# Patient Record
Sex: MALE | Race: WHITE | NOT HISPANIC OR LATINO | Employment: OTHER | URBAN - NONMETROPOLITAN AREA
[De-identification: names, ages, dates, MRNs, and addresses within clinical notes are randomized per-mention and may not be internally consistent; named-entity substitution may affect disease eponyms.]

---

## 2017-01-03 RX ORDER — DIGOXIN 250 UG/1
TABLET ORAL
Qty: 90 TABLET | Refills: 1 | Status: SHIPPED | OUTPATIENT
Start: 2017-01-03 | End: 2017-06-28 | Stop reason: SDUPTHER

## 2017-01-11 RX ORDER — ROSUVASTATIN CALCIUM 40 MG/1
TABLET, FILM COATED ORAL
Qty: 30 TABLET | Refills: 1 | Status: SHIPPED | OUTPATIENT
Start: 2017-01-11 | End: 2017-02-20 | Stop reason: ALTCHOICE

## 2017-02-06 RX ORDER — SACUBITRIL AND VALSARTAN 24; 26 MG/1; MG/1
TABLET, FILM COATED ORAL
Qty: 30 TABLET | Refills: 0 | OUTPATIENT
Start: 2017-02-06

## 2017-02-20 ENCOUNTER — LAB (OUTPATIENT)
Dept: LAB | Facility: HOSPITAL | Age: 62
End: 2017-02-20

## 2017-02-20 ENCOUNTER — DOCUMENTATION (OUTPATIENT)
Dept: CARDIOLOGY | Facility: CLINIC | Age: 62
End: 2017-02-20

## 2017-02-20 ENCOUNTER — OFFICE VISIT (OUTPATIENT)
Dept: CARDIOLOGY | Facility: CLINIC | Age: 62
End: 2017-02-20

## 2017-02-20 ENCOUNTER — TELEPHONE (OUTPATIENT)
Dept: CARDIOLOGY | Facility: CLINIC | Age: 62
End: 2017-02-20

## 2017-02-20 VITALS
WEIGHT: 202 LBS | SYSTOLIC BLOOD PRESSURE: 110 MMHG | BODY MASS INDEX: 27.36 KG/M2 | DIASTOLIC BLOOD PRESSURE: 59 MMHG | HEIGHT: 72 IN | HEART RATE: 88 BPM

## 2017-02-20 DIAGNOSIS — R00.1 SINUS BRADYCARDIA: ICD-10-CM

## 2017-02-20 DIAGNOSIS — I10 ESSENTIAL HYPERTENSION: ICD-10-CM

## 2017-02-20 DIAGNOSIS — E78.5 DYSLIPIDEMIA: ICD-10-CM

## 2017-02-20 DIAGNOSIS — E87.5 HYPERKALEMIA: Primary | ICD-10-CM

## 2017-02-20 DIAGNOSIS — Z86.79 HISTORY OF ASCVD (ATHEROSCLEROTIC CARDIOVASCULAR DISEASE): ICD-10-CM

## 2017-02-20 DIAGNOSIS — I48.0 PAROXYSMAL ATRIAL FIBRILLATION (HCC): Primary | ICD-10-CM

## 2017-02-20 DIAGNOSIS — I25.5 ISCHEMIC CARDIOMYOPATHY: ICD-10-CM

## 2017-02-20 DIAGNOSIS — I48.3 TYPICAL ATRIAL FLUTTER (HCC): ICD-10-CM

## 2017-02-20 DIAGNOSIS — Z79.899 DRUG THERAPY: ICD-10-CM

## 2017-02-20 DIAGNOSIS — I50.22 CHRONIC SYSTOLIC CONGESTIVE HEART FAILURE (HCC): ICD-10-CM

## 2017-02-20 DIAGNOSIS — Z79.899 DRUG THERAPY: Primary | ICD-10-CM

## 2017-02-20 DIAGNOSIS — I25.810 CORONARY ARTERY DISEASE INVOLVING CORONARY BYPASS GRAFT OF NATIVE HEART WITHOUT ANGINA PECTORIS: ICD-10-CM

## 2017-02-20 LAB
ANION GAP SERPL CALCULATED.3IONS-SCNC: 1.5 MMOL/L (ref 3.6–11.2)
BUN BLD-MCNC: 20 MG/DL (ref 7–21)
BUN/CREAT SERPL: 17.5 (ref 7–25)
CALCIUM SPEC-SCNC: 9.7 MG/DL (ref 7.7–10)
CHLORIDE SERPL-SCNC: 104 MMOL/L (ref 99–112)
CO2 SERPL-SCNC: 31.5 MMOL/L (ref 24.3–31.9)
CREAT BLD-MCNC: 1.14 MG/DL (ref 0.43–1.29)
GFR SERPL CREATININE-BSD FRML MDRD: 66 ML/MIN/1.73
GLUCOSE BLD-MCNC: 114 MG/DL (ref 70–110)
MAGNESIUM SERPL-MCNC: 2 MG/DL (ref 1.7–2.6)
OSMOLALITY SERPL CALC.SUM OF ELEC: 277.3 MOSM/KG (ref 273–305)
POTASSIUM BLD-SCNC: 5.8 MMOL/L (ref 3.5–5.3)
SODIUM BLD-SCNC: 137 MMOL/L (ref 135–153)

## 2017-02-20 PROCEDURE — 36415 COLL VENOUS BLD VENIPUNCTURE: CPT

## 2017-02-20 PROCEDURE — 99213 OFFICE O/P EST LOW 20 MIN: CPT | Performed by: INTERNAL MEDICINE

## 2017-02-20 PROCEDURE — 80048 BASIC METABOLIC PNL TOTAL CA: CPT | Performed by: PHYSICIAN ASSISTANT

## 2017-02-20 PROCEDURE — 83735 ASSAY OF MAGNESIUM: CPT | Performed by: PHYSICIAN ASSISTANT

## 2017-02-20 PROCEDURE — 93000 ELECTROCARDIOGRAM COMPLETE: CPT | Performed by: INTERNAL MEDICINE

## 2017-02-20 RX ORDER — ATORVASTATIN CALCIUM 80 MG/1
TABLET, FILM COATED ORAL
Qty: 90 TABLET | Refills: 1 | Status: SHIPPED | OUTPATIENT
Start: 2017-02-20 | End: 2017-04-24 | Stop reason: HOSPADM

## 2017-02-20 RX ORDER — ATORVASTATIN CALCIUM 80 MG/1
80 TABLET, FILM COATED ORAL EVERY EVENING
Qty: 30 TABLET | Refills: 5 | Status: SHIPPED | OUTPATIENT
Start: 2017-02-20 | End: 2017-02-20 | Stop reason: SDUPTHER

## 2017-02-20 NOTE — PROGRESS NOTES
Samuel Duane Kreis, MD  Rob Miranda  1955 11/14/2016    Patient Active Problem List   Diagnosis   • Paroxysmal atrial fibrillation,s/p pulmonay renee ablationin feb 2014 and march 2015,maintaing sinus rhythm with Tikosyn.   • Chronic systolic congestive heart failure, well compensated.   • Essential hypertension   • History of ASCVD (atherosclerotic cardiovascular disease) with previuos myocardial infarction and coronary artery bypass grafting in 1992.,last cardiac cath. 2004 revealed patent grafts.   • Advanced Ischemic cardiomyopathy with estimated ejection fraction of less than 20% in January 2015, status post biventricular ICD implantation,with [employer]s ICD 12/06 with gen, change in May 2013.   • Dyslipidemia, on Crestor.    • IDDM (insulin dependent diabetes mellitus)   • Atrial flutter, s/p RF ablation in march 2007   .          Dear Samuel Duane Kreis, MD:      Rob Miranda is a 62 y.o. male with the problems as listed above, presents cardiology followup for his multiple cardiac problems.      History of Present Illness:Patient presents for further evaluation and management of his atrial fibrillation, ischemic cardiomyopathy, LV dysfunction and ICD in the setting of heart failure. Since he was last seen in clinic, he states he continues to do better. His energy has improved. He has had no hospitalizations, ER visits, near syncope or syncope. He is unaware of any atrial fibrillation although he does note some tachypalpitations on occasion. He does continue to smoke although he has cut the amount down significantly. He has dyspnea with moderate exertion which is better than before.No c/o chest pain.    No Known Allergies:      Current Outpatient Prescriptions:   •  apixaban (ELIQUIS) 5 MG tablet tablet, Take 1 tablet by mouth 2 (two) times a day., Disp: 60 tablet, Rfl: 5  •  aspirin 81 MG tablet, Take 1 tablet by mouth Daily., Disp: 30 tablet, Rfl: 3  •  DIGOX 250 MCG tablet, TAKE 1 TABLET BY MOUTH  EVERY DAY, Disp: 90 tablet, Rfl: 1  •  dofetilide (TIKOSYN) 125 MCG capsule, Take 125 mcg by mouth 2 (Two) Times a Day., Disp: , Rfl:   •  dofetilide (TIKOSYN) 250 MCG capsule, Take 250 mcg by mouth 2 (Two) Times a Day., Disp: , Rfl:   •  furosemide (LASIX) 40 MG tablet, Take 40 mg by mouth daily., Disp: , Rfl:   •  insulin glargine (LANTUS) 100 UNIT/ML injection, Inject 40 Units under the skin every night., Disp: , Rfl:   •  metFORMIN (GLUCOPHAGE) 1000 MG tablet, Take 1,000 mg by mouth 2 (two) times a day with meals., Disp: , Rfl:   •  metoprolol tartrate (LOPRESSOR) 100 MG tablet, TAKE 1 TABLET BY MOUTH EVERY 12 HOURS, Disp: 60 tablet, Rfl: 5  •  sacubitril-valsartan (ENTRESTO) 49-51 MG tablet, Take 1 tablet by mouth 2 (Two) Times a Day., Disp: 60 tablet, Rfl: 3  •  sitaGLIPtin (JANUVIA) 100 MG tablet, Take 100 mg by mouth daily., Disp: , Rfl:   •  tamsulosin (FLOMAX) 0.4 MG capsule 24 hr capsule, Take 1 capsule by mouth every night., Disp: , Rfl:   •  atorvastatin (LIPITOR) 80 MG tablet, Take 1 tablet by mouth Every Evening for 30 days., Disp: 30 tablet, Rfl: 5      The following portions of the patient's history were reviewed and updated as appropriate: allergies, current medications, past family history, past medical history, past social history, past surgical history and problem list.    Social History   Substance Use Topics   • Smoking status: Current Every Day Smoker     Packs/day: 0.50     Types: Cigarettes   • Smokeless tobacco: Never Used   • Alcohol use No       Review of Systems   Constitution: Negative for chills, fever, weakness, malaise/fatigue, weight gain and weight loss.   HENT: Negative for congestion, headaches and nosebleeds.    Cardiovascular: Negative for chest pain, irregular heartbeat, leg swelling and orthopnea.   Respiratory: Negative for cough, hemoptysis and shortness of breath.    Gastrointestinal: Negative for abdominal pain, change in bowel habit, hematemesis, melena and vomiting.  "  Genitourinary: Negative for dysuria, frequency and hematuria.   Neurological: Negative for focal weakness and light-headedness.       Objective   Vitals:    02/20/17 0923   BP: 110/59   BP Location: Left arm   Patient Position: Sitting   Cuff Size: Adult   Pulse: 88   Weight: 202 lb (91.6 kg)   Height: 72\" (182.9 cm)     Body mass index is 27.4 kg/(m^2).        Physical Exam   Constitutional: He is oriented to person, place, and time. He appears well-developed and well-nourished.   HENT:   Head: Normocephalic.   Eyes: Conjunctivae and EOM are normal.   Neck: Normal range of motion. Neck supple. No JVD present. No tracheal deviation present. No thyromegaly present.   Cardiovascular: Normal rate and regular rhythm.  Exam reveals no gallop and no friction rub.    No murmur heard.  Pulmonary/Chest: Breath sounds normal. No respiratory distress. He has no wheezes. He has no rales.   Abdominal: Soft. Bowel sounds are normal. He exhibits no mass. There is no tenderness.   Musculoskeletal: He exhibits no edema.   Neurological: He is alert and oriented to person, place, and time. No cranial nerve deficit.   Skin: Skin is warm and dry.   Psychiatric: He has a normal mood and affect.       Lab Results   Component Value Date     03/10/2016    K 4.9 03/10/2016    CL 99 03/10/2016    CO2 28 03/10/2016    BUN 26 (H) 03/10/2016    CREATININE 1.1 03/10/2016    GLUCOSE 172 (H) 03/10/2016    CALCIUM 10.2 03/10/2016     Lab Results   Component Value Date    CKTOTAL 66 02/09/2014     Lab Results   Component Value Date    WBC 12.11 (H) 03/10/2016    HGB 16.8 03/10/2016    HCT 50.0 03/10/2016     03/10/2016     Lab Results   Component Value Date    INR 1.12 03/10/2016    INR 1.57 03/07/2015    INR 1.27 02/03/2014     Lab Results   Component Value Date    MG 1.5 07/01/2015     No results found for: TSH, PSA, CHLPL, TRIG, HDL, LDL   Lab Results   Component Value Date    BNP 1160 (H) 02/08/2014         ECG 12 Lead  Date/Time: " 2/20/2017 9:18 AM  Performed by: RANJITH EPPERSON  Authorized by: RANJITH EPPERSON   Comments: Electronic atrial paced rhythm.          Assessment/Plan :    1. History of ASCVD (atherosclerotic cardiovascular disease) with previuos myocardial infarction and coronary artery bypass grafting in 1992, clinically stable.      2. Advanced Ischemic cardiomyopathy with estimated ejection fraction of less than 20% in January 2015, status post biventricular ICD implantation with [employer]s device in 2007 with gen.change in 2013.    3. Chronic systolic congestive heart failure, well compensated.     4. Typical atrial flutter, s/p RF ablation     5. Dyslipidemia, on Crestor.      6. Essential hypertension     8. Paroxysmal atrial fibrillation,s/p pulmonary renee ablationx2 in 2/14 and 3/15.on Tikosyn and maintaing sinus rythm.          Recommendations:    Will try to do prior authorization for rosuvastatin as he had myalgias with atorvastatin.  For now Start atorvastatin 80 mg po qhs as his insurance company reportedly declined to pay for Crestor (rosuvastatin).  Continue other medications.   Advised on low sodium/salt diet.  Return in about 5 months (around 7/20/2017).    As always, I appreciate very much the opportunity to participate in the cardiovascular care of your patients.      With Best Regards,    Ranjith Epperson MD, FACC

## 2017-02-20 NOTE — TELEPHONE ENCOUNTER
Pt was here for a follow today. When he was here in November  had ordered a CMP and Mag level on him. He stated he had some labs done at his PCP about a month ago. I called and requested his labs. They had done a CMP on him and not a mag level. Advised Eliz and she stated was wanting to see if his PCP had recheck his potassium and they hadnt. She stated to have get a BMP and Mag level done today when he leaves the office. Advised pt and he expressed understanding.

## 2017-02-21 ENCOUNTER — APPOINTMENT (OUTPATIENT)
Dept: LAB | Facility: HOSPITAL | Age: 62
End: 2017-02-21
Attending: INTERNAL MEDICINE

## 2017-02-21 ENCOUNTER — RESULTS ENCOUNTER (OUTPATIENT)
Dept: CARDIOLOGY | Facility: CLINIC | Age: 62
End: 2017-02-21

## 2017-02-21 DIAGNOSIS — E87.5 HYPERKALEMIA: ICD-10-CM

## 2017-02-21 PROCEDURE — 80048 BASIC METABOLIC PNL TOTAL CA: CPT | Performed by: INTERNAL MEDICINE

## 2017-02-21 NOTE — PROGRESS NOTES
[facility] lab just called earlier about the potassium results on Mr. Miranda and indicated that it was elevated at 5.8.  I called Mr. Miranda at [phone] and asked for Mr. Miranda but his wife came on the phone and said that she will take the message and convey this to her .  I told her that his potassium level was elevated at 5.8 and he needs to take a dose of Kayexalate 30 g by mouth this evening.  She asked me to call this to [facility] in [place] which I did.  I told his wife to pick this up this evening and gave this dose to Mr. Miranda as soon as possible.  I told his wife to get a basic panel checked again in the morning.  Recheck the potassium level.  I also told her for him to cut back on the Entresto  dose back down to 24/26 mg by mouth twice a day.  She said she has a bottle of this dose that has been on unopened and left from before and she will start him on the smaller dose.  I told her that I would like to keep a close check on his potassium level and get another BMP in a couple of days.  I told her that I would order this to be done at [facility] and will put orders in the Epic.  She was instructed to go to the [facility] in a.m. and then in 2 days to get the blood test done.  I also told her that he should just stop drinking any fluids juices and eating any foods and nuts which could raise the potassium levels.  She expresses understanding of all this discussion.

## 2017-02-22 ENCOUNTER — TELEPHONE (OUTPATIENT)
Dept: CARDIOLOGY | Facility: CLINIC | Age: 62
End: 2017-02-22

## 2017-02-22 LAB
BUN SERPL-MCNC: 22 MG/DL (ref 8–27)
BUN/CREAT SERPL: 20 (ref 10–22)
CALCIUM SERPL-MCNC: 9.2 MG/DL (ref 8.6–10.2)
CHLORIDE SERPL-SCNC: 98 MMOL/L (ref 96–106)
CO2 SERPL-SCNC: 19 MMOL/L (ref 18–29)
CREAT SERPL-MCNC: 1.1 MG/DL (ref 0.76–1.27)
GLUCOSE SERPL-MCNC: 106 MG/DL (ref 65–99)
POTASSIUM SERPL-SCNC: 5.2 MMOL/L (ref 3.5–5.2)
SODIUM SERPL-SCNC: 138 MMOL/L (ref 134–144)

## 2017-02-22 NOTE — TELEPHONE ENCOUNTER
----- Message from Erica Peterson sent at 2/22/2017  2:48 PM EST -----  Regarding: medication questions  Contact: [phone]  Pt has a few questions about a medication that was changed and they wanted to see if he was taking the correct dose  Of his medication, i told them someone would give him a call and let him know.      Thank you        Called pt and went over his meds. Confirmed with Eliz COLLINS  He voiced understanding.   Sent in new RX for this.

## 2017-02-23 ENCOUNTER — APPOINTMENT (OUTPATIENT)
Dept: LAB | Facility: HOSPITAL | Age: 62
End: 2017-02-23
Attending: INTERNAL MEDICINE

## 2017-02-23 ENCOUNTER — RESULTS ENCOUNTER (OUTPATIENT)
Dept: CARDIOLOGY | Facility: CLINIC | Age: 62
End: 2017-02-23

## 2017-02-23 ENCOUNTER — DOCUMENTATION (OUTPATIENT)
Dept: CARDIOLOGY | Facility: CLINIC | Age: 62
End: 2017-02-23

## 2017-02-23 DIAGNOSIS — E87.5 HYPERKALEMIA: ICD-10-CM

## 2017-02-23 PROCEDURE — 36415 COLL VENOUS BLD VENIPUNCTURE: CPT | Performed by: INTERNAL MEDICINE

## 2017-02-23 PROCEDURE — 80048 BASIC METABOLIC PNL TOTAL CA: CPT | Performed by: INTERNAL MEDICINE

## 2017-02-23 NOTE — PROGRESS NOTES
Please call the lab and see if they have his BMP results from this morning? We are waiting to see if his potassium is still high or not.

## 2017-02-24 ENCOUNTER — TELEPHONE (OUTPATIENT)
Dept: CARDIOLOGY | Facility: CLINIC | Age: 62
End: 2017-02-24

## 2017-02-24 LAB
BUN SERPL-MCNC: 18 MG/DL (ref 8–27)
BUN/CREAT SERPL: 17 (ref 10–22)
CALCIUM SERPL-MCNC: 9.6 MG/DL (ref 8.6–10.2)
CHLORIDE SERPL-SCNC: 98 MMOL/L (ref 96–106)
CO2 SERPL-SCNC: 23 MMOL/L (ref 18–29)
CREAT SERPL-MCNC: 1.07 MG/DL (ref 0.76–1.27)
GLUCOSE SERPL-MCNC: 93 MG/DL (ref 65–99)
POTASSIUM SERPL-SCNC: 5.1 MMOL/L (ref 3.5–5.2)
SODIUM SERPL-SCNC: 141 MMOL/L (ref 134–144)

## 2017-02-24 NOTE — TELEPHONE ENCOUNTER
----- Message from MIGUEL Aguayo sent at 2/24/2017  8:52 AM EST -----  Potassium improving. Recheck BMP 1 week. Continue to avoid foods high in potassium, fruit juices, nuts, fruits, etc.

## 2017-02-27 ENCOUNTER — TELEPHONE (OUTPATIENT)
Dept: CARDIOLOGY | Facility: CLINIC | Age: 62
End: 2017-02-27

## 2017-02-27 NOTE — TELEPHONE ENCOUNTER
Please call Dr. Rivera' office and see which one he is supposed to be taking. They may have discontinued one when they started the other.

## 2017-02-27 NOTE — TELEPHONE ENCOUNTER
CALLED DR MENDOZA' OFFICE AND REMIGIO WILL CALL ME BACK WITH ANSWER TO WHICH MEDS PATIENT IS TO TAKE.

## 2017-02-27 NOTE — TELEPHONE ENCOUNTER
----- Message from Subha Caicedo MA sent at 2/27/2017  2:32 PM EST -----  Regarding: statins  Patient concerned that Dr Rivera has him on Atorvastatin 80mg 1daily and Rosuvastatin 20mg 1 daily.  Is this ok.  He is very concerned.      Thanks

## 2017-03-06 ENCOUNTER — LAB (OUTPATIENT)
Dept: LAB | Facility: HOSPITAL | Age: 62
End: 2017-03-06
Attending: INTERNAL MEDICINE

## 2017-03-06 DIAGNOSIS — Z79.899 DRUG THERAPY: ICD-10-CM

## 2017-03-06 LAB
ALBUMIN SERPL-MCNC: 4.3 G/DL (ref 3.4–4.8)
ALBUMIN/GLOB SERPL: 1.3 G/DL (ref 1.5–2.5)
ALP SERPL-CCNC: 68 U/L (ref 40–129)
ALT SERPL W P-5'-P-CCNC: 16 U/L (ref 10–44)
ANION GAP SERPL CALCULATED.3IONS-SCNC: 2.8 MMOL/L (ref 3.6–11.2)
AST SERPL-CCNC: 21 U/L (ref 10–34)
BILIRUB SERPL-MCNC: 0.5 MG/DL (ref 0.2–1.8)
BUN BLD-MCNC: 13 MG/DL (ref 7–21)
BUN/CREAT SERPL: 14.1 (ref 7–25)
CALCIUM SPEC-SCNC: 10.1 MG/DL (ref 7.7–10)
CHLORIDE SERPL-SCNC: 104 MMOL/L (ref 99–112)
CHOLEST SERPL-MCNC: 144 MG/DL (ref 0–200)
CO2 SERPL-SCNC: 34.2 MMOL/L (ref 24.3–31.9)
CREAT BLD-MCNC: 0.92 MG/DL (ref 0.43–1.29)
GFR SERPL CREATININE-BSD FRML MDRD: 84 ML/MIN/1.73
GLOBULIN UR ELPH-MCNC: 3.3 GM/DL
GLUCOSE BLD-MCNC: 124 MG/DL (ref 70–110)
HDLC SERPL-MCNC: 34 MG/DL (ref 60–100)
LDLC SERPL CALC-MCNC: 87 MG/DL (ref 0–100)
LDLC/HDLC SERPL: 2.55 {RATIO}
OSMOLALITY SERPL CALC.SUM OF ELEC: 282.8 MOSM/KG (ref 273–305)
POTASSIUM BLD-SCNC: 4.9 MMOL/L (ref 3.5–5.3)
PROT SERPL-MCNC: 7.6 G/DL (ref 6–8)
SODIUM BLD-SCNC: 141 MMOL/L (ref 135–153)
TRIGL SERPL-MCNC: 116 MG/DL (ref 0–150)
VLDLC SERPL-MCNC: 23.2 MG/DL

## 2017-03-06 PROCEDURE — 36415 COLL VENOUS BLD VENIPUNCTURE: CPT

## 2017-03-06 PROCEDURE — 80061 LIPID PANEL: CPT | Performed by: INTERNAL MEDICINE

## 2017-03-06 PROCEDURE — 80053 COMPREHEN METABOLIC PANEL: CPT | Performed by: INTERNAL MEDICINE

## 2017-03-15 RX ORDER — SACUBITRIL AND VALSARTAN 49; 51 MG/1; MG/1
TABLET, FILM COATED ORAL
Qty: 60 TABLET | Refills: 2 | Status: ON HOLD | OUTPATIENT
Start: 2017-03-15 | End: 2017-04-24

## 2017-04-12 ENCOUNTER — CLINICAL SUPPORT NO REQUIREMENTS (OUTPATIENT)
Dept: CARDIOLOGY | Facility: CLINIC | Age: 62
End: 2017-04-12

## 2017-04-12 DIAGNOSIS — I50.22 CHRONIC SYSTOLIC CONGESTIVE HEART FAILURE (HCC): ICD-10-CM

## 2017-04-12 DIAGNOSIS — I48.0 PAROXYSMAL ATRIAL FIBRILLATION (HCC): ICD-10-CM

## 2017-04-12 PROCEDURE — 93295 DEV INTERROG REMOTE 1/2/MLT: CPT | Performed by: INTERNAL MEDICINE

## 2017-04-12 PROCEDURE — 93296 REM INTERROG EVL PM/IDS: CPT | Performed by: INTERNAL MEDICINE

## 2017-04-14 ENCOUNTER — DOCUMENTATION (OUTPATIENT)
Dept: CARDIOLOGY | Facility: CLINIC | Age: 62
End: 2017-04-14

## 2017-04-14 NOTE — PROGRESS NOTES
Pt showed up today regarding a message left on his voicemail that he didn't understand.  On 4/12/17 pt was in afib on transmission from his AICD.  Pt is feeling good and Dr Edwards wants to f/u with another reading next week.  Explained to pt and he verbalized understanding.  Gave pt my name and number and he is going to send in a reading next week then call me.

## 2017-04-21 ENCOUNTER — PREP FOR SURGERY (OUTPATIENT)
Dept: CARDIOLOGY | Facility: CLINIC | Age: 62
End: 2017-04-21

## 2017-04-21 ENCOUNTER — TELEPHONE (OUTPATIENT)
Dept: CARDIOLOGY | Facility: CLINIC | Age: 62
End: 2017-04-21

## 2017-04-21 DIAGNOSIS — I48.19 PERSISTENT ATRIAL FIBRILLATION (HCC): Primary | ICD-10-CM

## 2017-04-21 DIAGNOSIS — I48.91 ATRIAL FIBRILLATION, UNSPECIFIED TYPE (HCC): Primary | ICD-10-CM

## 2017-04-21 RX ORDER — PROMETHAZINE HYDROCHLORIDE 25 MG/ML
12.5 INJECTION, SOLUTION INTRAMUSCULAR; INTRAVENOUS EVERY 4 HOURS PRN
Status: CANCELLED | OUTPATIENT
Start: 2017-04-21

## 2017-04-21 RX ORDER — NITROGLYCERIN 0.4 MG/1
0.4 TABLET SUBLINGUAL
Status: CANCELLED | OUTPATIENT
Start: 2017-04-21

## 2017-04-21 RX ORDER — ACETAMINOPHEN 325 MG/1
650 TABLET ORAL EVERY 4 HOURS PRN
Status: CANCELLED | OUTPATIENT
Start: 2017-04-21

## 2017-04-24 ENCOUNTER — HOSPITAL ENCOUNTER (OUTPATIENT)
Dept: CARDIOLOGY | Facility: HOSPITAL | Age: 62
Discharge: HOME | End: 2017-04-24
Attending: INTERNAL MEDICINE | Admitting: INTERNAL MEDICINE

## 2017-04-24 VITALS
RESPIRATION RATE: 16 BRPM | OXYGEN SATURATION: 99 % | DIASTOLIC BLOOD PRESSURE: 76 MMHG | TEMPERATURE: 97.3 F | HEART RATE: 80 BPM | SYSTOLIC BLOOD PRESSURE: 114 MMHG

## 2017-04-24 DIAGNOSIS — I48.91 ATRIAL FIBRILLATION, UNSPECIFIED TYPE (HCC): ICD-10-CM

## 2017-04-24 LAB
ANION GAP SERPL CALCULATED.3IONS-SCNC: 4 MMOL/L (ref 3–11)
BUN BLD-MCNC: 15 MG/DL (ref 9–23)
BUN/CREAT SERPL: 18.8 (ref 7–25)
CALCIUM SPEC-SCNC: 9.1 MG/DL (ref 8.7–10.4)
CHLORIDE SERPL-SCNC: 108 MMOL/L (ref 99–109)
CO2 SERPL-SCNC: 29 MMOL/L (ref 20–31)
CREAT BLD-MCNC: 0.8 MG/DL (ref 0.6–1.3)
DEPRECATED RDW RBC AUTO: 47.6 FL (ref 37–54)
ERYTHROCYTE [DISTWIDTH] IN BLOOD BY AUTOMATED COUNT: 14.9 % (ref 11.3–14.5)
GFR SERPL CREATININE-BSD FRML MDRD: 99 ML/MIN/1.73
GLUCOSE BLD-MCNC: 77 MG/DL (ref 70–100)
HBA1C MFR BLD: 7.8 % (ref 4.8–5.6)
HCT VFR BLD AUTO: 48.5 % (ref 38.9–50.9)
HGB BLD-MCNC: 15.6 G/DL (ref 13.1–17.5)
INR PPP: 1.28
MCH RBC QN AUTO: 28 PG (ref 27–31)
MCHC RBC AUTO-ENTMCNC: 32.2 G/DL (ref 32–36)
MCV RBC AUTO: 87.1 FL (ref 80–99)
PLATELET # BLD AUTO: 207 10*3/MM3 (ref 150–450)
PMV BLD AUTO: 10.2 FL (ref 6–12)
POTASSIUM BLD-SCNC: 4.6 MMOL/L (ref 3.5–5.5)
PROTHROMBIN TIME: 14.1 SECONDS (ref 9.6–11.5)
RBC # BLD AUTO: 5.57 10*6/MM3 (ref 4.2–5.76)
SODIUM BLD-SCNC: 141 MMOL/L (ref 132–146)
WBC NRBC COR # BLD: 12.16 10*3/MM3 (ref 3.5–10.8)

## 2017-04-24 PROCEDURE — 85610 PROTHROMBIN TIME: CPT | Performed by: INTERNAL MEDICINE

## 2017-04-24 PROCEDURE — 80048 BASIC METABOLIC PNL TOTAL CA: CPT | Performed by: INTERNAL MEDICINE

## 2017-04-24 PROCEDURE — 93724 ELEC ALYS ANTITCHYCAR PM SYS: CPT | Performed by: INTERNAL MEDICINE

## 2017-04-24 PROCEDURE — 83036 HEMOGLOBIN GLYCOSYLATED A1C: CPT | Performed by: PHYSICIAN ASSISTANT

## 2017-04-24 PROCEDURE — 92960 CARDIOVERSION ELECTRIC EXT: CPT

## 2017-04-24 PROCEDURE — 93005 ELECTROCARDIOGRAM TRACING: CPT | Performed by: INTERNAL MEDICINE

## 2017-04-24 PROCEDURE — 85027 COMPLETE CBC AUTOMATED: CPT | Performed by: INTERNAL MEDICINE

## 2017-04-24 PROCEDURE — 36415 COLL VENOUS BLD VENIPUNCTURE: CPT

## 2017-04-24 PROCEDURE — 25010000002 MIDAZOLAM PER 1 MG: Performed by: INTERNAL MEDICINE

## 2017-04-24 RX ORDER — MIDAZOLAM HYDROCHLORIDE 1 MG/ML
INJECTION INTRAMUSCULAR; INTRAVENOUS
Status: DISCONTINUED
Start: 2017-04-24 | End: 2017-04-24 | Stop reason: HOSPADM

## 2017-04-24 RX ORDER — NITROGLYCERIN 0.4 MG/1
0.4 TABLET SUBLINGUAL
Status: DISCONTINUED | OUTPATIENT
Start: 2017-04-24 | End: 2017-04-24 | Stop reason: HOSPADM

## 2017-04-24 RX ORDER — NALOXONE HYDROCHLORIDE 0.4 MG/ML
INJECTION, SOLUTION INTRAMUSCULAR; INTRAVENOUS; SUBCUTANEOUS
Status: DISCONTINUED
Start: 2017-04-24 | End: 2017-04-24 | Stop reason: WASHOUT

## 2017-04-24 RX ORDER — ACETAMINOPHEN 325 MG/1
650 TABLET ORAL EVERY 4 HOURS PRN
Status: DISCONTINUED | OUTPATIENT
Start: 2017-04-24 | End: 2017-04-24 | Stop reason: HOSPADM

## 2017-04-24 RX ORDER — MIDAZOLAM HYDROCHLORIDE 1 MG/ML
INJECTION INTRAMUSCULAR; INTRAVENOUS
Status: COMPLETED | OUTPATIENT
Start: 2017-04-24 | End: 2017-04-24

## 2017-04-24 RX ORDER — PROMETHAZINE HYDROCHLORIDE 25 MG/ML
12.5 INJECTION, SOLUTION INTRAMUSCULAR; INTRAVENOUS EVERY 4 HOURS PRN
Status: DISCONTINUED | OUTPATIENT
Start: 2017-04-24 | End: 2017-04-24 | Stop reason: HOSPADM

## 2017-04-24 RX ORDER — FLUMAZENIL 0.1 MG/ML
INJECTION INTRAVENOUS
Status: DISCONTINUED
Start: 2017-04-24 | End: 2017-04-24 | Stop reason: WASHOUT

## 2017-04-24 RX ORDER — ROSUVASTATIN CALCIUM 20 MG/1
20 TABLET, COATED ORAL NIGHTLY
COMMUNITY
End: 2017-05-26 | Stop reason: DRUGHIGH

## 2017-04-24 RX ADMIN — METHOHEXITAL SODIUM 20 MG: 500 INJECTION, POWDER, LYOPHILIZED, FOR SOLUTION INTRAMUSCULAR; INTRAVENOUS; RECTAL at 13:01

## 2017-04-24 RX ADMIN — METHOHEXITAL SODIUM 20 MG: 500 INJECTION, POWDER, LYOPHILIZED, FOR SOLUTION INTRAMUSCULAR; INTRAVENOUS; RECTAL at 13:02

## 2017-04-24 RX ADMIN — MIDAZOLAM HYDROCHLORIDE 1 MG: 1 INJECTION, SOLUTION INTRAMUSCULAR; INTRAVENOUS at 13:01

## 2017-04-26 ENCOUNTER — RESULTS ENCOUNTER (OUTPATIENT)
Dept: CARDIOLOGY | Facility: CLINIC | Age: 62
End: 2017-04-26

## 2017-04-26 DIAGNOSIS — I48.19 PERSISTENT ATRIAL FIBRILLATION (HCC): ICD-10-CM

## 2017-05-09 ENCOUNTER — LAB (OUTPATIENT)
Dept: LAB | Facility: HOSPITAL | Age: 62
End: 2017-05-09
Attending: INTERNAL MEDICINE

## 2017-05-09 DIAGNOSIS — Z79.899 DRUG THERAPY: ICD-10-CM

## 2017-05-09 LAB
ALBUMIN SERPL-MCNC: 4.4 G/DL (ref 3.4–4.8)
ALBUMIN/GLOB SERPL: 1.4 G/DL (ref 1.5–2.5)
ALP SERPL-CCNC: 64 U/L (ref 40–129)
ALT SERPL W P-5'-P-CCNC: 17 U/L (ref 10–44)
ANION GAP SERPL CALCULATED.3IONS-SCNC: 4.5 MMOL/L (ref 3.6–11.2)
AST SERPL-CCNC: 24 U/L (ref 10–34)
BILIRUB SERPL-MCNC: 0.6 MG/DL (ref 0.2–1.8)
BUN BLD-MCNC: 17 MG/DL (ref 7–21)
BUN/CREAT SERPL: 19.8 (ref 7–25)
CALCIUM SPEC-SCNC: 10.6 MG/DL (ref 7.7–10)
CHLORIDE SERPL-SCNC: 105 MMOL/L (ref 99–112)
CO2 SERPL-SCNC: 31.5 MMOL/L (ref 24.3–31.9)
CREAT BLD-MCNC: 0.86 MG/DL (ref 0.43–1.29)
GFR SERPL CREATININE-BSD FRML MDRD: 91 ML/MIN/1.73
GLOBULIN UR ELPH-MCNC: 3.2 GM/DL
GLUCOSE BLD-MCNC: 64 MG/DL (ref 70–110)
MAGNESIUM SERPL-MCNC: 1.6 MG/DL (ref 1.7–2.6)
OSMOLALITY SERPL CALC.SUM OF ELEC: 280.9 MOSM/KG (ref 273–305)
POTASSIUM BLD-SCNC: 4.8 MMOL/L (ref 3.5–5.3)
PROT SERPL-MCNC: 7.6 G/DL (ref 6–8)
SODIUM BLD-SCNC: 141 MMOL/L (ref 135–153)

## 2017-05-09 PROCEDURE — 80053 COMPREHEN METABOLIC PANEL: CPT | Performed by: INTERNAL MEDICINE

## 2017-05-09 PROCEDURE — 36415 COLL VENOUS BLD VENIPUNCTURE: CPT

## 2017-05-09 PROCEDURE — 83735 ASSAY OF MAGNESIUM: CPT | Performed by: INTERNAL MEDICINE

## 2017-05-10 DIAGNOSIS — Z79.899 DRUG THERAPY: Primary | ICD-10-CM

## 2017-05-11 ENCOUNTER — OFFICE VISIT (OUTPATIENT)
Dept: CARDIOLOGY | Facility: CLINIC | Age: 62
End: 2017-05-11

## 2017-05-11 VITALS
HEIGHT: 72 IN | BODY MASS INDEX: 26.28 KG/M2 | SYSTOLIC BLOOD PRESSURE: 102 MMHG | DIASTOLIC BLOOD PRESSURE: 61 MMHG | RESPIRATION RATE: 16 BRPM | HEART RATE: 80 BPM | WEIGHT: 194 LBS

## 2017-05-11 DIAGNOSIS — E78.2 MIXED HYPERLIPIDEMIA: ICD-10-CM

## 2017-05-11 DIAGNOSIS — I50.22 CHRONIC SYSTOLIC CONGESTIVE HEART FAILURE (HCC): ICD-10-CM

## 2017-05-11 DIAGNOSIS — I48.0 PAROXYSMAL ATRIAL FIBRILLATION (HCC): ICD-10-CM

## 2017-05-11 DIAGNOSIS — Z86.79 HISTORY OF ASCVD (ATHEROSCLEROTIC CARDIOVASCULAR DISEASE): ICD-10-CM

## 2017-05-11 DIAGNOSIS — I25.5 ISCHEMIC CARDIOMYOPATHY: Primary | ICD-10-CM

## 2017-05-11 DIAGNOSIS — E83.42 HYPOMAGNESEMIA: ICD-10-CM

## 2017-05-11 DIAGNOSIS — Z72.0 TOBACCO USE: ICD-10-CM

## 2017-05-11 PROCEDURE — 99213 OFFICE O/P EST LOW 20 MIN: CPT | Performed by: INTERNAL MEDICINE

## 2017-05-11 PROCEDURE — 93000 ELECTROCARDIOGRAM COMPLETE: CPT | Performed by: INTERNAL MEDICINE

## 2017-05-11 RX ORDER — METOPROLOL TARTRATE 100 MG/1
TABLET ORAL
Qty: 60 TABLET | Refills: 5 | Status: SHIPPED | OUTPATIENT
Start: 2017-05-11 | End: 2017-06-29 | Stop reason: SDUPTHER

## 2017-05-11 RX ORDER — FUROSEMIDE 40 MG/1
40 TABLET ORAL DAILY
Qty: 30 TABLET | Refills: 5 | Status: SHIPPED | OUTPATIENT
Start: 2017-05-11 | End: 2017-06-10

## 2017-05-15 ENCOUNTER — RESULTS ENCOUNTER (OUTPATIENT)
Dept: CARDIOLOGY | Facility: CLINIC | Age: 62
End: 2017-05-15

## 2017-05-15 DIAGNOSIS — Z79.899 DRUG THERAPY: ICD-10-CM

## 2017-05-15 DIAGNOSIS — E83.42 HYPOMAGNESEMIA: ICD-10-CM

## 2017-05-19 ENCOUNTER — TELEPHONE (OUTPATIENT)
Dept: CARDIOLOGY | Facility: CLINIC | Age: 62
End: 2017-05-19

## 2017-05-26 DIAGNOSIS — Z79.899 DRUG THERAPY: Primary | ICD-10-CM

## 2017-05-26 RX ORDER — ROSUVASTATIN CALCIUM 40 MG/1
40 TABLET, COATED ORAL DAILY
Qty: 30 TABLET | Refills: 3 | Status: SHIPPED | OUTPATIENT
Start: 2017-05-26 | End: 2017-07-10 | Stop reason: SDUPTHER

## 2017-06-28 RX ORDER — DIGOXIN 250 UG/1
TABLET ORAL
Qty: 90 TABLET | Refills: 1 | Status: SHIPPED | OUTPATIENT
Start: 2017-06-28 | End: 2017-06-29 | Stop reason: SDUPTHER

## 2017-06-29 ENCOUNTER — OFFICE VISIT (OUTPATIENT)
Dept: CARDIOLOGY | Facility: CLINIC | Age: 62
End: 2017-06-29

## 2017-06-29 VITALS
HEART RATE: 80 BPM | BODY MASS INDEX: 26.74 KG/M2 | HEIGHT: 72 IN | WEIGHT: 197.4 LBS | DIASTOLIC BLOOD PRESSURE: 68 MMHG | SYSTOLIC BLOOD PRESSURE: 116 MMHG

## 2017-06-29 DIAGNOSIS — I48.19 PERSISTENT ATRIAL FIBRILLATION (HCC): Primary | ICD-10-CM

## 2017-06-29 DIAGNOSIS — Z72.0 TOBACCO USE: ICD-10-CM

## 2017-06-29 DIAGNOSIS — I25.810 CORONARY ARTERY DISEASE INVOLVING CORONARY BYPASS GRAFT OF NATIVE HEART WITHOUT ANGINA PECTORIS: ICD-10-CM

## 2017-06-29 DIAGNOSIS — I50.22 CHRONIC SYSTOLIC CONGESTIVE HEART FAILURE (HCC): ICD-10-CM

## 2017-06-29 PROCEDURE — 99213 OFFICE O/P EST LOW 20 MIN: CPT | Performed by: PHYSICIAN ASSISTANT

## 2017-06-29 PROCEDURE — 93289 INTERROG DEVICE EVAL HEART: CPT | Performed by: PHYSICIAN ASSISTANT

## 2017-06-29 RX ORDER — DIGOXIN 250 MCG
250 TABLET ORAL
Qty: 90 TABLET | Refills: 2 | Status: SHIPPED | OUTPATIENT
Start: 2017-06-29 | End: 2018-08-10 | Stop reason: SDUPTHER

## 2017-06-29 RX ORDER — DOFETILIDE 0.25 MG/1
250 CAPSULE ORAL 2 TIMES DAILY
Qty: 180 CAPSULE | Refills: 2 | Status: SHIPPED | OUTPATIENT
Start: 2017-06-29 | End: 2017-12-08 | Stop reason: SDUPTHER

## 2017-06-29 RX ORDER — METOPROLOL TARTRATE 100 MG/1
TABLET ORAL
Qty: 180 TABLET | Refills: 3 | Status: SHIPPED | OUTPATIENT
Start: 2017-06-29 | End: 2018-04-30 | Stop reason: ALTCHOICE

## 2017-06-29 RX ORDER — DOFETILIDE 0.12 MG/1
125 CAPSULE ORAL 2 TIMES DAILY
Qty: 180 CAPSULE | Refills: 2 | Status: SHIPPED | OUTPATIENT
Start: 2017-06-29 | End: 2017-12-08 | Stop reason: SDUPTHER

## 2017-06-29 NOTE — PROGRESS NOTES
Rob Miranda  1955  [phone]      06/29/2017    [facility] CARDIOLOGY     Samuel Duane Kreis, MD  [address] [place] [address] [address] [place] [address]    Chief Complaint   Patient presents with   • Atrial Fibrillation       Problem List:   Paroxysmal Atrial Fibrillation      a. Initiation of Tikosyn, May 2013.  b. Inappropriate ICD shocks secondary to atrial fibrillation with RVR.   c. Pulmonary vein ablation, 02/04/2014; inability to isolate the right inferior pulmonary vein secondary to near proximity to the esophagus.   d. EVA, 02/04/2014; EF 23%, left atrium severely dilated.   e. On 03/16/2015, pulmonary vein ablation (re-isolation of all four pulmonary veins with linear lines along the left roof and mitral annulus, ablation of left atrial rotors). Procedure complicated by transient hypotension secondary to acidosis in the setting of severe LV dysfunction, and obstructive pulmonary disease.  f. Recurrent episode of atrial fibrillation with internal cardioversion, 07/02/2015.  g. Recurrent Atrial Fibrillation s/p ICV 4/24/17       Chronic Systolic Congestive Heart Failure      g. Previous MI.  h. CABG in 1991.   i. Left heart catheterization 2004: Patent grafts, EF estimated at 30% to 35%.   j. A 2D echocardiogram, 09/08/2006: EF estimated at 30% to 35%.   k. GXT Cardiolite, 09/08/2006: EF estimated at 20% to 25%, no inducible ischemia.   l. [employer] DDD-ICD, 12/04/2006, Dr. Márquez.  m. Echocardiogram, June 2012 with EF 25%.   n. ICD generator change out, May 2013.        Coronary Artery Disease Involving Coronary Bypass Graft of Native Heart Without Angina Pectoris   Essential Hypertension         Allergies  Allergies   Allergen Reactions   • Shellfish-Derived Products        Current Medications    Current Outpatient Prescriptions:   •  apixaban (ELIQUIS) 5 MG tablet tablet, Take 1 tablet by mouth 2 (two) times a day., Disp: 60 tablet, Rfl: 5  •  aspirin 81 MG tablet, Take 1  "tablet by mouth Daily., Disp: 30 tablet, Rfl: 3  •  DIGOX 250 MCG tablet, TAKE 1 TABLET BY MOUTH EVERY DAY, Disp: 90 tablet, Rfl: 1  •  dofetilide (TIKOSYN) 125 MCG capsule, Take 125 mcg by mouth 2 (Two) Times a Day., Disp: , Rfl:   •  dofetilide (TIKOSYN) 250 MCG capsule, Take 250 mcg by mouth 2 (Two) Times a Day., Disp: , Rfl:   •  insulin glargine (LANTUS) 100 UNIT/ML injection, Inject 40 Units under the skin every night., Disp: , Rfl:   •  magnesium gluconate (MAGONATE) 30 MG tablet, Take 1 tablet by mouth 2 (Two) Times a Day., Disp: 60 tablet, Rfl: 3  •  metFORMIN (GLUCOPHAGE) 1000 MG tablet, Take 1,000 mg by mouth 2 (two) times a day with meals., Disp: , Rfl:   •  metoprolol tartrate (LOPRESSOR) 100 MG tablet, Date 1 tablet by mouth twice a day., Disp: 60 tablet, Rfl: 5  •  rosuvastatin (CRESTOR) 40 MG tablet, Take 1 tablet by mouth Daily., Disp: 30 tablet, Rfl: 3  •  sacubitril-valsartan (ENTRESTO) 24-26 MG tablet, Take 1 tablet by mouth 2 (Two) Times a Day., Disp: 60 tablet, Rfl:   •  sitaGLIPtin (JANUVIA) 100 MG tablet, Take 100 mg by mouth daily., Disp: , Rfl:   •  tamsulosin (FLOMAX) 0.4 MG capsule 24 hr capsule, Take 1 capsule by mouth every night., Disp: , Rfl:     History of Present Illness   HPI    Pt presents for follow up of ICM, ICD check, and persistent afib s/p recent ICV. Since we last saw the pt on 4/24/17 for ICV, pt denies any AF episodes, SOB, CP, LH, and dizziness/syncope. Denies any hospitalizations, ER visits, bleeding on his Eliquis, or TIA/CVA symptoms. Overall feels well and feels much better since his ICV.     ROS:  General:  Denies fatigue, weight gain or loss  Cardiovascular:  Denies CP, PND, syncope, near syncope, edema or palpitations.  Pulmonary:  Denies NGUYEN, cough, or wheezing      Vitals:    06/29/17 1220   BP: 116/68   BP Location: Right arm   Patient Position: Sitting   Pulse: 80   Weight: 197 lb 6.4 oz (89.5 kg)   Height: 72\" (182.9 cm)     PE:  General: NAD  Neck: no JVD, " no carotid bruits, no TM  Heart RRR, NL S1, S2, S4 present, no rubs, murmurs  Lungs: CTA, no wheezes, rhonchi, or rales  Abd: soft, non-tender, NL BS  Ext: No musculoskeletal deformities, no edema, cyanosis, or clubbing  Psych: normal mood and affect    Diagnostic Data:  Procedures     ICD check: normal function, no afib, 3.5 years on battery. V threshold is 1.0 V at 0.5 ms    1. Persistent atrial fibrillation    2. Chronic systolic congestive heart failure, well compensated.    3. Coronary artery disease involving coronary bypass graft of native heart without angina pectoris    4. Tobacco use          Plan:  1) Persistent atrial fibrillation- maintaining NSR s/p ICV 4/24/17 on Tikosyn  Continue present medications.   2) Anticoagulation  Continue Eliquis  3) ICM/CAD- with normal ICD function. Class II symptoms on Entresto and Metoprolol  Wt loss, exercise, salt reduction  4) Tobacco abuse- counseled cessation.     F/up in 6-8 months    [facility]  6/29/2017   12:46 PM  I saw this patient independently.

## 2017-07-10 RX ORDER — ROSUVASTATIN CALCIUM 40 MG/1
40 TABLET, COATED ORAL DAILY
Qty: 30 TABLET | Refills: 3 | Status: SHIPPED | OUTPATIENT
Start: 2017-07-10 | End: 2018-03-14 | Stop reason: SDUPTHER

## 2017-07-31 ENCOUNTER — RESULTS ENCOUNTER (OUTPATIENT)
Dept: CARDIOLOGY | Facility: CLINIC | Age: 62
End: 2017-07-31

## 2017-07-31 DIAGNOSIS — Z79.899 DRUG THERAPY: ICD-10-CM

## 2017-08-04 ENCOUNTER — TELEPHONE (OUTPATIENT)
Dept: CARDIOLOGY | Facility: CLINIC | Age: 62
End: 2017-08-04

## 2017-08-04 NOTE — TELEPHONE ENCOUNTER
Called to remind him of his labs he is suppose to have done. He does need to fast for these labs.

## 2017-08-15 NOTE — TELEPHONE ENCOUNTER
Alonso from [facility] called and wanted to verify if pt's entresto dose had in fact been changed to 24/26 po bid, down from previous dose of 49/51 po bid.  Per reviewing pt's chart, documentation encounter on 2/20/17, Dr. Hernandez decreased pt's dose that day.  Will send in refill for this.

## 2017-10-06 ENCOUNTER — CLINICAL SUPPORT NO REQUIREMENTS (OUTPATIENT)
Dept: CARDIOLOGY | Facility: CLINIC | Age: 62
End: 2017-10-06

## 2017-10-06 DIAGNOSIS — I48.19 PERSISTENT ATRIAL FIBRILLATION (HCC): ICD-10-CM

## 2017-10-06 DIAGNOSIS — I50.22 CHRONIC SYSTOLIC CONGESTIVE HEART FAILURE (HCC): ICD-10-CM

## 2017-10-06 PROCEDURE — 93295 DEV INTERROG REMOTE 1/2/MLT: CPT | Performed by: INTERNAL MEDICINE

## 2017-10-06 PROCEDURE — 93296 REM INTERROG EVL PM/IDS: CPT | Performed by: INTERNAL MEDICINE

## 2017-10-09 ENCOUNTER — TELEPHONE (OUTPATIENT)
Dept: CARDIOLOGY | Facility: CLINIC | Age: 62
End: 2017-10-09

## 2017-10-09 ENCOUNTER — OFFICE VISIT (OUTPATIENT)
Dept: CARDIOLOGY | Facility: CLINIC | Age: 62
End: 2017-10-09

## 2017-10-09 VITALS
DIASTOLIC BLOOD PRESSURE: 59 MMHG | SYSTOLIC BLOOD PRESSURE: 93 MMHG | BODY MASS INDEX: 26.33 KG/M2 | HEART RATE: 102 BPM | HEIGHT: 72 IN | WEIGHT: 194.4 LBS

## 2017-10-09 DIAGNOSIS — Z72.0 TOBACCO USE: ICD-10-CM

## 2017-10-09 DIAGNOSIS — Z86.79 HISTORY OF ASCVD (ATHEROSCLEROTIC CARDIOVASCULAR DISEASE): ICD-10-CM

## 2017-10-09 DIAGNOSIS — E78.5 DYSLIPIDEMIA: ICD-10-CM

## 2017-10-09 DIAGNOSIS — I48.92 ATRIAL FIBRILLATION AND FLUTTER (HCC): ICD-10-CM

## 2017-10-09 DIAGNOSIS — Z79.899 DRUG THERAPY: ICD-10-CM

## 2017-10-09 DIAGNOSIS — I48.91 ATRIAL FIBRILLATION AND FLUTTER (HCC): ICD-10-CM

## 2017-10-09 DIAGNOSIS — I50.22 CHRONIC SYSTOLIC CONGESTIVE HEART FAILURE (HCC): ICD-10-CM

## 2017-10-09 DIAGNOSIS — I25.5 ISCHEMIC CARDIOMYOPATHY: Primary | ICD-10-CM

## 2017-10-09 PROCEDURE — 99213 OFFICE O/P EST LOW 20 MIN: CPT | Performed by: INTERNAL MEDICINE

## 2017-10-09 RX ORDER — FUROSEMIDE 40 MG/1
40 TABLET ORAL DAILY
COMMUNITY
End: 2018-06-05 | Stop reason: SDUPTHER

## 2017-10-09 NOTE — PROGRESS NOTES
Samuel Duane Kreis, MD  Rob Miranda  1955  10/09/2017    Patient Active Problem List   Diagnosis   • Persistent atrial fibrillation   • Chronic systolic congestive heart failure, well compensated.   • Coronary artery disease involving coronary bypass graft of native heart without angina pectoris   • Essential hypertension   • History of ASCVD (atherosclerotic cardiovascular disease), status post myocardial infarction, CABG 1992   • Advanced Ischemic cardiomyopathy with estimated ejection fraction of less than 20% in January 2015, status post biventricular ICD implantation.in 2007,with gen.change in 2013.   • Dyslipidemia, on Crestor.    • IDDM (insulin dependent diabetes mellitus)   • Atrial flutter, s/p RF ablation   • Sinus bradycardia   • Hyperlipidemia   • Tobacco use   • Hypomagnesemia       Dear Samuel Duane Kreis, MD:    Michele Miranda is a 62 y.o. male with the problems as listed above, presents      History of Present Illness:Mrs. Miranda is a pleasant 62-year-old gentleman with history of known ASCVD with previous myocardial infarctions, status post CABG 1992 with underlying advanced ischemic dilated cardiomyopathy with LV ejection fraction 40%.  A status post ICD implantation.  He also has history of paroxysmal atrial flutter and fibrillation for which he underwent RF ablation ×2 in February 2014 in March 2015, has been on Tikosyn therapy.  Is here today for a cardiology follow-up.  He denies any complaints of chest pains.  He has chronic dyspnea with moderate exertion with no PND, orthopnea or pedal edema.  He has some intermittent wheezing.  He has long history of smoking and continues smoked less than a pack a day.  He has some intermittent palpitations with no dizziness or syncope.  No bleeding problems with Eliquis.        Allergies   Allergen Reactions   • Shellfish-Derived Products    :      Current Outpatient Prescriptions:   •  apixaban (ELIQUIS) 5 MG tablet tablet, Take 1  tablet by mouth 2 (Two) Times a Day., Disp: 180 tablet, Rfl: 3  •  aspirin 81 MG tablet, Take 1 tablet by mouth Daily., Disp: 30 tablet, Rfl: 3  •  digoxin (DIGOX) 250 MCG tablet, Take 1 tablet by mouth Daily., Disp: 90 tablet, Rfl: 2  •  dofetilide (TIKOSYN) 125 MCG capsule, Take 1 capsule by mouth 2 (Two) Times a Day., Disp: 180 capsule, Rfl: 2  •  dofetilide (TIKOSYN) 250 MCG capsule, Take 1 capsule by mouth 2 (Two) Times a Day., Disp: 180 capsule, Rfl: 2  •  furosemide (LASIX) 40 MG tablet, Take 40 mg by mouth 2 (Two) Times a Day., Disp: , Rfl:   •  insulin glargine (LANTUS) 100 UNIT/ML injection, Inject 40 Units under the skin every night., Disp: , Rfl:   •  magnesium gluconate (MAGONATE) 30 MG tablet, Take 1 tablet by mouth 2 (Two) Times a Day., Disp: 60 tablet, Rfl: 3  •  metFORMIN (GLUCOPHAGE) 1000 MG tablet, Take 1,000 mg by mouth 2 (two) times a day with meals., Disp: , Rfl:   •  metoprolol tartrate (LOPRESSOR) 100 MG tablet, Date 1 tablet by mouth twice a day., Disp: 180 tablet, Rfl: 3  •  rosuvastatin (CRESTOR) 40 MG tablet, Take 1 tablet by mouth Daily., Disp: 30 tablet, Rfl: 3  •  sacubitril-valsartan (ENTRESTO) 24-26 MG tablet, Take 1 tablet by mouth 2 (Two) Times a Day., Disp: 60 tablet, Rfl: 3  •  sitaGLIPtin (JANUVIA) 100 MG tablet, Take 100 mg by mouth daily., Disp: , Rfl:   •  tamsulosin (FLOMAX) 0.4 MG capsule 24 hr capsule, Take 1 capsule by mouth every night., Disp: , Rfl:       The following portions of the patient's history were reviewed and updated as appropriate: allergies, current medications, past family history, past medical history, past social history, past surgical history and problem list.    Social History   Substance Use Topics   • Smoking status: Current Every Day Smoker     Packs/day: 0.50     Types: Cigarettes   • Smokeless tobacco: Never Used   • Alcohol use No       Review of Systems   Cardiovascular: Negative for chest pain, leg swelling, palpitations and syncope.  "  Respiratory: Negative for shortness of breath.    Neurological: Negative for dizziness.       Objective   Vitals:    10/09/17 0856   BP: 93/59   BP Location: Left arm   Patient Position: Sitting   Cuff Size: Adult   Pulse: 102   Weight: 194 lb 6.4 oz (88.2 kg)   Height: 72\" (182.9 cm)     Body mass index is 26.37 kg/(m^2).        Physical Exam   Constitutional: He is oriented to person, place, and time. He appears well-developed and well-nourished.   HENT:   Head: Normocephalic.   Eyes: Conjunctivae and EOM are normal.   Neck: Normal range of motion. Neck supple. No JVD present. No tracheal deviation present. No thyromegaly present.   Cardiovascular: Normal rate and regular rhythm.  Exam reveals no gallop and no friction rub.    No murmur heard.  Pulses:       Carotid pulses are 1+ on the right side, and 1+ on the left side.       Radial pulses are 1+ on the right side, and 1+ on the left side.        Femoral pulses are 1+ on the right side, and 1+ on the left side.       Popliteal pulses are 1+ on the right side, and 1+ on the left side.        Dorsalis pedis pulses are 1+ on the right side, and 1+ on the left side.        Posterior tibial pulses are 1+ on the right side, and 1+ on the left side.   Pulmonary/Chest: No respiratory distress. He has wheezes (mild expiratory wheezing bilaterally.). He has no rales.   Abdominal: Soft. Bowel sounds are normal. He exhibits no mass. There is no tenderness.   Musculoskeletal: He exhibits no edema.   Neurological: He is alert and oriented to person, place, and time. No cranial nerve deficit.   Skin: Skin is warm and dry.   Psychiatric: He has a normal mood and affect.       Lab Results   Component Value Date     05/09/2017    K 4.8 05/09/2017     05/09/2017    CO2 31.5 05/09/2017    BUN 17 05/09/2017    CREATININE 0.86 05/09/2017    GLUCOSE 64 (L) 05/09/2017    CALCIUM 10.6 (H) 05/09/2017    AST 24 05/09/2017    ALT 17 05/09/2017    ALKPHOS 64 05/09/2017    " LABIL2 1.4 (L) 05/09/2017     Lab Results   Component Value Date    CKTOTAL 66 02/09/2014     Lab Results   Component Value Date    WBC 12.16 (H) 04/24/2017    HGB 15.6 04/24/2017    HCT 48.5 04/24/2017     04/24/2017     Lab Results   Component Value Date    INR 1.28 04/24/2017    INR 1.12 03/10/2016    INR 1.57 03/07/2015     Lab Results   Component Value Date    MG 1.6 (L) 05/09/2017     Lab Results   Component Value Date    TRIG 116 03/06/2017    HDL 34 (L) 03/06/2017      Lab Results   Component Value Date    BNP 1160 (H) 02/08/2014     Echo   No results found for: ECHOEFEST    ECG 12 Lead  Date/Time: 10/9/2017 8:53 AM  Performed by: CHELSEY EPPERSON  Authorized by: CHELSEY EPPERSON               Assessment/Plan    Diagnosis Plan       Advanced Ischemic cardiomyopathy with estimated ejection fraction of less than 20% in January 2015, status post biventricular ICD implantation.in 2007,with gen.change in 2013.       2. History of ASCVD (atherosclerotic cardiovascular disease) with previuos myocardial infarction and coronary artery bypass grafting in 1992, clinically stable..      3. Paroxysmal atrial fibrillation,s/p pulmonary renee ablationx2 in 2/14 and 3/15.on Tikosyn and maintaing sinus rythm.      4. Chronic systolic congestive heart failure, well compensated.      5. Mixed hyperlipidemia              Recommendations:   Continue with current medications including the aspirin, Eliquis, Tikosyn, Entresto and Crestor.  Add metoprolol.  I reemphasized for him to quit smoking.  I would like to increase the dose of Tikosyn but we have to monitor him in the hospital for 72 hours.  However and says that his wife is sick and he won't be able to be admitted to the hospital at this time.  Hence we will leave him alone for now.  I'll (3 months.  Check BMP and a magnesium level.    Return in about 3 months (around 1/9/2018).    As always, I appreciate very much the opportunity to participate in the cardiovascular  care of your patients.      With Best Regards,    Ranjith Hernandez MD, [facility]    Dragon disclaimer:  Much of this encounter note is an electronic transcription/translation of spoken language to printed text. The electronic translation of spoken language may permit erroneous, or at times, nonsensical words or phrases to be inadvertently transcribed; Although I have reviewed the note for such errors, some may still exist.

## 2017-10-09 NOTE — TELEPHONE ENCOUNTER
Pt called and said he had spoke with dr. velasco about being admitted to adjust his tikosyn and he said he went home and discussed it with his wife and that he would like to set that up for any day after oct 23rd. I told him I would let everyone know.

## 2017-10-09 NOTE — PROGRESS NOTES
Samuel Duane Kreis, MD  Rob Miranda  1955  10/09/2017    Patient Active Problem List   Diagnosis   • Persistent atrial fibrillation   • Chronic systolic congestive heart failure, well compensated.   • Coronary artery disease involving coronary bypass graft of native heart without angina pectoris   • Essential hypertension   • History of ASCVD (atherosclerotic cardiovascular disease), status post myocardial infarction, CABG 1992   • Advanced Ischemic cardiomyopathy with estimated ejection fraction of less than 20% in January 2015, status post biventricular ICD implantation.in 2007,with gen.change in 2013.   • Dyslipidemia, on Crestor.    • IDDM (insulin dependent diabetes mellitus)   • Atrial fibrillation and flutter,s/p pulmonary renee ablationx2 in 2/14 and 3/15.on Tikosyn    • Sinus bradycardia   • Hyperlipidemia   • Tobacco use   • Hypomagnesemia       Dear Samuel Duane Kreis, MD:    Michele Miranda is a 62 y.o. male with the problems as listed above, presentsFor follow-up of    History of Present Illness:History of Present Illness:Mrs. Miranda is a pleasant 62-year-old gentleman with history of known ASCVD with previous myocardial infarctions, status post CABG 1992 with underlying advanced ischemic dilated cardiomyopathy with LV ejection fraction 40%.  A status post ICD implantation.  He also has history of paroxysmal atrial flutter and fibrillation for which he underwent RF ablation ×2 in February 2014 in March 2015, has been on Tikosyn therapy.  Is here today for a cardiology follow-up.  He denies any complaints of chest pains.  He has chronic dyspnea with moderate exertion with no PND, orthopnea or pedal edema.  He has some intermittent wheezing.  He has long history of smoking and continues smoked less than a pack a day.  He has some intermittent palpitations with no dizziness or syncope.  No bleeding problems with Eliquis.      Allergies   Allergen Reactions   • Shellfish-Derived  Products    :      Current Outpatient Prescriptions:   •  apixaban (ELIQUIS) 5 MG tablet tablet, Take 1 tablet by mouth 2 (Two) Times a Day., Disp: 180 tablet, Rfl: 3  •  aspirin 81 MG tablet, Take 1 tablet by mouth Daily., Disp: 30 tablet, Rfl: 3  •  digoxin (DIGOX) 250 MCG tablet, Take 1 tablet by mouth Daily., Disp: 90 tablet, Rfl: 2  •  dofetilide (TIKOSYN) 125 MCG capsule, Take 1 capsule by mouth 2 (Two) Times a Day., Disp: 180 capsule, Rfl: 2  •  dofetilide (TIKOSYN) 250 MCG capsule, Take 1 capsule by mouth 2 (Two) Times a Day., Disp: 180 capsule, Rfl: 2  •  furosemide (LASIX) 40 MG tablet, Take 40 mg by mouth 2 (Two) Times a Day., Disp: , Rfl:   •  insulin glargine (LANTUS) 100 UNIT/ML injection, Inject 40 Units under the skin every night., Disp: , Rfl:   •  magnesium gluconate (MAGONATE) 30 MG tablet, Take 1 tablet by mouth 2 (Two) Times a Day., Disp: 60 tablet, Rfl: 3  •  metFORMIN (GLUCOPHAGE) 1000 MG tablet, Take 1,000 mg by mouth 2 (two) times a day with meals., Disp: , Rfl:   •  metoprolol tartrate (LOPRESSOR) 100 MG tablet, Date 1 tablet by mouth twice a day., Disp: 180 tablet, Rfl: 3  •  rosuvastatin (CRESTOR) 40 MG tablet, Take 1 tablet by mouth Daily., Disp: 30 tablet, Rfl: 3  •  sacubitril-valsartan (ENTRESTO) 24-26 MG tablet, Take 1 tablet by mouth 2 (Two) Times a Day., Disp: 60 tablet, Rfl: 3  •  sitaGLIPtin (JANUVIA) 100 MG tablet, Take 100 mg by mouth daily., Disp: , Rfl:   •  tamsulosin (FLOMAX) 0.4 MG capsule 24 hr capsule, Take 1 capsule by mouth every night., Disp: , Rfl:       The following portions of the patient's history were reviewed and updated as appropriate: allergies, current medications, past family history, past medical history, past social history, past surgical history and problem list.    Social History   Substance Use Topics   • Smoking status: Current Every Day Smoker     Packs/day: 0.50     Types: Cigarettes   • Smokeless tobacco: Never Used   • Alcohol use No  "      ROS    Objective   Vitals:    10/09/17 0856   BP: 93/59   BP Location: Left arm   Patient Position: Sitting   Cuff Size: Adult   Pulse: 102   Weight: 194 lb 6.4 oz (88.2 kg)   Height: 72\" (182.9 cm)     Body mass index is 26.37 kg/(m^2).     10/9/17 6/29/17 5/11/17   BP 93/59 116/68 102/61   Heart Rate 102 80 80   Resp   16   Weight 194 lb 6.4 oz (88.2 kg) 197 lb 6.4 oz (89.5 kg) 194 lb (88 kg)   Height 72\" (182.9 cm) 72\" (182.9 cm) 72\" (182.9 cm)   BMI (Calculated) 26.4 26.8 26.3   BSA (Calculated - sq m) 2.1 sq meters 2.12 sq meters 2.1 sq meters           Physical Exam   Constitutional: He is oriented to person, place, and time. He appears well-developed and well-nourished.   HENT:   Head: Normocephalic.   Eyes: Conjunctivae and EOM are normal.   Neck: Normal range of motion. Neck supple. No JVD present. No tracheal deviation present. No thyromegaly present.   Cardiovascular: Normal rate, regular rhythm, S1 normal and S2 normal.  Exam reveals no gallop, no S3, no S4 and no friction rub.    Murmur heard.   Systolic murmur is present with a grade of 2/6   Pulses:       Dorsalis pedis pulses are 1+ on the right side, and 1+ on the left side.        Posterior tibial pulses are 1+ on the right side, and 1+ on the left side.   Pulmonary/Chest: Breath sounds normal. No respiratory distress. He has no wheezes. He has no rales.   Abdominal: Soft. Bowel sounds are normal. He exhibits no mass. There is no tenderness.   Musculoskeletal: He exhibits no edema.   Neurological: He is alert and oriented to person, place, and time. No cranial nerve deficit.   Skin: Skin is warm and dry.   Psychiatric: He has a normal mood and affect.       Lab Results   Component Value Date     05/09/2017    K 4.8 05/09/2017     05/09/2017    CO2 31.5 05/09/2017    BUN 17 05/09/2017    CREATININE 0.86 05/09/2017    GLUCOSE 64 (L) 05/09/2017    CALCIUM 10.6 (H) 05/09/2017    AST 24 05/09/2017    ALT 17 05/09/2017    ALKPHOS 64 " 05/09/2017    LABIL2 1.4 (L) 05/09/2017     Lab Results   Component Value Date    CKTOTAL 66 02/09/2014     Lab Results   Component Value Date    WBC 12.16 (H) 04/24/2017    HGB 15.6 04/24/2017    HCT 48.5 04/24/2017     04/24/2017     Lab Results   Component Value Date    INR 1.28 04/24/2017    INR 1.12 03/10/2016    INR 1.57 03/07/2015     Lab Results   Component Value Date    MG 1.6 (L) 05/09/2017     Lab Results   Component Value Date    TRIG 116 03/06/2017    HDL 34 (L) 03/06/2017      Lab Results   Component Value Date    BNP 1160 (H) 02/08/2014     Echo   No results found for: ECHOEFEST  Procedures    Assessment/Plan      1. Advanced Ischemic cardiomyopathy with estimated ejection fraction of less than 20% in January 2015, status post biventricular ICD implantation.in 2007,with gen.change in 2013.     2. History of ASCVD (atherosclerotic cardiovascular disease) with previuos myocardial infarction and coronary artery bypass grafting in 1992.     4. Atrial fibrillation and flutter,s/p pulmonary renee ablationx2 in 2/14 and 3/15.on Tikosyn.  Patient is back in atrial flutter/fibrillation.      5. Chronic systolic congestive heart failure, well compensated.     6. Dyslipidemia, on Crestor.      7. Tobacco use          Recommendations:    1. Continue with Tikosyn at the current dose.  I have offered him to admit him to the hospital to increase the dose of Tikosyn and monitored him for about 72 hours and perhaps consider electrical cardioversion.  Patient wants to think about it and let me know.  2. Continue with Entresto and metoprolol, low-dose aspirin and Eliquis at current doses.     Return in about 3 months (around 1/9/2018).    As always, I appreciate very much the opportunity to participate in the cardiovascular care of your patients.      With Best Regards,    Ranjith Hernandez MD, [facility]    Dragon disclaimer:  Much of this encounter note is an electronic transcription/translation of spoken language to  printed text. The electronic translation of spoken language may permit erroneous, or at times, nonsensical words or phrases to be inadvertently transcribed; Although I have reviewed the note for such errors, some may still exist.

## 2017-10-10 NOTE — TELEPHONE ENCOUNTER
See if he can do Oct 24th (Tuesday) as we will be on call that day and at the hospital. Please put on calender to get a bed for him that day on telemetry floor. Full admit for afib.

## 2017-10-11 ENCOUNTER — TELEPHONE (OUTPATIENT)
Dept: CARDIOLOGY | Facility: CLINIC | Age: 62
End: 2017-10-11

## 2017-10-11 DIAGNOSIS — I48.19 PERSISTENT ATRIAL FIBRILLATION (HCC): Primary | ICD-10-CM

## 2017-10-11 LAB
BUN SERPL-MCNC: 18 MG/DL (ref 8–27)
BUN/CREAT SERPL: 17 (ref 10–24)
CALCIUM SERPL-MCNC: 9.7 MG/DL (ref 8.6–10.2)
CHLORIDE SERPL-SCNC: 97 MMOL/L (ref 96–106)
CO2 SERPL-SCNC: 25 MMOL/L (ref 18–29)
CREAT SERPL-MCNC: 1.08 MG/DL (ref 0.76–1.27)
GLUCOSE SERPL-MCNC: 105 MG/DL (ref 65–99)
Lab: NORMAL
MAGNESIUM SERPL-MCNC: 1.9 MG/DL (ref 1.6–2.3)
POTASSIUM SERPL-SCNC: 5 MMOL/L (ref 3.5–5.2)
SODIUM SERPL-SCNC: 142 MMOL/L (ref 134–144)

## 2017-10-11 NOTE — TELEPHONE ENCOUNTER
Wife called with concerns of pt being in afib and  saw Dr Hernandez on 10/9 and he was wanting to admit the pt and increase Tikosyn dose there in [place].  Wife wants to know what Dr Edwards wants done and wants to have whatever he wants done at [facility].

## 2017-10-14 ENCOUNTER — RESULTS ENCOUNTER (OUTPATIENT)
Dept: CARDIOLOGY | Facility: CLINIC | Age: 62
End: 2017-10-14

## 2017-10-14 DIAGNOSIS — Z79.899 DRUG THERAPY: ICD-10-CM

## 2017-10-16 ENCOUNTER — RESULTS ENCOUNTER (OUTPATIENT)
Dept: CARDIOLOGY | Facility: CLINIC | Age: 62
End: 2017-10-16

## 2017-10-16 DIAGNOSIS — Z79.899 DRUG THERAPY: ICD-10-CM

## 2017-10-17 DIAGNOSIS — Z79.899 DRUG THERAPY CHANGED: Primary | ICD-10-CM

## 2017-10-17 DIAGNOSIS — Z79.899 DRUG THERAPY: ICD-10-CM

## 2017-10-20 ENCOUNTER — PREP FOR SURGERY (OUTPATIENT)
Dept: OTHER | Facility: HOSPITAL | Age: 62
End: 2017-10-20

## 2017-10-20 DIAGNOSIS — I48.19 PERSISTENT ATRIAL FIBRILLATION (HCC): Primary | ICD-10-CM

## 2017-10-20 RX ORDER — SODIUM CHLORIDE 0.9 % (FLUSH) 0.9 %
1-10 SYRINGE (ML) INJECTION AS NEEDED
Status: CANCELLED | OUTPATIENT
Start: 2017-10-20

## 2017-10-20 RX ORDER — ACETAMINOPHEN 325 MG/1
650 TABLET ORAL EVERY 4 HOURS PRN
Status: CANCELLED | OUTPATIENT
Start: 2017-10-20

## 2017-10-22 ENCOUNTER — RESULTS ENCOUNTER (OUTPATIENT)
Dept: CARDIOLOGY | Facility: CLINIC | Age: 62
End: 2017-10-22

## 2017-10-22 DIAGNOSIS — Z79.899 DRUG THERAPY: ICD-10-CM

## 2017-10-23 ENCOUNTER — HOSPITAL ENCOUNTER (OUTPATIENT)
Dept: CARDIOLOGY | Facility: HOSPITAL | Age: 62
Discharge: HOME | End: 2017-10-23
Attending: INTERNAL MEDICINE | Admitting: INTERNAL MEDICINE

## 2017-10-23 VITALS
TEMPERATURE: 97.9 F | SYSTOLIC BLOOD PRESSURE: 112 MMHG | RESPIRATION RATE: 16 BRPM | OXYGEN SATURATION: 97 % | HEART RATE: 90 BPM | DIASTOLIC BLOOD PRESSURE: 80 MMHG

## 2017-10-23 DIAGNOSIS — I48.19 PERSISTENT ATRIAL FIBRILLATION (HCC): ICD-10-CM

## 2017-10-23 LAB
ALBUMIN SERPL-MCNC: 4 G/DL (ref 3.2–4.8)
ALBUMIN/GLOB SERPL: 1.3 G/DL (ref 1.5–2.5)
ALP SERPL-CCNC: 70 U/L (ref 25–100)
ALT SERPL W P-5'-P-CCNC: 22 U/L (ref 7–40)
ANION GAP SERPL CALCULATED.3IONS-SCNC: -3 MMOL/L (ref 3–11)
AST SERPL-CCNC: 23 U/L (ref 0–33)
BILIRUB SERPL-MCNC: 0.5 MG/DL (ref 0.3–1.2)
BUN BLD-MCNC: 17 MG/DL (ref 9–23)
BUN/CREAT SERPL: 18.9 (ref 7–25)
CALCIUM SPEC-SCNC: 9.2 MG/DL (ref 8.7–10.4)
CHLORIDE SERPL-SCNC: 109 MMOL/L (ref 99–109)
CO2 SERPL-SCNC: 32 MMOL/L (ref 20–31)
CREAT BLD-MCNC: 0.9 MG/DL (ref 0.6–1.3)
DEPRECATED RDW RBC AUTO: 49.1 FL (ref 37–54)
ERYTHROCYTE [DISTWIDTH] IN BLOOD BY AUTOMATED COUNT: 15.5 % (ref 11.3–14.5)
GFR SERPL CREATININE-BSD FRML MDRD: 86 ML/MIN/1.73
GLOBULIN UR ELPH-MCNC: 3 GM/DL
GLUCOSE BLD-MCNC: 113 MG/DL (ref 70–100)
GLUCOSE BLDC GLUCOMTR-MCNC: 115 MG/DL (ref 70–130)
HCT VFR BLD AUTO: 48.8 % (ref 38.9–50.9)
HGB BLD-MCNC: 15.6 G/DL (ref 13.1–17.5)
INR PPP: 1.26
MCH RBC QN AUTO: 27.7 PG (ref 27–31)
MCHC RBC AUTO-ENTMCNC: 32 G/DL (ref 32–36)
MCV RBC AUTO: 86.7 FL (ref 80–99)
PLATELET # BLD AUTO: 214 10*3/MM3 (ref 150–450)
PMV BLD AUTO: 9.6 FL (ref 6–12)
POTASSIUM BLD-SCNC: 4.8 MMOL/L (ref 3.5–5.5)
PROT SERPL-MCNC: 7 G/DL (ref 5.7–8.2)
PROTHROMBIN TIME: 13.8 SECONDS (ref 9.6–11.5)
RBC # BLD AUTO: 5.63 10*6/MM3 (ref 4.2–5.76)
SODIUM BLD-SCNC: 138 MMOL/L (ref 132–146)
WBC NRBC COR # BLD: 10.53 10*3/MM3 (ref 3.5–10.8)

## 2017-10-23 PROCEDURE — 93724 ELEC ALYS ANTITCHYCAR PM SYS: CPT | Performed by: INTERNAL MEDICINE

## 2017-10-23 PROCEDURE — 25010000002 MIDAZOLAM PER 1 MG: Performed by: INTERNAL MEDICINE

## 2017-10-23 PROCEDURE — 85610 PROTHROMBIN TIME: CPT | Performed by: INTERNAL MEDICINE

## 2017-10-23 PROCEDURE — 80053 COMPREHEN METABOLIC PANEL: CPT | Performed by: INTERNAL MEDICINE

## 2017-10-23 PROCEDURE — 93005 ELECTROCARDIOGRAM TRACING: CPT | Performed by: INTERNAL MEDICINE

## 2017-10-23 PROCEDURE — 85027 COMPLETE CBC AUTOMATED: CPT | Performed by: INTERNAL MEDICINE

## 2017-10-23 PROCEDURE — 82962 GLUCOSE BLOOD TEST: CPT

## 2017-10-23 PROCEDURE — 36415 COLL VENOUS BLD VENIPUNCTURE: CPT

## 2017-10-23 PROCEDURE — 92960 CARDIOVERSION ELECTRIC EXT: CPT

## 2017-10-23 RX ORDER — MIDAZOLAM HYDROCHLORIDE 1 MG/ML
INJECTION INTRAMUSCULAR; INTRAVENOUS
Status: COMPLETED | OUTPATIENT
Start: 2017-10-23 | End: 2017-10-23

## 2017-10-23 RX ORDER — ACETAMINOPHEN 325 MG/1
650 TABLET ORAL EVERY 4 HOURS PRN
Status: DISCONTINUED | OUTPATIENT
Start: 2017-10-23 | End: 2017-10-23 | Stop reason: HOSPADM

## 2017-10-23 RX ORDER — MIDAZOLAM HYDROCHLORIDE 1 MG/ML
INJECTION INTRAMUSCULAR; INTRAVENOUS
Status: DISCONTINUED
Start: 2017-10-23 | End: 2017-10-23 | Stop reason: HOSPADM

## 2017-10-23 RX ORDER — FLUMAZENIL 0.1 MG/ML
INJECTION INTRAVENOUS
Status: DISCONTINUED
Start: 2017-10-23 | End: 2017-10-23 | Stop reason: WASHOUT

## 2017-10-23 RX ORDER — UREA 10 %
30 LOTION (ML) TOPICAL 2 TIMES DAILY
COMMUNITY
End: 2018-03-14 | Stop reason: SDUPTHER

## 2017-10-23 RX ORDER — NALOXONE HYDROCHLORIDE 0.4 MG/ML
INJECTION, SOLUTION INTRAMUSCULAR; INTRAVENOUS; SUBCUTANEOUS
Status: DISCONTINUED
Start: 2017-10-23 | End: 2017-10-23 | Stop reason: WASHOUT

## 2017-10-23 RX ORDER — SODIUM CHLORIDE 0.9 % (FLUSH) 0.9 %
1-10 SYRINGE (ML) INJECTION AS NEEDED
Status: DISCONTINUED | OUTPATIENT
Start: 2017-10-23 | End: 2017-10-23 | Stop reason: HOSPADM

## 2017-10-23 RX ADMIN — METHOHEXITAL SODIUM 20 MG: 500 INJECTION, POWDER, LYOPHILIZED, FOR SOLUTION INTRAMUSCULAR; INTRAVENOUS; RECTAL at 14:25

## 2017-10-23 RX ADMIN — METHOHEXITAL SODIUM 20 MG: 500 INJECTION, POWDER, LYOPHILIZED, FOR SOLUTION INTRAMUSCULAR; INTRAVENOUS; RECTAL at 14:26

## 2017-10-23 RX ADMIN — METHOHEXITAL SODIUM 10 MG: 500 INJECTION, POWDER, LYOPHILIZED, FOR SOLUTION INTRAMUSCULAR; INTRAVENOUS; RECTAL at 14:27

## 2017-10-23 RX ADMIN — MIDAZOLAM HYDROCHLORIDE 1 MG: 1 INJECTION, SOLUTION INTRAMUSCULAR; INTRAVENOUS at 14:25

## 2017-10-23 NOTE — H&P
[facility] Cardiology Services  HISTORY AND PHYSICAL NOTE      Date of Hospital Visit: 10/23/2017  Hospital Day:  LOS: 0 days     IDENTIFICATION:   Patient Name: Rob Miranda  :1955    Primary Care Provider: Samuel Duane Kreis, MD     Treatment Team:   Attending Provider: Poli Edwards MD  Admitting Provider: Poli Edwards MD    Referral Provider: Poli Edwards MD     Problem List:   Paroxysmal Atrial Fibrillation      a. Initiation of Tikosyn, May 2013.  b. Inappropriate ICD shocks secondary to atrial fibrillation with RVR.   c. Pulmonary vein ablation, 2014; inability to isolate the right inferior pulmonary vein secondary to near proximity to the esophagus.   d. EVA, 2014; EF 23%, left atrium severely dilated.   e. On 2015, pulmonary vein ablation (re-isolation of all four pulmonary veins with linear lines along the left roof and mitral annulus, ablation of left atrial rotors). Procedure complicated by transient hypotension secondary to acidosis in the setting of severe LV dysfunction, and obstructive pulmonary disease.  f. Recurrent episode of atrial fibrillation with internal cardioversion, 2015.  g. Recurrent Atrial Fibrillation s/p ICV 17       Chronic Systolic Congestive Heart Failure      g. Previous MI.  h. CABG in .   i. Left heart catheterization : Patent grafts, EF estimated at 30% to 35%.   j. A 2D echocardiogram, 2006: EF estimated at 30% to 35%.   k. GXT Cardiolite, 2006: EF estimated at 20% to 25%, no inducible ischemia.   l. St. Jr DDD-ICD, 2006, Dr. Márquez.  m. Echocardiogram, 2012 with EF 25%.   n. ICD generator change out, May 2013.        Coronary Artery Disease Involving Coronary Bypass Graft of Native Heart Without Angina Pectoris   Essential Hypertension       Patient Active Problem List   Diagnosis   • Persistent atrial fibrillation   • Chronic systolic congestive heart failure, well  compensated.   • Coronary artery disease involving coronary bypass graft of native heart without angina pectoris   • Essential hypertension   • History of ASCVD (atherosclerotic cardiovascular disease), status post myocardial infarction, CABG 1992   • Advanced Ischemic cardiomyopathy with estimated ejection fraction of less than 20% in January 2015, status post biventricular ICD implantation.in 2007,with gen.change in 2013.   • Dyslipidemia, on Crestor.    • IDDM (insulin dependent diabetes mellitus)   • Atrial fibrillation and flutter,s/p pulmonary renee ablationx2 in 2/14 and 3/15.on Tikosyn    • Sinus bradycardia   • Hyperlipidemia   • Tobacco use   • Hypomagnesemia       Past Medical History:   Diagnosis Date   • Atherosclerotic cardiovascular disease    • Chronic anticoagulation    • DM (diabetes mellitus)     insulin dependant   • Dyslipidemia    • Hx of atrial flutter     and a- fib   • Hx of myocardial infarction 1992    s/p CABG    • Hyperlipidemia    • Ischemic cardiomyopathy     advanced   • Sinus bradycardia     EPS, 03/19/2007, Dr. Márquez:  Successful radiofrequency ablation of right atrial flutter   • Tobacco use        Past Surgical History:   Procedure Laterality Date   • CARDIAC DEFIBRILLATOR PLACEMENT     • CARDIOVERSION      x 2 procedures   • CORONARY ARTERY BYPASS GRAFT  1991       Allergies as of 10/23/2017 - Luis as Reviewed 10/23/2017   Allergen Reaction Noted   • Shellfish-derived products  06/29/2017       MEDICATIONS:  No current facility-administered medications on file prior to encounter.      Current Outpatient Prescriptions on File Prior to Encounter   Medication Sig Dispense Refill   • apixaban (ELIQUIS) 5 MG tablet tablet Take 1 tablet by mouth 2 (Two) Times a Day. 180 tablet 3   • aspirin 81 MG tablet Take 1 tablet by mouth Daily. 30 tablet 3   • digoxin (DIGOX) 250 MCG tablet Take 1 tablet by mouth Daily. 90 tablet 2   • dofetilide (TIKOSYN) 125 MCG capsule Take 1 capsule by  mouth 2 (Two) Times a Day. 180 capsule 2   • dofetilide (TIKOSYN) 250 MCG capsule Take 1 capsule by mouth 2 (Two) Times a Day. 180 capsule 2   • furosemide (LASIX) 40 MG tablet Take 40 mg by mouth 2 (Two) Times a Day.     • insulin glargine (LANTUS) 100 UNIT/ML injection Inject 40 Units under the skin every night.     • metFORMIN (GLUCOPHAGE) 1000 MG tablet Take 1,000 mg by mouth 2 (two) times a day with meals.     • metoprolol tartrate (LOPRESSOR) 100 MG tablet Date 1 tablet by mouth twice a day. 180 tablet 3   • rosuvastatin (CRESTOR) 40 MG tablet Take 1 tablet by mouth Daily. 30 tablet 3   • sacubitril-valsartan (ENTRESTO) 24-26 MG tablet Take 1 tablet by mouth 2 (Two) Times a Day. 60 tablet 3   • sitaGLIPtin (JANUVIA) 100 MG tablet Take 100 mg by mouth daily.     • tamsulosin (FLOMAX) 0.4 MG capsule 24 hr capsule Take 1 capsule by mouth every night.     • [DISCONTINUED] magnesium gluconate (MAGONATE) 30 MG tablet Take 1 tablet by mouth 2 (Two) Times a Day. 60 tablet 3        HISTORY OF PRESENT ILLNESS: Mr. Miranda is a pleasant 63 y/o caucasion male well known to our group with symptomatic persistent A FIB as well as ICM s/p ICD. He remains on Tikosyn 375 mcg BID and Eliquis 5 mg BID and has not missed any doses in the last 30 days. He reports he has remained in NSR until recently since ICV in April 2017. He presents today for repeat ICV. His symptoms in A FIB are palpitations, fluttering of heart and SOB. No chest pain/ pressure, near syncope or syncope. No bleeding concerns. No hospitalizations or ER visits since we last saw him in office.       Social History     Social History   • Marital status:      Spouse name: N/A   • Number of children: N/A   • Years of education: N/A     Occupational History   • Not on file.     Social History Main Topics   • Smoking status: Current Every Day Smoker     Packs/day: 0.50     Types: Cigarettes   • Smokeless tobacco: Never Used   • Alcohol use 24 beers per year with  2-4 servings at a time   • Drug use: No   • Sexual activity: Defer   Coffee:  3 cups caffeinated coffee daily    Social History Narrative       Family History   Problem Relation Age of Onset   • Cancer Mother 74   • Heart attack Father 72     in his 80's       REVIEW OF SYSTEMS:   CONST:  No recent weight loss, fever, chills, weakness or fatigue.   HEENT:  No visual loss, blurred vision, double vision, yellow sclerae.                   No hearing loss, congestion, sore throat.   SKIN:      No rashes, urticaria, ulcers, sores.     RESP:     + shortness of breath, cough. No hemoptysis.   GI:           No anorexia, nausea, vomiting, diarrhea. No abdominal pain, melena.   :         No burning on urination, hematuria or increased frequency.  ENDO:    No diaphoresis, cold or heat intolerance. No polyuria or polydipsia.   NEURO:  No headache, dizziness, syncope, paralysis, ataxia, or parasthesias.                  No change in bowel or bladder control. No history of CVA/TIA  MUSC:    No muscle, back pain, joint pain or stiffness.   HEME:    + bruising UE. No anemia, bleeding. No history of DVT/PE.  PSYCH:  No history of depression, anxiety, hallucinations.    PHYSICAL EXAM:    There is no height or weight on file to calculate BMI.     Vital Sign Min/Max for last 24 hours  Temp  Min: 97.9 °F (36.6 °C)  Max: 97.9 °F (36.6 °C)   BP  Min: 97/63  Max: 112/80   Pulse  Min: 90  Max: 90   Resp  Min: 16  Max: 16   SpO2  Min: 97 %  Max: 97 %   No Data Recorded    No intake or output data in the 24 hours ending 10/23/17 1214        Gen:     In no acute distress.   Heent:    Normocephalic, atraumatic, conjunctivae/sclerae normal, no icterus.    Neck:   No carotid bruits. No thyromegaly, no JVD.   Resp:     Lungs coarse to auscultation, respirations regular, even, unlabored    Heart:    IRR IRR with extrasystole, normal rate, no murmurs, rubs, gallops, clicks. DP/PT 2+ bilaterally.   Abd:     Normal bowel sounds, no organomegaly,  soft non-tender. No abdominal bruit.   Ext:   No pitting edema bilaterally, no cyanosis, no clubbing.    Neuro:   Normal affect. Alert and oriented, CN II-XII grossly intact.   Skin:   No ecchymosis, no rashes, no ulcers.     LAB DATA:   10/10/2017: CR 1.08, GFR 74, K 5.0,         Assessment/Plan:   1) Persistent symptomatic A FIB:   - s/p PVA and redo PVA and now on Tikosyn 375 mcg BID and Metoprolol 100 mg BID   -Recurrent A FIB in early April in the setting of excessive stress- burden 34% with some high ventricular rates.    -Repeat ICV today under conscious sedation per Dr. Edwards. The procedure/ risks have been explained to  the patient and he is agreeable to move forward.  2) Chronic anticoagulation: [id number]: 4    -on Eliquis 5 mg BID w/o any missed doses   3) Ischemic cardiomyopathy:   -S/p bypass   -SJM ICD    -EF < 20% on appropriate medical therapy including Entresto and Metoprolol  4)HTN: well controlled on current regimen  5) DM II:   -managed on insulin and oral agents        Shirin Kelly, APRN  10/23/17  12:14 PM          I, Poli Edwards MD, personally performed the services face to face as described and documented by the above named individual. I have made any necessary edits and it is both accurate and complete 10/23/2017  2:33 PM

## 2017-12-08 RX ORDER — DOFETILIDE 0.12 MG/1
125 CAPSULE ORAL 2 TIMES DAILY
Qty: 180 CAPSULE | Refills: 2 | Status: SHIPPED | OUTPATIENT
Start: 2017-12-08 | End: 2017-12-20 | Stop reason: SDUPTHER

## 2017-12-08 RX ORDER — DOFETILIDE 0.25 MG/1
250 CAPSULE ORAL 2 TIMES DAILY
Qty: 180 CAPSULE | Refills: 2 | Status: SHIPPED | OUTPATIENT
Start: 2017-12-08 | End: 2017-12-20 | Stop reason: SDUPTHER

## 2017-12-20 ENCOUNTER — OFFICE VISIT (OUTPATIENT)
Dept: CARDIOLOGY | Facility: CLINIC | Age: 62
End: 2017-12-20

## 2017-12-20 VITALS
BODY MASS INDEX: 26.6 KG/M2 | WEIGHT: 196.4 LBS | DIASTOLIC BLOOD PRESSURE: 62 MMHG | HEIGHT: 72 IN | SYSTOLIC BLOOD PRESSURE: 118 MMHG | HEART RATE: 71 BPM

## 2017-12-20 DIAGNOSIS — Z72.0 TOBACCO ABUSE: ICD-10-CM

## 2017-12-20 DIAGNOSIS — I48.19 PERSISTENT ATRIAL FIBRILLATION (HCC): Primary | ICD-10-CM

## 2017-12-20 DIAGNOSIS — I50.22 CHRONIC SYSTOLIC CONGESTIVE HEART FAILURE (HCC): ICD-10-CM

## 2017-12-20 DIAGNOSIS — I73.9 PVD (PERIPHERAL VASCULAR DISEASE) (HCC): ICD-10-CM

## 2017-12-20 PROCEDURE — 93283 PRGRMG EVAL IMPLANTABLE DFB: CPT | Performed by: INTERNAL MEDICINE

## 2017-12-20 PROCEDURE — 99213 OFFICE O/P EST LOW 20 MIN: CPT | Performed by: INTERNAL MEDICINE

## 2017-12-20 RX ORDER — ZOLPIDEM TARTRATE 10 MG/1
10 TABLET ORAL AS NEEDED
COMMUNITY
Start: 2017-12-19 | End: 2019-08-14

## 2017-12-20 RX ORDER — DOFETILIDE 0.25 MG/1
250 CAPSULE ORAL EVERY 12 HOURS SCHEDULED
Qty: 180 CAPSULE | Refills: 2 | Status: SHIPPED | OUTPATIENT
Start: 2017-12-20 | End: 2018-08-31 | Stop reason: SDUPTHER

## 2017-12-20 RX ORDER — GABAPENTIN 100 MG/1
100 CAPSULE ORAL
Refills: 5 | COMMUNITY
Start: 2017-12-02

## 2017-12-20 RX ORDER — DOFETILIDE 0.12 MG/1
125 CAPSULE ORAL EVERY 12 HOURS SCHEDULED
Qty: 180 CAPSULE | Refills: 2 | Status: SHIPPED | OUTPATIENT
Start: 2017-12-20 | End: 2018-08-31 | Stop reason: SDUPTHER

## 2017-12-20 NOTE — PROGRESS NOTES
Rob Miranda  1955  [phone]      12/20/2017    [facility] CARDIOLOGY     Samuel Duane Kreis, MD  [address] [place] [address]    Chief Complaint   Patient presents with   • Atrial Fibrillation       Problem List:   Paroxysmal Atrial Fibrillation      a. Initiation of Tikosyn, May 2013.  b. Inappropriate ICD shocks secondary to atrial fibrillation with RVR.   c. Pulmonary vein ablation, 02/04/2014; inability to isolate the right inferior pulmonary vein secondary to near proximity to the esophagus.   d. EVA, 02/04/2014; EF 23%, left atrium severely dilated.   e. On 03/16/2015, pulmonary vein ablation (re-isolation of all four pulmonary veins with linear lines along the left roof and mitral annulus, ablation of left atrial rotors). Procedure complicated by transient hypotension secondary to acidosis in the setting of severe LV dysfunction, and obstructive pulmonary disease.  f. Recurrent episode of atrial fibrillation with internal cardioversion, 07/02/2015.  g. Recurrent Atrial Fibrillation s/p ICV 4/24/17  h. Recurrent atrial fibrillation s/p ICV 10/23/17       Chronic Systolic Congestive Heart Failure      g. Previous MI.  h. CABG in 1991.   i. Left heart catheterization 2004: Patent grafts, EF estimated at 30% to 35%.   j. A 2D echocardiogram, 09/08/2006: EF estimated at 30% to 35%.   k. GXT Cardiolite, 09/08/2006: EF estimated at 20% to 25%, no inducible ischemia.   l. St. Jr DDD-ICD, 12/04/2006, Dr. Márquez.  m. Echocardiogram, June 2012 with EF 25%.   n. ICD generator change out, May 2013.        Coronary Artery Disease Involving Coronary Bypass Graft of Native Heart Without Angina Pectoris   Essential Hypertension         Allergies  Allergies   Allergen Reactions   • Shellfish-Derived Products        Current Medications    Current Outpatient Prescriptions:   •  apixaban (ELIQUIS) 5 MG tablet tablet, Take 1 tablet by mouth 2 (Two) Times a Day., Disp:  180 tablet, Rfl: 3  •  aspirin 81 MG tablet, Take 1 tablet by mouth Daily., Disp: 30 tablet, Rfl: 3  •  digoxin (DIGOX) 250 MCG tablet, Take 1 tablet by mouth Daily., Disp: 90 tablet, Rfl: 2  •  dofetilide (TIKOSYN) 125 MCG capsule, Take 1 capsule by mouth 2 (Two) Times a Day., Disp: 180 capsule, Rfl: 2  •  dofetilide (TIKOSYN) 250 MCG capsule, Take 1 capsule by mouth 2 (Two) Times a Day., Disp: 180 capsule, Rfl: 2  •  furosemide (LASIX) 40 MG tablet, Take 40 mg by mouth Daily., Disp: , Rfl:   •  gabapentin (NEURONTIN) 600 MG tablet, 2 (Two) Times a Day., Disp: , Rfl: 5  •  insulin glargine (LANTUS) 100 UNIT/ML injection, Inject 40 Units under the skin every night., Disp: , Rfl:   •  magnesium, as, gluconate (MAGONATE) 500 (27 Mg) MG tablet, Take 30 mg by mouth 2 (Two) Times a Day., Disp: , Rfl:   •  metFORMIN (GLUCOPHAGE) 1000 MG tablet, Take 1,000 mg by mouth 2 (two) times a day with meals., Disp: , Rfl:   •  metoprolol tartrate (LOPRESSOR) 100 MG tablet, Date 1 tablet by mouth twice a day., Disp: 180 tablet, Rfl: 3  •  rosuvastatin (CRESTOR) 40 MG tablet, Take 1 tablet by mouth Daily., Disp: 30 tablet, Rfl: 3  •  sacubitril-valsartan (ENTRESTO) 24-26 MG tablet, Take 1 tablet by mouth 2 (Two) Times a Day., Disp: 60 tablet, Rfl: 3  •  sitaGLIPtin (JANUVIA) 100 MG tablet, Take 100 mg by mouth daily., Disp: , Rfl:   •  tamsulosin (FLOMAX) 0.4 MG capsule 24 hr capsule, Take 1 capsule by mouth every night., Disp: , Rfl:   •  zolpidem (AMBIEN) 10 MG tablet, As Needed., Disp: , Rfl:     History of Present Illness   HPI    Pt presents for follow up of atrial fibrillation, CHF, cardiomyopathy, and ICD check. Since we last saw the pt, he had an ICV from atrial fibrillation to NSR. He feels much better since then with more energy. Pt denies any AF episodes, SOB, CP, LH, and dizziness. Denies any hospitalizations, ER visits, bleeding issues Eliqius, or TIA/CVA symptoms. Overall feels well. Still smoking. BP well controlled.  "He will be seeing a vascular surgeon about possible surgery on his legs for claudication.     ROS:  General:  Denies fatigue, weight gain or loss  Cardiovascular:  Denies CP, PND, syncope, near syncope, edema or palpitations.  Pulmonary:  Denies NGUYEN, cough, or wheezing      Vitals:    12/20/17 1339   BP: 118/62   BP Location: Right arm   Patient Position: Sitting   Pulse: 71   Weight: 89.1 kg (196 lb 6.4 oz)   Height: 182.9 cm (72\")     PE:  General: NAD  Neck: no JVD, no carotid bruits, no TM  Heart RRR, NL S1, S2, S4 present, no rubs, murmurs  Lungs: CTA, no wheezes, rhonchi, or rales  Abd: soft, non-tender, NL BS  Ext: No musculoskeletal deformities, no edema, cyanosis, or clubbing  Psych: normal mood and affect    Diagnostic Data:      ICD check: 32% A paced, Less than 1% V paced. No arrhythmias.     Procedures    1. Persistent atrial fibrillation    2. Chronic systolic congestive heart failure, well compensated.    3. PVD (peripheral vascular disease)    4. Tobacco abuse          Plan:  1) AF- maintaining NSR on Tiksoyn since ICV 10/2017.   Continue present medications.   2) Anticoagulation  Continue Eliquis  3) ICM - on BB and Entresto  Normal ICD function  4) PVD - following up with vascular surgery.   5) Tobacco abuse - needs to quit.     F/up in 6 months      Scribed for Poli Edwards MD by Leda Gong PA-C. 12/20/2017  1:57 PM      I, Poli Edwards MD, personally performed the services described in this documentation as scribed by the above named individual in my presence, and it is both accurate and complete.  12/20/2017  2:00 PM      "

## 2018-01-24 ENCOUNTER — OFFICE VISIT (OUTPATIENT)
Dept: CARDIOLOGY | Facility: CLINIC | Age: 63
End: 2018-01-24

## 2018-01-24 VITALS
SYSTOLIC BLOOD PRESSURE: 100 MMHG | HEART RATE: 69 BPM | WEIGHT: 195 LBS | BODY MASS INDEX: 26.45 KG/M2 | DIASTOLIC BLOOD PRESSURE: 57 MMHG

## 2018-01-24 DIAGNOSIS — I25.5 ISCHEMIC CARDIOMYOPATHY: ICD-10-CM

## 2018-01-24 DIAGNOSIS — I25.10 ASCVD (ARTERIOSCLEROTIC CARDIOVASCULAR DISEASE): Primary | ICD-10-CM

## 2018-01-24 DIAGNOSIS — E78.2 MIXED HYPERLIPIDEMIA: ICD-10-CM

## 2018-01-24 DIAGNOSIS — I48.92 ATRIAL FIBRILLATION AND FLUTTER (HCC): ICD-10-CM

## 2018-01-24 DIAGNOSIS — IMO0001 IDDM (INSULIN DEPENDENT DIABETES MELLITUS): ICD-10-CM

## 2018-01-24 DIAGNOSIS — I73.9 PERIPHERAL ARTERIAL DISEASE (HCC): ICD-10-CM

## 2018-01-24 DIAGNOSIS — Z79.899 DRUG THERAPY: ICD-10-CM

## 2018-01-24 DIAGNOSIS — I48.91 ATRIAL FIBRILLATION AND FLUTTER (HCC): ICD-10-CM

## 2018-01-24 PROCEDURE — 99214 OFFICE O/P EST MOD 30 MIN: CPT | Performed by: INTERNAL MEDICINE

## 2018-01-24 PROCEDURE — 93000 ELECTROCARDIOGRAM COMPLETE: CPT | Performed by: INTERNAL MEDICINE

## 2018-01-24 NOTE — PROGRESS NOTES
Samuel Duane Kreis, MD  Rob Miranda  1955 01/24/2018    Patient Active Problem List   Diagnosis   • Chronic systolic congestive heart failure, well compensated.   • Coronary artery disease involving coronary bypass graft of native heart without angina pectoris   • Essential hypertension   • History of ASCVD (atherosclerotic cardiovascular disease), status post myocardial infarction, CABG 1992   • Advanced Ischemic cardiomyopathy with estimated ejection fraction of less than 20% in January 2015, status post biventricular ICD implantation.in 2007,with gen.change in 2013.   • Dyslipidemia, on Crestor.    • IDDM (insulin dependent diabetes mellitus)   • Atrial fibrillation and flutter,s/p pulmonary renee ablationx2 in 2/14 and 3/15.on Tikosyn    • Sinus bradycardia   • Hyperlipidemia   • Tobacco use   • Hypomagnesemia   • ASCVD (arteriosclerotic cardiovascular disease), status post CABG in 1992.   • Peripheral arterial disease both lower extremities.       Dear Samuel Duane Kreis, MD:    Subjective     Chief complaint: Follow-up of ASCVD and ischemic cardiomyopathy and atrial flutter/fibrillation.    History of Present Illness: Mr. Miranda is a pleasant 62-year-old  male with history of known ASCVD, status post previous myocardial infarctions, status post CABG in 1992, with underlying advance ischemic cardiomyopathy, status post ICD implantation.  He also has history of atrial flutter, status post RF ablation, maintaining sinus rhythm with Tikosyn is here for regular cardiac follow-up.  He denies any complaints of palpitations, dizziness or syncope.  Denies any complaints of chest pains or shortness of breath.  Denies any PND, orthopnea or pedal edema.  No bleeding issues with the Eliquis.  He has some intermittent leg claudication.      Allergies   Allergen Reactions   • Shellfish-Derived Products    :      Current Outpatient Prescriptions:   •  apixaban (ELIQUIS) 5 MG tablet tablet, Take 1 tablet by  mouth 2 (Two) Times a Day., Disp: 180 tablet, Rfl: 3  •  digoxin (DIGOX) 250 MCG tablet, Take 1 tablet by mouth Daily., Disp: 90 tablet, Rfl: 2  •  dofetilide (TIKOSYN) 125 MCG capsule, Take 1 capsule by mouth Every 12 (Twelve) Hours., Disp: 180 capsule, Rfl: 2  •  dofetilide (TIKOSYN) 250 MCG capsule, Take 1 capsule by mouth Every 12 (Twelve) Hours., Disp: 180 capsule, Rfl: 2  •  furosemide (LASIX) 40 MG tablet, Take 40 mg by mouth Daily., Disp: , Rfl:   •  gabapentin (NEURONTIN) 600 MG tablet, 2 (Two) Times a Day., Disp: , Rfl: 5  •  insulin detemir (LEVEMIR) 100 UNIT/ML injection, Inject 42 Units under the skin Every Night., Disp: , Rfl:   •  insulin glargine (LANTUS) 100 UNIT/ML injection, Inject 40 Units under the skin every night., Disp: , Rfl:   •  magnesium, as, gluconate (MAGONATE) 500 (27 Mg) MG tablet, Take 30 mg by mouth 2 (Two) Times a Day., Disp: , Rfl:   •  metFORMIN (GLUCOPHAGE) 1000 MG tablet, Take 1,000 mg by mouth 2 (two) times a day with meals., Disp: , Rfl:   •  metoprolol tartrate (LOPRESSOR) 100 MG tablet, Date 1 tablet by mouth twice a day., Disp: 180 tablet, Rfl: 3  •  rosuvastatin (CRESTOR) 40 MG tablet, Take 1 tablet by mouth Daily., Disp: 30 tablet, Rfl: 3  •  sacubitril-valsartan (ENTRESTO) 24-26 MG tablet, Take 1 tablet by mouth Every 12 (Twelve) Hours., Disp: 180 tablet, Rfl: 2  •  sitaGLIPtin (JANUVIA) 100 MG tablet, Take 100 mg by mouth daily., Disp: , Rfl:   •  tamsulosin (FLOMAX) 0.4 MG capsule 24 hr capsule, Take 1 capsule by mouth every night., Disp: , Rfl:   •  zolpidem (AMBIEN) 10 MG tablet, As Needed., Disp: , Rfl:       The following portions of the patient's history were reviewed and updated as appropriate: allergies, current medications, past family history, past medical history, past social history, past surgical history and problem list.    Social History   Substance Use Topics   • Smoking status: Current Every Day Smoker     Packs/day: 0.50     Types: Cigarettes   •  "Smokeless tobacco: Never Used   • Alcohol use 0.6 - 1.2 oz/week     1 - 2 Cans of beer per week      Comment: socially       Review of Systems   Constitution: Negative for chills and fever.   HENT: Negative for nosebleeds and sore throat.    Cardiovascular: Negative for chest pain and leg swelling. Palpitations: no change.   Respiratory: Negative for cough, hemoptysis, shortness of breath and wheezing.    Hematologic/Lymphatic: Bleeding problem: none reported.   Gastrointestinal: Negative for abdominal pain, hematemesis, hematochezia, melena, nausea and vomiting.   Genitourinary: Negative for dysuria and hematuria.   Neurological: Negative for dizziness, headaches and light-headedness.       Objective   Vitals:    01/24/18 1337   BP: 100/57   Pulse: 69   Weight: 88.5 kg (195 lb)     Body mass index is 26.45 kg/(m^2).    Vitals      1/24/18 12/20/17 10/23/17   /57 118/62 112/80   Heart Rate 69 71 90   Resp   16   Temp   97.9 °F (36.6 °C)   Temp src   Tympanic   SpO2   97 %   Weight 88.5 kg (195 lb) 89.1 kg (196 lb 6.4 oz)    Height  182.9 cm (72\")    BMI (Calculated)  26.6    BSA (Calculated - sq m)  2.11 sq meters          Physical Exam   Constitutional: He is oriented to person, place, and time. He appears well-developed and well-nourished.   HENT:   Head: Normocephalic.   Eyes: Conjunctivae and EOM are normal.   Neck: Normal range of motion. Neck supple. No JVD present. No tracheal deviation present. No thyromegaly present.   Cardiovascular: Normal rate and regular rhythm.  Exam reveals no gallop and no friction rub.    No murmur heard.  Pulses:       Carotid pulses are 1+ on the left side.       Radial pulses are 1+ on the left side.        Femoral pulses are 1+ on the right side, and 1+ on the left side.       Popliteal pulses are 1+ on the right side, and 1+ on the left side.        Dorsalis pedis pulses are 1+ on the right side, and 1+ on the left side.        Posterior tibial pulses are 1+ on the right " side, and 1+ on the left side.   Pulmonary/Chest: Breath sounds normal. No respiratory distress. He has no wheezes. He has no rales.   Abdominal: Soft. Bowel sounds are normal. He exhibits no mass. There is no tenderness.   Musculoskeletal: He exhibits no edema.   Neurological: He is alert and oriented to person, place, and time. No cranial nerve deficit.   Skin: Skin is warm and dry.   Psychiatric: He has a normal mood and affect.       Lab Results   Component Value Date     10/23/2017    K 4.8 10/23/2017     10/23/2017    CO2 32.0 (H) 10/23/2017    BUN 17 10/23/2017    CREATININE 0.90 10/23/2017    GLUCOSE 113 (H) 10/23/2017    CALCIUM 9.2 10/23/2017    AST 23 10/23/2017    ALT 22 10/23/2017    ALKPHOS 70 10/23/2017    LABIL2 1.3 (L) 10/23/2017     Lab Results   Component Value Date    CKTOTAL 66 02/09/2014     Lab Results   Component Value Date    WBC 10.53 10/23/2017    HGB 15.6 10/23/2017    HCT 48.8 10/23/2017     10/23/2017     Lab Results   Component Value Date    INR 1.26 10/23/2017    INR 1.28 04/24/2017    INR 1.12 03/10/2016     Lab Results   Component Value Date    MG 1.9 10/10/2017     Lab Results   Component Value Date    TRIG 116 03/06/2017    HDL 34 (L) 03/06/2017      Lab Results   Component Value Date    BNP 1160 (H) 02/08/2014     Echo   No results found for: ECHOEFEST    ECG 12 Lead  Date/Time: 1/24/2018 1:32 PM  Performed by: NICKO FUENTES  Authorized by: CHELSEY EPPERSON   Comments: Atrial paced rhythm with QTC of 412 ms.  Chronic ST-T wave changes noted in all the leadsin significant change from before.            Assessment/Plan :     Diagnosis Plan   1. ASCVD (arteriosclerotic cardiovascular disease), status post CABG in 1992.     2. Advanced Ischemic cardiomyopathy with estimated ejection fraction of less than 20% in January 2015, status post biventricular ICD implantation.in 2007,with gen.change in 2013.     3. Atrial fibrillation and flutter,s/p pulmonary renee  ablationx2 in 2/14 and 3/15.on Tikosyn      4. Mixed hyperlipidemia     5. IDDM (insulin dependent diabetes mellitus)     6. Drug therapy  Comprehensive Metabolic Panel    Digoxin Level    Magnesium   7. Peripheral arterial disease both lower extremities.  Duplex Lower Extremity Art / Grafts - Bilateral CAR        Recommendations:    1. Continue with Tikosyn, Eliquis, Entresto, metoprolol at current doses.  2. Obtain CMP, magnesium and digoxin levels.  3. Obtain arterial duplex scan of both lower extremities to evaluate his PAD.  4. Follow-up in 3-4 months.       Return in about 3 months (around 4/24/2018).    As always, I appreciate very much the opportunity to participate in the cardiovascular care of your patients.      With Best Regards,    Ranjith Hernandez MD, FACC

## 2018-01-29 ENCOUNTER — RESULTS ENCOUNTER (OUTPATIENT)
Dept: CARDIOLOGY | Facility: CLINIC | Age: 63
End: 2018-01-29

## 2018-01-29 DIAGNOSIS — Z79.899 DRUG THERAPY: ICD-10-CM

## 2018-01-31 ENCOUNTER — APPOINTMENT (OUTPATIENT)
Dept: CARDIOLOGY | Facility: HOSPITAL | Age: 63
End: 2018-01-31
Attending: INTERNAL MEDICINE

## 2018-02-01 ENCOUNTER — HOSPITAL ENCOUNTER (OUTPATIENT)
Dept: CARDIOLOGY | Facility: HOSPITAL | Age: 63
Discharge: HOME | End: 2018-02-01
Attending: INTERNAL MEDICINE | Admitting: INTERNAL MEDICINE

## 2018-02-01 DIAGNOSIS — I73.9 PERIPHERAL ARTERIAL DISEASE (HCC): ICD-10-CM

## 2018-02-01 LAB
ALBUMIN SERPL-MCNC: 4.2 G/DL (ref 3.6–4.8)
ALBUMIN/GLOB SERPL: 1.6 {RATIO} (ref 1.2–2.2)
ALP SERPL-CCNC: 64 IU/L (ref 39–117)
ALT SERPL-CCNC: 13 IU/L (ref 0–44)
AST SERPL-CCNC: 19 IU/L (ref 0–40)
BILIRUB SERPL-MCNC: 0.4 MG/DL (ref 0–1.2)
BUN SERPL-MCNC: 15 MG/DL (ref 8–27)
BUN/CREAT SERPL: 14 (ref 10–24)
CALCIUM SERPL-MCNC: 9.5 MG/DL (ref 8.6–10.2)
CHLORIDE SERPL-SCNC: 97 MMOL/L (ref 96–106)
CO2 SERPL-SCNC: 26 MMOL/L (ref 18–29)
CREAT SERPL-MCNC: 1.09 MG/DL (ref 0.76–1.27)
DIGOXIN SERPL-MCNC: 0.9 NG/ML (ref 0.5–0.9)
GFR SERPLBLD CREATININE-BSD FMLA CKD-EPI: 73 ML/MIN/1.73
GFR SERPLBLD CREATININE-BSD FMLA CKD-EPI: 84 ML/MIN/1.73
GLOBULIN SER CALC-MCNC: 2.6 G/DL (ref 1.5–4.5)
GLUCOSE SERPL-MCNC: 114 MG/DL (ref 65–99)
MAGNESIUM SERPL-MCNC: 1.8 MG/DL (ref 1.6–2.3)
POTASSIUM SERPL-SCNC: 5.5 MMOL/L (ref 3.5–5.2)
PROT SERPL-MCNC: 6.8 G/DL (ref 6–8.5)
SODIUM SERPL-SCNC: 139 MMOL/L (ref 134–144)

## 2018-02-01 PROCEDURE — 93925 LOWER EXTREMITY STUDY: CPT

## 2018-02-01 PROCEDURE — 93925 LOWER EXTREMITY STUDY: CPT | Performed by: RADIOLOGY

## 2018-02-02 ENCOUNTER — RESULTS ENCOUNTER (OUTPATIENT)
Dept: CARDIOLOGY | Facility: CLINIC | Age: 63
End: 2018-02-02

## 2018-02-02 DIAGNOSIS — Z79.899 DRUG THERAPY: ICD-10-CM

## 2018-02-02 DIAGNOSIS — Z79.899 DRUG THERAPY: Primary | ICD-10-CM

## 2018-02-02 DIAGNOSIS — I73.9 PAD (PERIPHERAL ARTERY DISEASE) (HCC): Primary | ICD-10-CM

## 2018-02-02 LAB — POTASSIUM SERPL-SCNC: 5.1 MMOL/L (ref 3.5–5.2)

## 2018-02-05 ENCOUNTER — TELEPHONE (OUTPATIENT)
Dept: CARDIOLOGY | Facility: CLINIC | Age: 63
End: 2018-02-05

## 2018-02-05 DIAGNOSIS — E87.5 HYPERKALEMIA: Primary | ICD-10-CM

## 2018-02-05 DIAGNOSIS — R89.9 ABNORMAL LABORATORY TEST: ICD-10-CM

## 2018-02-05 LAB
AMBIG ABBREV BMP8 DEFAULT: NORMAL
BUN SERPL-MCNC: 20 MG/DL (ref 8–27)
BUN/CREAT SERPL: 21 (ref 10–24)
CALCIUM SERPL-MCNC: 9.6 MG/DL (ref 8.6–10.2)
CHLORIDE SERPL-SCNC: 102 MMOL/L (ref 96–106)
CO2 SERPL-SCNC: 27 MMOL/L (ref 18–29)
CREAT SERPL-MCNC: 0.95 MG/DL (ref 0.76–1.27)
GFR SERPLBLD CREATININE-BSD FMLA CKD-EPI: 86 ML/MIN/1.73
GFR SERPLBLD CREATININE-BSD FMLA CKD-EPI: 99 ML/MIN/1.73
GLUCOSE SERPL-MCNC: 184 MG/DL (ref 65–99)
POTASSIUM SERPL-SCNC: 5.6 MMOL/L (ref 3.5–5.2)
SODIUM SERPL-SCNC: 137 MMOL/L (ref 134–144)

## 2018-02-05 RX ORDER — SODIUM POLYSTYRENE SULFONATE 4.1 MEQ/G
POWDER, FOR SUSPENSION ORAL; RECTAL ONCE
Qty: 30 G | Refills: 0 | Status: SHIPPED | OUTPATIENT
Start: 2018-02-05 | End: 2018-02-05 | Stop reason: SDUPTHER

## 2018-02-05 RX ORDER — SODIUM POLYSTYRENE SULFONATE 4.1 MEQ/G
POWDER, FOR SUSPENSION ORAL; RECTAL ONCE
Qty: 30 G | Refills: 0 | Status: SHIPPED | OUTPATIENT
Start: 2018-02-05 | End: 2018-02-05

## 2018-02-05 NOTE — TELEPHONE ENCOUNTER
Patient was contacted about his high potassium on recheck BMP today.  There was a mixup and originally his dad was called about the labs and medication instructions, however, the medication was sent in on the correct patient and I have contacted both patient's to straighten it out.     He is to take a single dose of Kayexalate 30g times one dose which he states he cannot  until the morning as his pharmacy is closed.  I have asked him to hold his Entresto for now and to go ahead and take his dose of Lasix that he not taken yet today as this will also help to lower his potassium.  He is also to drink a little bit more water this evening. He was reminded about avoiding fruit juices, nuts, and not to eat any fruits at all this evening.  He will take Kayexalate first laying in the morning when he can pick it up and we will recheck a basic panel on Wednesday.      Please call the patient and make sure he knows to hold the Entresto until we recheck his labs on Wednesday and we will give him further instructions at that point.  I have already placed an order for a basic metabolic panel.      Please call [facility] and cancel the prescription that was sent there for the Kayexalate.  The initial prescription was sent into the correct pharmacy at [facility] which the patient will  tomorrow.

## 2018-02-06 ENCOUNTER — TELEPHONE (OUTPATIENT)
Dept: CARDIOLOGY | Facility: CLINIC | Age: 63
End: 2018-02-06

## 2018-02-06 NOTE — TELEPHONE ENCOUNTER
Notes Recorded by Malika Marin CMA on 2/6/2018 at 4:45 PM  Pt stated he would rather take try the ASA.  Per Eliz, she would prefer this anyway, if pt can tolerate it, so pt to take aspirin 81 mg daily-not start the Plavix.  Pt was agreeable and stated he would call us with any issues.  ------    Notes Recorded by MIGUEL Aguayo on 2/6/2018 at 4:24 PM  Since he developed adverse reactions to aspirin, take Plavix 75 mg daily for his peripheral arterial disease.  Monitor for signs of bleeding as he is also taking Eliquis.  ------    Notes Recorded by Mame Adan MA on 2/2/2018 at 3:17 PM  Pt stated it was with the low dose ASA.  ------    Notes Recorded by MIGUEL Aguayo on 2/2/2018 at 10:58 AM  Please see if that was on a full dose aspirin or a low-dose 81 mg.  Does he have an allergy to aspirin?  ------    Notes Recorded by Mame Adan MA on 2/2/2018 at 9:29 AM  Sent in the referral.   Called pt and he stated that he doesn't take any ASA due to it causing his hands to tingle with Eliquis.  ------    Notes Recorded by MIGUEL Aguayo on 2/1/2018 at 4:49 PM  Refer to Dr. Vasquez for PAD.  He is on Crestor.  Please check and see if he is on an aspirin.  He is on Eliquis so we'll not add Plavix at this time.       Details

## 2018-02-06 NOTE — TELEPHONE ENCOUNTER
----- Message from MIGUEL Aguayo sent at 2/6/2018  4:24 PM EST -----  Since he developed adverse reactions to aspirin, take Plavix 75 mg daily for his peripheral arterial disease.  Monitor for signs of bleeding as he is also taking Eliquis.

## 2018-02-06 NOTE — TELEPHONE ENCOUNTER
Called pt to advised him to hold the Entrestro until he got further instructions from us. He expressed understanding. Christy stated that she had called in the [facility] to the other pharmacy.

## 2018-02-06 NOTE — TELEPHONE ENCOUNTER
[facility] called to clarify pt's Rx for Kayexelate.  Per Eliz's note, we were to cx the Rx at [facility], as it  Needed (and was) sent to [facility].  I advised them that per Eliz to cx this Rx.

## 2018-02-08 DIAGNOSIS — R78.89 ELEVATED DIGOXIN LEVEL: Primary | ICD-10-CM

## 2018-02-08 DIAGNOSIS — I50.22 CHRONIC SYSTOLIC CONGESTIVE HEART FAILURE (HCC): ICD-10-CM

## 2018-02-08 DIAGNOSIS — R89.9 ABNORMAL LABORATORY TEST: ICD-10-CM

## 2018-02-08 LAB
ALBUMIN SERPL-MCNC: 4.2 G/DL (ref 3.6–4.8)
ALBUMIN/GLOB SERPL: 1.6 {RATIO} (ref 1.2–2.2)
ALP SERPL-CCNC: 64 IU/L (ref 39–117)
ALT SERPL-CCNC: 13 IU/L (ref 0–44)
AMBIG ABBREV CMP14 DEFAULT: NORMAL
AST SERPL-CCNC: 19 IU/L (ref 0–40)
BILIRUB SERPL-MCNC: 0.3 MG/DL (ref 0–1.2)
BUN SERPL-MCNC: 19 MG/DL (ref 8–27)
BUN/CREAT SERPL: 21 (ref 10–24)
CALCIUM SERPL-MCNC: 9.4 MG/DL (ref 8.6–10.2)
CHLORIDE SERPL-SCNC: 100 MMOL/L (ref 96–106)
CO2 SERPL-SCNC: 31 MMOL/L (ref 18–29)
CREAT SERPL-MCNC: 0.89 MG/DL (ref 0.76–1.27)
DIGOXIN SERPL-MCNC: 1.2 NG/ML (ref 0.5–0.9)
GFR SERPLBLD CREATININE-BSD FMLA CKD-EPI: 107 ML/MIN/1.73
GFR SERPLBLD CREATININE-BSD FMLA CKD-EPI: 92 ML/MIN/1.73
GLOBULIN SER CALC-MCNC: 2.7 G/DL (ref 1.5–4.5)
GLUCOSE SERPL-MCNC: 80 MG/DL (ref 65–99)
MAGNESIUM SERPL-MCNC: 1.8 MG/DL (ref 1.6–2.3)
POTASSIUM SERPL-SCNC: 4.2 MMOL/L (ref 3.5–5.2)
PROT SERPL-MCNC: 6.9 G/DL (ref 6–8.5)
SODIUM SERPL-SCNC: 141 MMOL/L (ref 134–144)

## 2018-02-09 RX ORDER — MAGNESIUM GLUCONATE 27 MG(500)
TABLET ORAL
Qty: 60 TABLET | Refills: 3 | Status: SHIPPED | OUTPATIENT
Start: 2018-02-09 | End: 2018-07-02 | Stop reason: SDUPTHER

## 2018-02-10 ENCOUNTER — RESULTS ENCOUNTER (OUTPATIENT)
Dept: CARDIOLOGY | Facility: CLINIC | Age: 63
End: 2018-02-10

## 2018-02-10 DIAGNOSIS — E87.5 HYPERKALEMIA: ICD-10-CM

## 2018-02-10 DIAGNOSIS — R89.9 ABNORMAL LABORATORY TEST: ICD-10-CM

## 2018-02-13 ENCOUNTER — RESULTS ENCOUNTER (OUTPATIENT)
Dept: CARDIOLOGY | Facility: CLINIC | Age: 63
End: 2018-02-13

## 2018-02-13 DIAGNOSIS — R89.9 ABNORMAL LABORATORY TEST: ICD-10-CM

## 2018-02-13 DIAGNOSIS — I50.22 CHRONIC SYSTOLIC CONGESTIVE HEART FAILURE (HCC): ICD-10-CM

## 2018-02-13 DIAGNOSIS — R78.89 ELEVATED DIGOXIN LEVEL: ICD-10-CM

## 2018-02-13 LAB
BUN SERPL-MCNC: 16 MG/DL (ref 8–27)
BUN/CREAT SERPL: 16 (ref 10–24)
CALCIUM SERPL-MCNC: 10.4 MG/DL (ref 8.6–10.2)
CHLORIDE SERPL-SCNC: 97 MMOL/L (ref 96–106)
CO2 SERPL-SCNC: 32 MMOL/L (ref 18–29)
CREAT SERPL-MCNC: 1.01 MG/DL (ref 0.76–1.27)
DIGOXIN SERPL-MCNC: <0.4 NG/ML (ref 0.5–0.9)
GFR SERPLBLD CREATININE-BSD FMLA CKD-EPI: 80 ML/MIN/1.73
GFR SERPLBLD CREATININE-BSD FMLA CKD-EPI: 92 ML/MIN/1.73
GLUCOSE SERPL-MCNC: 74 MG/DL (ref 65–99)
POTASSIUM SERPL-SCNC: 4.9 MMOL/L (ref 3.5–5.2)
SODIUM SERPL-SCNC: 139 MMOL/L (ref 134–144)

## 2018-02-14 DIAGNOSIS — R89.9 ABNORMAL LABORATORY TEST: Primary | ICD-10-CM

## 2018-02-14 DIAGNOSIS — I10 ESSENTIAL HYPERTENSION: ICD-10-CM

## 2018-02-19 ENCOUNTER — RESULTS ENCOUNTER (OUTPATIENT)
Dept: CARDIOLOGY | Facility: CLINIC | Age: 63
End: 2018-02-19

## 2018-02-19 DIAGNOSIS — I10 ESSENTIAL HYPERTENSION: ICD-10-CM

## 2018-02-19 DIAGNOSIS — R89.9 ABNORMAL LABORATORY TEST: ICD-10-CM

## 2018-02-19 LAB
BUN SERPL-MCNC: 16 MG/DL (ref 8–27)
BUN/CREAT SERPL: 16 (ref 10–24)
CALCIUM SERPL-MCNC: 10.1 MG/DL (ref 8.6–10.2)
CHLORIDE SERPL-SCNC: 99 MMOL/L (ref 96–106)
CO2 SERPL-SCNC: 29 MMOL/L (ref 18–29)
CREAT SERPL-MCNC: 1 MG/DL (ref 0.76–1.27)
GFR SERPLBLD CREATININE-BSD FMLA CKD-EPI: 81 ML/MIN/1.73
GFR SERPLBLD CREATININE-BSD FMLA CKD-EPI: 93 ML/MIN/1.73
GLUCOSE SERPL-MCNC: 88 MG/DL (ref 65–99)
POTASSIUM SERPL-SCNC: 5.3 MMOL/L (ref 3.5–5.2)
SODIUM SERPL-SCNC: 139 MMOL/L (ref 134–144)

## 2018-02-21 DIAGNOSIS — Z79.899 DRUG THERAPY: Primary | ICD-10-CM

## 2018-02-21 LAB
BUN SERPL-MCNC: 18 MG/DL (ref 8–27)
BUN/CREAT SERPL: 17 (ref 10–24)
CALCIUM SERPL-MCNC: 9.7 MG/DL (ref 8.6–10.2)
CHLORIDE SERPL-SCNC: 102 MMOL/L (ref 96–106)
CO2 SERPL-SCNC: 29 MMOL/L (ref 18–29)
CREAT SERPL-MCNC: 1.06 MG/DL (ref 0.76–1.27)
GFR SERPLBLD CREATININE-BSD FMLA CKD-EPI: 75 ML/MIN/{1.73_M2}
GFR SERPLBLD CREATININE-BSD FMLA CKD-EPI: 87 ML/MIN/{1.73_M2}
GLUCOSE SERPL-MCNC: 129 MG/DL (ref 65–99)
POTASSIUM SERPL-SCNC: 5 MMOL/L (ref 3.5–5.2)
SODIUM SERPL-SCNC: 141 MMOL/L (ref 134–144)

## 2018-02-22 ENCOUNTER — TELEPHONE (OUTPATIENT)
Dept: CARDIOLOGY | Facility: CLINIC | Age: 63
End: 2018-02-22

## 2018-02-22 NOTE — TELEPHONE ENCOUNTER
----- Message from MIGUEL Aguayo sent at 2/21/2018  5:13 PM EST -----  Potassium is back down to 5.0. Continue to stay off of Entresto. Forward copy to his nephrologist. See when he follows up with them>?

## 2018-02-23 NOTE — TELEPHONE ENCOUNTER
Spoke with Rob and advised him he needed to see his nephrologist sooner than summer appointment per Eliz Apodaca PA-C.  He stated he would call them and get a sooner appt for next month.

## 2018-02-28 ENCOUNTER — RESULTS ENCOUNTER (OUTPATIENT)
Dept: CARDIOLOGY | Facility: CLINIC | Age: 63
End: 2018-02-28

## 2018-02-28 DIAGNOSIS — Z79.899 DRUG THERAPY: ICD-10-CM

## 2018-03-02 RX ORDER — ROSUVASTATIN CALCIUM 40 MG/1
TABLET, COATED ORAL
Qty: 30 TABLET | Refills: 3 | Status: SHIPPED | OUTPATIENT
Start: 2018-03-02 | End: 2018-04-30 | Stop reason: DRUGHIGH

## 2018-03-14 ENCOUNTER — OFFICE VISIT (OUTPATIENT)
Dept: CARDIAC SURGERY | Facility: CLINIC | Age: 63
End: 2018-03-14

## 2018-03-14 VITALS
HEART RATE: 79 BPM | HEIGHT: 72 IN | SYSTOLIC BLOOD PRESSURE: 115 MMHG | DIASTOLIC BLOOD PRESSURE: 69 MMHG | BODY MASS INDEX: 27.68 KG/M2 | WEIGHT: 204.4 LBS

## 2018-03-14 DIAGNOSIS — I73.9 PERIPHERAL ARTERIAL DISEASE (HCC): Primary | ICD-10-CM

## 2018-03-14 PROCEDURE — 99203 OFFICE O/P NEW LOW 30 MIN: CPT | Performed by: THORACIC SURGERY (CARDIOTHORACIC VASCULAR SURGERY)

## 2018-03-14 PROCEDURE — 99406 BEHAV CHNG SMOKING 3-10 MIN: CPT | Performed by: THORACIC SURGERY (CARDIOTHORACIC VASCULAR SURGERY)

## 2018-03-14 RX ORDER — ASPIRIN 325 MG
325 TABLET ORAL DAILY
COMMUNITY
End: 2018-04-30 | Stop reason: DRUGHIGH

## 2018-03-14 RX ORDER — IBUPROFEN 200 MG
200 TABLET ORAL EVERY 6 HOURS PRN
COMMUNITY
End: 2019-05-29 | Stop reason: ALTCHOICE

## 2018-03-23 RX ORDER — ISOSORBIDE DINITRATE 10 MG/1
10 TABLET ORAL 3 TIMES DAILY
Qty: 90 TABLET | Refills: 3 | Status: SHIPPED | OUTPATIENT
Start: 2018-03-23 | End: 2018-09-12 | Stop reason: SDUPTHER

## 2018-03-23 RX ORDER — HYDRALAZINE HYDROCHLORIDE 10 MG/1
10 TABLET, FILM COATED ORAL 3 TIMES DAILY
Qty: 90 TABLET | Refills: 3 | Status: SHIPPED | OUTPATIENT
Start: 2018-03-23 | End: 2018-09-18 | Stop reason: SDUPTHER

## 2018-03-23 NOTE — PROGRESS NOTES
Patient presented to the office with who I presumed to be his wife for her appointment.  He noted that his potassium continues to be elevated and he is following with primary care and nephrology for this.  His Entresto has been discontinued as previously instructed.  He states his blood pressure runs in the 120s to 130s systolic at home.      We'll add hydralazine 10mg TID and isosorbide dinitrate 10mg TID. Patient instructed and agrees.

## 2018-03-27 ENCOUNTER — HOSPITAL ENCOUNTER (OUTPATIENT)
Dept: CT IMAGING | Facility: HOSPITAL | Age: 63
Discharge: HOME | End: 2018-03-27
Admitting: THORACIC SURGERY (CARDIOTHORACIC VASCULAR SURGERY)

## 2018-03-27 LAB — CREAT BLDA-MCNC: 1.1 MG/DL (ref 0.6–1.3)

## 2018-03-27 PROCEDURE — 25010000002 IOPAMIDOL 61 % SOLUTION: Performed by: RADIOLOGY

## 2018-03-27 PROCEDURE — 82565 ASSAY OF CREATININE: CPT

## 2018-03-27 PROCEDURE — 75635 CT ANGIO ABDOMINAL ARTERIES: CPT

## 2018-03-27 PROCEDURE — 75635 CT ANGIO ABDOMINAL ARTERIES: CPT | Performed by: RADIOLOGY

## 2018-03-27 RX ADMIN — IOPAMIDOL 100 ML: 612 INJECTION, SOLUTION INTRAVENOUS at 14:45

## 2018-04-18 ENCOUNTER — OFFICE VISIT (OUTPATIENT)
Dept: CARDIAC SURGERY | Facility: CLINIC | Age: 63
End: 2018-04-18

## 2018-04-18 VITALS
DIASTOLIC BLOOD PRESSURE: 61 MMHG | WEIGHT: 198 LBS | SYSTOLIC BLOOD PRESSURE: 128 MMHG | BODY MASS INDEX: 26.82 KG/M2 | HEART RATE: 85 BPM | HEIGHT: 72 IN

## 2018-04-18 DIAGNOSIS — I73.9 PERIPHERAL ARTERIAL DISEASE (HCC): Primary | ICD-10-CM

## 2018-04-18 PROCEDURE — 99213 OFFICE O/P EST LOW 20 MIN: CPT | Performed by: THORACIC SURGERY (CARDIOTHORACIC VASCULAR SURGERY)

## 2018-04-18 RX ORDER — CILOSTAZOL 100 MG/1
100 TABLET ORAL
Qty: 60 TABLET | Refills: 11 | Status: SHIPPED | OUTPATIENT
Start: 2018-04-18 | End: 2018-04-30 | Stop reason: DRUGHIGH

## 2018-04-30 ENCOUNTER — OFFICE VISIT (OUTPATIENT)
Dept: CARDIOLOGY | Facility: CLINIC | Age: 63
End: 2018-04-30

## 2018-04-30 VITALS
WEIGHT: 199 LBS | DIASTOLIC BLOOD PRESSURE: 57 MMHG | HEIGHT: 72 IN | OXYGEN SATURATION: 98 % | BODY MASS INDEX: 26.95 KG/M2 | SYSTOLIC BLOOD PRESSURE: 108 MMHG | HEART RATE: 70 BPM

## 2018-04-30 DIAGNOSIS — Z79.899 DRUG THERAPY: ICD-10-CM

## 2018-04-30 DIAGNOSIS — I25.5 ISCHEMIC CARDIOMYOPATHY: ICD-10-CM

## 2018-04-30 DIAGNOSIS — Z86.79 HISTORY OF ASCVD (ATHEROSCLEROTIC CARDIOVASCULAR DISEASE): ICD-10-CM

## 2018-04-30 DIAGNOSIS — I48.91 ATRIAL FIBRILLATION AND FLUTTER (HCC): ICD-10-CM

## 2018-04-30 DIAGNOSIS — IMO0001 IDDM (INSULIN DEPENDENT DIABETES MELLITUS): ICD-10-CM

## 2018-04-30 DIAGNOSIS — E78.5 DYSLIPIDEMIA: ICD-10-CM

## 2018-04-30 DIAGNOSIS — Z72.0 TOBACCO USE: ICD-10-CM

## 2018-04-30 DIAGNOSIS — I48.92 ATRIAL FIBRILLATION AND FLUTTER (HCC): ICD-10-CM

## 2018-04-30 DIAGNOSIS — I73.9 PERIPHERAL ARTERIAL DISEASE (HCC): ICD-10-CM

## 2018-04-30 DIAGNOSIS — I25.10 ASCVD (ARTERIOSCLEROTIC CARDIOVASCULAR DISEASE): Primary | ICD-10-CM

## 2018-04-30 DIAGNOSIS — I50.22 CHRONIC SYSTOLIC CONGESTIVE HEART FAILURE (HCC): ICD-10-CM

## 2018-04-30 PROCEDURE — 99214 OFFICE O/P EST MOD 30 MIN: CPT | Performed by: INTERNAL MEDICINE

## 2018-04-30 RX ORDER — ASPIRIN 325 MG
325 TABLET, DELAYED RELEASE (ENTERIC COATED) ORAL DAILY
Status: ON HOLD | COMMUNITY
End: 2018-08-21

## 2018-04-30 RX ORDER — CILOSTAZOL 50 MG/1
50 TABLET ORAL
Qty: 180 TABLET | Refills: 2 | Status: SHIPPED | OUTPATIENT
Start: 2018-04-30 | End: 2018-07-29

## 2018-04-30 RX ORDER — HYDRALAZINE HYDROCHLORIDE 10 MG/1
10 TABLET, FILM COATED ORAL 3 TIMES DAILY
COMMUNITY
End: 2018-09-12 | Stop reason: SDUPTHER

## 2018-04-30 RX ORDER — ROSUVASTATIN CALCIUM 20 MG/1
40 TABLET, COATED ORAL DAILY
COMMUNITY
End: 2019-05-29 | Stop reason: SDUPTHER

## 2018-04-30 RX ORDER — CARVEDILOL 25 MG/1
25 TABLET ORAL 2 TIMES DAILY
Qty: 180 TABLET | Refills: 3 | Status: SHIPPED | OUTPATIENT
Start: 2018-04-30 | End: 2018-08-10 | Stop reason: SDUPTHER

## 2018-04-30 NOTE — PROGRESS NOTES
Samuel Duane Kreis, MD  Rob Miranda  1955 04/30/2018    Patient Active Problem List   Diagnosis   • Chronic systolic congestive heart failure, well compensated.   • Coronary artery disease involving coronary bypass graft of native heart without angina pectoris   • Essential hypertension   • History of ASCVD (atherosclerotic cardiovascular disease), status post myocardial infarction, CABG 1992   • Advanced Ischemic cardiomyopathy with estimated ejection fraction of less than 20% in January 2015, status post biventricular ICD implantation.in 2007,with gen.change in 2013.   • Dyslipidemia, on Crestor.    • IDDM (insulin dependent diabetes mellitus)   • Atrial fibrillation and flutter,s/p pulmonary renee ablationx2 in 2/14 and 3/15.on Tikosyn    • Sinus bradycardia   • Hyperlipidemia   • Tobacco use   • Hypomagnesemia   • ASCVD (arteriosclerotic cardiovascular disease), status post CABG in 1992.   • Peripheral arterial disease both lower extremities.       Dear Samuel Duane Kreis, MD:    Michele Miranda is a 63 y.o. male with the problems as listed above, presents    Chief complaint: Follow-up of ASCVD, ischemic cardiomyopathy and atrial flutter/fibrillation.    History of Present Illness: is a pleasant 63-year-old  male with history of known ASCVD, status post previous myocardial infarctions and CABG in 1992 with advanced ischemic cardiomyopathy with LV ejection fraction of about 20%, status post ICD implantation and previous history of atrial flutter, status post RF ablation, has been maintaining sinus rhythm on Tikosyn.  He is here for a cardiology follow-up.  He denies any complaints of shortness of breath, PND, orthopnea or pedal edema.  No complaint of palpitations, dizziness or syncope.  No bleeding issues with Eliquis.  Pains are better as well.  Overall he has been doing well.  Says he is still smoking less than a pack a day.        Allergies   Allergen Reactions   •  Shellfish-Derived Products Hives, Shortness Of Breath and Swelling   :      Current Outpatient Prescriptions:   •  apixaban (ELIQUIS) 5 MG tablet tablet, Take 1 tablet by mouth 2 (Two) Times a Day., Disp: 180 tablet, Rfl: 3  •  aspirin 81 MG EC tablet, Take 81 mg by mouth Daily., Disp: , Rfl:   •  digoxin (DIGOX) 250 MCG tablet, Take 1 tablet by mouth Daily., Disp: 90 tablet, Rfl: 2  •  dofetilide (TIKOSYN) 125 MCG capsule, Take 1 capsule by mouth Every 12 (Twelve) Hours., Disp: 180 capsule, Rfl: 2  •  dofetilide (TIKOSYN) 250 MCG capsule, Take 1 capsule by mouth Every 12 (Twelve) Hours., Disp: 180 capsule, Rfl: 2  •  furosemide (LASIX) 40 MG tablet, Take 40 mg by mouth Daily., Disp: , Rfl:   •  gabapentin (NEURONTIN) 600 MG tablet, 2 (Two) Times a Day., Disp: , Rfl: 5  •  hydrALAZINE (APRESOLINE) 10 MG tablet, Take 10 mg by mouth 3 (Three) Times a Day., Disp: , Rfl:   •  ibuprofen (ADVIL,MOTRIN) 200 MG tablet, Take 200 mg by mouth Every 6 (Six) Hours As Needed for Mild Pain ., Disp: , Rfl:   •  insulin detemir (LEVEMIR) 100 UNIT/ML injection, Inject 42 Units under the skin Every Night., Disp: , Rfl:   •  insulin glargine (LANTUS) 100 UNIT/ML injection, Inject 40 Units under the skin every night., Disp: , Rfl:   •  isosorbide dinitrate (ISORDIL) 10 MG tablet, Take 1 tablet by mouth 3 (Three) Times a Day., Disp: 90 tablet, Rfl: 3  •  magnesium gluconate (MAGONATE) 500 MG tablet, TAKE 1 TABLET TWICE DAILY, Disp: 60 tablet, Rfl: 3  •  metFORMIN (GLUCOPHAGE) 1000 MG tablet, Take 1,000 mg by mouth 2 (two) times a day with meals., Disp: , Rfl:   •  rosuvastatin (CRESTOR) 20 MG tablet, Take 20 mg by mouth Daily., Disp: , Rfl:   •  tamsulosin (FLOMAX) 0.4 MG capsule 24 hr capsule, Take 1 capsule by mouth every night., Disp: , Rfl:   •  zolpidem (AMBIEN) 10 MG tablet, As Needed., Disp: , Rfl:   •  carvedilol (COREG) 25 MG tablet, Take 1 tablet by mouth 2 (Two) Times a Day., Disp: 180 tablet, Rfl: 3  •  cilostazol (PLETAL) 50  "MG tablet, Take 1 tablet by mouth 2 (Two) Times a Day Before Meals for 90 days., Disp: 180 tablet, Rfl: 2      The following portions of the patient's history were reviewed and updated as appropriate: allergies, current medications, past family history, past medical history, past social history, past surgical history and problem list.    Social History   Substance Use Topics   • Smoking status: Current Every Day Smoker     Packs/day: 0.75     Years: 48.00     Types: Cigarettes   • Smokeless tobacco: Never Used   • Alcohol use 0.6 - 1.2 oz/week     1 - 2 Cans of beer per week      Comment: socially       Review of Systems   Constitution: Negative for chills and fever.   HENT: Negative for nosebleeds and sore throat.    Cardiovascular: Negative for chest pain, leg swelling, palpitations and syncope.   Respiratory: Negative for cough, hemoptysis, shortness of breath and wheezing.    Gastrointestinal: Negative for abdominal pain, hematemesis, hematochezia, melena, nausea and vomiting.   Genitourinary: Negative for dysuria and hematuria.   Neurological: Positive for numbness (Right hand during the night. ). Negative for dizziness and headaches.       Objective   Vitals:    04/30/18 0815   BP: 108/57   BP Location: Left arm   Patient Position: Sitting   Cuff Size: Adult   Pulse: 70   SpO2: 98%   Weight: 90.3 kg (199 lb)   Height: 182.9 cm (72\")     Body mass index is 26.99 kg/m².        Physical Exam   Constitutional: He is oriented to person, place, and time. He appears well-developed and well-nourished.   HENT:   Mouth/Throat: Oropharynx is clear and moist.   Eyes: EOM are normal. Pupils are equal, round, and reactive to light.   Neck: Neck supple. No JVD present. No tracheal deviation present. No thyromegaly present.   Cardiovascular: Normal rate, regular rhythm, S1 normal and S2 normal.  Exam reveals no gallop, no S3, no S4 and no friction rub.    No murmur heard.  Pulmonary/Chest: Effort normal and breath sounds " normal.   Abdominal: Soft. Bowel sounds are normal. He exhibits no mass. There is no tenderness.   Musculoskeletal: Normal range of motion. He exhibits no edema.   Lymphadenopathy:     He has no cervical adenopathy.   Neurological: He is alert and oriented to person, place, and time.   Skin: Skin is warm and dry. No rash noted.   Psychiatric: He has a normal mood and affect.       Lab Results   Component Value Date     02/21/2018    K 5.0 02/21/2018     02/21/2018    CO2 29 02/21/2018    BUN 18 02/21/2018    CREATININE 1.10 03/27/2018    GLUCOSE 113 (H) 10/23/2017    CALCIUM 9.7 02/21/2018    AST 19 02/07/2018    ALT 13 02/07/2018    ALKPHOS 64 02/07/2018    LABIL2 1.6 02/07/2018     Lab Results   Component Value Date    CKTOTAL 66 02/09/2014     Lab Results   Component Value Date    WBC 10.53 10/23/2017    HGB 15.6 10/23/2017    HCT 48.8 10/23/2017     10/23/2017     Lab Results   Component Value Date    INR 1.26 10/23/2017    INR 1.28 04/24/2017    INR 1.12 03/10/2016     Lab Results   Component Value Date    MG 1.8 02/07/2018     Lab Results   Component Value Date    TRIG 116 03/06/2017    HDL 34 (L) 03/06/2017    LDL 87 03/06/2017      Lab Results   Component Value Date    BNP 1,160 (H) 02/08/2014     Echo   No results found for: ECHOEFEST  Procedures    Assessment/Plan   1. History of ASCVD (atherosclerotic cardiovascular disease), status post myocardial infarction, CABG 1992     2. Advanced Ischemic cardiomyopathy with estimated ejection fraction of less than 20% in January 2015, status post biventricular ICD implantation.in 2007,with gen.change in 2013.     3. Atrial fibrillation and flutter,s/p pulmonary renee ablationx2 in 2/14 and 3/15.on Tikosyn      4. Peripheral arterial disease both lower extremities.     5. IDDM (insulin dependent diabetes mellitus)     6. Tobacco use     7. Dyslipidemia, on Crestor.      8. Chronic systolic congestive heart failure, well compensated.          Recommendations:  1. We'll decrease the cilostazol to 50 mg by mouth twice a day due to his LV systolic dysfunction  2. Continue with aspirin, Tikosyn, Eliquis.  3. Change metoprolol to carvedilol for better efficacy for his cardiomyopathy.  We'll start 25 mg by mouth twice a day.  4. Patient is not on ACE inhibitor, ARB or Entresto due to tendency for hyperkalemia.  5. Continue with hydralazine and oral nitrates for his cardiomyopathy.  6. Have reemphasized for him to quit smoking.  I will also reemphasized to continue to follow salt restricted diet.  7. Follow-up in 4-5 months.    Return in about 4 months (around 8/30/2018).    As always, I appreciate very much the opportunity to participate in the cardiovascular care of your patients.      With Best Regards,    Ranjith Hernandez MD, FACC    Dragon disclaimer:  Much of this encounter note is an electronic transcription/translation of spoken language to printed text. The electronic translation of spoken language may permit erroneous, or at times, nonsensical words or phrases to be inadvertently transcribed; Although I have reviewed the note for such errors, some may still exist.

## 2018-05-05 ENCOUNTER — RESULTS ENCOUNTER (OUTPATIENT)
Dept: CARDIOLOGY | Facility: CLINIC | Age: 63
End: 2018-05-05

## 2018-05-05 DIAGNOSIS — Z79.899 DRUG THERAPY: ICD-10-CM

## 2018-05-07 ENCOUNTER — RESULTS ENCOUNTER (OUTPATIENT)
Dept: CARDIOLOGY | Facility: CLINIC | Age: 63
End: 2018-05-07

## 2018-05-07 DIAGNOSIS — I25.5 ISCHEMIC CARDIOMYOPATHY: ICD-10-CM

## 2018-05-15 ENCOUNTER — CLINICAL SUPPORT NO REQUIREMENTS (OUTPATIENT)
Dept: CARDIOLOGY | Facility: CLINIC | Age: 63
End: 2018-05-15

## 2018-05-15 DIAGNOSIS — I48.91 ATRIAL FIBRILLATION AND FLUTTER (HCC): ICD-10-CM

## 2018-05-15 DIAGNOSIS — I50.22 CHRONIC SYSTOLIC CONGESTIVE HEART FAILURE (HCC): ICD-10-CM

## 2018-05-15 DIAGNOSIS — I48.92 ATRIAL FIBRILLATION AND FLUTTER (HCC): ICD-10-CM

## 2018-05-15 PROCEDURE — 93295 DEV INTERROG REMOTE 1/2/MLT: CPT | Performed by: INTERNAL MEDICINE

## 2018-05-15 PROCEDURE — 93296 REM INTERROG EVL PM/IDS: CPT | Performed by: INTERNAL MEDICINE

## 2018-06-06 RX ORDER — DIGOXIN 125 MCG
TABLET ORAL
Qty: 90 TABLET | Refills: 0 | Status: SHIPPED | OUTPATIENT
Start: 2018-06-06 | End: 2018-08-09 | Stop reason: SDUPTHER

## 2018-06-06 RX ORDER — FUROSEMIDE 40 MG/1
TABLET ORAL
Qty: 90 TABLET | Refills: 0 | Status: SHIPPED | OUTPATIENT
Start: 2018-06-06 | End: 2018-08-09 | Stop reason: SDUPTHER

## 2018-07-02 RX ORDER — MAGNESIUM GLUCONATE 27 MG(500)
TABLET ORAL
Qty: 60 TABLET | Refills: 3 | Status: SHIPPED | OUTPATIENT
Start: 2018-07-02 | End: 2018-11-26 | Stop reason: SDUPTHER

## 2018-08-10 RX ORDER — DIGOXIN 125 MCG
TABLET ORAL
Qty: 90 TABLET | Refills: 0 | Status: SHIPPED | OUTPATIENT
Start: 2018-08-10 | End: 2018-09-12 | Stop reason: SDUPTHER

## 2018-08-10 RX ORDER — FUROSEMIDE 40 MG/1
TABLET ORAL
Qty: 90 TABLET | Refills: 0 | Status: SHIPPED | OUTPATIENT
Start: 2018-08-10 | End: 2018-09-12 | Stop reason: SDUPTHER

## 2018-08-14 NOTE — PROGRESS NOTES
[facility]   OFFICE NOTE      Rob Miranda  1955  PCP: Kreis, Samuel Duane, MD    SUBJECTIVE:   Rob Miranda is a 63 y.o. male seen for a follow up visit regarding the following: Afib, DCM, Chronic SHF, HTN    CC:Afib     Problem List:   1. PAF/flutter   A)Tikosyn 2013   B)ICD Shocks secondary to Afib with RVR   C)PVA 2014, inability to isoate Right inferior pulmonary vent secondary to near proximity to esophagus   D)Repeat PVA 3/16/2015 wit re-isolaton of all four pulmonary veins.  Roof lines and mitral annulas left atrial rotor ablation   E)Recurrent Afib with ECV 4/17 and 10/17   F)Chadsvasc=4, Eliquis BID   G)Aflutter(recurrent)x one week 8/151/8  2. DCM   A)Remote MI/CABG 1991   B)Patent grafts by [facility] 2004, persistent ICM EF<30%   C)Echo 2006 EF 35%   D)St. Jr ICD Dr. Márquez 2006   E)Gen change 2013.   3. Chronic SHF  4. DM (Insulin dependant)  5. HTN  6. HLD  7. Insomnia   8. PAD: Severe bilateral lower extremity disease by CTA 3/18, followed by Dr. Vasuqez   9. Tobacco Abuse  10. No ACE,ARB, Entresto secondary to Hyperkalemia      HPI:   Today I saw Mr. Miranda for follow-up regarding history of  atrial flutter, chronic systolic heart failure, hypertension, DCM, and St. Jr dual-chamber ICD.  Mr. Miranda has had a repeat cardioversion procedure in October for atrial flutter.  He has remained on Eliquis and Tikosyn therapy.  His primary cardiologist Dr. Hernandez had switched him from Lopressor to carvedilol which she appears to be tolerating well.  He has been intolerant of ACE,ARBS or Entresto because of high potassium levels.  Mr. Miranda does state over the past couple weeks and under tremendous amount of  stress as his daughter has had some domestic issues.  He denies having any chest pains but he admits with the recent stress between him and his daughter he's physically has been exhausted at times.  He denies any chest pain.   He denies any  worsening heart  failure symptoms such as orthopnea, peripheral edema or PND.  He denies any ICD shocks or syncope events.      ROS:  Review of Symptoms:  General: no recent weight loss/gain, + weakness and fatigue  Skin: no rashes, lumps, or other skin changes  HEENT: no dizziness, lightheadedness, or vision changes  Respiratory: + cough, ongoing tobacco abuse  Cardiovascular: + tachycardia  Gastrointestinal: no black/tarry stools or diarrhea  Urinary: no change in frequency or urgency  Peripheral Vascular: no claudication or leg cramps  Musculoskeletal: no muscle or joint pain/stiffness  Psychiatric: + excessive stress  Neurological: no sensory or motor loss, no syncope  Hematologic: no anemia, easy bruising or bleeding  Endocrine: no thyroid problems, nor heat or cold intolerance    Cardiac PMH: (Old records have been reviewed and summarized below)      Past Medical History, Past Surgical History, Family history, Social History, and Medications were all reviewed with the patient today and updated as necessary.         Allergies   Allergen Reactions   • Shellfish-Derived Products Hives, Shortness Of Breath and Swelling     Patient Active Problem List   Diagnosis   • Chronic systolic congestive heart failure, well compensated.   • Coronary artery disease involving coronary bypass graft of native heart without angina pectoris   • Essential hypertension   • History of ASCVD (atherosclerotic cardiovascular disease), status post myocardial infarction, CABG 1992   • Advanced Ischemic cardiomyopathy with estimated ejection fraction of less than 20% in January 2015, status post biventricular ICD implantation.in 2007,with gen.change in 2013.   • Dyslipidemia, on Crestor.    • IDDM (insulin dependent diabetes mellitus) (CMS/[facility])   • Atrial fibrillation and flutter,s/p pulmonary renee ablationx2 in 2/14 and 3/15.on Tikosyn    • Sinus bradycardia   • Hyperlipidemia   • Tobacco use   • Hypomagnesemia   • ASCVD (arteriosclerotic cardiovascular  "disease), status post CABG in 1992.   • Peripheral arterial disease both lower extremities.     Past Medical History:   Diagnosis Date   • Atherosclerotic cardiovascular disease    • Chronic anticoagulation    • Congestive heart failure (CMS/HCC)    • COPD (chronic obstructive pulmonary disease) (CMS/[facility])    • DM (diabetes mellitus) (CMS/[facility])     insulin dependant   • Dyslipidemia    • Hx of atrial flutter     and a- fib   • Hx of myocardial infarction 1992    s/p CABG    • Hyperlipidemia    • Hypertension    • Ischemic cardiomyopathy     advanced   • Sinus bradycardia     EPS, 03/19/2007, Dr. Márquez:  Successful radiofrequency ablation of right atrial flutter   • Skin cancer    • Tobacco use      Past Surgical History:   Procedure Laterality Date   • CARDIAC DEFIBRILLATOR PLACEMENT     • CARDIOVERSION      x 2 procedures   • CORONARY ARTERY BYPASS GRAFT  1991    [facility]      Family History   Problem Relation Age of Onset   • Cancer Mother 74   • Heart disease Mother    • Diabetes Mother    • Heart attack Father 72        in his 80's   • Diabetes Father      Social History   Substance Use Topics   • Smoking status: Current Every Day Smoker     Packs/day: 0.75     Years: 48.00     Types: Cigarettes   • Smokeless tobacco: Never Used   • Alcohol use 0.6 - 1.2 oz/week     1 - 2 Cans of beer per week      Comment: socially         PHYSICAL EXAM:    /64 (BP Location: Right arm, Patient Position: Sitting)   Pulse 103   Ht 182.9 cm (72\")   Wt 87.5 kg (193 lb)   BMI 26.18 kg/m²        Wt Readings from Last 5 Encounters:   08/15/18 87.5 kg (193 lb)   04/30/18 90.3 kg (199 lb)   04/18/18 89.8 kg (198 lb)   03/14/18 92.7 kg (204 lb 6.4 oz)   01/24/18 88.5 kg (195 lb)       BP Readings from Last 5 Encounters:   08/15/18 108/64   04/30/18 108/57   04/18/18 128/61   03/14/18 115/69   01/24/18 100/57       General appearance - Alert, well appearing, and in no distress   Mental status - Affect appropriate to " mood.  Eyes - Sclerae anicteric,  ENMT - Hearing grossly normal bilaterally, Dental hygiene good.  Neck - Carotids upstroke normal bilaterally, no bruits, no JVD.  Resp - Clear to auscultation excpet in bases, no wheezes, rales or rhonchi, symmetric air entry.  Heart - Rapid rate, regular rhythm, normal S1, S2, no murmurs, rubs, clicks or gallops.  GI - Soft, nontender, nondistended, no masses or organomegaly.  Neurological - Grossly intact - normal speech, no focal findings  Musculoskeletal - No joint tenderness, deformity or swelling, no muscular tenderness noted.  Extremities - Peripheral pulses normal, no pedal edema, no clubbing or cyanosis.  Skin - Normal coloration and turgor.  Psych -  oriented to person, place, and time.    Medical problems and test results were reviewed with the patient today.       EKG: (EKG has been independently visualized by me and summarized below).    ECG 12 Lead  Date/Time: 8/15/2018 10:57 AM  Performed by: JUDI LOZOYA  Authorized by: JUDI LOZOYA   Comparison: compared with previous ECG from 1/13/2017  Comparison to previous ECG: Patient now in flutter.   Rhythm: atrial flutter  Conduction: non-specific intraventricular conduction delay  Clinical impression: dysrhythmia - atrial  Comments: Nonspecific T wave changes          Device Interrogation:  St. Jr ICD dual-chamber device settings at DDDR 70.  A paced 1%.  RV paced less than 1%.  Patient has been flutter for 2 weeks.  R-wave greater than 12 mV.  RV threshold 1.0 V at 0.5 ms with impedance of 440 ohms.  Atrial impedance 410 ohms.  Battery voltage 2 years remaining.  3.3% mode switch with ongoing flutter.  Charge time 8.3 seconds.      ASSESSMENT   1. ICM:  Continue BB, Coreg.   2. PAFlutter:  Tikosyn, Eliquis.  Consider repeat Cardioversion   3. CAD: No chest pain, SOB  4. Chronic SHF: continue lasix and daily weights, fluid restriction.  5. Ongoing Tobacco abuse: Discusses importance of cessation.    5. St.  Jr ICD: Normal function.     PLAN  · Continue current medical therapy.  · Repeat ECV with Dr. Edwards  · Remote Device check prior to ECV to rule out return to normal rhtym  · Stop Smoking  · Furether recommendations following ECV.     8/15/2018  10:51 AM    Will Layne NI

## 2018-08-15 ENCOUNTER — TELEPHONE (OUTPATIENT)
Dept: CARDIOLOGY | Facility: CLINIC | Age: 63
End: 2018-08-15

## 2018-08-15 ENCOUNTER — OFFICE VISIT (OUTPATIENT)
Dept: CARDIOLOGY | Facility: CLINIC | Age: 63
End: 2018-08-15

## 2018-08-15 VITALS
SYSTOLIC BLOOD PRESSURE: 108 MMHG | BODY MASS INDEX: 26.14 KG/M2 | WEIGHT: 193 LBS | DIASTOLIC BLOOD PRESSURE: 64 MMHG | HEART RATE: 103 BPM | HEIGHT: 72 IN

## 2018-08-15 DIAGNOSIS — I50.22 CHRONIC SYSTOLIC CONGESTIVE HEART FAILURE (HCC): ICD-10-CM

## 2018-08-15 DIAGNOSIS — I25.5 ISCHEMIC CARDIOMYOPATHY: ICD-10-CM

## 2018-08-15 DIAGNOSIS — I48.92 ATRIAL FIBRILLATION AND FLUTTER (HCC): Primary | ICD-10-CM

## 2018-08-15 DIAGNOSIS — I48.91 ATRIAL FIBRILLATION AND FLUTTER (HCC): Primary | ICD-10-CM

## 2018-08-15 PROCEDURE — 99213 OFFICE O/P EST LOW 20 MIN: CPT | Performed by: PHYSICIAN ASSISTANT

## 2018-08-15 PROCEDURE — 93283 PRGRMG EVAL IMPLANTABLE DFB: CPT | Performed by: PHYSICIAN ASSISTANT

## 2018-08-15 RX ORDER — CARVEDILOL 25 MG/1
25 TABLET ORAL 2 TIMES DAILY WITH MEALS
COMMUNITY
End: 2018-09-12 | Stop reason: SDUPTHER

## 2018-08-15 RX ORDER — CILOSTAZOL 50 MG/1
50 TABLET ORAL 2 TIMES DAILY
COMMUNITY
End: 2018-09-12 | Stop reason: SDUPTHER

## 2018-08-15 NOTE — TELEPHONE ENCOUNTER
Pt is going to be scheduled for a Cardioversion and called pt to let him know to call us the day before cardioversion so we can get a remote reading to make sure he is still out of rhythm.  Gave my name and number to pt.  Pt verbalized understanding.

## 2018-08-17 ENCOUNTER — PREP FOR SURGERY (OUTPATIENT)
Dept: OTHER | Facility: HOSPITAL | Age: 63
End: 2018-08-17

## 2018-08-17 DIAGNOSIS — I48.91 ATRIAL FIBRILLATION AND FLUTTER (HCC): Primary | ICD-10-CM

## 2018-08-17 DIAGNOSIS — I48.92 ATRIAL FIBRILLATION AND FLUTTER (HCC): Primary | ICD-10-CM

## 2018-08-17 RX ORDER — ACETAMINOPHEN 325 MG/1
650 TABLET ORAL EVERY 4 HOURS PRN
Status: CANCELLED | OUTPATIENT
Start: 2018-08-17

## 2018-08-17 RX ORDER — PROMETHAZINE HYDROCHLORIDE 25 MG/ML
12.5 INJECTION, SOLUTION INTRAMUSCULAR; INTRAVENOUS EVERY 4 HOURS PRN
Status: CANCELLED | OUTPATIENT
Start: 2018-08-17

## 2018-08-17 RX ORDER — NITROGLYCERIN 0.4 MG/1
0.4 TABLET SUBLINGUAL
Status: CANCELLED | OUTPATIENT
Start: 2018-08-17

## 2018-08-17 RX ORDER — SODIUM CHLORIDE 0.9 % (FLUSH) 0.9 %
1-10 SYRINGE (ML) INJECTION AS NEEDED
Status: CANCELLED | OUTPATIENT
Start: 2018-08-17

## 2018-08-20 ENCOUNTER — TELEPHONE (OUTPATIENT)
Dept: CARDIOLOGY | Facility: CLINIC | Age: 63
End: 2018-08-20

## 2018-08-20 NOTE — TELEPHONE ENCOUNTER
Pt called to check to see if he still out of rhythm.  He is having a cardioversion tomorrow.  Merlin transmission shows pt is still out of rhythm.  Pt said he will come tomorrow for cardioversion.

## 2018-08-21 ENCOUNTER — HOSPITAL ENCOUNTER (OUTPATIENT)
Dept: CARDIOLOGY | Facility: HOSPITAL | Age: 63
Discharge: HOME | End: 2018-08-21
Attending: INTERNAL MEDICINE | Admitting: PHYSICIAN ASSISTANT

## 2018-08-21 VITALS
BODY MASS INDEX: 26.37 KG/M2 | HEART RATE: 79 BPM | TEMPERATURE: 97.6 F | DIASTOLIC BLOOD PRESSURE: 78 MMHG | SYSTOLIC BLOOD PRESSURE: 118 MMHG | WEIGHT: 194.67 LBS | RESPIRATION RATE: 16 BRPM | OXYGEN SATURATION: 98 % | HEIGHT: 72 IN

## 2018-08-21 DIAGNOSIS — I48.91 ATRIAL FIBRILLATION AND FLUTTER (HCC): ICD-10-CM

## 2018-08-21 DIAGNOSIS — I48.92 ATRIAL FIBRILLATION AND FLUTTER (HCC): ICD-10-CM

## 2018-08-21 DIAGNOSIS — I50.22 CHRONIC SYSTOLIC CONGESTIVE HEART FAILURE (HCC): ICD-10-CM

## 2018-08-21 DIAGNOSIS — I25.5 ISCHEMIC CARDIOMYOPATHY: ICD-10-CM

## 2018-08-21 LAB
ANION GAP SERPL CALCULATED.3IONS-SCNC: 2 MMOL/L (ref 3–11)
BUN BLD-MCNC: 14 MG/DL (ref 9–23)
BUN/CREAT SERPL: 13.7 (ref 7–25)
CALCIUM SPEC-SCNC: 9 MG/DL (ref 8.7–10.4)
CHLORIDE SERPL-SCNC: 110 MMOL/L (ref 99–109)
CO2 SERPL-SCNC: 28 MMOL/L (ref 20–31)
CREAT BLD-MCNC: 1.02 MG/DL (ref 0.6–1.3)
DEPRECATED RDW RBC AUTO: 48.7 FL (ref 37–54)
ERYTHROCYTE [DISTWIDTH] IN BLOOD BY AUTOMATED COUNT: 17.5 % (ref 11.3–14.5)
GFR SERPL CREATININE-BSD FRML MDRD: 74 ML/MIN/1.73
GLUCOSE BLD-MCNC: 93 MG/DL (ref 70–100)
HCT VFR BLD AUTO: 44.7 % (ref 38.9–50.9)
HGB BLD-MCNC: 13.6 G/DL (ref 13.1–17.5)
MCH RBC QN AUTO: 23.7 PG (ref 27–31)
MCHC RBC AUTO-ENTMCNC: 30.4 G/DL (ref 32–36)
MCV RBC AUTO: 77.7 FL (ref 80–99)
PLATELET # BLD AUTO: 207 10*3/MM3 (ref 150–450)
PMV BLD AUTO: 9.8 FL (ref 6–12)
POTASSIUM BLD-SCNC: 4 MMOL/L (ref 3.5–5.5)
RBC # BLD AUTO: 5.75 10*6/MM3 (ref 4.2–5.76)
SODIUM BLD-SCNC: 140 MMOL/L (ref 132–146)
WBC NRBC COR # BLD: 10.47 10*3/MM3 (ref 3.5–10.8)

## 2018-08-21 PROCEDURE — 85027 COMPLETE CBC AUTOMATED: CPT | Performed by: INTERNAL MEDICINE

## 2018-08-21 PROCEDURE — 80048 BASIC METABOLIC PNL TOTAL CA: CPT | Performed by: INTERNAL MEDICINE

## 2018-08-21 PROCEDURE — 93005 ELECTROCARDIOGRAM TRACING: CPT | Performed by: INTERNAL MEDICINE

## 2018-08-21 PROCEDURE — 93724 ELEC ALYS ANTITCHYCAR PM SYS: CPT

## 2018-08-21 PROCEDURE — 36415 COLL VENOUS BLD VENIPUNCTURE: CPT

## 2018-08-21 PROCEDURE — 25010000002 MIDAZOLAM PER 1 MG: Performed by: INTERNAL MEDICINE

## 2018-08-21 PROCEDURE — 92960 CARDIOVERSION ELECTRIC EXT: CPT

## 2018-08-21 PROCEDURE — 93799 UNLISTED CV SVC/PROCEDURE: CPT | Performed by: INTERNAL MEDICINE

## 2018-08-21 RX ORDER — NITROGLYCERIN 0.4 MG/1
0.4 TABLET SUBLINGUAL
Status: DISCONTINUED | OUTPATIENT
Start: 2018-08-21 | End: 2018-08-21 | Stop reason: HOSPADM

## 2018-08-21 RX ORDER — SODIUM CHLORIDE 0.9 % (FLUSH) 0.9 %
1-10 SYRINGE (ML) INJECTION AS NEEDED
Status: DISCONTINUED | OUTPATIENT
Start: 2018-08-21 | End: 2018-08-21 | Stop reason: HOSPADM

## 2018-08-21 RX ORDER — NALOXONE HYDROCHLORIDE 0.4 MG/ML
INJECTION, SOLUTION INTRAMUSCULAR; INTRAVENOUS; SUBCUTANEOUS
Status: DISCONTINUED
Start: 2018-08-21 | End: 2018-08-21 | Stop reason: WASHOUT

## 2018-08-21 RX ORDER — MIDAZOLAM HYDROCHLORIDE 1 MG/ML
INJECTION INTRAMUSCULAR; INTRAVENOUS
Status: DISCONTINUED
Start: 2018-08-21 | End: 2018-08-21 | Stop reason: HOSPADM

## 2018-08-21 RX ORDER — SERTRALINE HYDROCHLORIDE 25 MG/1
25 TABLET, FILM COATED ORAL
Qty: 30 TABLET | Refills: 6 | Status: SHIPPED | OUTPATIENT
Start: 2018-08-21 | End: 2019-03-18 | Stop reason: SDUPTHER

## 2018-08-21 RX ORDER — PROMETHAZINE HYDROCHLORIDE 25 MG/ML
12.5 INJECTION, SOLUTION INTRAMUSCULAR; INTRAVENOUS EVERY 4 HOURS PRN
Status: DISCONTINUED | OUTPATIENT
Start: 2018-08-21 | End: 2018-08-21 | Stop reason: HOSPADM

## 2018-08-21 RX ORDER — MIDAZOLAM HYDROCHLORIDE 1 MG/ML
INJECTION INTRAMUSCULAR; INTRAVENOUS
Status: COMPLETED | OUTPATIENT
Start: 2018-08-21 | End: 2018-08-21

## 2018-08-21 RX ORDER — FLUMAZENIL 0.1 MG/ML
INJECTION INTRAVENOUS
Status: DISCONTINUED
Start: 2018-08-21 | End: 2018-08-21 | Stop reason: WASHOUT

## 2018-08-21 RX ORDER — ACETAMINOPHEN 325 MG/1
650 TABLET ORAL EVERY 4 HOURS PRN
Status: DISCONTINUED | OUTPATIENT
Start: 2018-08-21 | End: 2018-08-21 | Stop reason: HOSPADM

## 2018-08-21 RX ADMIN — MIDAZOLAM HYDROCHLORIDE 1 MG: 1 INJECTION, SOLUTION INTRAMUSCULAR; INTRAVENOUS at 08:29

## 2018-08-21 RX ADMIN — METHOHEXITAL SODIUM 20 MG: 500 INJECTION, POWDER, LYOPHILIZED, FOR SOLUTION INTRAMUSCULAR; INTRAVENOUS; RECTAL at 08:29

## 2018-08-21 NOTE — INTERVAL H&P NOTE
H&P reviewed. The patient was examined and there are no changes to the H&P.   Patient with recurrent, symptomatic atrial flutter.  He has been compliant with Tikosyn and Eliquis with no missed doses.  Feels he went out of rhythm due to significantly increased stress lately.  Will proceed with ICV through St. Jr ICD.  The risks, benefits, and alternatives of the procedure have been reviewed and the patient wishes to proceed.     CLARIBEL [facility]  8/21/2018  8:17 AM      I have read the above note and agree

## 2018-08-21 NOTE — H&P (VIEW-ONLY)
[facility] at [facility]   OFFICE NOTE      Rob Miranda  1955  PCP: Kreis, Samuel Duane, MD    SUBJECTIVE:   Rob Miranda is a 63 y.o. male seen for a follow up visit regarding the following: Afib, DCM, Chronic SHF, HTN    CC:Afib     Problem List:   1. PAF/flutter   A)Tikosyn 2013   B)ICD Shocks secondary to Afib with RVR   C)PVA 2014, inability to isoate Right inferior pulmonary vent secondary to near proximity to esophagus   D)Repeat PVA 3/16/2015 wit re-isolaton of all four pulmonary veins.  Roof lines and mitral annulas left atrial rotor ablation   E)Recurrent Afib with ECV 4/17 and 10/17   F)Chadsvasc=4, Eliquis BID   G)Aflutter(recurrent)x one week 8/151/8  2. DCM   A)Remote MI/CABG 1991   B)Patent grafts by [facility] 2004, persistent ICM EF<30%   C)Echo 2006 EF 35%   D)St. Jr ICD Dr. Mráquez 2006   E)Gen change 2013.   3. Chronic SHF  4. DM (Insulin dependant)  5. HTN  6. HLD  7. Insomnia   8. PAD: Severe bilateral lower extremity disease by CTA 3/18, followed by Dr. Vasquez   9. Tobacco Abuse  10. No ACE,ARB, Entresto secondary to Hyperkalemia      HPI:   Today I saw Mr. Miranda for follow-up regarding history of  atrial flutter, chronic systolic heart failure, hypertension, DCM, and St. Jr dual-chamber ICD.  Mr. Miranda has had a repeat cardioversion procedure in October for atrial flutter.  He has remained on Eliquis and Tikosyn therapy.  His primary cardiologist Dr. Hernandez had switched him from Lopressor to carvedilol which she appears to be tolerating well.  He has been intolerant of ACE,ARBS or Entresto because of high potassium levels.  Mr. Miranda does state over the past couple weeks and under tremendous amount of  stress as his daughter has had some domestic issues.  He denies having any chest pains but he admits with the recent stress between him and his daughter he's physically has been exhausted at times.  He denies any chest pain.   He denies any  worsening heart  failure symptoms such as orthopnea, peripheral edema or PND.  He denies any ICD shocks or syncope events.      ROS:  Review of Symptoms:  General: no recent weight loss/gain, + weakness and fatigue  Skin: no rashes, lumps, or other skin changes  HEENT: no dizziness, lightheadedness, or vision changes  Respiratory: + cough, ongoing tobacco abuse  Cardiovascular: + tachycardia  Gastrointestinal: no black/tarry stools or diarrhea  Urinary: no change in frequency or urgency  Peripheral Vascular: no claudication or leg cramps  Musculoskeletal: no muscle or joint pain/stiffness  Psychiatric: + excessive stress  Neurological: no sensory or motor loss, no syncope  Hematologic: no anemia, easy bruising or bleeding  Endocrine: no thyroid problems, nor heat or cold intolerance    Cardiac PMH: (Old records have been reviewed and summarized below)      Past Medical History, Past Surgical History, Family history, Social History, and Medications were all reviewed with the patient today and updated as necessary.         Allergies   Allergen Reactions   • Shellfish-Derived Products Hives, Shortness Of Breath and Swelling     Patient Active Problem List   Diagnosis   • Chronic systolic congestive heart failure, well compensated.   • Coronary artery disease involving coronary bypass graft of native heart without angina pectoris   • Essential hypertension   • History of ASCVD (atherosclerotic cardiovascular disease), status post myocardial infarction, CABG 1992   • Advanced Ischemic cardiomyopathy with estimated ejection fraction of less than 20% in January 2015, status post biventricular ICD implantation.in 2007,with gen.change in 2013.   • Dyslipidemia, on Crestor.    • IDDM (insulin dependent diabetes mellitus) (CMS/[facility])   • Atrial fibrillation and flutter,s/p pulmonary renee ablationx2 in 2/14 and 3/15.on Tikosyn    • Sinus bradycardia   • Hyperlipidemia   • Tobacco use   • Hypomagnesemia   • ASCVD (arteriosclerotic cardiovascular  "disease), status post CABG in 1992.   • Peripheral arterial disease both lower extremities.     Past Medical History:   Diagnosis Date   • Atherosclerotic cardiovascular disease    • Chronic anticoagulation    • Congestive heart failure (CMS/HCC)    • COPD (chronic obstructive pulmonary disease) (CMS/[facility])    • DM (diabetes mellitus) (CMS/[facility])     insulin dependant   • Dyslipidemia    • Hx of atrial flutter     and a- fib   • Hx of myocardial infarction 1992    s/p CABG    • Hyperlipidemia    • Hypertension    • Ischemic cardiomyopathy     advanced   • Sinus bradycardia     EPS, 03/19/2007, Dr. Márquez:  Successful radiofrequency ablation of right atrial flutter   • Skin cancer    • Tobacco use      Past Surgical History:   Procedure Laterality Date   • CARDIAC DEFIBRILLATOR PLACEMENT     • CARDIOVERSION      x 2 procedures   • CORONARY ARTERY BYPASS GRAFT  1991    [facility]      Family History   Problem Relation Age of Onset   • Cancer Mother 74   • Heart disease Mother    • Diabetes Mother    • Heart attack Father 72        in his 80's   • Diabetes Father      Social History   Substance Use Topics   • Smoking status: Current Every Day Smoker     Packs/day: 0.75     Years: 48.00     Types: Cigarettes   • Smokeless tobacco: Never Used   • Alcohol use 0.6 - 1.2 oz/week     1 - 2 Cans of beer per week      Comment: socially         PHYSICAL EXAM:    /64 (BP Location: Right arm, Patient Position: Sitting)   Pulse 103   Ht 182.9 cm (72\")   Wt 87.5 kg (193 lb)   BMI 26.18 kg/m²        Wt Readings from Last 5 Encounters:   08/15/18 87.5 kg (193 lb)   04/30/18 90.3 kg (199 lb)   04/18/18 89.8 kg (198 lb)   03/14/18 92.7 kg (204 lb 6.4 oz)   01/24/18 88.5 kg (195 lb)       BP Readings from Last 5 Encounters:   08/15/18 108/64   04/30/18 108/57   04/18/18 128/61   03/14/18 115/69   01/24/18 100/57       General appearance - Alert, well appearing, and in no distress   Mental status - Affect appropriate to " mood.  Eyes - Sclerae anicteric,  ENMT - Hearing grossly normal bilaterally, Dental hygiene good.  Neck - Carotids upstroke normal bilaterally, no bruits, no JVD.  Resp - Clear to auscultation excpet in bases, no wheezes, rales or rhonchi, symmetric air entry.  Heart - Rapid rate, regular rhythm, normal S1, S2, no murmurs, rubs, clicks or gallops.  GI - Soft, nontender, nondistended, no masses or organomegaly.  Neurological - Grossly intact - normal speech, no focal findings  Musculoskeletal - No joint tenderness, deformity or swelling, no muscular tenderness noted.  Extremities - Peripheral pulses normal, no pedal edema, no clubbing or cyanosis.  Skin - Normal coloration and turgor.  Psych -  oriented to person, place, and time.    Medical problems and test results were reviewed with the patient today.       EKG: (EKG has been independently visualized by me and summarized below).    ECG 12 Lead  Date/Time: 8/15/2018 10:57 AM  Performed by: JUDI LOZOYA  Authorized by: JUDI LOZOYA   Comparison: compared with previous ECG from 1/13/2017  Comparison to previous ECG: Patient now in flutter.   Rhythm: atrial flutter  Conduction: non-specific intraventricular conduction delay  Clinical impression: dysrhythmia - atrial  Comments: Nonspecific T wave changes          Device Interrogation:  St. Jr ICD dual-chamber device settings at DDDR 70.  A paced 1%.  RV paced less than 1%.  Patient has been flutter for 2 weeks.  R-wave greater than 12 mV.  RV threshold 1.0 V at 0.5 ms with impedance of 440 ohms.  Atrial impedance 410 ohms.  Battery voltage 2 years remaining.  3.3% mode switch with ongoing flutter.  Charge time 8.3 seconds.      ASSESSMENT   1. ICM:  Continue BB, Coreg.   2. PAFlutter:  Tikosyn, Eliquis.  Consider repeat Cardioversion   3. CAD: No chest pain, SOB  4. Chronic SHF: continue lasix and daily weights, fluid restriction.  5. Ongoing Tobacco abuse: Discusses importance of cessation.    5. St.  Jr ICD: Normal function.     PLAN  · Continue current medical therapy.  · Repeat ECV with Dr. Edwards  · Remote Device check prior to ECV to rule out return to normal rhtym  · Stop Smoking  · Furether recommendations following ECV.     8/15/2018  10:51 AM    Will Layne NI

## 2018-08-31 RX ORDER — DOFETILIDE 0.12 MG/1
125 CAPSULE ORAL EVERY 12 HOURS SCHEDULED
Qty: 180 CAPSULE | Refills: 2 | Status: SHIPPED | OUTPATIENT
Start: 2018-08-31 | End: 2018-11-28

## 2018-08-31 RX ORDER — DOFETILIDE 0.25 MG/1
250 CAPSULE ORAL EVERY 12 HOURS SCHEDULED
Qty: 180 CAPSULE | Refills: 2 | Status: SHIPPED | OUTPATIENT
Start: 2018-08-31 | End: 2019-03-26 | Stop reason: SDUPTHER

## 2018-09-12 ENCOUNTER — OFFICE VISIT (OUTPATIENT)
Dept: CARDIOLOGY | Facility: CLINIC | Age: 63
End: 2018-09-12

## 2018-09-12 VITALS
WEIGHT: 194 LBS | RESPIRATION RATE: 16 BRPM | DIASTOLIC BLOOD PRESSURE: 63 MMHG | HEIGHT: 72 IN | SYSTOLIC BLOOD PRESSURE: 103 MMHG | BODY MASS INDEX: 26.28 KG/M2 | HEART RATE: 85 BPM | OXYGEN SATURATION: 98 %

## 2018-09-12 DIAGNOSIS — I48.91 ATRIAL FIBRILLATION AND FLUTTER (HCC): ICD-10-CM

## 2018-09-12 DIAGNOSIS — Z72.0 TOBACCO USE: ICD-10-CM

## 2018-09-12 DIAGNOSIS — Z79.899 ENCOUNTER FOR LONG-TERM (CURRENT) USE OF MEDICATIONS: ICD-10-CM

## 2018-09-12 DIAGNOSIS — I50.22 CHRONIC SYSTOLIC CONGESTIVE HEART FAILURE (HCC): Primary | ICD-10-CM

## 2018-09-12 DIAGNOSIS — I48.92 ATRIAL FIBRILLATION AND FLUTTER (HCC): ICD-10-CM

## 2018-09-12 DIAGNOSIS — I25.5 ISCHEMIC CARDIOMYOPATHY: ICD-10-CM

## 2018-09-12 DIAGNOSIS — I73.9 PERIPHERAL ARTERIAL DISEASE (HCC): ICD-10-CM

## 2018-09-12 DIAGNOSIS — I10 ESSENTIAL HYPERTENSION: ICD-10-CM

## 2018-09-12 DIAGNOSIS — I25.810 CORONARY ARTERY DISEASE INVOLVING CORONARY BYPASS GRAFT OF NATIVE HEART WITHOUT ANGINA PECTORIS: ICD-10-CM

## 2018-09-12 DIAGNOSIS — E78.5 DYSLIPIDEMIA: ICD-10-CM

## 2018-09-12 PROCEDURE — 99213 OFFICE O/P EST LOW 20 MIN: CPT | Performed by: NURSE PRACTITIONER

## 2018-09-12 RX ORDER — CILOSTAZOL 50 MG/1
50 TABLET ORAL
Qty: 180 TABLET | Refills: 3 | Status: SHIPPED | OUTPATIENT
Start: 2018-09-12 | End: 2019-05-29 | Stop reason: SDUPTHER

## 2018-09-12 RX ORDER — ISOSORBIDE DINITRATE 10 MG/1
10 TABLET ORAL 3 TIMES DAILY
Qty: 270 TABLET | Refills: 3 | Status: SHIPPED | OUTPATIENT
Start: 2018-09-12 | End: 2018-09-18 | Stop reason: SDUPTHER

## 2018-09-12 RX ORDER — DIGOXIN 125 MCG
125 TABLET ORAL DAILY
Qty: 90 TABLET | Refills: 3 | Status: SHIPPED | OUTPATIENT
Start: 2018-09-12 | End: 2019-05-29 | Stop reason: SDUPTHER

## 2018-09-12 RX ORDER — HYDRALAZINE HYDROCHLORIDE 10 MG/1
10 TABLET, FILM COATED ORAL 3 TIMES DAILY
Qty: 270 TABLET | Refills: 3 | Status: SHIPPED | OUTPATIENT
Start: 2018-09-12 | End: 2018-09-18 | Stop reason: SDUPTHER

## 2018-09-12 RX ORDER — CARVEDILOL 25 MG/1
25 TABLET ORAL 2 TIMES DAILY WITH MEALS
Qty: 180 TABLET | Refills: 3 | Status: SHIPPED | OUTPATIENT
Start: 2018-09-12 | End: 2019-05-29 | Stop reason: SDUPTHER

## 2018-09-12 RX ORDER — FUROSEMIDE 40 MG/1
40 TABLET ORAL DAILY
Qty: 90 TABLET | Refills: 3 | Status: SHIPPED | OUTPATIENT
Start: 2018-09-12 | End: 2019-05-29 | Stop reason: SDUPTHER

## 2018-09-12 NOTE — PROGRESS NOTES
Kreis, Samuel Duane, MD  Rob Miranda  1955 09/12/2018    Patient Active Problem List   Diagnosis   • Chronic systolic congestive heart failure, well compensated.   • Coronary artery disease involving coronary bypass graft of native heart without angina pectoris   • Essential hypertension   • History of ASCVD (atherosclerotic cardiovascular disease), status post myocardial infarction, CABG 1992   • Advanced Ischemic cardiomyopathy with estimated ejection fraction of less than 20% in January 2015, status post biventricular ICD implantation.in 2007,with gen.change in 2013.   • Dyslipidemia, on Crestor.    • IDDM (insulin dependent diabetes mellitus) (CMS/[facility])   • Atrial fibrillation and flutter,s/p pulmonary renee ablationx2 in 2/14 and 3/15.on Tikosyn    • Sinus bradycardia   • Hyperlipidemia   • Tobacco use   • Hypomagnesemia   • ASCVD (arteriosclerotic cardiovascular disease), status post CABG in 1992.   • Peripheral arterial disease both lower extremities.       Dear Kreis, Samuel Duane, MD:    Subjective     Chief Complaint   Patient presents with   • Atrial Fibrillation   • atherosclerotic cardiovascular   • myocardial infarction           History of Present Illness:    Rob Miranda is a 63 y.o. male with a history of known ASCVD, status post previous myocardial infarctions and CABG in 1992 with advanced ischemic cardiomyopathy with most recent LV ejection fraction of 20%, status post ICD implantation and previous history of atrial flutter, status post RF ablation on Tikosyn.  He recently was noted to be back in atrial fibrillation/flutter.  On 8/21/18 he underwent cardioversion by Dr. Edwards.  Reports since that time he has done very well.  He denies palpitations, dizziness, and lightheadedness. He also reports he has been very active and tolerating his activities well. He denies chest pain, shortness of breath, pedal edema, PND, and orthopnea. He denies bleeding issues with Eliquis. He reports he  is still smoking, but has decreased cigarette consumption. He states he is not ready to quit.          Allergies   Allergen Reactions   • Shellfish-Derived Products Hives, Shortness Of Breath and Swelling   :      Current Outpatient Prescriptions:   •  apixaban (ELIQUIS) 5 MG tablet tablet, Take 1 tablet by mouth Every 12 (Twelve) Hours., Disp: 30 tablet, Rfl: 11  •  carvedilol (COREG) 25 MG tablet, Take 1 tablet by mouth 2 (Two) Times a Day With Meals., Disp: 180 tablet, Rfl: 3  •  cilostazol (PLETAL) 50 MG tablet, Take 1 tablet by mouth 2 (Two) Times a Day Before Meals., Disp: 180 tablet, Rfl: 3  •  digoxin (LANOXIN) 125 MCG tablet, Take 1 tablet by mouth Daily., Disp: 90 tablet, Rfl: 3  •  dofetilide (TIKOSYN) 125 MCG capsule, Take 1 capsule by mouth Every 12 (Twelve) Hours., Disp: 180 capsule, Rfl: 2  •  dofetilide (TIKOSYN) 250 MCG capsule, Take 1 capsule by mouth Every 12 (Twelve) Hours., Disp: 180 capsule, Rfl: 2  •  furosemide (LASIX) 40 MG tablet, Take 1 tablet by mouth Daily., Disp: 90 tablet, Rfl: 3  •  gabapentin (NEURONTIN) 600 MG tablet, Take 600 mg by mouth 2 (Two) Times a Day., Disp: , Rfl: 5  •  hydrALAZINE (APRESOLINE) 10 MG tablet, Take 1 tablet by mouth 3 (Three) Times a Day., Disp: 270 tablet, Rfl: 3  •  insulin detemir (LEVEMIR) 100 UNIT/ML injection, Inject 45 Units under the skin into the appropriate area as directed Every Night. flexpen, Disp: , Rfl:   •  isosorbide dinitrate (ISORDIL) 10 MG tablet, Take 1 tablet by mouth 3 (Three) Times a Day., Disp: 270 tablet, Rfl: 3  •  magnesium gluconate (MAGONATE) 500 MG tablet, TAKE 1 TABLET TWICE DAILY, Disp: 60 tablet, Rfl: 3  •  metFORMIN (GLUCOPHAGE) 1000 MG tablet, Take 1,000 mg by mouth 2 (two) times a day with meals., Disp: , Rfl:   •  rosuvastatin (CRESTOR) 20 MG tablet, Take 20 mg by mouth Daily., Disp: , Rfl:   •  sertraline (ZOLOFT) 25 MG tablet, Take 1 tablet by mouth every night at bedtime., Disp: 30 tablet, Rfl: 6  •  tamsulosin  "(FLOMAX) 0.4 MG capsule 24 hr capsule, Take 1 capsule by mouth every night., Disp: , Rfl:   •  zolpidem (AMBIEN) 10 MG tablet, Take 10 mg by mouth As Needed., Disp: , Rfl:   •  ibuprofen (ADVIL,MOTRIN) 200 MG tablet, Take 200 mg by mouth Every 6 (Six) Hours As Needed for Mild Pain ., Disp: , Rfl:       The following portions of the patient's history were reviewed and updated as appropriate: allergies, current medications, past family history, past medical history, past social history, past surgical history and problem list.    Social History   Substance Use Topics   • Smoking status: Current Every Day Smoker     Packs/day: 0.75     Years: 48.00     Types: Cigarettes   • Smokeless tobacco: Never Used   • Alcohol use 0.6 - 1.2 oz/week     1 - 2 Cans of beer per week      Comment: socially       Review of Systems   Constitution: Negative for chills and fever.   HENT: Negative for nosebleeds and sore throat.    Cardiovascular: Negative for chest pain, claudication, dyspnea on exertion, irregular heartbeat, leg swelling, near-syncope, orthopnea, palpitations and syncope.   Respiratory: Negative for cough, hemoptysis, shortness of breath and wheezing.    Gastrointestinal: Negative for abdominal pain, hematemesis, hematochezia, melena, nausea and vomiting.   Genitourinary: Negative for dysuria and hematuria.   Neurological: Negative for dizziness and headaches.       Objective   Vitals:    09/12/18 0848   BP: 103/63   Pulse: 85   Resp: 16   SpO2: 98%   Weight: 88 kg (194 lb)   Height: 182.9 cm (72.01\")     Body mass index is 26.31 kg/m².        Physical Exam   Constitutional: He is oriented to person, place, and time. He appears well-developed and well-nourished.   HENT:   Head: Normocephalic and atraumatic.   Cardiovascular: Normal rate, regular rhythm and normal heart sounds.  Exam reveals no S3 and no S4.    No murmur heard.  Pulmonary/Chest: Effort normal and breath sounds normal. He has no wheezes. He has no rales. "   Abdominal: Soft. Bowel sounds are normal.   Musculoskeletal: He exhibits no edema.   Neurological: He is alert and oriented to person, place, and time.   Skin: Skin is warm and dry.   Psychiatric: He has a normal mood and affect. His behavior is normal.       Lab Results   Component Value Date     08/21/2018    K 4.0 08/21/2018     (H) 08/21/2018    CO2 28.0 08/21/2018    BUN 14 08/21/2018    CREATININE 1.02 08/21/2018    GLUCOSE 93 08/21/2018    CALCIUM 9.0 08/21/2018    AST 19 02/07/2018    ALT 13 02/07/2018    ALKPHOS 64 02/07/2018    LABIL2 1.6 02/07/2018       Lab Results   Component Value Date    WBC 10.47 08/21/2018    HGB 13.6 08/21/2018    HCT 44.7 08/21/2018     08/21/2018       Lab Results   Component Value Date    MG 1.8 02/07/2018         Procedures      Assessment/Plan    Diagnosis Plan   1. Chronic systolic congestive heart failure, well compensated.  Comprehensive Metabolic Panel    Digoxin Level    carvedilol (COREG) 25 MG tablet    cilostazol (PLETAL) 50 MG tablet    digoxin (LANOXIN) 125 MCG tablet    furosemide (LASIX) 40 MG tablet    hydrALAZINE (APRESOLINE) 10 MG tablet    isosorbide dinitrate (ISORDIL) 10 MG tablet   2. Coronary artery disease involving coronary bypass graft of native heart without angina pectoris  Comprehensive Metabolic Panel    Digoxin Level    carvedilol (COREG) 25 MG tablet    cilostazol (PLETAL) 50 MG tablet    digoxin (LANOXIN) 125 MCG tablet    furosemide (LASIX) 40 MG tablet    hydrALAZINE (APRESOLINE) 10 MG tablet    isosorbide dinitrate (ISORDIL) 10 MG tablet   3. Essential hypertension  Comprehensive Metabolic Panel    Digoxin Level    carvedilol (COREG) 25 MG tablet    cilostazol (PLETAL) 50 MG tablet    digoxin (LANOXIN) 125 MCG tablet    furosemide (LASIX) 40 MG tablet    hydrALAZINE (APRESOLINE) 10 MG tablet    isosorbide dinitrate (ISORDIL) 10 MG tablet   4. Advanced Ischemic cardiomyopathy with estimated ejection fraction of less than 20%  in January 2015, status post biventricular ICD implantation.in 2007,with gen.change in 2013.  Comprehensive Metabolic Panel    Digoxin Level    carvedilol (COREG) 25 MG tablet    cilostazol (PLETAL) 50 MG tablet    digoxin (LANOXIN) 125 MCG tablet    furosemide (LASIX) 40 MG tablet    hydrALAZINE (APRESOLINE) 10 MG tablet    isosorbide dinitrate (ISORDIL) 10 MG tablet   5. Atrial fibrillation and flutter,s/p pulmonary renee ablationx2 in 2/14 and 3/15.on Tikosyn   Comprehensive Metabolic Panel    Digoxin Level    carvedilol (COREG) 25 MG tablet    cilostazol (PLETAL) 50 MG tablet    digoxin (LANOXIN) 125 MCG tablet    furosemide (LASIX) 40 MG tablet    hydrALAZINE (APRESOLINE) 10 MG tablet    isosorbide dinitrate (ISORDIL) 10 MG tablet    apixaban (ELIQUIS) 5 MG tablet tablet   6. Peripheral arterial disease both lower extremities.  Comprehensive Metabolic Panel    Digoxin Level    carvedilol (COREG) 25 MG tablet    cilostazol (PLETAL) 50 MG tablet    digoxin (LANOXIN) 125 MCG tablet    furosemide (LASIX) 40 MG tablet    hydrALAZINE (APRESOLINE) 10 MG tablet    isosorbide dinitrate (ISORDIL) 10 MG tablet   7. Dyslipidemia, on Crestor.   Comprehensive Metabolic Panel    Digoxin Level    carvedilol (COREG) 25 MG tablet    cilostazol (PLETAL) 50 MG tablet    digoxin (LANOXIN) 125 MCG tablet    furosemide (LASIX) 40 MG tablet    hydrALAZINE (APRESOLINE) 10 MG tablet    isosorbide dinitrate (ISORDIL) 10 MG tablet   8. Tobacco use  Comprehensive Metabolic Panel    Digoxin Level    carvedilol (COREG) 25 MG tablet    cilostazol (PLETAL) 50 MG tablet    digoxin (LANOXIN) 125 MCG tablet    furosemide (LASIX) 40 MG tablet    hydrALAZINE (APRESOLINE) 10 MG tablet    isosorbide dinitrate (ISORDIL) 10 MG tablet   9. Encounter for long-term (current) use of medications  Comprehensive Metabolic Panel    Digoxin Level    carvedilol (COREG) 25 MG tablet    cilostazol (PLETAL) 50 MG tablet    digoxin (LANOXIN) 125 MCG tablet     furosemide (LASIX) 40 MG tablet    hydrALAZINE (APRESOLINE) 10 MG tablet    isosorbide dinitrate (ISORDIL) 10 MG tablet                Recommendations:    1. Will continue current medications. I have sent refills to his pharmacy    2. We are unable to add any ACE/ARB, entresto, or spironolactone for his cardiomyopathy due to his baseline tendency for hyperkalemia    3. I have encouraged smoking cessation, but he states he is not ready to quit. He is trying to cut back    4. CMP and Digoxin level ordered today    5. Continue follow up with Dr. Edwards    6. Follow up in 4 months and PRN.               Return in about 4 months (around 1/12/2019) for Recheck.    As always, I appreciate very much the opportunity to participate in the cardiovascular care of your patients.      With Best Regards,    CLARIBEL Sheikh

## 2018-09-14 LAB
ALBUMIN SERPL-MCNC: 4.2 G/DL (ref 3.6–4.8)
ALBUMIN/GLOB SERPL: 1.6 {RATIO} (ref 1.2–2.2)
ALP SERPL-CCNC: 60 IU/L (ref 39–117)
ALT SERPL-CCNC: 17 IU/L (ref 0–44)
AST SERPL-CCNC: 21 IU/L (ref 0–40)
BILIRUB SERPL-MCNC: 0.4 MG/DL (ref 0–1.2)
BUN SERPL-MCNC: 18 MG/DL (ref 8–27)
BUN/CREAT SERPL: 17 (ref 10–24)
CALCIUM SERPL-MCNC: 9.2 MG/DL (ref 8.6–10.2)
CHLORIDE SERPL-SCNC: 100 MMOL/L (ref 96–106)
CO2 SERPL-SCNC: 22 MMOL/L (ref 20–29)
CREAT SERPL-MCNC: 1.08 MG/DL (ref 0.76–1.27)
DIGOXIN SERPL-MCNC: 0.6 NG/ML (ref 0.5–0.9)
GLOBULIN SER CALC-MCNC: 2.6 G/DL (ref 1.5–4.5)
GLUCOSE SERPL-MCNC: 117 MG/DL (ref 65–99)
POTASSIUM SERPL-SCNC: 4.8 MMOL/L (ref 3.5–5.2)
PROT SERPL-MCNC: 6.8 G/DL (ref 6–8.5)
SODIUM SERPL-SCNC: 138 MMOL/L (ref 134–144)

## 2018-09-18 RX ORDER — ISOSORBIDE DINITRATE 10 MG/1
TABLET ORAL
Qty: 90 TABLET | Refills: 3 | Status: SHIPPED | OUTPATIENT
Start: 2018-09-18 | End: 2019-05-29 | Stop reason: SDUPTHER

## 2018-09-18 RX ORDER — HYDRALAZINE HYDROCHLORIDE 10 MG/1
TABLET, FILM COATED ORAL
Qty: 90 TABLET | Refills: 3 | Status: SHIPPED | OUTPATIENT
Start: 2018-09-18 | End: 2019-05-29 | Stop reason: SDUPTHER

## 2018-09-24 ENCOUNTER — TELEPHONE (OUTPATIENT)
Dept: CARDIAC SURGERY | Facility: CLINIC | Age: 63
End: 2018-09-24

## 2018-10-09 ENCOUNTER — CLINICAL SUPPORT NO REQUIREMENTS (OUTPATIENT)
Dept: CARDIOLOGY | Facility: CLINIC | Age: 63
End: 2018-10-09

## 2018-10-09 DIAGNOSIS — I48.92 ATRIAL FIBRILLATION AND FLUTTER (HCC): ICD-10-CM

## 2018-10-09 DIAGNOSIS — I48.91 ATRIAL FIBRILLATION AND FLUTTER (HCC): ICD-10-CM

## 2018-10-09 DIAGNOSIS — I50.22 CHRONIC SYSTOLIC CONGESTIVE HEART FAILURE (HCC): ICD-10-CM

## 2018-10-09 PROCEDURE — 93295 DEV INTERROG REMOTE 1/2/MLT: CPT | Performed by: INTERNAL MEDICINE

## 2018-10-09 PROCEDURE — 93296 REM INTERROG EVL PM/IDS: CPT | Performed by: INTERNAL MEDICINE

## 2018-11-05 ENCOUNTER — CLINICAL SUPPORT NO REQUIREMENTS (OUTPATIENT)
Dept: CARDIOLOGY | Facility: CLINIC | Age: 63
End: 2018-11-05

## 2018-11-05 DIAGNOSIS — I48.91 ATRIAL FIBRILLATION AND FLUTTER (HCC): ICD-10-CM

## 2018-11-05 DIAGNOSIS — I48.92 ATRIAL FIBRILLATION AND FLUTTER (HCC): ICD-10-CM

## 2018-11-05 DIAGNOSIS — I50.22 CHRONIC SYSTOLIC CONGESTIVE HEART FAILURE (HCC): Primary | ICD-10-CM

## 2018-11-13 ENCOUNTER — TELEPHONE (OUTPATIENT)
Dept: CARDIOLOGY | Facility: CLINIC | Age: 63
End: 2018-11-13

## 2018-11-13 DIAGNOSIS — I48.4 ATYPICAL ATRIAL FLUTTER (HCC): Primary | ICD-10-CM

## 2018-11-13 NOTE — TELEPHONE ENCOUNTER
He's been in [facility] since 11/1 and has been struggling with sinus and inner ear infections for past couple of months.  He has been on several ABX and is currently on Augmentin, he also received steroid dose pack about 1 month ago.  He noticed feeling worse about the beginning of Nov with SOA with exertion and fatigue, no edema, BP WNL and HRs 90-110s.  Can I bring him in for ECV or see if Dr. Hernandez will do?

## 2018-11-14 ENCOUNTER — TELEPHONE (OUTPATIENT)
Dept: CARDIOLOGY | Facility: CLINIC | Age: 63
End: 2018-11-14

## 2018-11-14 NOTE — TELEPHONE ENCOUNTER
Pt is having high V rates of  with afib for several days.  I called to tell him that he needs to have an ECV with his device either in [place] or here in [place] per [facility].  Left msg on cell phone and home phone.

## 2018-11-14 NOTE — TELEPHONE ENCOUNTER
Michael spoke with pt, he is sending in one more transmission to confirm still in AFL and will set up ICV through device.

## 2018-11-15 ENCOUNTER — APPOINTMENT (OUTPATIENT)
Dept: CARDIOLOGY | Facility: HOSPITAL | Age: 63
End: 2018-11-15
Attending: INTERNAL MEDICINE

## 2018-11-15 PROCEDURE — 92960 CARDIOVERSION ELECTRIC EXT: CPT

## 2018-11-26 DIAGNOSIS — Z79.899 DRUG THERAPY CONTINUED: ICD-10-CM

## 2018-11-26 DIAGNOSIS — E83.42 HYPOMAGNESEMIA: Primary | ICD-10-CM

## 2018-11-26 RX ORDER — MAGNESIUM GLUCONATE 27 MG(500)
TABLET ORAL
Qty: 60 TABLET | Refills: 3 | Status: SHIPPED | OUTPATIENT
Start: 2018-11-26 | End: 2019-05-29 | Stop reason: SDUPTHER

## 2018-11-28 ENCOUNTER — OFFICE VISIT (OUTPATIENT)
Dept: CARDIOLOGY | Facility: CLINIC | Age: 63
End: 2018-11-28

## 2018-11-28 VITALS
HEIGHT: 72 IN | SYSTOLIC BLOOD PRESSURE: 130 MMHG | HEART RATE: 74 BPM | BODY MASS INDEX: 27.36 KG/M2 | WEIGHT: 202 LBS | DIASTOLIC BLOOD PRESSURE: 70 MMHG

## 2018-11-28 DIAGNOSIS — I50.22 CHRONIC SYSTOLIC CONGESTIVE HEART FAILURE (HCC): ICD-10-CM

## 2018-11-28 DIAGNOSIS — Z72.0 TOBACCO USE: ICD-10-CM

## 2018-11-28 DIAGNOSIS — I48.92 ATRIAL FIBRILLATION AND FLUTTER (HCC): Primary | ICD-10-CM

## 2018-11-28 DIAGNOSIS — I25.5 ISCHEMIC CARDIOMYOPATHY: ICD-10-CM

## 2018-11-28 DIAGNOSIS — I48.91 ATRIAL FIBRILLATION AND FLUTTER (HCC): Primary | ICD-10-CM

## 2018-11-28 PROCEDURE — 93283 PRGRMG EVAL IMPLANTABLE DFB: CPT | Performed by: PHYSICIAN ASSISTANT

## 2018-11-28 PROCEDURE — 99213 OFFICE O/P EST LOW 20 MIN: CPT | Performed by: PHYSICIAN ASSISTANT

## 2018-11-28 NOTE — TELEPHONE ENCOUNTER
Pt was advised.  He v/u and was agreeable.  He is reqeusting us to send order to PCP's office.  Will place lab order for cosign. Will also send order to PCP's office per pt request.

## 2018-11-28 NOTE — PROGRESS NOTES
Rob Miranda  1955  [phone]    11/28/2018    [facility] CARDIOLOGY     Kreis, Samuel Duane, MD  [address] [place] [address] [address] [place] [address]    Chief Complaint   Patient presents with   • Atrial Fibrillation          Problem List:   1. PAF/flutter            A)Tikosyn 2013            B)ICD Shocks secondary to Afib with RVR            C)PVA 2014, inability to isoate Right inferior pulmonary vent secondary to near proximity to esophagus            D)Repeat PVA 3/16/2015 wit re-isolaton of all four pulmonary veins.  Roof lines and mitral annulas left atrial rotor ablation            E)Recurrent Afib with ECV 4/17 and 10/17            F)Chadsvasc=4, Eliquis BID            G)Aflutter(recurrent) with ECV to NSR 11/15/18  2. DCM            A)Remote MI/CABG 1991            B)Patent grafts by [facility] 2004, persistent ICM EF<30%            C)Echo 2006 EF 35%            D)St. Jr ICD Dr. Márquez 2006            E)Gen change 2013.   3. Chronic SHF  4. DM (Insulin dependant)  5. HTN  6. HLD  7. Insomnia   8. PAD: Severe bilateral lower extremity disease by CTA 3/18, followed by Dr. Vasquez   9. Tobacco Abuse  10. No ACE,ARB, Entresto secondary to Hyperkalemia        Allergies  Allergies   Allergen Reactions   • Shellfish-Derived Products Hives, Shortness Of Breath and Swelling       Current Medications    Current Outpatient Medications:   •  apixaban (ELIQUIS) 5 MG tablet tablet, Take 1 tablet by mouth Every 12 (Twelve) Hours., Disp: 30 tablet, Rfl: 11  •  carvedilol (COREG) 25 MG tablet, Take 1 tablet by mouth 2 (Two) Times a Day With Meals., Disp: 180 tablet, Rfl: 3  •  cilostazol (PLETAL) 50 MG tablet, Take 1 tablet by mouth 2 (Two) Times a Day Before Meals., Disp: 180 tablet, Rfl: 3  •  digoxin (LANOXIN) 125 MCG tablet, Take 1 tablet by mouth Daily., Disp: 90 tablet, Rfl: 3  •  dofetilide (TIKOSYN) 250 MCG capsule, Take 1 capsule by mouth Every 12 (Twelve) Hours., Disp: 180 capsule,  Rfl: 2  •  furosemide (LASIX) 40 MG tablet, Take 1 tablet by mouth Daily., Disp: 90 tablet, Rfl: 3  •  gabapentin (NEURONTIN) 600 MG tablet, Take 600 mg by mouth 2 (Two) Times a Day., Disp: , Rfl: 5  •  hydrALAZINE (APRESOLINE) 10 MG tablet, TAKE 1 TABLET BY MOUTH 3 TIMES DAILY, Disp: 90 tablet, Rfl: 3  •  ibuprofen (ADVIL,MOTRIN) 200 MG tablet, Take 200 mg by mouth Every 6 (Six) Hours As Needed for Mild Pain ., Disp: , Rfl:   •  insulin detemir (LEVEMIR) 100 UNIT/ML injection, Inject 50 Units under the skin into the appropriate area as directed Every Night. flexpen, Disp: , Rfl:   •  isosorbide dinitrate (ISORDIL) 10 MG tablet, TAKE 1 TABLET BY MOUTH 3 TIMES DAILY, Disp: 90 tablet, Rfl: 3  •  magnesium gluconate (MAGONATE) 500 MG tablet, TAKE 1 TABLET TWICE DAILY, Disp: 60 tablet, Rfl: 3  •  metFORMIN (GLUCOPHAGE) 1000 MG tablet, Take 1,000 mg by mouth 2 (two) times a day with meals., Disp: , Rfl:   •  rosuvastatin (CRESTOR) 20 MG tablet, Take 40 mg by mouth Daily., Disp: , Rfl:   •  sertraline (ZOLOFT) 25 MG tablet, Take 1 tablet by mouth every night at bedtime., Disp: 30 tablet, Rfl: 6  •  tamsulosin (FLOMAX) 0.4 MG capsule 24 hr capsule, Take 1 capsule by mouth every night., Disp: , Rfl:   •  zolpidem (AMBIEN) 10 MG tablet, Take 10 mg by mouth As Needed., Disp: , Rfl:     History of Present Illness     Pt presents for follow up of atrial fibrillation, atrial flutter, DCM, chronic systolic CHF, and ICD check. Since we last saw the pt,he had recurrence of atrial flutter in August and underwent ECV to NSR on 11/15/18. He has been in NSR since then. He denies SOB, CP, LH, and dizziness, syncope. Denies any hospitalizations other than for ECV recently, ER visits, bleeding issues on Eliquis, or TIA/CVA symptoms. Overall feels well. BP has been well controlled. He has been taken off Entresto and ACE due to RODOLFO and elevated potassium. He is still smoking about 1 PPD. He is on Pletal for PVD.     ROS:  General:  Denies  "fatigue, weight gain or loss  Cardiovascular:  Denies CP, PND, syncope, near syncope, -edema or palpitations.  Pulmonary:  Denies NGUYEN, cough, or wheezing      Vitals:    11/28/18 1507   BP: 130/70   BP Location: Right arm   Patient Position: Sitting   Pulse: 74   Weight: 91.6 kg (202 lb)   Height: 182.9 cm (72\")     Body mass index is 27.4 kg/m².  PE:  General: NAD. A & O x 3  Neck: no JVD, no carotid bruits, no TM  Heart RRR, NL S1, S2, S4 present, no rubs, murmurs  Lungs: CTA, no wheezes, rhonchi, or rales  Abd: soft, non-tender, NL BS  Ext: No musculoskeletal deformities, no edema, cyanosis, or clubbing  Psych: normal mood and affect    Diagnostic Data:    ICD check: normal function. 1.2-1.4 years on battery. Less than 1% afib since ECV. (longest 38 minutes)       ECG 12 Lead  Date/Time: 11/28/2018 3:38 PM  Performed by: Leda Gong PA  Authorized by: Leda Gong PA   Comparison: compared with previous ECG from 11/15/2018  Similar to previous ECG  Comparison to previous ECG: QTC WNL   Rhythm: sinus rhythm  BPM: 74              1. Atrial fibrillation and flutter,s/p pulmonary renee ablationx2 in 2/14 and 3/15.on Tikosyn     2. Advanced Ischemic cardiomyopathy with estimated ejection fraction of less than 20% in January 2015, status post biventricular ICD implantation.in 2007,with gen.change in 2013.    3. Chronic systolic congestive heart failure, well compensated.    4. Tobacco use          Plan:  1. Atrial flutter/fibrillation:  - s/p ECV 11/15/18  - on Tikosyn 250 mcg BID and Eliquis 5 mg BID    2. ICM/Chronic Systolic CHF:  - normal ICD function  - on Coreg. Could not tolerate Entresto or ACE due to elevated potassium.   - Currently compensated and class I symptoms.     3. Tobacco Abuse:  - needs to quit.       F/up in 6 months    [facility]  11/28/2018   3:40 PM        "

## 2018-12-03 ENCOUNTER — RESULTS ENCOUNTER (OUTPATIENT)
Dept: CARDIOLOGY | Facility: CLINIC | Age: 63
End: 2018-12-03

## 2018-12-03 DIAGNOSIS — E83.42 HYPOMAGNESEMIA: ICD-10-CM

## 2018-12-03 DIAGNOSIS — Z79.899 DRUG THERAPY CONTINUED: ICD-10-CM

## 2018-12-20 ENCOUNTER — TELEPHONE (OUTPATIENT)
Dept: CARDIAC SURGERY | Facility: CLINIC | Age: 63
End: 2018-12-20

## 2019-01-01 ENCOUNTER — HOSPITAL ENCOUNTER (OUTPATIENT)
Facility: HOSPITAL | Age: 64
Discharge: HOME | End: 2019-12-13
Attending: INTERNAL MEDICINE | Admitting: INTERNAL MEDICINE

## 2019-01-01 ENCOUNTER — APPOINTMENT (OUTPATIENT)
Dept: GENERAL RADIOLOGY | Facility: HOSPITAL | Age: 64
End: 2019-01-01

## 2019-01-01 ENCOUNTER — OFFICE VISIT (OUTPATIENT)
Dept: CARDIOLOGY | Facility: CLINIC | Age: 64
End: 2019-01-01

## 2019-01-01 ENCOUNTER — TELEPHONE (OUTPATIENT)
Dept: CARDIOLOGY | Facility: CLINIC | Age: 64
End: 2019-01-01

## 2019-01-01 ENCOUNTER — CLINICAL SUPPORT NO REQUIREMENTS (OUTPATIENT)
Dept: CARDIOLOGY | Facility: CLINIC | Age: 64
End: 2019-01-01

## 2019-01-01 ENCOUNTER — RESULTS ENCOUNTER (OUTPATIENT)
Dept: CARDIOLOGY | Facility: CLINIC | Age: 64
End: 2019-01-01

## 2019-01-01 VITALS
WEIGHT: 168 LBS | DIASTOLIC BLOOD PRESSURE: 62 MMHG | HEIGHT: 72 IN | HEART RATE: 99 BPM | OXYGEN SATURATION: 98 % | SYSTOLIC BLOOD PRESSURE: 93 MMHG | BODY MASS INDEX: 22.75 KG/M2

## 2019-01-01 VITALS
BODY MASS INDEX: 25.47 KG/M2 | RESPIRATION RATE: 18 BRPM | DIASTOLIC BLOOD PRESSURE: 72 MMHG | SYSTOLIC BLOOD PRESSURE: 115 MMHG | WEIGHT: 188 LBS | OXYGEN SATURATION: 94 % | TEMPERATURE: 98.1 F | HEART RATE: 96 BPM | HEIGHT: 72 IN

## 2019-01-01 DIAGNOSIS — I48.91 ATRIAL FIBRILLATION AND FLUTTER (HCC): ICD-10-CM

## 2019-01-01 DIAGNOSIS — I10 ESSENTIAL HYPERTENSION: ICD-10-CM

## 2019-01-01 DIAGNOSIS — I25.10 ASCVD (ARTERIOSCLEROTIC CARDIOVASCULAR DISEASE): ICD-10-CM

## 2019-01-01 DIAGNOSIS — I48.91 ATRIAL FIBRILLATION AND FLUTTER (HCC): Primary | ICD-10-CM

## 2019-01-01 DIAGNOSIS — I25.5 ISCHEMIC CARDIOMYOPATHY: ICD-10-CM

## 2019-01-01 DIAGNOSIS — I48.92 ATRIAL FIBRILLATION AND FLUTTER (HCC): ICD-10-CM

## 2019-01-01 DIAGNOSIS — I48.92 ATRIAL FIBRILLATION AND FLUTTER (HCC): Primary | ICD-10-CM

## 2019-01-01 DIAGNOSIS — Z86.79 HISTORY OF ASCVD (ATHEROSCLEROTIC CARDIOVASCULAR DISEASE): ICD-10-CM

## 2019-01-01 DIAGNOSIS — I25.810 CORONARY ARTERY DISEASE INVOLVING CORONARY BYPASS GRAFT OF NATIVE HEART WITHOUT ANGINA PECTORIS: Primary | ICD-10-CM

## 2019-01-01 LAB
ANION GAP SERPL CALCULATED.3IONS-SCNC: 13 MMOL/L (ref 5–15)
BUN BLD-MCNC: 40 MG/DL (ref 8–23)
BUN/CREAT SERPL: 33.6 (ref 7–25)
CALCIUM SPEC-SCNC: 10.2 MG/DL (ref 8.6–10.5)
CHLORIDE SERPL-SCNC: 94 MMOL/L (ref 98–107)
CO2 SERPL-SCNC: 30 MMOL/L (ref 22–29)
CREAT BLD-MCNC: 1.19 MG/DL (ref 0.76–1.27)
DEPRECATED RDW RBC AUTO: 66.6 FL (ref 37–54)
ERYTHROCYTE [DISTWIDTH] IN BLOOD BY AUTOMATED COUNT: 21.9 % (ref 12.3–15.4)
GFR SERPL CREATININE-BSD FRML MDRD: 62 ML/MIN/1.73
GLUCOSE BLD-MCNC: 106 MG/DL (ref 65–99)
GLUCOSE BLDC GLUCOMTR-MCNC: 397 MG/DL (ref 70–130)
GLUCOSE BLDC GLUCOMTR-MCNC: 406 MG/DL (ref 70–130)
HBA1C MFR BLD: 8.8 % (ref 4.8–5.6)
HCT VFR BLD AUTO: 49.8 % (ref 37.5–51)
HGB BLD-MCNC: 15.8 G/DL (ref 13–17.7)
INR PPP: 1.29 (ref 0.85–1.16)
MCH RBC QN AUTO: 27.4 PG (ref 26.6–33)
MCHC RBC AUTO-ENTMCNC: 31.7 G/DL (ref 31.5–35.7)
MCV RBC AUTO: 86.5 FL (ref 79–97)
PLATELET # BLD AUTO: 155 10*3/MM3 (ref 140–450)
PMV BLD AUTO: 9.2 FL (ref 6–12)
POTASSIUM BLD-SCNC: 4.4 MMOL/L (ref 3.5–5.2)
PROTHROMBIN TIME: 15.5 SECONDS (ref 11.2–14.3)
RBC # BLD AUTO: 5.76 10*6/MM3 (ref 4.14–5.8)
SODIUM BLD-SCNC: 137 MMOL/L (ref 136–145)
WBC NRBC COR # BLD: 7.38 10*3/MM3 (ref 3.4–10.8)

## 2019-01-01 PROCEDURE — 33225 L VENTRIC PACING LEAD ADD-ON: CPT | Performed by: INTERNAL MEDICINE

## 2019-01-01 PROCEDURE — 85610 PROTHROMBIN TIME: CPT | Performed by: NURSE PRACTITIONER

## 2019-01-01 PROCEDURE — 99152 MOD SED SAME PHYS/QHP 5/>YRS: CPT | Performed by: INTERNAL MEDICINE

## 2019-01-01 PROCEDURE — C1769 GUIDE WIRE: HCPCS | Performed by: INTERNAL MEDICINE

## 2019-01-01 PROCEDURE — 63710000001 FLUTICASONE 50 MCG/ACT SUSPENSION 16 G BOTTLE: Performed by: NURSE PRACTITIONER

## 2019-01-01 PROCEDURE — C1882 AICD, OTHER THAN SING/DUAL: HCPCS | Performed by: INTERNAL MEDICINE

## 2019-01-01 PROCEDURE — 63710000001 INSULIN DETEMIR PER 5 UNITS: Performed by: NURSE PRACTITIONER

## 2019-01-01 PROCEDURE — 71046 X-RAY EXAM CHEST 2 VIEWS: CPT

## 2019-01-01 PROCEDURE — 63710000001 PANTOPRAZOLE 40 MG TABLET DELAYED-RELEASE: Performed by: NURSE PRACTITIONER

## 2019-01-01 PROCEDURE — 99153 MOD SED SAME PHYS/QHP EA: CPT | Performed by: INTERNAL MEDICINE

## 2019-01-01 PROCEDURE — A9270 NON-COVERED ITEM OR SERVICE: HCPCS | Performed by: NURSE PRACTITIONER

## 2019-01-01 PROCEDURE — 25010000002 VANCOMYCIN 10 G RECONSTITUTED SOLUTION: Performed by: INTERNAL MEDICINE

## 2019-01-01 PROCEDURE — 33264 RMVL & RPLCMT DFB GEN MLT LD: CPT | Performed by: INTERNAL MEDICINE

## 2019-01-01 PROCEDURE — 93296 REM INTERROG EVL PM/IDS: CPT | Performed by: INTERNAL MEDICINE

## 2019-01-01 PROCEDURE — 63710000001 METOPROLOL TARTRATE 50 MG TABLET: Performed by: NURSE PRACTITIONER

## 2019-01-01 PROCEDURE — 25010000002 METHYLPREDNISOLONE PER 125 MG: Performed by: INTERNAL MEDICINE

## 2019-01-01 PROCEDURE — 82962 GLUCOSE BLOOD TEST: CPT

## 2019-01-01 PROCEDURE — 83036 HEMOGLOBIN GLYCOSYLATED A1C: CPT | Performed by: PHYSICIAN ASSISTANT

## 2019-01-01 PROCEDURE — C1894 INTRO/SHEATH, NON-LASER: HCPCS | Performed by: INTERNAL MEDICINE

## 2019-01-01 PROCEDURE — 63710000001 MAGNESIUM (AS) GLUCONATE 500 (27 MG) MG TABLET: Performed by: NURSE PRACTITIONER

## 2019-01-01 PROCEDURE — 93650 ICAR CATH ABLTJ AV NODE FUNC: CPT | Performed by: INTERNAL MEDICINE

## 2019-01-01 PROCEDURE — 99213 OFFICE O/P EST LOW 20 MIN: CPT | Performed by: PHYSICIAN ASSISTANT

## 2019-01-01 PROCEDURE — 25010000002 VANCOMYCIN: Performed by: PHYSICIAN ASSISTANT

## 2019-01-01 PROCEDURE — 99024 POSTOP FOLLOW-UP VISIT: CPT | Performed by: INTERNAL MEDICINE

## 2019-01-01 PROCEDURE — 99024 POSTOP FOLLOW-UP VISIT: CPT | Performed by: NURSE PRACTITIONER

## 2019-01-01 PROCEDURE — 36415 COLL VENOUS BLD VENIPUNCTURE: CPT

## 2019-01-01 PROCEDURE — C1900 LEAD, CORONARY VENOUS: HCPCS | Performed by: INTERNAL MEDICINE

## 2019-01-01 PROCEDURE — 93295 DEV INTERROG REMOTE 1/2/MLT: CPT | Performed by: INTERNAL MEDICINE

## 2019-01-01 PROCEDURE — C1730 CATH, EP, 19 OR FEW ELECT: HCPCS | Performed by: INTERNAL MEDICINE

## 2019-01-01 PROCEDURE — 63710000001 SERTRALINE 25 MG TABLET: Performed by: NURSE PRACTITIONER

## 2019-01-01 PROCEDURE — 63710000001 DOXYCYCLINE 100 MG CAPSULE: Performed by: NURSE PRACTITIONER

## 2019-01-01 PROCEDURE — C1892 INTRO/SHEATH,FIXED,PEEL-AWAY: HCPCS | Performed by: INTERNAL MEDICINE

## 2019-01-01 PROCEDURE — 25010000002 FENTANYL CITRATE (PF) 100 MCG/2ML SOLUTION: Performed by: INTERNAL MEDICINE

## 2019-01-01 PROCEDURE — 25010000002 MIDAZOLAM PER 1 MG: Performed by: INTERNAL MEDICINE

## 2019-01-01 PROCEDURE — C1733 CATH, EP, OTHR THAN COOL-TIP: HCPCS | Performed by: INTERNAL MEDICINE

## 2019-01-01 PROCEDURE — 25010000002 DIPHENHYDRAMINE PER 50 MG: Performed by: INTERNAL MEDICINE

## 2019-01-01 PROCEDURE — 63710000001 FERROUS SULFATE 325 (65 FE) MG TABLET: Performed by: NURSE PRACTITIONER

## 2019-01-01 PROCEDURE — 0 IOPAMIDOL PER 1 ML: Performed by: INTERNAL MEDICINE

## 2019-01-01 PROCEDURE — 85027 COMPLETE CBC AUTOMATED: CPT | Performed by: NURSE PRACTITIONER

## 2019-01-01 PROCEDURE — 63710000001 ASPIRIN 81 MG TABLET DELAYED-RELEASE: Performed by: NURSE PRACTITIONER

## 2019-01-01 PROCEDURE — 63710000001 LEVOTHYROXINE 25 MCG TABLET: Performed by: NURSE PRACTITIONER

## 2019-01-01 PROCEDURE — 80048 BASIC METABOLIC PNL TOTAL CA: CPT | Performed by: NURSE PRACTITIONER

## 2019-01-01 PROCEDURE — 63710000001 TAMSULOSIN 0.4 MG CAPSULE: Performed by: NURSE PRACTITIONER

## 2019-01-01 PROCEDURE — C1887 CATHETER, GUIDING: HCPCS | Performed by: INTERNAL MEDICINE

## 2019-01-01 DEVICE — ICD QUADRA ASSURA MP CRTD40 CD336940C: Type: IMPLANTABLE DEVICE | Site: HEART | Status: FUNCTIONAL

## 2019-01-01 DEVICE — LD QUARTET LV S/CRV BIPOL 1458Q/92: Type: IMPLANTABLE DEVICE | Site: HEART | Status: FUNCTIONAL

## 2019-01-01 RX ORDER — BUMETANIDE 1 MG/1
1 TABLET ORAL
Status: DISCONTINUED | OUTPATIENT
Start: 2019-01-01 | End: 2019-01-01 | Stop reason: HOSPADM

## 2019-01-01 RX ORDER — HYDROCODONE BITARTRATE AND ACETAMINOPHEN 5; 325 MG/1; MG/1
1 TABLET ORAL EVERY 4 HOURS PRN
Status: DISCONTINUED | OUTPATIENT
Start: 2019-01-01 | End: 2019-01-01 | Stop reason: HOSPADM

## 2019-01-01 RX ORDER — TAMSULOSIN HYDROCHLORIDE 0.4 MG/1
0.4 CAPSULE ORAL NIGHTLY
Status: DISCONTINUED | OUTPATIENT
Start: 2019-01-01 | End: 2019-01-01 | Stop reason: HOSPADM

## 2019-01-01 RX ORDER — ASPIRIN 81 MG/1
81 TABLET ORAL DAILY
Status: DISCONTINUED | OUTPATIENT
Start: 2019-01-01 | End: 2019-01-01 | Stop reason: HOSPADM

## 2019-01-01 RX ORDER — LEVOTHYROXINE SODIUM 0.03 MG/1
25 TABLET ORAL
Status: DISCONTINUED | OUTPATIENT
Start: 2019-01-01 | End: 2019-01-01 | Stop reason: HOSPADM

## 2019-01-01 RX ORDER — FENTANYL CITRATE 50 UG/ML
INJECTION, SOLUTION INTRAMUSCULAR; INTRAVENOUS AS NEEDED
Status: DISCONTINUED | OUTPATIENT
Start: 2019-01-01 | End: 2019-01-01 | Stop reason: HOSPADM

## 2019-01-01 RX ORDER — SODIUM CHLORIDE 0.9 % (FLUSH) 0.9 %
3 SYRINGE (ML) INJECTION EVERY 12 HOURS SCHEDULED
Status: DISCONTINUED | OUTPATIENT
Start: 2019-01-01 | End: 2019-01-01 | Stop reason: HOSPADM

## 2019-01-01 RX ORDER — SODIUM CHLORIDE 9 MG/ML
1 INJECTION, SOLUTION INTRAVENOUS CONTINUOUS
Status: DISCONTINUED | OUTPATIENT
Start: 2019-01-01 | End: 2019-01-01

## 2019-01-01 RX ORDER — ACETAMINOPHEN 325 MG/1
650 TABLET ORAL EVERY 4 HOURS PRN
Status: DISCONTINUED | OUTPATIENT
Start: 2019-01-01 | End: 2019-01-01 | Stop reason: HOSPADM

## 2019-01-01 RX ORDER — FLUTICASONE PROPIONATE 50 MCG
2 SPRAY, SUSPENSION (ML) NASAL DAILY
Status: DISCONTINUED | OUTPATIENT
Start: 2019-01-01 | End: 2019-01-01 | Stop reason: HOSPADM

## 2019-01-01 RX ORDER — FERROUS SULFATE 325(65) MG
325 TABLET ORAL
Status: DISCONTINUED | OUTPATIENT
Start: 2019-01-01 | End: 2019-01-01 | Stop reason: HOSPADM

## 2019-01-01 RX ORDER — MILRINONE LACTATE 0.2 MG/ML
0.29 INJECTION, SOLUTION INTRAVENOUS CONTINUOUS
Status: DISCONTINUED | OUTPATIENT
Start: 2019-01-01 | End: 2019-01-01 | Stop reason: HOSPADM

## 2019-01-01 RX ORDER — METHYLPREDNISOLONE SODIUM SUCCINATE 125 MG/2ML
125 INJECTION, POWDER, LYOPHILIZED, FOR SOLUTION INTRAMUSCULAR; INTRAVENOUS ONCE
Status: COMPLETED | OUTPATIENT
Start: 2019-01-01 | End: 2019-01-01

## 2019-01-01 RX ORDER — BUPIVACAINE HYDROCHLORIDE 5 MG/ML
INJECTION, SOLUTION PERINEURAL AS NEEDED
Status: DISCONTINUED | OUTPATIENT
Start: 2019-01-01 | End: 2019-01-01 | Stop reason: HOSPADM

## 2019-01-01 RX ORDER — DOXYCYCLINE 100 MG/1
100 CAPSULE ORAL DAILY
COMMUNITY
End: 2020-01-01

## 2019-01-01 RX ORDER — LEVOTHYROXINE SODIUM 0.03 MG/1
25 TABLET ORAL DAILY
COMMUNITY

## 2019-01-01 RX ORDER — NITROGLYCERIN 0.4 MG/1
0.4 TABLET SUBLINGUAL
Status: DISCONTINUED | OUTPATIENT
Start: 2019-01-01 | End: 2019-01-01 | Stop reason: HOSPADM

## 2019-01-01 RX ORDER — UREA 10 %
27 LOTION (ML) TOPICAL 2 TIMES DAILY
Status: DISCONTINUED | OUTPATIENT
Start: 2019-01-01 | End: 2019-01-01 | Stop reason: HOSPADM

## 2019-01-01 RX ORDER — LIDOCAINE HYDROCHLORIDE 10 MG/ML
INJECTION, SOLUTION EPIDURAL; INFILTRATION; INTRACAUDAL; PERINEURAL AS NEEDED
Status: DISCONTINUED | OUTPATIENT
Start: 2019-01-01 | End: 2019-01-01 | Stop reason: HOSPADM

## 2019-01-01 RX ORDER — SODIUM CHLORIDE 0.9 % (FLUSH) 0.9 %
10 SYRINGE (ML) INJECTION AS NEEDED
Status: DISCONTINUED | OUTPATIENT
Start: 2019-01-01 | End: 2019-01-01 | Stop reason: HOSPADM

## 2019-01-01 RX ORDER — DIPHENHYDRAMINE HYDROCHLORIDE 50 MG/ML
25 INJECTION INTRAMUSCULAR; INTRAVENOUS ONCE
Status: COMPLETED | OUTPATIENT
Start: 2019-01-01 | End: 2019-01-01

## 2019-01-01 RX ORDER — DOXYCYCLINE 100 MG/1
100 CAPSULE ORAL EVERY 12 HOURS SCHEDULED
Status: DISCONTINUED | OUTPATIENT
Start: 2019-01-01 | End: 2019-01-01 | Stop reason: HOSPADM

## 2019-01-01 RX ORDER — IPRATROPIUM BROMIDE AND ALBUTEROL SULFATE 2.5; .5 MG/3ML; MG/3ML
3 SOLUTION RESPIRATORY (INHALATION) EVERY 6 HOURS PRN
Status: DISCONTINUED | OUTPATIENT
Start: 2019-01-01 | End: 2019-01-01 | Stop reason: HOSPADM

## 2019-01-01 RX ORDER — METOPROLOL TARTRATE 50 MG/1
150 TABLET, FILM COATED ORAL 2 TIMES DAILY
Status: DISCONTINUED | OUTPATIENT
Start: 2019-01-01 | End: 2019-01-01 | Stop reason: HOSPADM

## 2019-01-01 RX ORDER — MIDAZOLAM HYDROCHLORIDE 1 MG/ML
INJECTION INTRAMUSCULAR; INTRAVENOUS AS NEEDED
Status: DISCONTINUED | OUTPATIENT
Start: 2019-01-01 | End: 2019-01-01 | Stop reason: HOSPADM

## 2019-01-01 RX ORDER — ONDANSETRON 2 MG/ML
4 INJECTION INTRAMUSCULAR; INTRAVENOUS EVERY 6 HOURS PRN
Status: DISCONTINUED | OUTPATIENT
Start: 2019-01-01 | End: 2019-01-01 | Stop reason: HOSPADM

## 2019-01-01 RX ORDER — PROMETHAZINE HYDROCHLORIDE 25 MG/ML
12.5 INJECTION, SOLUTION INTRAMUSCULAR; INTRAVENOUS EVERY 4 HOURS PRN
Status: DISCONTINUED | OUTPATIENT
Start: 2019-01-01 | End: 2019-01-01 | Stop reason: HOSPADM

## 2019-01-01 RX ORDER — PANTOPRAZOLE SODIUM 40 MG/1
40 TABLET, DELAYED RELEASE ORAL EVERY MORNING
Status: DISCONTINUED | OUTPATIENT
Start: 2019-01-01 | End: 2019-01-01 | Stop reason: HOSPADM

## 2019-01-01 RX ORDER — SERTRALINE HYDROCHLORIDE 25 MG/1
25 TABLET, FILM COATED ORAL NIGHTLY
Status: DISCONTINUED | OUTPATIENT
Start: 2019-01-01 | End: 2019-01-01 | Stop reason: HOSPADM

## 2019-01-01 RX ADMIN — VANCOMYCIN HYDROCHLORIDE 1250 MG: 10 INJECTION, POWDER, LYOPHILIZED, FOR SOLUTION INTRAVENOUS at 00:57

## 2019-01-01 RX ADMIN — FLUTICASONE PROPIONATE 2 SPRAY: 50 SPRAY, METERED NASAL at 10:08

## 2019-01-01 RX ADMIN — DIPHENHYDRAMINE HYDROCHLORIDE 25 MG: 50 INJECTION INTRAMUSCULAR; INTRAVENOUS at 09:57

## 2019-01-01 RX ADMIN — TAMSULOSIN HYDROCHLORIDE 0.4 MG: 0.4 CAPSULE ORAL at 22:01

## 2019-01-01 RX ADMIN — ASPIRIN 81 MG: 81 TABLET, COATED ORAL at 10:07

## 2019-01-01 RX ADMIN — METOPROLOL TARTRATE 150 MG: 50 TABLET, FILM COATED ORAL at 10:06

## 2019-01-01 RX ADMIN — DOXYCYCLINE 100 MG: 100 CAPSULE ORAL at 10:07

## 2019-01-01 RX ADMIN — DOXYCYCLINE 100 MG: 100 CAPSULE ORAL at 22:02

## 2019-01-01 RX ADMIN — VANCOMYCIN HYDROCHLORIDE 1500 MG: 10 INJECTION, POWDER, LYOPHILIZED, FOR SOLUTION INTRAVENOUS at 12:00

## 2019-01-01 RX ADMIN — METHYLPREDNISOLONE SODIUM SUCCINATE 125 MG: 125 INJECTION, POWDER, FOR SOLUTION INTRAMUSCULAR; INTRAVENOUS at 09:57

## 2019-01-01 RX ADMIN — Medication 27 MG: at 10:06

## 2019-01-01 RX ADMIN — LEVOTHYROXINE SODIUM 25 MCG: 25 TABLET ORAL at 05:53

## 2019-01-01 RX ADMIN — Medication 27 MG: at 22:02

## 2019-01-01 RX ADMIN — PANTOPRAZOLE SODIUM 40 MG: 40 TABLET, DELAYED RELEASE ORAL at 05:53

## 2019-01-01 RX ADMIN — FERROUS SULFATE TAB 325 MG (65 MG ELEMENTAL FE) 325 MG: 325 (65 FE) TAB at 10:06

## 2019-01-01 RX ADMIN — INSULIN DETEMIR 10 UNITS: 100 INJECTION, SOLUTION SUBCUTANEOUS at 22:02

## 2019-01-01 RX ADMIN — SERTRALINE HYDROCHLORIDE 25 MG: 25 TABLET ORAL at 22:01

## 2019-01-01 RX ADMIN — SODIUM CHLORIDE, PRESERVATIVE FREE 3 ML: 5 INJECTION INTRAVENOUS at 22:04

## 2019-01-01 RX ADMIN — METOPROLOL TARTRATE 150 MG: 50 TABLET, FILM COATED ORAL at 22:09

## 2019-01-16 ENCOUNTER — CLINICAL SUPPORT NO REQUIREMENTS (OUTPATIENT)
Dept: CARDIOLOGY | Facility: CLINIC | Age: 64
End: 2019-01-16

## 2019-03-18 RX ORDER — SERTRALINE HYDROCHLORIDE 25 MG/1
25 TABLET, FILM COATED ORAL
Qty: 30 TABLET | Refills: 6 | Status: SHIPPED | OUTPATIENT
Start: 2019-03-18 | End: 2019-05-29 | Stop reason: SDUPTHER

## 2019-03-26 RX ORDER — DOFETILIDE 0.25 MG/1
CAPSULE ORAL
Qty: 180 CAPSULE | Refills: 2 | Status: SHIPPED | OUTPATIENT
Start: 2019-03-26 | End: 2019-05-29 | Stop reason: SDUPTHER

## 2019-04-18 ENCOUNTER — CLINICAL SUPPORT NO REQUIREMENTS (OUTPATIENT)
Dept: CARDIOLOGY | Facility: CLINIC | Age: 64
End: 2019-04-18

## 2019-04-18 DIAGNOSIS — I50.22 CHRONIC SYSTOLIC CONGESTIVE HEART FAILURE (HCC): ICD-10-CM

## 2019-04-18 DIAGNOSIS — I48.92 ATRIAL FIBRILLATION AND FLUTTER (HCC): ICD-10-CM

## 2019-04-18 DIAGNOSIS — I25.5 ISCHEMIC CARDIOMYOPATHY: ICD-10-CM

## 2019-04-18 DIAGNOSIS — I48.91 ATRIAL FIBRILLATION AND FLUTTER (HCC): ICD-10-CM

## 2019-04-18 PROCEDURE — 93296 REM INTERROG EVL PM/IDS: CPT | Performed by: INTERNAL MEDICINE

## 2019-04-18 PROCEDURE — 93295 DEV INTERROG REMOTE 1/2/MLT: CPT | Performed by: INTERNAL MEDICINE

## 2019-05-01 RX ORDER — CARVEDILOL 25 MG/1
TABLET ORAL
Qty: 180 TABLET | Refills: 0 | Status: SHIPPED | OUTPATIENT
Start: 2019-05-01 | End: 2019-05-29 | Stop reason: SDUPTHER

## 2019-05-29 ENCOUNTER — OFFICE VISIT (OUTPATIENT)
Dept: CARDIOLOGY | Facility: CLINIC | Age: 64
End: 2019-05-29

## 2019-05-29 VITALS
HEIGHT: 72 IN | OXYGEN SATURATION: 98 % | SYSTOLIC BLOOD PRESSURE: 102 MMHG | HEART RATE: 74 BPM | DIASTOLIC BLOOD PRESSURE: 50 MMHG | BODY MASS INDEX: 27.6 KG/M2 | WEIGHT: 203.8 LBS

## 2019-05-29 DIAGNOSIS — I25.5 ISCHEMIC CARDIOMYOPATHY: ICD-10-CM

## 2019-05-29 DIAGNOSIS — E78.5 DYSLIPIDEMIA: ICD-10-CM

## 2019-05-29 DIAGNOSIS — I48.92 ATRIAL FIBRILLATION AND FLUTTER (HCC): ICD-10-CM

## 2019-05-29 DIAGNOSIS — I25.810 CORONARY ARTERY DISEASE INVOLVING CORONARY BYPASS GRAFT OF NATIVE HEART WITHOUT ANGINA PECTORIS: ICD-10-CM

## 2019-05-29 DIAGNOSIS — Z79.899 ENCOUNTER FOR LONG-TERM (CURRENT) USE OF MEDICATIONS: ICD-10-CM

## 2019-05-29 DIAGNOSIS — I48.91 ATRIAL FIBRILLATION AND FLUTTER (HCC): ICD-10-CM

## 2019-05-29 DIAGNOSIS — Z72.0 TOBACCO USE: ICD-10-CM

## 2019-05-29 DIAGNOSIS — I73.9 PERIPHERAL ARTERIAL DISEASE (HCC): ICD-10-CM

## 2019-05-29 DIAGNOSIS — I10 ESSENTIAL HYPERTENSION: ICD-10-CM

## 2019-05-29 DIAGNOSIS — I50.22 CHRONIC SYSTOLIC CONGESTIVE HEART FAILURE (HCC): ICD-10-CM

## 2019-05-29 PROCEDURE — 93000 ELECTROCARDIOGRAM COMPLETE: CPT | Performed by: PHYSICIAN ASSISTANT

## 2019-05-29 PROCEDURE — 99213 OFFICE O/P EST LOW 20 MIN: CPT | Performed by: PHYSICIAN ASSISTANT

## 2019-05-29 RX ORDER — HYDRALAZINE HYDROCHLORIDE 10 MG/1
10 TABLET, FILM COATED ORAL 3 TIMES DAILY
Qty: 120 TABLET | Refills: 4 | Status: SHIPPED | OUTPATIENT
Start: 2019-05-29 | End: 2019-08-14

## 2019-05-29 RX ORDER — ISOSORBIDE DINITRATE 10 MG/1
10 TABLET ORAL 3 TIMES DAILY
Qty: 120 TABLET | Refills: 3 | Status: SHIPPED | OUTPATIENT
Start: 2019-05-29 | End: 2019-08-14

## 2019-05-29 RX ORDER — MAGNESIUM GLUCONATE 27 MG(500)
TABLET ORAL
Qty: 60 TABLET | Refills: 3 | Status: SHIPPED | OUTPATIENT
Start: 2019-05-29

## 2019-05-29 RX ORDER — DIGOXIN 125 MCG
125 TABLET ORAL DAILY
Qty: 90 TABLET | Refills: 3 | Status: SHIPPED | OUTPATIENT
Start: 2019-05-29 | End: 2019-08-14

## 2019-05-29 RX ORDER — SERTRALINE HYDROCHLORIDE 25 MG/1
25 TABLET, FILM COATED ORAL
Qty: 30 TABLET | Refills: 6 | Status: SHIPPED | OUTPATIENT
Start: 2019-05-29

## 2019-05-29 RX ORDER — DOFETILIDE 0.25 MG/1
250 CAPSULE ORAL EVERY 12 HOURS
Qty: 180 CAPSULE | Refills: 2 | Status: SHIPPED | OUTPATIENT
Start: 2019-05-29 | End: 2019-08-14

## 2019-05-29 RX ORDER — ROSUVASTATIN CALCIUM 20 MG/1
40 TABLET, COATED ORAL DAILY
Qty: 90 TABLET | Refills: 3 | Status: ON HOLD | OUTPATIENT
Start: 2019-05-29 | End: 2019-08-19

## 2019-05-29 RX ORDER — CILOSTAZOL 50 MG/1
50 TABLET ORAL
Qty: 180 TABLET | Refills: 3 | Status: SHIPPED | OUTPATIENT
Start: 2019-05-29 | End: 2019-08-14

## 2019-05-29 RX ORDER — FUROSEMIDE 40 MG/1
40 TABLET ORAL DAILY
Qty: 90 TABLET | Refills: 3 | Status: SHIPPED | OUTPATIENT
Start: 2019-05-29 | End: 2019-08-14

## 2019-05-29 RX ORDER — CARVEDILOL 25 MG/1
25 TABLET ORAL 2 TIMES DAILY WITH MEALS
Qty: 180 TABLET | Refills: 3 | Status: SHIPPED | OUTPATIENT
Start: 2019-05-29 | End: 2019-08-14

## 2019-05-29 NOTE — PROGRESS NOTES
Kreis, Samuel Duane, MD  Rob Miranda  1955 05/29/2019    Patient Active Problem List   Diagnosis   • Chronic systolic congestive heart failure, well compensated.   • Coronary artery disease involving coronary bypass graft of native heart without angina pectoris   • Essential hypertension   • History of ASCVD (atherosclerotic cardiovascular disease), status post myocardial infarction, CABG 1992   • Advanced Ischemic cardiomyopathy with estimated ejection fraction of less than 20% in January 2015, status post biventricular ICD implantation.in 2007,with gen.change in 2013.   • Dyslipidemia, on Crestor.    • IDDM (insulin dependent diabetes mellitus) (CMS/[facility])   • Atrial fibrillation and flutter,s/p pulmonary renee ablationx2 in 2/14 and 3/15.on Tikosyn    • Sinus bradycardia   • Hyperlipidemia   • Tobacco use   • Hypomagnesemia   • ASCVD (arteriosclerotic cardiovascular disease), status post CABG in 1992.   • Peripheral arterial disease both lower extremities.       Dear Kreis, Samuel Duane, MD:    Subjective     History of Present Illness:    Chief complaint: Follow-up for ischemic cardiomyopathy    Rob Miranda is a pleasant 64 y.o. male with a past medical history significant for ASCVD, status post previous myocardial infarctions and CABG in 1992 with advanced ischemic cardiomyopathy with most recent LV ejection fraction of 20%, status post ICD implantation and previous history of atrial flutter, status post RF ablation on Tikosyn. For which he follows Dr. Edwards for.  He comes in today for routine cardiology follow-up.    Patient reports he has been doing well. Patient denies any chest pain, shortness of breath, palpitations, dizziness, syncope or near syncope. He is requesting refills of all of his medications today. He reports good blood pressure control at home as well.     Allergies   Allergen Reactions   • Shellfish-Derived Products Hives, Shortness Of Breath and Swelling   :      Current  Outpatient Medications:   •  apixaban (ELIQUIS) 5 MG tablet tablet, Take 1 tablet by mouth Every 12 (Twelve) Hours., Disp: 30 tablet, Rfl: 11  •  carvedilol (COREG) 25 MG tablet, Take 1 tablet by mouth 2 (Two) Times a Day With Meals., Disp: 180 tablet, Rfl: 3  •  cilostazol (PLETAL) 50 MG tablet, Take 1 tablet by mouth 2 (Two) Times a Day Before Meals., Disp: 180 tablet, Rfl: 3  •  digoxin (LANOXIN) 125 MCG tablet, Take 1 tablet by mouth Daily., Disp: 90 tablet, Rfl: 3  •  dofetilide (TIKOSYN) 250 MCG capsule, Take 1 capsule by mouth Every 12 (Twelve) Hours., Disp: 180 capsule, Rfl: 2  •  furosemide (LASIX) 40 MG tablet, Take 1 tablet by mouth Daily., Disp: 90 tablet, Rfl: 3  •  gabapentin (NEURONTIN) 600 MG tablet, Take 600 mg by mouth 2 (Two) Times a Day., Disp: , Rfl: 5  •  hydrALAZINE (APRESOLINE) 10 MG tablet, Take 1 tablet by mouth 3 (Three) Times a Day., Disp: 120 tablet, Rfl: 4  •  insulin detemir (LEVEMIR) 100 UNIT/ML injection, Inject 60 Units under the skin into the appropriate area as directed Every Night. flexpen, Disp: , Rfl:   •  isosorbide dinitrate (ISORDIL) 10 MG tablet, Take 1 tablet by mouth 3 (Three) Times a Day., Disp: 120 tablet, Rfl: 3  •  magnesium gluconate (MAGONATE) 500 MG tablet, TAKE 1 TABLET TWICE DAILY, Disp: 60 tablet, Rfl: 3  •  metFORMIN (GLUCOPHAGE) 1000 MG tablet, Take 1,000 mg by mouth 2 (two) times a day with meals., Disp: , Rfl:   •  rosuvastatin (CRESTOR) 20 MG tablet, Take 2 tablets by mouth Daily., Disp: 90 tablet, Rfl: 3  •  sertraline (ZOLOFT) 25 MG tablet, Take 1 tablet by mouth every night at bedtime., Disp: 30 tablet, Rfl: 6  •  tamsulosin (FLOMAX) 0.4 MG capsule 24 hr capsule, Take 1 capsule by mouth every night., Disp: , Rfl:   •  zolpidem (AMBIEN) 10 MG tablet, Take 10 mg by mouth As Needed., Disp: , Rfl:     The following portions of the patient's history were reviewed and updated as appropriate: allergies, current medications, past family history, past medical  "history, past social history, past surgical history and problem list.    Social History     Tobacco Use   • Smoking status: Current Every Day Smoker     Packs/day: 0.75     Years: 48.00     Pack years: 36.00     Types: Cigarettes   • Smokeless tobacco: Never Used   Substance Use Topics   • Alcohol use: Yes     Alcohol/week: 0.6 - 1.2 oz     Types: 1 - 2 Cans of beer per week     Comment: socially   • Drug use: No       Review of Systems   Constitution: Negative for weakness and malaise/fatigue.   Cardiovascular: Positive for palpitations. Negative for chest pain, dyspnea on exertion, irregular heartbeat, leg swelling and syncope.   Respiratory: Negative for cough and shortness of breath.    Hematologic/Lymphatic: Negative for bleeding problem. Does not bruise/bleed easily.   Gastrointestinal: Negative for nausea and vomiting.   Neurological: Negative for dizziness.     Objective   Vitals:    05/29/19 1036   BP: 102/50   BP Location: Left arm   Patient Position: Sitting   Cuff Size: Adult   Pulse: 74   SpO2: 98%   Weight: 92.4 kg (203 lb 12.8 oz)   Height: 182.9 cm (72\")     Body mass index is 27.64 kg/m².    Physical Exam   Constitutional: He is oriented to person, place, and time. He appears well-developed and well-nourished. No distress.   HENT:   Head: Normocephalic and atraumatic.   Cardiovascular: Normal rate, regular rhythm, normal heart sounds and intact distal pulses.   Pulmonary/Chest: Effort normal and breath sounds normal. No respiratory distress.   Musculoskeletal: He exhibits no edema.   Neurological: He is alert and oriented to person, place, and time.   Skin: He is not diaphoretic.     Lab Results   Component Value Date     11/15/2018    K 4.4 11/15/2018     (H) 11/15/2018    CO2 28.0 11/15/2018    BUN 11 11/15/2018    CREATININE 0.84 11/15/2018    GLUCOSE 101 (H) 11/15/2018    CALCIUM 8.8 11/15/2018    AST 21 09/13/2018    ALT 17 09/13/2018    ALKPHOS 60 09/13/2018    LABIL2 1.6 " 09/13/2018     Lab Results   Component Value Date    CKTOTAL 66 02/09/2014     Lab Results   Component Value Date    WBC 6.35 11/15/2018    HGB 13.0 (L) 11/15/2018    HCT 42.1 11/15/2018     (L) 11/15/2018     Lab Results   Component Value Date    INR 1.26 10/23/2017    INR 1.28 04/24/2017    INR 1.12 03/10/2016     Lab Results   Component Value Date    MG 1.8 02/07/2018     Lab Results   Component Value Date    TRIG 116 03/06/2017    HDL 34 (L) 03/06/2017    LDL 87 03/06/2017      Lab Results   Component Value Date    BNP 1,160 (H) 02/08/2014       During this visit the following were done:  Labs Reviewed [x]    Labs Ordered []    Radiology Reports Reviewed [x]    Radiology Ordered []    PCP Records Reviewed []    Referring Provider Records Reviewed []    ER Records Reviewed []    Hospital Records Reviewed []    History Obtained From Family []    Radiology Images Reviewed []    Other Reviewed []    Records Requested []         ECG 12 Lead  Date/Time: 5/29/2019 10:31 AM  Performed by: Avery Colbert PA-C  Authorized by: Avery Colbert PA-C   Comparison: compared with previous ECG   Similar to previous ECG  Rhythm: sinus rhythm  Conduction: non-specific intraventricular conduction delay  Pacing: atrial sensed rhythm  Clinical impression: non-specific ECG            Assessment/Plan    Diagnosis Plan   1. Chronic systolic congestive heart failure, well compensated.  digoxin (LANOXIN) 125 MCG tablet    cilostazol (PLETAL) 50 MG tablet    carvedilol (COREG) 25 MG tablet    furosemide (LASIX) 40 MG tablet   2. Coronary artery disease involving coronary bypass graft of native heart without angina pectoris  digoxin (LANOXIN) 125 MCG tablet    cilostazol (PLETAL) 50 MG tablet    carvedilol (COREG) 25 MG tablet    furosemide (LASIX) 40 MG tablet   3. Essential hypertension  digoxin (LANOXIN) 125 MCG tablet    cilostazol (PLETAL) 50 MG tablet    carvedilol (COREG) 25 MG tablet    furosemide (LASIX) 40 MG tablet    4. Advanced Ischemic cardiomyopathy with estimated ejection fraction of less than 20% in January 2015, status post biventricular ICD implantation.in 2007,with gen.change in 2013.  digoxin (LANOXIN) 125 MCG tablet    cilostazol (PLETAL) 50 MG tablet    carvedilol (COREG) 25 MG tablet    furosemide (LASIX) 40 MG tablet   5. Atrial fibrillation and flutter,s/p pulmonary renee ablationx2 in 2/14 and 3/15.on Tikosyn   digoxin (LANOXIN) 125 MCG tablet    cilostazol (PLETAL) 50 MG tablet    carvedilol (COREG) 25 MG tablet    apixaban (ELIQUIS) 5 MG tablet tablet    furosemide (LASIX) 40 MG tablet   6. Peripheral arterial disease both lower extremities.  digoxin (LANOXIN) 125 MCG tablet    cilostazol (PLETAL) 50 MG tablet    carvedilol (COREG) 25 MG tablet    furosemide (LASIX) 40 MG tablet   7. Dyslipidemia, on Crestor.   digoxin (LANOXIN) 125 MCG tablet    cilostazol (PLETAL) 50 MG tablet    carvedilol (COREG) 25 MG tablet    furosemide (LASIX) 40 MG tablet   8. Tobacco use  digoxin (LANOXIN) 125 MCG tablet    cilostazol (PLETAL) 50 MG tablet    carvedilol (COREG) 25 MG tablet    furosemide (LASIX) 40 MG tablet   9. Encounter for long-term (current) use of medications  digoxin (LANOXIN) 125 MCG tablet    cilostazol (PLETAL) 50 MG tablet    carvedilol (COREG) 25 MG tablet    furosemide (LASIX) 40 MG tablet            Recommendations:  1. Patient is doing well from cardiac standpoint.  He denies any bleeding problems with Eliquis.  Continue carvedilol, Pletal, digoxin, Tikosyn, Lasix, hydralazine, Isordil, rosuvastatin.    Return in about 6 months (around 11/29/2019).    As always, I appreciate very much the opportunity to participate in the cardiovascular care of your patients.      With Best Regards,    Avery Colbert PA-C

## 2019-06-14 ENCOUNTER — TELEPHONE (OUTPATIENT)
Dept: CARDIOLOGY | Facility: CLINIC | Age: 64
End: 2019-06-14

## 2019-06-14 NOTE — TELEPHONE ENCOUNTER
[facility] called requesting a refill on Edhiren's Zoloft. I advised them that we will not be refilling his Zoloft due to it not being a cardiac medication. She stated that she will call Rob's PCP and ask them.

## 2019-06-19 ENCOUNTER — TELEPHONE (OUTPATIENT)
Dept: CARDIOLOGY | Facility: CLINIC | Age: 64
End: 2019-06-19

## 2019-06-19 NOTE — TELEPHONE ENCOUNTER
Wife calling to let you know that he has legionella pneumonia and is on a ventilator at . He is septic and kidneys have shut down. They are starting dialysis today. Wife wanted to let us know what was going on with him and she will call to keep us updated.

## 2019-07-16 ENCOUNTER — TELEPHONE (OUTPATIENT)
Dept: CARDIOLOGY | Facility: CLINIC | Age: 64
End: 2019-07-16

## 2019-07-16 NOTE — TELEPHONE ENCOUNTER
Pts wife called to let us know he is in rehab in [place] and was concerned about his battery. I explained to her they could have someone come to the rehab center and check him. The rehab center is [facility] the hospital.She expressed understanding.

## 2019-08-09 ENCOUNTER — TELEPHONE (OUTPATIENT)
Dept: CARDIOLOGY | Facility: CLINIC | Age: 64
End: 2019-08-09

## 2019-08-12 ENCOUNTER — TELEPHONE (OUTPATIENT)
Dept: CARDIOLOGY | Facility: CLINIC | Age: 64
End: 2019-08-12

## 2019-08-12 NOTE — TELEPHONE ENCOUNTER
LM for pt to CB, I spoke with MIRIAN Rhodes at  who asked for me to contact pt and work him in to be seen for his AFL/AFIB and fluid retention.  Ok to set up with Will or EP [facility].  Will schedule appt when pt calls back.

## 2019-08-14 ENCOUNTER — OFFICE VISIT (OUTPATIENT)
Dept: CARDIOLOGY | Facility: CLINIC | Age: 64
End: 2019-08-14

## 2019-08-14 VITALS
DIASTOLIC BLOOD PRESSURE: 62 MMHG | HEART RATE: 116 BPM | BODY MASS INDEX: 28.56 KG/M2 | OXYGEN SATURATION: 97 % | SYSTOLIC BLOOD PRESSURE: 102 MMHG | HEIGHT: 71 IN | WEIGHT: 204 LBS

## 2019-08-14 DIAGNOSIS — I50.22 CHRONIC SYSTOLIC CONGESTIVE HEART FAILURE (HCC): ICD-10-CM

## 2019-08-14 DIAGNOSIS — Z86.79 HISTORY OF ASCVD (ATHEROSCLEROTIC CARDIOVASCULAR DISEASE): ICD-10-CM

## 2019-08-14 DIAGNOSIS — I25.5 ISCHEMIC CARDIOMYOPATHY: Primary | ICD-10-CM

## 2019-08-14 DIAGNOSIS — I48.19 PERSISTENT ATRIAL FIBRILLATION (HCC): ICD-10-CM

## 2019-08-14 PROCEDURE — 99214 OFFICE O/P EST MOD 30 MIN: CPT | Performed by: PHYSICIAN ASSISTANT

## 2019-08-14 PROCEDURE — 93000 ELECTROCARDIOGRAM COMPLETE: CPT | Performed by: PHYSICIAN ASSISTANT

## 2019-08-14 RX ORDER — FLUTICASONE PROPIONATE 50 MCG
2 SPRAY, SUSPENSION (ML) NASAL DAILY
COMMUNITY

## 2019-08-14 RX ORDER — ATORVASTATIN CALCIUM 80 MG/1
80 TABLET, FILM COATED ORAL DAILY
COMMUNITY
End: 2019-08-26 | Stop reason: HOSPADM

## 2019-08-14 RX ORDER — FERROUS SULFATE 325(65) MG
325 TABLET ORAL 2 TIMES DAILY
COMMUNITY
End: 2019-10-09 | Stop reason: SDUPTHER

## 2019-08-14 RX ORDER — ESOMEPRAZOLE MAGNESIUM 40 MG/1
40 CAPSULE, DELAYED RELEASE ORAL
COMMUNITY

## 2019-08-14 RX ORDER — METOPROLOL TARTRATE 50 MG/1
50 TABLET, FILM COATED ORAL 2 TIMES DAILY
Qty: 180 TABLET | Refills: 3 | Status: ON HOLD | COMMUNITY
Start: 2019-08-14 | End: 2019-08-26 | Stop reason: SDUPTHER

## 2019-08-14 RX ORDER — CETIRIZINE HYDROCHLORIDE 10 MG/1
10 TABLET ORAL DAILY
COMMUNITY
End: 2019-10-09

## 2019-08-14 RX ORDER — ALPRAZOLAM 1 MG/1
1 TABLET ORAL 2 TIMES DAILY PRN
COMMUNITY
End: 2019-09-10

## 2019-08-14 RX ORDER — ASPIRIN 81 MG/1
81 TABLET ORAL DAILY
COMMUNITY

## 2019-08-14 RX ORDER — BUMETANIDE 2 MG/1
2 TABLET ORAL 2 TIMES DAILY
Status: ON HOLD | COMMUNITY
End: 2019-08-26 | Stop reason: SDUPTHER

## 2019-08-14 RX ORDER — AMIODARONE HYDROCHLORIDE 200 MG/1
200 TABLET ORAL 3 TIMES DAILY
Qty: 90 TABLET | Refills: 1 | Status: SHIPPED | OUTPATIENT
Start: 2019-08-14 | End: 2019-08-26 | Stop reason: HOSPADM

## 2019-08-14 NOTE — PROGRESS NOTES
[facility] at [facility]   OFFICE NOTE      Rob Miranda  1955  PCP: Kreis, Samuel Duane, MD    SUBJECTIVE:   Rob Miranda is a 64 y.o. male seen for a follow up visit regarding the following:     CC:Afib    HPI:   Mr. Miranda is a 64-year-old gentleman who is seen in office follow-up at the request of Rain SANFORD at  for treatment of persistent atrial flutter.  Mr. Miranda has been following with  heart failure center for consideration of possible LVAD versus heart transplant.  Recently Mr. Miranda had Legionella was pneumonia and was admitted to [facility] in 6/14/19.  He was eventually transferred to  hospital as his condition worsened on 6/18/2019.  He was intubated and sedated and remained on ventilator for over 2 weeks.  During this time he developed acute renal failure and his Tikosyn, carvedilol, and all cardiac medications were discontinued.  He was eventually weaned off of the ventilator and was able to be discharged to rehab on July 31, 2019.  He has been home for the past week from the rehab center.  2 weeks ago he had a visit with his nephrologist and he was restarted on a beta-blocker as well as restart his Eliquis at 5 mg twice daily. His last HD was on 7/26/19.  He recently had a visit with  heart failure team on 8/8/18 last week regarding his heart failure symptoms.   A device interrogation revealed persistent Aflutter since 6/19.   It was recommended he be seen in our clinic today for further evaluation and plan regarding treatment of his atrial flutter.  Mr. Miranda has had progressive worsening weakness and shortness of breath over the past couple weeks.  He has had some two-pillow orthopnea as well as worsening peripheral edema.  He is taking Bumex and was recently doubled his dose by Dr. Aguiar.  He denies any chest pain or chest tightness or ICD shocks.  He is trying to limit his sodium and fluid intake.    Cardiac PMH: (Old records  have been reviewed and summarized below)  1. PAF/flutter            A)Tikosyn 2013            B)ICD Shocks secondary to Afib with RVR            C)PVA 2014, inability to isoate Right inferior pulmonary vent secondary to near proximity to esophagus            D)Repeat PVA 3/16/2015 wit re-isolaton of all four pulmonary veins.  Roof lines and mitral annulas left atrial rotor ablation            E)Recurrent Afib with ECV 4/17 and 10/17            F)Chadsvasc=4, Eliquis BID            G)Aflutter(recurrent) with ECV to NSR 11/15/18   H)Recurrent persistent Flutter since June 19  2. DCM            A)Remote MI/CABG 1991            B)Patent grafts by [facility] 2004, persistent ICM EF<30%            C)Echo 2006 EF 35%            D)St. Jr ICD Dr. Márquez 2006            E)Gen change 2013.     F)Recent Echo [facility] 6/19 EF 20%.   3. Chronic SHF ClassIII  4. DM (Insulin dependant)  5. HTN  6. HLD  7. Insomnia   8. PAD: Severe bilateral lower extremity disease by CTA 3/18, followed by Dr. Vasquez   9. Tobacco Abuse(Ongoing)  10. ARF, HD.  Last HD 7/26/19 Followed by Dr. Cosme Gosnalez, [place]  11. Recent Legionnaires PNA Admit to [facility], transfer to  6/18/19 with two weeks on Ventilator, discharged to Rehab in [place] 7/31/19.       Past Medical History, Past Surgical History, Family history, Social History, and Medications were all reviewed with the patient today and updated as necessary.       Current Outpatient Medications:   •  ALPRAZolam (XANAX) 1 MG tablet, Take 1 mg by mouth 2 (Two) Times a Day As Needed for Anxiety., Disp: , Rfl:   •  apixaban (ELIQUIS) 5 MG tablet tablet, Take 1 tablet by mouth Every 12 (Twelve) Hours., Disp: 30 tablet, Rfl: 11  •  aspirin 81 MG EC tablet, Take 81 mg by mouth Daily., Disp: , Rfl:   •  atorvastatin (LIPITOR) 80 MG tablet, Take 80 mg by mouth Daily., Disp: , Rfl:   •  bumetanide (BUMEX) 2 MG tablet, Take 2 mg by mouth 2 (Two) Times a Day., Disp: , Rfl:   •  cetirizine (zyrTEC) 10 MG  tablet, Take 10 mg by mouth Daily., Disp: , Rfl:   •  esomeprazole (nexIUM) 40 MG capsule, Take 40 mg by mouth Every Morning Before Breakfast., Disp: , Rfl:   •  ferrous sulfate 325 (65 FE) MG tablet, Take 325 mg by mouth 2 (Two) Times a Day., Disp: , Rfl:   •  fluticasone (FLONASE) 50 MCG/ACT nasal spray, 2 sprays into the nostril(s) as directed by provider Daily., Disp: , Rfl:   •  gabapentin (NEURONTIN) 600 MG tablet, Take 100 mg by mouth every night at bedtime., Disp: , Rfl: 5  •  insulin detemir (LEVEMIR) 100 UNIT/ML injection, Inject 35 Units under the skin into the appropriate area as directed Every Night. flexpen, Disp: , Rfl:   •  magnesium gluconate (MAGONATE) 500 MG tablet, TAKE 1 TABLET TWICE DAILY, Disp: 60 tablet, Rfl: 3  •  sertraline (ZOLOFT) 25 MG tablet, Take 1 tablet by mouth every night at bedtime., Disp: 30 tablet, Rfl: 6  •  Umeclidinium Bromide (INCRUSE ELLIPTA) 62.5 MCG/INH aerosol powder , Inhale Daily., Disp: , Rfl:   •  amiodarone (PACERONE) 200 MG tablet, Take 1 tablet by mouth 3 (Three) Times a Day. Take TID for one week then decrease to BID, Disp: 90 tablet, Rfl: 1  •  metoprolol tartrate (LOPRESSOR) 50 MG tablet, Take 1 tablet by mouth 2 (Two) Times a Day., Disp: 180 tablet, Rfl: 3  •  rosuvastatin (CRESTOR) 20 MG tablet, Take 2 tablets by mouth Daily., Disp: 90 tablet, Rfl: 3      Allergies   Allergen Reactions   • Shellfish-Derived Products Hives, Shortness Of Breath and Swelling     Patient Active Problem List   Diagnosis   • Chronic systolic congestive heart failure, well compensated.   • Coronary artery disease involving coronary bypass graft of native heart without angina pectoris   • Essential hypertension   • History of ASCVD (atherosclerotic cardiovascular disease), status post myocardial infarction, CABG 1992   • Advanced Ischemic cardiomyopathy with estimated ejection fraction of less than 20% in January 2015, status post biventricular ICD implantation.in 2007,with  gen.change in 2013.   • Dyslipidemia, on Crestor.    • IDDM (insulin dependent diabetes mellitus) (CMS/[facility])   • Atrial fibrillation and flutter,s/p pulmonary renee ablationx2 in 2/14 and 3/15.on Tikosyn    • Sinus bradycardia   • Hyperlipidemia   • Tobacco use   • Hypomagnesemia   • ASCVD (arteriosclerotic cardiovascular disease), status post CABG in 1992.   • Peripheral arterial disease both lower extremities.     Past Medical History:   Diagnosis Date   • Atherosclerotic cardiovascular disease    • Chronic anticoagulation    • Congestive heart failure (CMS/[facility])    • COPD (chronic obstructive pulmonary disease) (CMS/[facility])    • DM (diabetes mellitus) (CMS/[facility])     insulin dependant   • Dyslipidemia    • Hx of atrial flutter     and a- fib   • Hx of myocardial infarction 1992    s/p CABG    • Hyperlipidemia    • Hypertension    • Ischemic cardiomyopathy     advanced   • Legionnaire's disease (CMS/[facility])    • Sinus bradycardia     EPS, 03/19/2007, Dr. Márquez:  Successful radiofrequency ablation of right atrial flutter   • Skin cancer    • Tobacco use      Past Surgical History:   Procedure Laterality Date   • CARDIAC DEFIBRILLATOR PLACEMENT     • CARDIOVERSION      x 2 procedures   • CORONARY ARTERY BYPASS GRAFT  1991    [facility]      Family History   Problem Relation Age of Onset   • Cancer Mother 74   • Heart disease Mother    • Diabetes Mother    • Heart attack Father 72        in his 80's   • Diabetes Father      Social History     Tobacco Use   • Smoking status: Current Every Day Smoker     Years: 48.00     Types: Cigarettes   • Smokeless tobacco: Never Used   • Tobacco comment: 5-6 CAD   Substance Use Topics   • Alcohol use: Yes     Alcohol/week: 0.6 - 1.2 oz     Types: 1 - 2 Cans of beer per week     Comment: socially       ROS:  Review of Symptoms:  General: + Fatigue  Skin: no rashes, lumps, or other skin changes  HEENT: no dizziness, lightheadedness, or vision changes  Respiratory: no cough or  "hemoptysis  Cardiovascular: + palpitations, and tachycardia  Gastrointestinal: no black/tarry stools or diarrhea  Urinary: no change in frequency or urgency  Peripheral Vascular: no claudication or leg cramps  Psychiatric: + Anxiety  Neurological: no sensory or motor loss, no syncope  Hematologic: + anemia,   Endocrine: no thyroid problems, nor heat or cold intolerance    PHYSICAL EXAM:    /62 (BP Location: Right arm, Patient Position: Sitting)   Pulse 116   Ht 180.3 cm (71\")   Wt 92.5 kg (204 lb)   SpO2 97%   BMI 28.45 kg/m²        Wt Readings from Last 5 Encounters:   08/14/19 92.5 kg (204 lb)   05/29/19 92.4 kg (203 lb 12.8 oz)   11/28/18 91.6 kg (202 lb)   11/15/18 89 kg (196 lb 3.4 oz)   09/12/18 88 kg (194 lb)       BP Readings from Last 5 Encounters:   08/14/19 102/62   05/29/19 102/50   11/28/18 130/70   11/15/18 118/78   09/12/18 103/63       General appearance -  Alert, in a wheel chair with Fatigue, sob.    Mental status - Affect appropriate to mood.  Eyes - Sclerae anicteric,  ENMT - Hearing grossly normal bilaterally,  Neck - Carotids upstroke normal bilaterally, + JVD  Resp -Crackles I bases L>R.   Heart - Raped rate , normal S1, S2, no murmurs, rubs, clicks or gallops.  GI - Soft, nontender,  Neurological - Grossly intact - normal speech, no focal findings  Extremities - Peripheral pulses normal,+ Edema   Skin - Normal coloration and turgor.  Psych -  oriented to person, place, and time.    Medical problems and test results were reviewed with the patient today.     No results found for this or any previous visit (from the past 672 hour(s)).      EKG: (EKG has been independently visualized by me and summarized below)    ECG 12 Lead  Date/Time: 8/14/2019 3:04 PM  Performed by: Francisco Tillman PA  Authorized by: Francisco Tillman PA   Comparison: compared with previous ECG from 5/16/2019  Rhythm: atrial flutter  Rate: tachycardic  Conduction: conduction normal  ST Segments: ST segments " normal    Clinical impression: abnormal EKG            Device Interrogation:  Please see device interrogation form which has been signed and updated for full details.  Dual-chamber ICD.  Atrial and RV paced less than 1%.  P wave and R wave sensitivity acceptable.  Battery voltage 5 months remaining with charge time 8.9 seconds.  Mode switch 100% atrial flutter since June 2019.    ASSESSMENT   1. PAF: Remote PVA 2014 and 2015.   ECV 11/15/18.  Recurrent persistent flutter since 6/19.  Tikosyn discontinued by  secondary to ARF.   2. Chronic SHF, Class III-IV  3. Tobacco Abuse. Ongoing  4. ICM:  Remote MI, CABG.  EF 20%.   5. St. Jr ICD:  Normal function. Five months remaining until [place].   6. ESRD:  HD, Last treatment 7/26/19.  Scr 1.8,  8/8/19 Followed by Dr. Aguiar.   6. Anticoagulation:  Eliqus 5 mg Restarted two weeks ago.     PLAN  Long discussion with patient and family of recommended that we initiate amiodarone therapy 200 mg 3 times daily for 1 week and reduce to 2 mg twice daily.  In addition we recommend pursuing a transesophageal echocardiogram and cardioversion on 8/19/2019.  Further discussion with Dr. Edwards will also be recommended during this time he be considered for EP study RFA of AV node with upgrade of his Saint Jr dual-chamber ICD to a biventricular ICD. We discussed the procedure in great detail including risks, benefits, and alternatives. The patient understands that risks include bleeding, infection, stroke, MI, cardiac perforation, and even death. We will minimize contrast secondary to poor kidney function.   He will continue to monitor fluid intake reduce sodium monitor daily weights and use extra Bumex as needed for heart failure symptoms  Further recognition on hospital course.  8/14/2019  2:47 PM    Will Layne NI

## 2019-08-15 PROBLEM — I48.19 PERSISTENT ATRIAL FIBRILLATION (HCC): Status: ACTIVE | Noted: 2019-08-15

## 2019-08-16 ENCOUNTER — PREP FOR SURGERY (OUTPATIENT)
Dept: OTHER | Facility: HOSPITAL | Age: 64
End: 2019-08-16

## 2019-08-16 DIAGNOSIS — I50.22 CHRONIC SYSTOLIC CONGESTIVE HEART FAILURE (HCC): Primary | ICD-10-CM

## 2019-08-16 DIAGNOSIS — I48.19 PERSISTENT ATRIAL FIBRILLATION (HCC): ICD-10-CM

## 2019-08-16 RX ORDER — SODIUM CHLORIDE 0.9 % (FLUSH) 0.9 %
3 SYRINGE (ML) INJECTION EVERY 12 HOURS SCHEDULED
Status: CANCELLED | OUTPATIENT
Start: 2019-08-16

## 2019-08-16 RX ORDER — SODIUM CHLORIDE 0.9 % (FLUSH) 0.9 %
1-10 SYRINGE (ML) INJECTION AS NEEDED
Status: CANCELLED | OUTPATIENT
Start: 2019-08-16

## 2019-08-16 RX ORDER — PROMETHAZINE HYDROCHLORIDE 25 MG/ML
12.5 INJECTION, SOLUTION INTRAMUSCULAR; INTRAVENOUS EVERY 4 HOURS PRN
Status: CANCELLED | OUTPATIENT
Start: 2019-08-16

## 2019-08-16 RX ORDER — NITROGLYCERIN 0.4 MG/1
0.4 TABLET SUBLINGUAL
Status: CANCELLED | OUTPATIENT
Start: 2019-08-16

## 2019-08-16 RX ORDER — ACETAMINOPHEN 325 MG/1
650 TABLET ORAL EVERY 4 HOURS PRN
Status: CANCELLED | OUTPATIENT
Start: 2019-08-16

## 2019-08-16 RX ORDER — SODIUM CHLORIDE 9 MG/ML
1 INJECTION, SOLUTION INTRAVENOUS CONTINUOUS
Status: CANCELLED | OUTPATIENT
Start: 2019-08-16 | End: 2019-08-16

## 2019-08-19 ENCOUNTER — HOSPITAL ENCOUNTER (OUTPATIENT)
Dept: CARDIOLOGY | Facility: HOSPITAL | Age: 64
Discharge: HOME | End: 2019-08-19

## 2019-08-19 ENCOUNTER — HOSPITAL ENCOUNTER (INPATIENT)
Facility: HOSPITAL | Age: 64
LOS: 7 days | Discharge: HOME HEALTH | End: 2019-08-26
Attending: INTERNAL MEDICINE | Admitting: INTERNAL MEDICINE

## 2019-08-19 VITALS
HEART RATE: 90 BPM | SYSTOLIC BLOOD PRESSURE: 106 MMHG | WEIGHT: 204 LBS | OXYGEN SATURATION: 100 % | HEIGHT: 71 IN | DIASTOLIC BLOOD PRESSURE: 74 MMHG | RESPIRATION RATE: 14 BRPM | BODY MASS INDEX: 28.56 KG/M2

## 2019-08-19 DIAGNOSIS — I48.92 ATRIAL FIBRILLATION AND FLUTTER (HCC): ICD-10-CM

## 2019-08-19 DIAGNOSIS — I25.5 ISCHEMIC CARDIOMYOPATHY: ICD-10-CM

## 2019-08-19 DIAGNOSIS — I25.10 ASCVD (ARTERIOSCLEROTIC CARDIOVASCULAR DISEASE): ICD-10-CM

## 2019-08-19 DIAGNOSIS — I50.22 CHRONIC SYSTOLIC CONGESTIVE HEART FAILURE (HCC): ICD-10-CM

## 2019-08-19 DIAGNOSIS — I48.91 ATRIAL FIBRILLATION AND FLUTTER (HCC): ICD-10-CM

## 2019-08-19 DIAGNOSIS — Z86.79 HISTORY OF ASCVD (ATHEROSCLEROTIC CARDIOVASCULAR DISEASE): ICD-10-CM

## 2019-08-19 DIAGNOSIS — I48.19 PERSISTENT ATRIAL FIBRILLATION (HCC): ICD-10-CM

## 2019-08-19 DIAGNOSIS — I25.810 CORONARY ARTERY DISEASE INVOLVING CORONARY BYPASS GRAFT OF NATIVE HEART WITHOUT ANGINA PECTORIS: ICD-10-CM

## 2019-08-19 DIAGNOSIS — I73.9 PERIPHERAL ARTERIAL DISEASE (HCC): ICD-10-CM

## 2019-08-19 DIAGNOSIS — Z74.09 IMPAIRED MOBILITY AND ADLS: ICD-10-CM

## 2019-08-19 DIAGNOSIS — N18.9 CHRONIC KIDNEY DISEASE, UNSPECIFIED CKD STAGE: Primary | ICD-10-CM

## 2019-08-19 DIAGNOSIS — Z78.9 IMPAIRED MOBILITY AND ADLS: ICD-10-CM

## 2019-08-19 LAB
ALBUMIN SERPL-MCNC: 3.4 G/DL (ref 3.5–5.2)
ALBUMIN/GLOB SERPL: 0.8 G/DL
ALP SERPL-CCNC: 297 U/L (ref 39–117)
ALT SERPL W P-5'-P-CCNC: 132 U/L (ref 1–41)
ANION GAP SERPL CALCULATED.3IONS-SCNC: 19 MMOL/L (ref 5–15)
ANION GAP SERPL CALCULATED.3IONS-SCNC: 21 MMOL/L (ref 5–15)
AST SERPL-CCNC: 202 U/L (ref 1–40)
BH CV ECHO MEAS - BSA(HAYCOCK): 2.2 M^2
BH CV ECHO MEAS - BSA: 2.1 M^2
BH CV ECHO MEAS - BZI_BMI: 28.5 KILOGRAMS/M^2
BH CV ECHO MEAS - BZI_METRIC_HEIGHT: 180.3 CM
BH CV ECHO MEAS - BZI_METRIC_WEIGHT: 92.5 KG
BH CV VAS BP LEFT ARM: NORMAL MMHG
BILIRUB SERPL-MCNC: 2.1 MG/DL (ref 0.2–1.2)
BUN BLD-MCNC: 53 MG/DL (ref 8–23)
BUN BLD-MCNC: 56 MG/DL (ref 8–23)
BUN BLDA-MCNC: 48 MG/DL (ref 8–26)
BUN/CREAT SERPL: 17.4 (ref 7–25)
BUN/CREAT SERPL: 20.2 (ref 7–25)
CA-I BLDA-SCNC: 1.14 MMOL/L (ref 1.2–1.32)
CALCIUM SPEC-SCNC: 9.4 MG/DL (ref 8.6–10.5)
CALCIUM SPEC-SCNC: 9.6 MG/DL (ref 8.6–10.5)
CHLORIDE BLDA-SCNC: 97 MMOL/L (ref 98–109)
CHLORIDE SERPL-SCNC: 93 MMOL/L (ref 98–107)
CHLORIDE SERPL-SCNC: 94 MMOL/L (ref 98–107)
CO2 BLDA-SCNC: 24 MMOL/L (ref 24–29)
CO2 SERPL-SCNC: 22 MMOL/L (ref 22–29)
CO2 SERPL-SCNC: 23 MMOL/L (ref 22–29)
CREAT BLD-MCNC: 2.77 MG/DL (ref 0.76–1.27)
CREAT BLD-MCNC: 3.04 MG/DL (ref 0.76–1.27)
CREAT BLDA-MCNC: 2.9 MG/DL (ref 0.6–1.3)
DEPRECATED RDW RBC AUTO: 75.2 FL (ref 37–54)
ERYTHROCYTE [DISTWIDTH] IN BLOOD BY AUTOMATED COUNT: 27 % (ref 12.3–15.4)
GFR SERPL CREATININE-BSD FRML MDRD: 21 ML/MIN/1.73
GFR SERPL CREATININE-BSD FRML MDRD: 23 ML/MIN/1.73
GLOBULIN UR ELPH-MCNC: 4.3 GM/DL
GLUCOSE BLD-MCNC: 150 MG/DL (ref 65–99)
GLUCOSE BLD-MCNC: 155 MG/DL (ref 65–99)
GLUCOSE BLDC GLUCOMTR-MCNC: 101 MG/DL (ref 70–130)
GLUCOSE BLDC GLUCOMTR-MCNC: 132 MG/DL (ref 70–130)
GLUCOSE BLDC GLUCOMTR-MCNC: 146 MG/DL (ref 70–130)
HBA1C MFR BLD: 6.2 % (ref 4.8–5.6)
HCT VFR BLD AUTO: 41.1 % (ref 37.5–51)
HCT VFR BLDA CALC: 42 % (ref 38–51)
HGB BLD-MCNC: 11.9 G/DL (ref 13–17.7)
HGB BLDA-MCNC: 14.3 G/DL (ref 12–17)
INR PPP: 5.89 (ref 0.85–1.16)
INR PPP: 6.18 (ref 0.85–1.16)
MCH RBC QN AUTO: 23.3 PG (ref 26.6–33)
MCHC RBC AUTO-ENTMCNC: 29 G/DL (ref 31.5–35.7)
MCV RBC AUTO: 80.6 FL (ref 79–97)
PLATELET # BLD AUTO: 142 10*3/MM3 (ref 140–450)
POTASSIUM BLD-SCNC: 3.9 MMOL/L (ref 3.5–5.2)
POTASSIUM BLD-SCNC: 3.9 MMOL/L (ref 3.5–5.2)
POTASSIUM BLDA-SCNC: 3.7 MMOL/L (ref 3.5–4.9)
PROT SERPL-MCNC: 7.7 G/DL (ref 6–8.5)
PROTHROMBIN TIME: 50.8 SECONDS (ref 11.2–14.3)
PROTHROMBIN TIME: 52.7 SECONDS (ref 11.2–14.3)
RBC # BLD AUTO: 5.1 10*6/MM3 (ref 4.14–5.8)
SODIUM BLD-SCNC: 136 MMOL/L (ref 136–145)
SODIUM BLD-SCNC: 136 MMOL/L (ref 136–145)
SODIUM BLDA-SCNC: 136 MMOL/L (ref 138–146)
WBC NRBC COR # BLD: 6.62 10*3/MM3 (ref 3.4–10.8)

## 2019-08-19 PROCEDURE — 25010000002 VITAMIN K1 PER 1 MG: Performed by: NURSE PRACTITIONER

## 2019-08-19 PROCEDURE — 25010000002 MIDAZOLAM PER 1 MG: Performed by: INTERNAL MEDICINE

## 2019-08-19 PROCEDURE — 82962 GLUCOSE BLOOD TEST: CPT

## 2019-08-19 PROCEDURE — 85014 HEMATOCRIT: CPT

## 2019-08-19 PROCEDURE — 80053 COMPREHEN METABOLIC PANEL: CPT | Performed by: NURSE PRACTITIONER

## 2019-08-19 PROCEDURE — 94799 UNLISTED PULMONARY SVC/PX: CPT

## 2019-08-19 PROCEDURE — 87040 BLOOD CULTURE FOR BACTERIA: CPT | Performed by: NURSE PRACTITIONER

## 2019-08-19 PROCEDURE — 93321 DOPPLER ECHO F-UP/LMTD STD: CPT

## 2019-08-19 PROCEDURE — 93321 DOPPLER ECHO F-UP/LMTD STD: CPT | Performed by: INTERNAL MEDICINE

## 2019-08-19 PROCEDURE — 83036 HEMOGLOBIN GLYCOSYLATED A1C: CPT | Performed by: NURSE PRACTITIONER

## 2019-08-19 PROCEDURE — 80047 BASIC METABLC PNL IONIZED CA: CPT

## 2019-08-19 PROCEDURE — 93312 ECHO TRANSESOPHAGEAL: CPT | Performed by: INTERNAL MEDICINE

## 2019-08-19 PROCEDURE — 93325 DOPPLER ECHO COLOR FLOW MAPG: CPT

## 2019-08-19 PROCEDURE — 36415 COLL VENOUS BLD VENIPUNCTURE: CPT

## 2019-08-19 PROCEDURE — 25010000002 FENTANYL CITRATE (PF) 100 MCG/2ML SOLUTION: Performed by: INTERNAL MEDICINE

## 2019-08-19 PROCEDURE — 93312 ECHO TRANSESOPHAGEAL: CPT

## 2019-08-19 PROCEDURE — 63710000001 INSULIN LISPRO (HUMAN) PER 5 UNITS: Performed by: NURSE PRACTITIONER

## 2019-08-19 PROCEDURE — 85610 PROTHROMBIN TIME: CPT | Performed by: INTERNAL MEDICINE

## 2019-08-19 PROCEDURE — 85610 PROTHROMBIN TIME: CPT | Performed by: NURSE PRACTITIONER

## 2019-08-19 PROCEDURE — 93325 DOPPLER ECHO COLOR FLOW MAPG: CPT | Performed by: INTERNAL MEDICINE

## 2019-08-19 PROCEDURE — 85027 COMPLETE CBC AUTOMATED: CPT | Performed by: NURSE PRACTITIONER

## 2019-08-19 RX ORDER — MIDAZOLAM HYDROCHLORIDE 1 MG/ML
INJECTION INTRAMUSCULAR; INTRAVENOUS
Status: DISCONTINUED
Start: 2019-08-19 | End: 2019-08-26 | Stop reason: HOSPADM

## 2019-08-19 RX ORDER — FENTANYL CITRATE 50 UG/ML
INJECTION, SOLUTION INTRAMUSCULAR; INTRAVENOUS
Status: DISCONTINUED | OUTPATIENT
Start: 2019-08-19 | End: 2019-08-20 | Stop reason: HOSPADM

## 2019-08-19 RX ORDER — METOPROLOL TARTRATE 50 MG/1
50 TABLET, FILM COATED ORAL 2 TIMES DAILY
Status: DISCONTINUED | OUTPATIENT
Start: 2019-08-19 | End: 2019-08-20

## 2019-08-19 RX ORDER — FERROUS SULFATE 325(65) MG
325 TABLET ORAL 2 TIMES DAILY
Status: DISCONTINUED | OUTPATIENT
Start: 2019-08-19 | End: 2019-08-23

## 2019-08-19 RX ORDER — NITROGLYCERIN 0.4 MG/1
0.4 TABLET SUBLINGUAL
Status: DISCONTINUED | OUTPATIENT
Start: 2019-08-19 | End: 2019-08-26 | Stop reason: HOSPADM

## 2019-08-19 RX ORDER — NALOXONE HYDROCHLORIDE 0.4 MG/ML
INJECTION, SOLUTION INTRAMUSCULAR; INTRAVENOUS; SUBCUTANEOUS
Status: DISCONTINUED
Start: 2019-08-19 | End: 2019-08-19 | Stop reason: WASHOUT

## 2019-08-19 RX ORDER — NICOTINE POLACRILEX 4 MG
15 LOZENGE BUCCAL
Status: DISCONTINUED | OUTPATIENT
Start: 2019-08-19 | End: 2019-08-26 | Stop reason: HOSPADM

## 2019-08-19 RX ORDER — FLUMAZENIL 0.1 MG/ML
INJECTION INTRAVENOUS
Status: DISCONTINUED
Start: 2019-08-19 | End: 2019-08-19 | Stop reason: WASHOUT

## 2019-08-19 RX ORDER — SODIUM CHLORIDE 0.9 % (FLUSH) 0.9 %
3 SYRINGE (ML) INJECTION EVERY 12 HOURS SCHEDULED
Status: DISCONTINUED | OUTPATIENT
Start: 2019-08-19 | End: 2019-08-25

## 2019-08-19 RX ORDER — FENTANYL CITRATE 50 UG/ML
INJECTION, SOLUTION INTRAMUSCULAR; INTRAVENOUS
Status: DISCONTINUED
Start: 2019-08-19 | End: 2019-08-19 | Stop reason: WASHOUT

## 2019-08-19 RX ORDER — SODIUM CHLORIDE 0.9 % (FLUSH) 0.9 %
3-10 SYRINGE (ML) INJECTION AS NEEDED
Status: DISCONTINUED | OUTPATIENT
Start: 2019-08-19 | End: 2019-08-25

## 2019-08-19 RX ORDER — MIDAZOLAM HYDROCHLORIDE 1 MG/ML
INJECTION INTRAMUSCULAR; INTRAVENOUS
Status: DISCONTINUED | OUTPATIENT
Start: 2019-08-19 | End: 2019-08-20 | Stop reason: HOSPADM

## 2019-08-19 RX ORDER — PROMETHAZINE HYDROCHLORIDE 25 MG/ML
12.5 INJECTION, SOLUTION INTRAMUSCULAR; INTRAVENOUS EVERY 4 HOURS PRN
Status: DISCONTINUED | OUTPATIENT
Start: 2019-08-19 | End: 2019-08-23

## 2019-08-19 RX ORDER — PANTOPRAZOLE SODIUM 40 MG/1
40 TABLET, DELAYED RELEASE ORAL
Status: DISCONTINUED | OUTPATIENT
Start: 2019-08-20 | End: 2019-08-26 | Stop reason: HOSPADM

## 2019-08-19 RX ORDER — SODIUM CHLORIDE 0.9 % (FLUSH) 0.9 %
3 SYRINGE (ML) INJECTION EVERY 12 HOURS SCHEDULED
Status: DISCONTINUED | OUTPATIENT
Start: 2019-08-19 | End: 2019-08-24

## 2019-08-19 RX ORDER — SODIUM CHLORIDE 9 MG/ML
1 INJECTION, SOLUTION INTRAVENOUS CONTINUOUS
Status: DISCONTINUED | OUTPATIENT
Start: 2019-08-19 | End: 2019-08-19

## 2019-08-19 RX ORDER — FENTANYL CITRATE 50 UG/ML
INJECTION, SOLUTION INTRAMUSCULAR; INTRAVENOUS
Status: DISCONTINUED
Start: 2019-08-19 | End: 2019-08-26 | Stop reason: HOSPADM

## 2019-08-19 RX ORDER — DEXTROSE MONOHYDRATE 25 G/50ML
25 INJECTION, SOLUTION INTRAVENOUS
Status: DISCONTINUED | OUTPATIENT
Start: 2019-08-19 | End: 2019-08-26 | Stop reason: HOSPADM

## 2019-08-19 RX ORDER — SODIUM CHLORIDE 0.9 % (FLUSH) 0.9 %
1-10 SYRINGE (ML) INJECTION AS NEEDED
Status: DISCONTINUED | OUTPATIENT
Start: 2019-08-19 | End: 2019-08-26 | Stop reason: HOSPADM

## 2019-08-19 RX ORDER — ACETAMINOPHEN 325 MG/1
650 TABLET ORAL EVERY 4 HOURS PRN
Status: DISCONTINUED | OUTPATIENT
Start: 2019-08-19 | End: 2019-08-23

## 2019-08-19 RX ORDER — MIDAZOLAM HYDROCHLORIDE 1 MG/ML
INJECTION INTRAMUSCULAR; INTRAVENOUS
Status: DISCONTINUED
Start: 2019-08-19 | End: 2019-08-19 | Stop reason: WASHOUT

## 2019-08-19 RX ADMIN — IPRATROPIUM BROMIDE 0.5 MG: 0.5 SOLUTION RESPIRATORY (INHALATION) at 16:14

## 2019-08-19 RX ADMIN — MIDAZOLAM HYDROCHLORIDE 1 MG: 1 INJECTION, SOLUTION INTRAMUSCULAR; INTRAVENOUS at 15:10

## 2019-08-19 RX ADMIN — MIDAZOLAM HYDROCHLORIDE 1 MG: 1 INJECTION, SOLUTION INTRAMUSCULAR; INTRAVENOUS at 15:06

## 2019-08-19 RX ADMIN — PHYTONADIONE 10 MG: 10 INJECTION, EMULSION INTRAMUSCULAR; INTRAVENOUS; SUBCUTANEOUS at 14:34

## 2019-08-19 RX ADMIN — IPRATROPIUM BROMIDE 0.5 MG: 0.5 SOLUTION RESPIRATORY (INHALATION) at 20:26

## 2019-08-19 RX ADMIN — FENTANYL CITRATE 50 MCG: 50 INJECTION, SOLUTION INTRAMUSCULAR; INTRAVENOUS at 15:07

## 2019-08-20 LAB
ALBUMIN SERPL-MCNC: 2.9 G/DL (ref 3.5–5.2)
ALBUMIN/GLOB SERPL: 0.7 G/DL
ALP SERPL-CCNC: 283 U/L (ref 39–117)
ALT SERPL W P-5'-P-CCNC: 132 U/L (ref 1–41)
ANION GAP SERPL CALCULATED.3IONS-SCNC: 20 MMOL/L (ref 5–15)
AST SERPL-CCNC: 186 U/L (ref 1–40)
BASOPHILS # BLD AUTO: 0.04 10*3/MM3 (ref 0–0.2)
BASOPHILS NFR BLD AUTO: 0.6 % (ref 0–1.5)
BILIRUB SERPL-MCNC: 1.8 MG/DL (ref 0.2–1.2)
BUN BLD-MCNC: 55 MG/DL (ref 8–23)
BUN/CREAT SERPL: 21.3 (ref 7–25)
CALCIUM SPEC-SCNC: 9.4 MG/DL (ref 8.6–10.5)
CHLORIDE SERPL-SCNC: 98 MMOL/L (ref 98–107)
CK SERPL-CCNC: 68 U/L (ref 20–200)
CO2 SERPL-SCNC: 20 MMOL/L (ref 22–29)
CREAT BLD-MCNC: 2.58 MG/DL (ref 0.76–1.27)
CREAT UR-MCNC: 102.4 MG/DL
DEPRECATED RDW RBC AUTO: 81 FL (ref 37–54)
EOSINOPHIL # BLD AUTO: 0.12 10*3/MM3 (ref 0–0.4)
EOSINOPHIL NFR BLD AUTO: 1.9 % (ref 0.3–6.2)
EOSINOPHIL SPEC QL MICRO: 0 % EOS/100 CELLS (ref 0–0)
ERYTHROCYTE [DISTWIDTH] IN BLOOD BY AUTOMATED COUNT: 27.9 % (ref 12.3–15.4)
GFR SERPL CREATININE-BSD FRML MDRD: 25 ML/MIN/1.73
GLOBULIN UR ELPH-MCNC: 3.9 GM/DL
GLUCOSE BLD-MCNC: 184 MG/DL (ref 65–99)
GLUCOSE BLDC GLUCOMTR-MCNC: 140 MG/DL (ref 70–130)
GLUCOSE BLDC GLUCOMTR-MCNC: 157 MG/DL (ref 70–130)
GLUCOSE BLDC GLUCOMTR-MCNC: 186 MG/DL (ref 70–130)
GLUCOSE BLDC GLUCOMTR-MCNC: 187 MG/DL (ref 70–130)
HCT VFR BLD AUTO: 41.5 % (ref 37.5–51)
HGB BLD-MCNC: 11.7 G/DL (ref 13–17.7)
IMM GRANULOCYTES # BLD AUTO: 0.02 10*3/MM3 (ref 0–0.05)
IMM GRANULOCYTES NFR BLD AUTO: 0.3 % (ref 0–0.5)
INR PPP: 3.29 (ref 0.85–1.16)
LYMPHOCYTES # BLD AUTO: 2.24 10*3/MM3 (ref 0.7–3.1)
LYMPHOCYTES NFR BLD AUTO: 34.8 % (ref 19.6–45.3)
MCH RBC QN AUTO: 23.2 PG (ref 26.6–33)
MCHC RBC AUTO-ENTMCNC: 28.2 G/DL (ref 31.5–35.7)
MCV RBC AUTO: 82.3 FL (ref 79–97)
MONOCYTES # BLD AUTO: 0.55 10*3/MM3 (ref 0.1–0.9)
MONOCYTES NFR BLD AUTO: 8.5 % (ref 5–12)
NEUTROPHILS # BLD AUTO: 3.47 10*3/MM3 (ref 1.7–7)
NEUTROPHILS NFR BLD AUTO: 53.9 % (ref 42.7–76)
NRBC BLD AUTO-RTO: 0 /100 WBC (ref 0–0.2)
PLATELET # BLD AUTO: 138 10*3/MM3 (ref 140–450)
PMV BLD AUTO: 10.9 FL (ref 6–12)
POTASSIUM BLD-SCNC: 3.8 MMOL/L (ref 3.5–5.2)
PROT SERPL-MCNC: 6.8 G/DL (ref 6–8.5)
PROT UR-MCNC: 47.2 MG/DL
PROTHROMBIN TIME: 32.2 SECONDS (ref 11.2–14.3)
RBC # BLD AUTO: 5.04 10*6/MM3 (ref 4.14–5.8)
SODIUM BLD-SCNC: 138 MMOL/L (ref 136–145)
WBC NRBC COR # BLD: 6.44 10*3/MM3 (ref 3.4–10.8)

## 2019-08-20 PROCEDURE — 85025 COMPLETE CBC W/AUTO DIFF WBC: CPT | Performed by: NURSE PRACTITIONER

## 2019-08-20 PROCEDURE — 86481 TB AG RESPONSE T-CELL SUSP: CPT | Performed by: INTERNAL MEDICINE

## 2019-08-20 PROCEDURE — 25010000002 DAPTOMYCIN PER 1 MG: Performed by: INTERNAL MEDICINE

## 2019-08-20 PROCEDURE — 87070 CULTURE OTHR SPECIMN AEROBIC: CPT | Performed by: INTERNAL MEDICINE

## 2019-08-20 PROCEDURE — 86638 Q FEVER ANTIBODY: CPT | Performed by: INTERNAL MEDICINE

## 2019-08-20 PROCEDURE — 97162 PT EVAL MOD COMPLEX 30 MIN: CPT

## 2019-08-20 PROCEDURE — 87899 AGENT NOS ASSAY W/OPTIC: CPT | Performed by: INTERNAL MEDICINE

## 2019-08-20 PROCEDURE — 99232 SBSQ HOSP IP/OBS MODERATE 35: CPT | Performed by: INTERNAL MEDICINE

## 2019-08-20 PROCEDURE — 82570 ASSAY OF URINE CREATININE: CPT | Performed by: INTERNAL MEDICINE

## 2019-08-20 PROCEDURE — 63710000001 INSULIN DETEMIR PER 5 UNITS: Performed by: NURSE PRACTITIONER

## 2019-08-20 PROCEDURE — 05PYX3Z REMOVAL OF INFUSION DEVICE FROM UPPER VEIN, EXTERNAL APPROACH: ICD-10-PCS | Performed by: INTERNAL MEDICINE

## 2019-08-20 PROCEDURE — 86632 CHLAMYDIA IGM ANTIBODY: CPT | Performed by: INTERNAL MEDICINE

## 2019-08-20 PROCEDURE — 63710000001 INSULIN LISPRO (HUMAN) PER 5 UNITS: Performed by: NURSE PRACTITIONER

## 2019-08-20 PROCEDURE — 25010000002 CEFEPIME PER 500 MG: Performed by: INTERNAL MEDICINE

## 2019-08-20 PROCEDURE — 80053 COMPREHEN METABOLIC PANEL: CPT | Performed by: NURSE PRACTITIONER

## 2019-08-20 PROCEDURE — 85007 BL SMEAR W/DIFF WBC COUNT: CPT | Performed by: NURSE PRACTITIONER

## 2019-08-20 PROCEDURE — 86622 BRUCELLA ANTIBODY: CPT | Performed by: INTERNAL MEDICINE

## 2019-08-20 PROCEDURE — 86713 LEGIONELLA ANTIBODY: CPT | Performed by: INTERNAL MEDICINE

## 2019-08-20 PROCEDURE — 85610 PROTHROMBIN TIME: CPT | Performed by: NURSE PRACTITIONER

## 2019-08-20 PROCEDURE — 94799 UNLISTED PULMONARY SVC/PX: CPT

## 2019-08-20 PROCEDURE — 82550 ASSAY OF CK (CPK): CPT | Performed by: INTERNAL MEDICINE

## 2019-08-20 PROCEDURE — 82962 GLUCOSE BLOOD TEST: CPT

## 2019-08-20 PROCEDURE — 86611 BARTONELLA ANTIBODY: CPT | Performed by: INTERNAL MEDICINE

## 2019-08-20 PROCEDURE — 87205 SMEAR GRAM STAIN: CPT | Performed by: INTERNAL MEDICINE

## 2019-08-20 PROCEDURE — 84156 ASSAY OF PROTEIN URINE: CPT | Performed by: INTERNAL MEDICINE

## 2019-08-20 RX ORDER — METOPROLOL TARTRATE 50 MG/1
50 TABLET, FILM COATED ORAL ONCE
Status: DISCONTINUED | OUTPATIENT
Start: 2019-08-20 | End: 2019-08-26 | Stop reason: HOSPADM

## 2019-08-20 RX ORDER — METOPROLOL TARTRATE 100 MG/1
100 TABLET ORAL 2 TIMES DAILY
Status: DISCONTINUED | OUTPATIENT
Start: 2019-08-20 | End: 2019-08-26 | Stop reason: HOSPADM

## 2019-08-20 RX ADMIN — SODIUM CHLORIDE, PRESERVATIVE FREE 3 ML: 5 INJECTION INTRAVENOUS at 08:55

## 2019-08-20 RX ADMIN — CEFEPIME HYDROCHLORIDE 2 G: 2 INJECTION, POWDER, FOR SOLUTION INTRAVENOUS at 17:53

## 2019-08-20 RX ADMIN — INSULIN LISPRO 2 UNITS: 100 INJECTION, SOLUTION INTRAVENOUS; SUBCUTANEOUS at 17:53

## 2019-08-20 RX ADMIN — IPRATROPIUM BROMIDE 0.5 MG: 0.5 SOLUTION RESPIRATORY (INHALATION) at 11:33

## 2019-08-20 RX ADMIN — METOPROLOL TARTRATE 50 MG: 50 TABLET ORAL at 08:55

## 2019-08-20 RX ADMIN — IPRATROPIUM BROMIDE 0.5 MG: 0.5 SOLUTION RESPIRATORY (INHALATION) at 20:03

## 2019-08-20 RX ADMIN — IPRATROPIUM BROMIDE 0.5 MG: 0.5 SOLUTION RESPIRATORY (INHALATION) at 07:17

## 2019-08-20 RX ADMIN — SODIUM CHLORIDE, PRESERVATIVE FREE 3 ML: 5 INJECTION INTRAVENOUS at 20:54

## 2019-08-20 RX ADMIN — APIXABAN 2.5 MG: 2.5 TABLET, FILM COATED ORAL at 20:52

## 2019-08-20 RX ADMIN — MICONAZOLE NITRATE: 20 POWDER TOPICAL at 17:54

## 2019-08-20 RX ADMIN — MICONAZOLE NITRATE: 20 POWDER TOPICAL at 20:54

## 2019-08-20 RX ADMIN — PANTOPRAZOLE SODIUM 40 MG: 40 TABLET, DELAYED RELEASE ORAL at 06:00

## 2019-08-20 RX ADMIN — METOPROLOL TARTRATE 100 MG: 100 TABLET ORAL at 20:54

## 2019-08-20 RX ADMIN — IPRATROPIUM BROMIDE 0.5 MG: 0.5 SOLUTION RESPIRATORY (INHALATION) at 15:47

## 2019-08-20 RX ADMIN — FERROUS SULFATE TAB 325 MG (65 MG ELEMENTAL FE) 325 MG: 325 (65 FE) TAB at 08:55

## 2019-08-20 RX ADMIN — SODIUM CHLORIDE, PRESERVATIVE FREE 3 ML: 5 INJECTION INTRAVENOUS at 04:08

## 2019-08-20 RX ADMIN — APIXABAN 2.5 MG: 2.5 TABLET, FILM COATED ORAL at 08:55

## 2019-08-20 RX ADMIN — INSULIN DETEMIR 35 UNITS: 100 INJECTION, SOLUTION SUBCUTANEOUS at 20:52

## 2019-08-20 RX ADMIN — FERROUS SULFATE TAB 325 MG (65 MG ELEMENTAL FE) 325 MG: 325 (65 FE) TAB at 20:53

## 2019-08-20 RX ADMIN — INSULIN LISPRO 2 UNITS: 100 INJECTION, SOLUTION INTRAVENOUS; SUBCUTANEOUS at 08:55

## 2019-08-20 RX ADMIN — SERTRALINE HYDROCHLORIDE 25 MG: 50 TABLET ORAL at 20:53

## 2019-08-20 RX ADMIN — DAPTOMYCIN 750 MG: 500 INJECTION, POWDER, LYOPHILIZED, FOR SOLUTION INTRAVENOUS at 19:05

## 2019-08-21 LAB
25(OH)D3 SERPL-MCNC: 38.9 NG/ML (ref 30–100)
ALBUMIN SERPL-MCNC: 3.2 G/DL (ref 3.5–5.2)
ALBUMIN/GLOB SERPL: 0.7 G/DL
ALP SERPL-CCNC: 275 U/L (ref 39–117)
ALT SERPL W P-5'-P-CCNC: 149 U/L (ref 1–41)
ANION GAP SERPL CALCULATED.3IONS-SCNC: 16 MMOL/L (ref 5–15)
AST SERPL-CCNC: 201 U/L (ref 1–40)
BASOPHILS # BLD AUTO: 0.04 10*3/MM3 (ref 0–0.2)
BASOPHILS NFR BLD AUTO: 0.6 % (ref 0–1.5)
BILIRUB SERPL-MCNC: 1.6 MG/DL (ref 0.2–1.2)
BUN BLD-MCNC: 52 MG/DL (ref 8–23)
BUN/CREAT SERPL: 17.7 (ref 7–25)
C3 SERPL-MCNC: 82 MG/DL (ref 82–167)
C4 SERPL-MCNC: 14 MG/DL (ref 14–44)
CALCIUM SPEC-SCNC: 9 MG/DL (ref 8.6–10.5)
CALCIUM SPEC-SCNC: 9.4 MG/DL (ref 8.6–10.5)
CHLORIDE SERPL-SCNC: 95 MMOL/L (ref 98–107)
CK SERPL-CCNC: 64 U/L (ref 20–200)
CO2 SERPL-SCNC: 26 MMOL/L (ref 22–29)
CREAT BLD-MCNC: 2.94 MG/DL (ref 0.76–1.27)
CRYPTOC AG CSF QL: NEGATIVE
D-LACTATE SERPL-SCNC: 2 MMOL/L (ref 0.5–2)
DEPRECATED RDW RBC AUTO: 77.6 FL (ref 37–54)
EOSINOPHIL # BLD AUTO: 0.17 10*3/MM3 (ref 0–0.4)
EOSINOPHIL NFR BLD AUTO: 2.6 % (ref 0.3–6.2)
ERYTHROCYTE [DISTWIDTH] IN BLOOD BY AUTOMATED COUNT: 27.5 % (ref 12.3–15.4)
GFR SERPL CREATININE-BSD FRML MDRD: 22 ML/MIN/1.73
GLOBULIN UR ELPH-MCNC: 4.3 GM/DL
GLUCOSE BLD-MCNC: 87 MG/DL (ref 65–99)
GLUCOSE BLDC GLUCOMTR-MCNC: 107 MG/DL (ref 70–130)
GLUCOSE BLDC GLUCOMTR-MCNC: 113 MG/DL (ref 70–130)
GLUCOSE BLDC GLUCOMTR-MCNC: 93 MG/DL (ref 70–130)
GLUCOSE BLDC GLUCOMTR-MCNC: 94 MG/DL (ref 70–130)
HCT VFR BLD AUTO: 43 % (ref 37.5–51)
HGB BLD-MCNC: 12.2 G/DL (ref 13–17.7)
IMM GRANULOCYTES # BLD AUTO: 0.02 10*3/MM3 (ref 0–0.05)
IMM GRANULOCYTES NFR BLD AUTO: 0.3 % (ref 0–0.5)
INR PPP: 2.65 (ref 0.85–1.16)
IRON 24H UR-MRATE: 57 MCG/DL (ref 59–158)
IRON SATN MFR SERPL: 13 % (ref 20–50)
LYMPHOCYTES # BLD AUTO: 2.06 10*3/MM3 (ref 0.7–3.1)
LYMPHOCYTES NFR BLD AUTO: 31.9 % (ref 19.6–45.3)
MCH RBC QN AUTO: 23.3 PG (ref 26.6–33)
MCHC RBC AUTO-ENTMCNC: 28.4 G/DL (ref 31.5–35.7)
MCV RBC AUTO: 82.1 FL (ref 79–97)
MONOCYTES # BLD AUTO: 0.53 10*3/MM3 (ref 0.1–0.9)
MONOCYTES NFR BLD AUTO: 8.2 % (ref 5–12)
NEUTROPHILS # BLD AUTO: 3.64 10*3/MM3 (ref 1.7–7)
NEUTROPHILS NFR BLD AUTO: 56.4 % (ref 42.7–76)
NRBC BLD AUTO-RTO: 0.3 /100 WBC (ref 0–0.2)
PHOSPHATE SERPL-MCNC: 5 MG/DL (ref 2.5–4.5)
PLATELET # BLD AUTO: 128 10*3/MM3 (ref 140–450)
PMV BLD AUTO: 10.4 FL (ref 6–12)
POTASSIUM BLD-SCNC: 3.4 MMOL/L (ref 3.5–5.2)
PROT SERPL-MCNC: 7.5 G/DL (ref 6–8.5)
PROTHROMBIN TIME: 27.2 SECONDS (ref 11.2–14.3)
PTH-INTACT SERPL-MCNC: 52.5 PG/ML (ref 15–65)
RBC # BLD AUTO: 5.24 10*6/MM3 (ref 4.14–5.8)
SODIUM BLD-SCNC: 137 MMOL/L (ref 136–145)
TIBC SERPL-MCNC: 446 MCG/DL (ref 298–536)
TRANSFERRIN SERPL-MCNC: 299 MG/DL (ref 200–360)
WBC NRBC COR # BLD: 6.46 10*3/MM3 (ref 3.4–10.8)

## 2019-08-21 PROCEDURE — 84100 ASSAY OF PHOSPHORUS: CPT | Performed by: INTERNAL MEDICINE

## 2019-08-21 PROCEDURE — 83540 ASSAY OF IRON: CPT | Performed by: INTERNAL MEDICINE

## 2019-08-21 PROCEDURE — 86256 FLUORESCENT ANTIBODY TITER: CPT | Performed by: INTERNAL MEDICINE

## 2019-08-21 PROCEDURE — 99232 SBSQ HOSP IP/OBS MODERATE 35: CPT | Performed by: INTERNAL MEDICINE

## 2019-08-21 PROCEDURE — 86160 COMPLEMENT ANTIGEN: CPT | Performed by: INTERNAL MEDICINE

## 2019-08-21 PROCEDURE — 82550 ASSAY OF CK (CPK): CPT | Performed by: INTERNAL MEDICINE

## 2019-08-21 PROCEDURE — 85610 PROTHROMBIN TIME: CPT | Performed by: NURSE PRACTITIONER

## 2019-08-21 PROCEDURE — 83970 ASSAY OF PARATHORMONE: CPT | Performed by: INTERNAL MEDICINE

## 2019-08-21 PROCEDURE — 94799 UNLISTED PULMONARY SVC/PX: CPT

## 2019-08-21 PROCEDURE — 83520 IMMUNOASSAY QUANT NOS NONAB: CPT | Performed by: INTERNAL MEDICINE

## 2019-08-21 PROCEDURE — 97110 THERAPEUTIC EXERCISES: CPT

## 2019-08-21 PROCEDURE — 83516 IMMUNOASSAY NONANTIBODY: CPT | Performed by: INTERNAL MEDICINE

## 2019-08-21 PROCEDURE — 85025 COMPLETE CBC W/AUTO DIFF WBC: CPT | Performed by: INTERNAL MEDICINE

## 2019-08-21 PROCEDURE — 80053 COMPREHEN METABOLIC PANEL: CPT | Performed by: INTERNAL MEDICINE

## 2019-08-21 PROCEDURE — 82962 GLUCOSE BLOOD TEST: CPT

## 2019-08-21 PROCEDURE — 83605 ASSAY OF LACTIC ACID: CPT | Performed by: INTERNAL MEDICINE

## 2019-08-21 PROCEDURE — 97535 SELF CARE MNGMENT TRAINING: CPT

## 2019-08-21 PROCEDURE — 63710000001 INSULIN DETEMIR PER 5 UNITS: Performed by: NURSE PRACTITIONER

## 2019-08-21 PROCEDURE — 84466 ASSAY OF TRANSFERRIN: CPT | Performed by: INTERNAL MEDICINE

## 2019-08-21 PROCEDURE — 86225 DNA ANTIBODY NATIVE: CPT | Performed by: INTERNAL MEDICINE

## 2019-08-21 PROCEDURE — 97166 OT EVAL MOD COMPLEX 45 MIN: CPT

## 2019-08-21 PROCEDURE — 25010000002 CEFEPIME PER 500 MG: Performed by: INTERNAL MEDICINE

## 2019-08-21 PROCEDURE — 82306 VITAMIN D 25 HYDROXY: CPT | Performed by: INTERNAL MEDICINE

## 2019-08-21 PROCEDURE — 97116 GAIT TRAINING THERAPY: CPT

## 2019-08-21 RX ADMIN — IPRATROPIUM BROMIDE 0.5 MG: 0.5 SOLUTION RESPIRATORY (INHALATION) at 08:15

## 2019-08-21 RX ADMIN — SERTRALINE HYDROCHLORIDE 25 MG: 50 TABLET ORAL at 20:58

## 2019-08-21 RX ADMIN — IPRATROPIUM BROMIDE 0.5 MG: 0.5 SOLUTION RESPIRATORY (INHALATION) at 11:26

## 2019-08-21 RX ADMIN — CEFEPIME HYDROCHLORIDE 1 G: 1 INJECTION, POWDER, FOR SOLUTION INTRAMUSCULAR; INTRAVENOUS at 14:39

## 2019-08-21 RX ADMIN — METOPROLOL TARTRATE 100 MG: 100 TABLET ORAL at 20:59

## 2019-08-21 RX ADMIN — INSULIN DETEMIR 35 UNITS: 100 INJECTION, SOLUTION SUBCUTANEOUS at 20:59

## 2019-08-21 RX ADMIN — FERROUS SULFATE TAB 325 MG (65 MG ELEMENTAL FE) 325 MG: 325 (65 FE) TAB at 09:00

## 2019-08-21 RX ADMIN — MICONAZOLE NITRATE: 20 POWDER TOPICAL at 20:57

## 2019-08-21 RX ADMIN — SODIUM CHLORIDE, PRESERVATIVE FREE 3 ML: 5 INJECTION INTRAVENOUS at 00:49

## 2019-08-21 RX ADMIN — METOPROLOL TARTRATE 100 MG: 100 TABLET ORAL at 09:00

## 2019-08-21 RX ADMIN — SODIUM CHLORIDE, PRESERVATIVE FREE 3 ML: 5 INJECTION INTRAVENOUS at 20:58

## 2019-08-21 RX ADMIN — MICONAZOLE NITRATE: 20 POWDER TOPICAL at 09:00

## 2019-08-21 RX ADMIN — FERROUS SULFATE TAB 325 MG (65 MG ELEMENTAL FE) 325 MG: 325 (65 FE) TAB at 20:59

## 2019-08-21 RX ADMIN — APIXABAN 2.5 MG: 2.5 TABLET, FILM COATED ORAL at 20:59

## 2019-08-21 RX ADMIN — IPRATROPIUM BROMIDE 0.5 MG: 0.5 SOLUTION RESPIRATORY (INHALATION) at 15:40

## 2019-08-21 RX ADMIN — APIXABAN 2.5 MG: 2.5 TABLET, FILM COATED ORAL at 08:59

## 2019-08-21 RX ADMIN — PANTOPRAZOLE SODIUM 40 MG: 40 TABLET, DELAYED RELEASE ORAL at 05:27

## 2019-08-21 RX ADMIN — IPRATROPIUM BROMIDE 0.5 MG: 0.5 SOLUTION RESPIRATORY (INHALATION) at 20:27

## 2019-08-22 LAB
ALBUMIN SERPL-MCNC: 3.1 G/DL (ref 3.5–5.2)
ALP SERPL-CCNC: 297 U/L (ref 39–117)
ALT SERPL W P-5'-P-CCNC: 166 U/L (ref 1–41)
ANION GAP SERPL CALCULATED.3IONS-SCNC: 16 MMOL/L (ref 5–15)
AST SERPL-CCNC: 212 U/L (ref 1–40)
BILIRUB CONJ SERPL-MCNC: 1.3 MG/DL (ref 0.2–0.3)
BILIRUB INDIRECT SERPL-MCNC: 0.6 MG/DL
BILIRUB SERPL-MCNC: 1.9 MG/DL (ref 0.2–1.2)
BUN BLD-MCNC: 53 MG/DL (ref 8–23)
BUN/CREAT SERPL: 22.9 (ref 7–25)
CALCIUM SPEC-SCNC: 9.2 MG/DL (ref 8.6–10.5)
CATHETER CULTURE: NORMAL
CHLORIDE SERPL-SCNC: 95 MMOL/L (ref 98–107)
CO2 SERPL-SCNC: 22 MMOL/L (ref 22–29)
CREAT BLD-MCNC: 2.31 MG/DL (ref 0.76–1.27)
DSDNA AB SER-ACNC: <1 IU/ML (ref 0–9)
GFR SERPL CREATININE-BSD FRML MDRD: 29 ML/MIN/1.73
GLUCOSE BLD-MCNC: 44 MG/DL (ref 65–99)
GLUCOSE BLDC GLUCOMTR-MCNC: 44 MG/DL (ref 70–130)
GLUCOSE BLDC GLUCOMTR-MCNC: 56 MG/DL (ref 70–130)
GLUCOSE BLDC GLUCOMTR-MCNC: 58 MG/DL (ref 70–130)
GLUCOSE BLDC GLUCOMTR-MCNC: 62 MG/DL (ref 70–130)
GLUCOSE BLDC GLUCOMTR-MCNC: 62 MG/DL (ref 70–130)
GLUCOSE BLDC GLUCOMTR-MCNC: 64 MG/DL (ref 70–130)
GLUCOSE BLDC GLUCOMTR-MCNC: 66 MG/DL (ref 70–130)
GLUCOSE BLDC GLUCOMTR-MCNC: 68 MG/DL (ref 70–130)
GLUCOSE BLDC GLUCOMTR-MCNC: 68 MG/DL (ref 70–130)
GLUCOSE BLDC GLUCOMTR-MCNC: 73 MG/DL (ref 70–130)
GLUCOSE BLDC GLUCOMTR-MCNC: 99 MG/DL (ref 70–130)
HAV IGM SERPL QL IA: NORMAL
HBV CORE IGM SERPL QL IA: NORMAL
HBV SURFACE AG SERPL QL IA: NORMAL
HCV AB SER DONR QL: NORMAL
INR PPP: 2.35 (ref 0.85–1.16)
POTASSIUM BLD-SCNC: 3.6 MMOL/L (ref 3.5–5.2)
PROT SERPL-MCNC: 7.5 G/DL (ref 6–8.5)
PROTHROMBIN TIME: 24.8 SECONDS (ref 11.2–14.3)
SODIUM BLD-SCNC: 133 MMOL/L (ref 136–145)
TSPOT INTERPRETATION: NEGATIVE
TSPOT NIL CONTROL INTERPRETATION: NORMAL
TSPOT PANEL A: 0
TSPOT PANEL B: 0
TSPOT POS CONTROL INTERPRETATION: NORMAL

## 2019-08-22 PROCEDURE — 25010000002 PROMETHAZINE PER 50 MG: Performed by: NURSE PRACTITIONER

## 2019-08-22 PROCEDURE — 80048 BASIC METABOLIC PNL TOTAL CA: CPT | Performed by: NURSE PRACTITIONER

## 2019-08-22 PROCEDURE — 97116 GAIT TRAINING THERAPY: CPT

## 2019-08-22 PROCEDURE — 94799 UNLISTED PULMONARY SVC/PX: CPT

## 2019-08-22 PROCEDURE — 80076 HEPATIC FUNCTION PANEL: CPT | Performed by: NURSE PRACTITIONER

## 2019-08-22 PROCEDURE — 25010000002 VITAMIN K1 PER 1 MG: Performed by: INTERNAL MEDICINE

## 2019-08-22 PROCEDURE — 85610 PROTHROMBIN TIME: CPT | Performed by: NURSE PRACTITIONER

## 2019-08-22 PROCEDURE — 97110 THERAPEUTIC EXERCISES: CPT

## 2019-08-22 PROCEDURE — 82962 GLUCOSE BLOOD TEST: CPT

## 2019-08-22 PROCEDURE — 25010000002 DAPTOMYCIN PER 1 MG: Performed by: INTERNAL MEDICINE

## 2019-08-22 PROCEDURE — 80074 ACUTE HEPATITIS PANEL: CPT | Performed by: NURSE PRACTITIONER

## 2019-08-22 PROCEDURE — 99232 SBSQ HOSP IP/OBS MODERATE 35: CPT | Performed by: INTERNAL MEDICINE

## 2019-08-22 RX ORDER — BUMETANIDE 1 MG/1
1 TABLET ORAL
Status: DISCONTINUED | OUTPATIENT
Start: 2019-08-22 | End: 2019-08-25

## 2019-08-22 RX ORDER — BUMETANIDE 1 MG/1
1 TABLET ORAL
Status: DISCONTINUED | OUTPATIENT
Start: 2019-08-22 | End: 2019-08-22

## 2019-08-22 RX ORDER — POTASSIUM CHLORIDE 750 MG/1
20 CAPSULE, EXTENDED RELEASE ORAL ONCE
Status: COMPLETED | OUTPATIENT
Start: 2019-08-22 | End: 2019-08-22

## 2019-08-22 RX ADMIN — PROMETHAZINE HYDROCHLORIDE 12.5 MG: 25 INJECTION INTRAMUSCULAR; INTRAVENOUS at 18:10

## 2019-08-22 RX ADMIN — MICONAZOLE NITRATE: 20 POWDER TOPICAL at 20:52

## 2019-08-22 RX ADMIN — FERROUS SULFATE TAB 325 MG (65 MG ELEMENTAL FE) 325 MG: 325 (65 FE) TAB at 20:52

## 2019-08-22 RX ADMIN — DEXTROSE 50 % IN WATER (D50W) INTRAVENOUS SYRINGE 25 G: at 22:25

## 2019-08-22 RX ADMIN — SERTRALINE HYDROCHLORIDE 25 MG: 50 TABLET ORAL at 20:52

## 2019-08-22 RX ADMIN — PHYTONADIONE 5 MG: 10 INJECTION, EMULSION INTRAMUSCULAR; INTRAVENOUS; SUBCUTANEOUS at 20:52

## 2019-08-22 RX ADMIN — BUMETANIDE 1 MG: 1 TABLET ORAL at 20:52

## 2019-08-22 RX ADMIN — BUMETANIDE 1 MG: 1 TABLET ORAL at 13:19

## 2019-08-22 RX ADMIN — POTASSIUM CHLORIDE 20 MEQ: 750 CAPSULE, EXTENDED RELEASE ORAL at 13:19

## 2019-08-22 RX ADMIN — SODIUM CHLORIDE, PRESERVATIVE FREE 3 ML: 5 INJECTION INTRAVENOUS at 20:54

## 2019-08-22 RX ADMIN — IPRATROPIUM BROMIDE 0.5 MG: 0.5 SOLUTION RESPIRATORY (INHALATION) at 12:13

## 2019-08-22 RX ADMIN — DAPTOMYCIN 750 MG: 500 INJECTION, POWDER, LYOPHILIZED, FOR SOLUTION INTRAVENOUS at 17:28

## 2019-08-22 RX ADMIN — PANTOPRAZOLE SODIUM 40 MG: 40 TABLET, DELAYED RELEASE ORAL at 06:05

## 2019-08-22 RX ADMIN — IPRATROPIUM BROMIDE 0.5 MG: 0.5 SOLUTION RESPIRATORY (INHALATION) at 07:59

## 2019-08-22 RX ADMIN — METOPROLOL TARTRATE 100 MG: 100 TABLET ORAL at 08:16

## 2019-08-22 RX ADMIN — MICONAZOLE NITRATE: 20 POWDER TOPICAL at 08:16

## 2019-08-22 RX ADMIN — SODIUM CHLORIDE, PRESERVATIVE FREE 3 ML: 5 INJECTION INTRAVENOUS at 20:52

## 2019-08-22 RX ADMIN — FERROUS SULFATE TAB 325 MG (65 MG ELEMENTAL FE) 325 MG: 325 (65 FE) TAB at 08:16

## 2019-08-22 RX ADMIN — SODIUM CHLORIDE, PRESERVATIVE FREE 3 ML: 5 INJECTION INTRAVENOUS at 08:16

## 2019-08-22 RX ADMIN — APIXABAN 2.5 MG: 2.5 TABLET, FILM COATED ORAL at 08:16

## 2019-08-22 RX ADMIN — CEFEPIME HYDROCHLORIDE 1 G: 1 INJECTION, POWDER, FOR SOLUTION INTRAMUSCULAR; INTRAVENOUS at 13:19

## 2019-08-23 ENCOUNTER — APPOINTMENT (OUTPATIENT)
Dept: ULTRASOUND IMAGING | Facility: HOSPITAL | Age: 64
End: 2019-08-23

## 2019-08-23 LAB
ALBUMIN SERPL-MCNC: 3.3 G/DL (ref 3.5–5.2)
ALP SERPL-CCNC: 286 U/L (ref 39–117)
ALT SERPL W P-5'-P-CCNC: 203 U/L (ref 1–41)
ANION GAP SERPL CALCULATED.3IONS-SCNC: 18 MMOL/L (ref 5–15)
AST SERPL-CCNC: 288 U/L (ref 1–40)
B HENSELAE IGG TITR SER IF: NEGATIVE TITER
B HENSELAE IGM TITR SER IF: NEGATIVE TITER
B QUINTANA IGG TITR SER: NEGATIVE TITER
B QUINTANA IGM TITR SER: NEGATIVE TITER
BILIRUB CONJ SERPL-MCNC: 1.6 MG/DL (ref 0.2–0.3)
BILIRUB INDIRECT SERPL-MCNC: 0.9 MG/DL
BILIRUB SERPL-MCNC: 2.5 MG/DL (ref 0.2–1.2)
BUN BLD-MCNC: 56 MG/DL (ref 8–23)
BUN/CREAT SERPL: 25.7 (ref 7–25)
C PSITTACI IGM TITR SER IF: NORMAL {TITER}
C-ANCA TITR SER IF: ABNORMAL TITER
CALCIUM SPEC-SCNC: 9.3 MG/DL (ref 8.6–10.5)
CHLORIDE SERPL-SCNC: 93 MMOL/L (ref 98–107)
CO2 SERPL-SCNC: 21 MMOL/L (ref 22–29)
CREAT BLD-MCNC: 2.18 MG/DL (ref 0.76–1.27)
GBM IGG SER-ACNC: 3 UNITS (ref 0–20)
GFR SERPL CREATININE-BSD FRML MDRD: 31 ML/MIN/1.73
GLUCOSE BLD-MCNC: 84 MG/DL (ref 65–99)
GLUCOSE BLDC GLUCOMTR-MCNC: 100 MG/DL (ref 70–130)
GLUCOSE BLDC GLUCOMTR-MCNC: 115 MG/DL (ref 70–130)
GLUCOSE BLDC GLUCOMTR-MCNC: 117 MG/DL (ref 70–130)
GLUCOSE BLDC GLUCOMTR-MCNC: 127 MG/DL (ref 70–130)
GLUCOSE BLDC GLUCOMTR-MCNC: 73 MG/DL (ref 70–130)
GLUCOSE BLDC GLUCOMTR-MCNC: 82 MG/DL (ref 70–130)
GLUCOSE BLDC GLUCOMTR-MCNC: 94 MG/DL (ref 70–130)
INR PPP: 2.73 (ref 0.85–1.16)
L PNEUMO AB SER IA-ACNC: 3.74 OD RATIO (ref 0–0.9)
MYELOPEROXIDASE AB SER-ACNC: <9 U/ML (ref 0–9)
P-ANCA ATYPICAL TITR SER IF: ABNORMAL TITER
P-ANCA TITR SER IF: ABNORMAL TITER
POTASSIUM BLD-SCNC: 4.1 MMOL/L (ref 3.5–5.2)
PROT SERPL-MCNC: 7.4 G/DL (ref 6–8.5)
PROTEINASE3 AB SER IA-ACNC: <3.5 U/ML (ref 0–3.5)
PROTHROMBIN TIME: 27.8 SECONDS (ref 11.2–14.3)
SODIUM BLD-SCNC: 132 MMOL/L (ref 136–145)
VANCOMYCIN SERPL-MCNC: <4 MCG/ML (ref 5–40)

## 2019-08-23 PROCEDURE — 25010000002 PROMETHAZINE PER 50 MG: Performed by: NURSE PRACTITIONER

## 2019-08-23 PROCEDURE — 80048 BASIC METABOLIC PNL TOTAL CA: CPT | Performed by: NURSE PRACTITIONER

## 2019-08-23 PROCEDURE — 82962 GLUCOSE BLOOD TEST: CPT

## 2019-08-23 PROCEDURE — 80076 HEPATIC FUNCTION PANEL: CPT | Performed by: NURSE PRACTITIONER

## 2019-08-23 PROCEDURE — 97535 SELF CARE MNGMENT TRAINING: CPT

## 2019-08-23 PROCEDURE — 25010000002 VITAMIN K1 PER 1 MG: Performed by: NURSE PRACTITIONER

## 2019-08-23 PROCEDURE — 76705 ECHO EXAM OF ABDOMEN: CPT

## 2019-08-23 PROCEDURE — 25010000002 CEFEPIME PER 500 MG: Performed by: INTERNAL MEDICINE

## 2019-08-23 PROCEDURE — 99221 1ST HOSP IP/OBS SF/LOW 40: CPT | Performed by: PHYSICIAN ASSISTANT

## 2019-08-23 PROCEDURE — 99232 SBSQ HOSP IP/OBS MODERATE 35: CPT | Performed by: INTERNAL MEDICINE

## 2019-08-23 PROCEDURE — 85610 PROTHROMBIN TIME: CPT | Performed by: NURSE PRACTITIONER

## 2019-08-23 PROCEDURE — 76981 USE PARENCHYMA: CPT

## 2019-08-23 PROCEDURE — 80202 ASSAY OF VANCOMYCIN: CPT | Performed by: INTERNAL MEDICINE

## 2019-08-23 PROCEDURE — 97110 THERAPEUTIC EXERCISES: CPT

## 2019-08-23 RX ORDER — FERROUS SULFATE 325(65) MG
325 TABLET ORAL DAILY
Status: DISCONTINUED | OUTPATIENT
Start: 2019-08-24 | End: 2019-08-26 | Stop reason: HOSPADM

## 2019-08-23 RX ORDER — TAMSULOSIN HYDROCHLORIDE 0.4 MG/1
0.4 CAPSULE ORAL DAILY
Status: DISCONTINUED | OUTPATIENT
Start: 2019-08-23 | End: 2019-08-26 | Stop reason: HOSPADM

## 2019-08-23 RX ADMIN — TAMSULOSIN HYDROCHLORIDE 0.4 MG: 0.4 CAPSULE ORAL at 10:41

## 2019-08-23 RX ADMIN — BUMETANIDE 1 MG: 1 TABLET ORAL at 20:57

## 2019-08-23 RX ADMIN — MICONAZOLE NITRATE: 20 POWDER TOPICAL at 20:58

## 2019-08-23 RX ADMIN — SODIUM CHLORIDE, PRESERVATIVE FREE 3 ML: 5 INJECTION INTRAVENOUS at 20:58

## 2019-08-23 RX ADMIN — PHYTONADIONE 5 MG: 10 INJECTION, EMULSION INTRAMUSCULAR; INTRAVENOUS; SUBCUTANEOUS at 10:41

## 2019-08-23 RX ADMIN — PROMETHAZINE HYDROCHLORIDE 12.5 MG: 25 INJECTION INTRAMUSCULAR; INTRAVENOUS at 02:07

## 2019-08-23 RX ADMIN — BUMETANIDE 1 MG: 1 TABLET ORAL at 08:59

## 2019-08-23 RX ADMIN — PANTOPRAZOLE SODIUM 40 MG: 40 TABLET, DELAYED RELEASE ORAL at 05:37

## 2019-08-23 RX ADMIN — CEFEPIME HYDROCHLORIDE 1 G: 1 INJECTION, POWDER, FOR SOLUTION INTRAMUSCULAR; INTRAVENOUS at 13:45

## 2019-08-23 RX ADMIN — FERROUS SULFATE TAB 325 MG (65 MG ELEMENTAL FE) 325 MG: 325 (65 FE) TAB at 08:59

## 2019-08-23 RX ADMIN — SODIUM CHLORIDE, PRESERVATIVE FREE 3 ML: 5 INJECTION INTRAVENOUS at 20:59

## 2019-08-23 RX ADMIN — METOPROLOL TARTRATE 100 MG: 100 TABLET ORAL at 08:59

## 2019-08-24 ENCOUNTER — APPOINTMENT (OUTPATIENT)
Dept: CARDIOLOGY | Facility: HOSPITAL | Age: 64
End: 2019-08-24

## 2019-08-24 LAB
ALBUMIN SERPL-MCNC: 2.9 G/DL (ref 3.5–5.2)
ALBUMIN/GLOB SERPL: 0.7 G/DL
ALP SERPL-CCNC: 267 U/L (ref 39–117)
ALT SERPL W P-5'-P-CCNC: 237 U/L (ref 1–41)
ANION GAP SERPL CALCULATED.3IONS-SCNC: 18 MMOL/L (ref 5–15)
AST SERPL-CCNC: 277 U/L (ref 1–40)
BASOPHILS # BLD AUTO: 0.04 10*3/MM3 (ref 0–0.2)
BASOPHILS NFR BLD AUTO: 0.8 % (ref 0–1.5)
BILIRUB SERPL-MCNC: 2.1 MG/DL (ref 0.2–1.2)
BUN BLD-MCNC: 57 MG/DL (ref 8–23)
BUN/CREAT SERPL: 27.1 (ref 7–25)
CALCIUM SPEC-SCNC: 9.2 MG/DL (ref 8.6–10.5)
CHLORIDE SERPL-SCNC: 94 MMOL/L (ref 98–107)
CO2 SERPL-SCNC: 20 MMOL/L (ref 22–29)
CREAT BLD-MCNC: 2.1 MG/DL (ref 0.76–1.27)
D-LACTATE SERPL-SCNC: 1.7 MMOL/L (ref 0.5–2)
D-LACTATE SERPL-SCNC: 2.3 MMOL/L (ref 0.5–2)
DEPRECATED RDW RBC AUTO: 87.6 FL (ref 37–54)
EOSINOPHIL # BLD AUTO: 0.13 10*3/MM3 (ref 0–0.4)
EOSINOPHIL NFR BLD AUTO: 2.5 % (ref 0.3–6.2)
ERYTHROCYTE [DISTWIDTH] IN BLOOD BY AUTOMATED COUNT: 29.2 % (ref 12.3–15.4)
GFR SERPL CREATININE-BSD FRML MDRD: 32 ML/MIN/1.73
GLOBULIN UR ELPH-MCNC: 4 GM/DL
GLUCOSE BLD-MCNC: 175 MG/DL (ref 65–99)
GLUCOSE BLDC GLUCOMTR-MCNC: 144 MG/DL (ref 70–130)
GLUCOSE BLDC GLUCOMTR-MCNC: 171 MG/DL (ref 70–130)
GLUCOSE BLDC GLUCOMTR-MCNC: 174 MG/DL (ref 70–130)
GLUCOSE BLDC GLUCOMTR-MCNC: 180 MG/DL (ref 70–130)
HCT VFR BLD AUTO: 44.7 % (ref 37.5–51)
HGB BLD-MCNC: 12.4 G/DL (ref 13–17.7)
HOLD SPECIMEN: NORMAL
IMM GRANULOCYTES # BLD AUTO: 0.02 10*3/MM3 (ref 0–0.05)
IMM GRANULOCYTES NFR BLD AUTO: 0.4 % (ref 0–0.5)
INR PPP: 2.44 (ref 0.85–1.16)
LYMPHOCYTES # BLD AUTO: 1.96 10*3/MM3 (ref 0.7–3.1)
LYMPHOCYTES NFR BLD AUTO: 37.3 % (ref 19.6–45.3)
MCH RBC QN AUTO: 23.6 PG (ref 26.6–33)
MCHC RBC AUTO-ENTMCNC: 27.7 G/DL (ref 31.5–35.7)
MCV RBC AUTO: 85.1 FL (ref 79–97)
MONOCYTES # BLD AUTO: 0.54 10*3/MM3 (ref 0.1–0.9)
MONOCYTES NFR BLD AUTO: 10.3 % (ref 5–12)
NEUTROPHILS # BLD AUTO: 2.56 10*3/MM3 (ref 1.7–7)
NEUTROPHILS NFR BLD AUTO: 48.7 % (ref 42.7–76)
NRBC BLD AUTO-RTO: 0.4 /100 WBC (ref 0–0.2)
PHOSPHATE SERPL-MCNC: 4.7 MG/DL (ref 2.5–4.5)
PLAT MORPH BLD: NORMAL
PLATELET # BLD AUTO: 135 10*3/MM3 (ref 140–450)
PMV BLD AUTO: 11 FL (ref 6–12)
POTASSIUM BLD-SCNC: 4.1 MMOL/L (ref 3.5–5.2)
PROCALCITONIN SERPL-MCNC: 0.19 NG/ML (ref 0.1–0.25)
PROT SERPL-MCNC: 6.9 G/DL (ref 6–8.5)
PROTHROMBIN TIME: 25.5 SECONDS (ref 11.2–14.3)
RBC # BLD AUTO: 5.25 10*6/MM3 (ref 4.14–5.8)
SODIUM BLD-SCNC: 132 MMOL/L (ref 136–145)
TARGETS BLD QL SMEAR: NORMAL
WBC MORPH BLD: NORMAL
WBC NRBC COR # BLD: 5.25 10*3/MM3 (ref 3.4–10.8)

## 2019-08-24 PROCEDURE — 83605 ASSAY OF LACTIC ACID: CPT | Performed by: INTERNAL MEDICINE

## 2019-08-24 PROCEDURE — 63710000001 INSULIN LISPRO (HUMAN) PER 5 UNITS: Performed by: NURSE PRACTITIONER

## 2019-08-24 PROCEDURE — C1894 INTRO/SHEATH, NON-LASER: HCPCS

## 2019-08-24 PROCEDURE — 99232 SBSQ HOSP IP/OBS MODERATE 35: CPT | Performed by: NURSE PRACTITIONER

## 2019-08-24 PROCEDURE — 93975 VASCULAR STUDY: CPT

## 2019-08-24 PROCEDURE — 05HB33Z INSERTION OF INFUSION DEVICE INTO RIGHT BASILIC VEIN, PERCUTANEOUS APPROACH: ICD-10-PCS | Performed by: INTERNAL MEDICINE

## 2019-08-24 PROCEDURE — 85025 COMPLETE CBC W/AUTO DIFF WBC: CPT | Performed by: INTERNAL MEDICINE

## 2019-08-24 PROCEDURE — 85610 PROTHROMBIN TIME: CPT | Performed by: NURSE PRACTITIONER

## 2019-08-24 PROCEDURE — C1751 CATH, INF, PER/CENT/MIDLINE: HCPCS

## 2019-08-24 PROCEDURE — 80053 COMPREHEN METABOLIC PANEL: CPT | Performed by: INTERNAL MEDICINE

## 2019-08-24 PROCEDURE — 82962 GLUCOSE BLOOD TEST: CPT

## 2019-08-24 PROCEDURE — 84100 ASSAY OF PHOSPHORUS: CPT | Performed by: INTERNAL MEDICINE

## 2019-08-24 PROCEDURE — 84145 PROCALCITONIN (PCT): CPT | Performed by: INTERNAL MEDICINE

## 2019-08-24 PROCEDURE — 85007 BL SMEAR W/DIFF WBC COUNT: CPT | Performed by: INTERNAL MEDICINE

## 2019-08-24 PROCEDURE — 25010000002 CEFEPIME PER 500 MG: Performed by: INTERNAL MEDICINE

## 2019-08-24 PROCEDURE — 97530 THERAPEUTIC ACTIVITIES: CPT

## 2019-08-24 PROCEDURE — 25010000002 DAPTOMYCIN PER 1 MG: Performed by: INTERNAL MEDICINE

## 2019-08-24 RX ORDER — SODIUM CHLORIDE 0.9 % (FLUSH) 0.9 %
20 SYRINGE (ML) INJECTION AS NEEDED
Status: DISCONTINUED | OUTPATIENT
Start: 2019-08-24 | End: 2019-08-26 | Stop reason: HOSPADM

## 2019-08-24 RX ORDER — SODIUM CHLORIDE 0.9 % (FLUSH) 0.9 %
10 SYRINGE (ML) INJECTION AS NEEDED
Status: DISCONTINUED | OUTPATIENT
Start: 2019-08-24 | End: 2019-08-26 | Stop reason: HOSPADM

## 2019-08-24 RX ORDER — SODIUM CHLORIDE 0.9 % (FLUSH) 0.9 %
10 SYRINGE (ML) INJECTION EVERY 12 HOURS SCHEDULED
Status: DISCONTINUED | OUTPATIENT
Start: 2019-08-24 | End: 2019-08-26 | Stop reason: HOSPADM

## 2019-08-24 RX ADMIN — BUMETANIDE 1 MG: 1 TABLET ORAL at 17:40

## 2019-08-24 RX ADMIN — SODIUM CHLORIDE, PRESERVATIVE FREE 3 ML: 5 INJECTION INTRAVENOUS at 09:11

## 2019-08-24 RX ADMIN — SODIUM CHLORIDE, PRESERVATIVE FREE 10 ML: 5 INJECTION INTRAVENOUS at 20:20

## 2019-08-24 RX ADMIN — FERROUS SULFATE TAB 325 MG (65 MG ELEMENTAL FE) 325 MG: 325 (65 FE) TAB at 08:59

## 2019-08-24 RX ADMIN — PANTOPRAZOLE SODIUM 40 MG: 40 TABLET, DELAYED RELEASE ORAL at 05:30

## 2019-08-24 RX ADMIN — DAPTOMYCIN 500 MG: 500 INJECTION, POWDER, LYOPHILIZED, FOR SOLUTION INTRAVENOUS at 17:40

## 2019-08-24 RX ADMIN — MICONAZOLE NITRATE: 20 POWDER TOPICAL at 20:19

## 2019-08-24 RX ADMIN — CEFEPIME HYDROCHLORIDE 1 G: 1 INJECTION, POWDER, FOR SOLUTION INTRAMUSCULAR; INTRAVENOUS at 14:06

## 2019-08-24 RX ADMIN — METOPROLOL TARTRATE 100 MG: 100 TABLET ORAL at 08:59

## 2019-08-24 RX ADMIN — BUMETANIDE 1 MG: 1 TABLET ORAL at 08:59

## 2019-08-24 RX ADMIN — SODIUM CHLORIDE, PRESERVATIVE FREE 10 ML: 5 INJECTION INTRAVENOUS at 14:45

## 2019-08-24 RX ADMIN — INSULIN LISPRO 2 UNITS: 100 INJECTION, SOLUTION INTRAVENOUS; SUBCUTANEOUS at 09:00

## 2019-08-24 RX ADMIN — SODIUM CHLORIDE, PRESERVATIVE FREE 3 ML: 5 INJECTION INTRAVENOUS at 20:21

## 2019-08-24 RX ADMIN — TAMSULOSIN HYDROCHLORIDE 0.4 MG: 0.4 CAPSULE ORAL at 08:59

## 2019-08-24 RX ADMIN — MICONAZOLE NITRATE: 20 POWDER TOPICAL at 08:59

## 2019-08-24 RX ADMIN — INSULIN LISPRO 2 UNITS: 100 INJECTION, SOLUTION INTRAVENOUS; SUBCUTANEOUS at 12:02

## 2019-08-24 RX ADMIN — INSULIN LISPRO 2 UNITS: 100 INJECTION, SOLUTION INTRAVENOUS; SUBCUTANEOUS at 20:21

## 2019-08-25 LAB
ALBUMIN SERPL-MCNC: 3 G/DL (ref 3.5–5.2)
ALBUMIN SERPL-MCNC: 3 G/DL (ref 3.5–5.2)
ALP SERPL-CCNC: 231 U/L (ref 39–117)
ALT SERPL W P-5'-P-CCNC: 169 U/L (ref 1–41)
ANION GAP SERPL CALCULATED.3IONS-SCNC: 15 MMOL/L (ref 5–15)
AST SERPL-CCNC: 144 U/L (ref 1–40)
BILIRUB CONJ SERPL-MCNC: 1.2 MG/DL (ref 0.2–0.3)
BILIRUB INDIRECT SERPL-MCNC: 0.5 MG/DL
BILIRUB SERPL-MCNC: 1.7 MG/DL (ref 0.2–1.2)
BUN BLD-MCNC: 48 MG/DL (ref 8–23)
BUN/CREAT SERPL: 27.9 (ref 7–25)
CALCIUM SPEC-SCNC: 9.1 MG/DL (ref 8.6–10.5)
CHLORIDE SERPL-SCNC: 95 MMOL/L (ref 98–107)
CO2 SERPL-SCNC: 24 MMOL/L (ref 22–29)
CREAT BLD-MCNC: 1.72 MG/DL (ref 0.76–1.27)
GFR SERPL CREATININE-BSD FRML MDRD: 40 ML/MIN/1.73
GLUCOSE BLD-MCNC: 174 MG/DL (ref 65–99)
GLUCOSE BLDC GLUCOMTR-MCNC: 122 MG/DL (ref 70–130)
GLUCOSE BLDC GLUCOMTR-MCNC: 151 MG/DL (ref 70–130)
GLUCOSE BLDC GLUCOMTR-MCNC: 188 MG/DL (ref 70–130)
GLUCOSE BLDC GLUCOMTR-MCNC: 251 MG/DL (ref 70–130)
INR PPP: 1.91 (ref 0.85–1.16)
PHOSPHATE SERPL-MCNC: 3.8 MG/DL (ref 2.5–4.5)
POTASSIUM BLD-SCNC: 3.4 MMOL/L (ref 3.5–5.2)
PROT SERPL-MCNC: 6.8 G/DL (ref 6–8.5)
PROTHROMBIN TIME: 21.1 SECONDS (ref 11.2–14.3)
SODIUM BLD-SCNC: 134 MMOL/L (ref 136–145)

## 2019-08-25 PROCEDURE — 99232 SBSQ HOSP IP/OBS MODERATE 35: CPT | Performed by: NURSE PRACTITIONER

## 2019-08-25 PROCEDURE — 63710000001 INSULIN LISPRO (HUMAN) PER 5 UNITS: Performed by: NURSE PRACTITIONER

## 2019-08-25 PROCEDURE — 80076 HEPATIC FUNCTION PANEL: CPT | Performed by: NURSE PRACTITIONER

## 2019-08-25 PROCEDURE — 85610 PROTHROMBIN TIME: CPT | Performed by: NURSE PRACTITIONER

## 2019-08-25 PROCEDURE — 80069 RENAL FUNCTION PANEL: CPT | Performed by: INTERNAL MEDICINE

## 2019-08-25 PROCEDURE — 25010000002 CEFEPIME PER 500 MG: Performed by: INTERNAL MEDICINE

## 2019-08-25 PROCEDURE — 82962 GLUCOSE BLOOD TEST: CPT

## 2019-08-25 RX ORDER — BUMETANIDE 1 MG/1
1 TABLET ORAL DAILY
Status: DISCONTINUED | OUTPATIENT
Start: 2019-08-26 | End: 2019-08-26 | Stop reason: HOSPADM

## 2019-08-25 RX ORDER — ALPRAZOLAM 1 MG/1
1 TABLET ORAL 2 TIMES DAILY PRN
Status: DISCONTINUED | OUTPATIENT
Start: 2019-08-25 | End: 2019-08-26 | Stop reason: HOSPADM

## 2019-08-25 RX ADMIN — ALPRAZOLAM 1 MG: 1 TABLET ORAL at 20:41

## 2019-08-25 RX ADMIN — SODIUM CHLORIDE, PRESERVATIVE FREE 10 ML: 5 INJECTION INTRAVENOUS at 20:42

## 2019-08-25 RX ADMIN — INSULIN LISPRO 2 UNITS: 100 INJECTION, SOLUTION INTRAVENOUS; SUBCUTANEOUS at 12:00

## 2019-08-25 RX ADMIN — INSULIN LISPRO 2 UNITS: 100 INJECTION, SOLUTION INTRAVENOUS; SUBCUTANEOUS at 08:48

## 2019-08-25 RX ADMIN — BUMETANIDE 1 MG: 1 TABLET ORAL at 08:47

## 2019-08-25 RX ADMIN — CEFEPIME HYDROCHLORIDE 1 G: 1 INJECTION, POWDER, FOR SOLUTION INTRAMUSCULAR; INTRAVENOUS at 14:03

## 2019-08-25 RX ADMIN — INSULIN LISPRO 6 UNITS: 100 INJECTION, SOLUTION INTRAVENOUS; SUBCUTANEOUS at 17:21

## 2019-08-25 RX ADMIN — METOPROLOL TARTRATE 100 MG: 100 TABLET ORAL at 08:47

## 2019-08-25 RX ADMIN — FERROUS SULFATE TAB 325 MG (65 MG ELEMENTAL FE) 325 MG: 325 (65 FE) TAB at 08:47

## 2019-08-25 RX ADMIN — TAMSULOSIN HYDROCHLORIDE 0.4 MG: 0.4 CAPSULE ORAL at 08:47

## 2019-08-25 RX ADMIN — PANTOPRAZOLE SODIUM 40 MG: 40 TABLET, DELAYED RELEASE ORAL at 05:28

## 2019-08-25 RX ADMIN — MICONAZOLE NITRATE: 20 POWDER TOPICAL at 08:47

## 2019-08-25 RX ADMIN — METOPROLOL TARTRATE 100 MG: 100 TABLET ORAL at 20:41

## 2019-08-26 VITALS
HEART RATE: 93 BPM | OXYGEN SATURATION: 95 % | SYSTOLIC BLOOD PRESSURE: 113 MMHG | WEIGHT: 204 LBS | TEMPERATURE: 97.2 F | HEIGHT: 71 IN | DIASTOLIC BLOOD PRESSURE: 76 MMHG | RESPIRATION RATE: 17 BRPM | BODY MASS INDEX: 28.56 KG/M2

## 2019-08-26 PROBLEM — N17.9 AKI (ACUTE KIDNEY INJURY) (HCC): Status: ACTIVE | Noted: 2019-08-26

## 2019-08-26 PROBLEM — R79.89 ELEVATED LFTS: Status: ACTIVE | Noted: 2019-08-26

## 2019-08-26 LAB
ALBUMIN SERPL-MCNC: 3.1 G/DL (ref 3.5–5.2)
ALBUMIN SERPL-MCNC: 3.1 G/DL (ref 3.5–5.2)
ALP SERPL-CCNC: 234 U/L (ref 39–117)
ALT SERPL W P-5'-P-CCNC: 134 U/L (ref 1–41)
ANION GAP SERPL CALCULATED.3IONS-SCNC: 11 MMOL/L (ref 5–15)
AST SERPL-CCNC: 97 U/L (ref 1–40)
BILIRUB CONJ SERPL-MCNC: 1.1 MG/DL (ref 0.2–0.3)
BILIRUB INDIRECT SERPL-MCNC: 0.5 MG/DL
BILIRUB SERPL-MCNC: 1.6 MG/DL (ref 0.2–1.2)
BUN BLD-MCNC: 40 MG/DL (ref 8–23)
BUN/CREAT SERPL: 27.2 (ref 7–25)
CALCIUM SPEC-SCNC: 8.8 MG/DL (ref 8.6–10.5)
CHLORIDE SERPL-SCNC: 96 MMOL/L (ref 98–107)
CO2 SERPL-SCNC: 29 MMOL/L (ref 22–29)
CREAT BLD-MCNC: 1.47 MG/DL (ref 0.76–1.27)
GFR SERPL CREATININE-BSD FRML MDRD: 49 ML/MIN/1.73
GLUCOSE BLD-MCNC: 143 MG/DL (ref 65–99)
GLUCOSE BLDC GLUCOMTR-MCNC: 143 MG/DL (ref 70–130)
GLUCOSE BLDC GLUCOMTR-MCNC: 155 MG/DL (ref 70–130)
INR PPP: 1.59 (ref 0.85–1.16)
PHOSPHATE SERPL-MCNC: 2.8 MG/DL (ref 2.5–4.5)
POTASSIUM BLD-SCNC: 3.2 MMOL/L (ref 3.5–5.2)
PROT SERPL-MCNC: 6.8 G/DL (ref 6–8.5)
PROTHROMBIN TIME: 18.3 SECONDS (ref 11.2–14.3)
SODIUM BLD-SCNC: 136 MMOL/L (ref 136–145)

## 2019-08-26 PROCEDURE — 80069 RENAL FUNCTION PANEL: CPT | Performed by: INTERNAL MEDICINE

## 2019-08-26 PROCEDURE — 63710000001 INSULIN LISPRO (HUMAN) PER 5 UNITS: Performed by: NURSE PRACTITIONER

## 2019-08-26 PROCEDURE — 87385 HISTOPLASMA CAPSUL AG IA: CPT | Performed by: INTERNAL MEDICINE

## 2019-08-26 PROCEDURE — 97110 THERAPEUTIC EXERCISES: CPT

## 2019-08-26 PROCEDURE — 99231 SBSQ HOSP IP/OBS SF/LOW 25: CPT | Performed by: PHYSICIAN ASSISTANT

## 2019-08-26 PROCEDURE — 80076 HEPATIC FUNCTION PANEL: CPT | Performed by: NURSE PRACTITIONER

## 2019-08-26 PROCEDURE — 82962 GLUCOSE BLOOD TEST: CPT

## 2019-08-26 PROCEDURE — 25010000002 CEFEPIME PER 500 MG: Performed by: INTERNAL MEDICINE

## 2019-08-26 PROCEDURE — 97116 GAIT TRAINING THERAPY: CPT

## 2019-08-26 PROCEDURE — 25010000002 DAPTOMYCIN PER 1 MG: Performed by: INTERNAL MEDICINE

## 2019-08-26 PROCEDURE — 85610 PROTHROMBIN TIME: CPT | Performed by: NURSE PRACTITIONER

## 2019-08-26 RX ORDER — METOPROLOL TARTRATE 100 MG/1
100 TABLET ORAL 2 TIMES DAILY
Qty: 60 TABLET | Refills: 6 | COMMUNITY
Start: 2019-08-26 | End: 2019-08-28 | Stop reason: SDUPTHER

## 2019-08-26 RX ORDER — TAMSULOSIN HYDROCHLORIDE 0.4 MG/1
0.4 CAPSULE ORAL DAILY
Qty: 30 CAPSULE | Refills: 0 | Status: SHIPPED | OUTPATIENT
Start: 2019-08-27

## 2019-08-26 RX ORDER — ACETAMINOPHEN 325 MG/1
650 TABLET ORAL ONCE
Status: COMPLETED | OUTPATIENT
Start: 2019-08-26 | End: 2019-08-26

## 2019-08-26 RX ORDER — BUMETANIDE 2 MG/1
1 TABLET ORAL DAILY
Start: 2019-08-26 | End: 2019-08-28 | Stop reason: SDUPTHER

## 2019-08-26 RX ORDER — POTASSIUM CHLORIDE 750 MG/1
40 CAPSULE, EXTENDED RELEASE ORAL ONCE
Status: COMPLETED | OUTPATIENT
Start: 2019-08-26 | End: 2019-08-26

## 2019-08-26 RX ADMIN — FERROUS SULFATE TAB 325 MG (65 MG ELEMENTAL FE) 325 MG: 325 (65 FE) TAB at 08:59

## 2019-08-26 RX ADMIN — ACETAMINOPHEN 650 MG: 325 TABLET ORAL at 13:05

## 2019-08-26 RX ADMIN — SODIUM CHLORIDE, PRESERVATIVE FREE 10 ML: 5 INJECTION INTRAVENOUS at 09:04

## 2019-08-26 RX ADMIN — CEFEPIME HYDROCHLORIDE 1 G: 1 INJECTION, POWDER, FOR SOLUTION INTRAMUSCULAR; INTRAVENOUS at 13:05

## 2019-08-26 RX ADMIN — DAPTOMYCIN 500 MG: 500 INJECTION, POWDER, LYOPHILIZED, FOR SOLUTION INTRAVENOUS at 14:18

## 2019-08-26 RX ADMIN — BUMETANIDE 1 MG: 1 TABLET ORAL at 08:59

## 2019-08-26 RX ADMIN — APIXABAN 5 MG: 5 TABLET, FILM COATED ORAL at 12:18

## 2019-08-26 RX ADMIN — INSULIN LISPRO 2 UNITS: 100 INJECTION, SOLUTION INTRAVENOUS; SUBCUTANEOUS at 12:18

## 2019-08-26 RX ADMIN — TAMSULOSIN HYDROCHLORIDE 0.4 MG: 0.4 CAPSULE ORAL at 08:59

## 2019-08-26 RX ADMIN — POTASSIUM CHLORIDE 40 MEQ: 750 CAPSULE, EXTENDED RELEASE ORAL at 12:18

## 2019-08-26 RX ADMIN — PANTOPRAZOLE SODIUM 40 MG: 40 TABLET, DELAYED RELEASE ORAL at 05:17

## 2019-08-27 ENCOUNTER — READMISSION MANAGEMENT (OUTPATIENT)
Dept: CALL CENTER | Facility: HOSPITAL | Age: 64
End: 2019-08-27

## 2019-08-27 LAB
BRUCELLA IGG SER QL IA: NEGATIVE
BRUCELLA IGM SER QL IA: NEGATIVE
C BURNET PH1 IGG SER-ACNC: NEGATIVE
C BURNET PH1 IGM SER-ACNC: NEGATIVE
C BURNET PH2 IGG SER-ACNC: NEGATIVE
C BURNET PH2 IGM SER-ACNC: NEGATIVE

## 2019-08-27 NOTE — OUTREACH NOTE
Prep Survey      Responses   Facility patient discharged from?  [place]   Is patient eligible?  Yes   Discharge diagnosis  Chronic systolic CHF, ischemic cardiomyopathy, persistent atrial fibrillation, RODOLFO on CKD, elevated liver enzymes, vegetations on ICD lead   Does the patient have one of the following disease processes/diagnoses(primary or secondary)?  CHF   Does the patient have Home health ordered?  Yes   What is the Home health agency?    [facility]    Is there a DME ordered?  No [Wheelchair, rollator and shower chair at home already.]   Prep survey completed?  Yes          Edel Molina RN

## 2019-08-28 ENCOUNTER — READMISSION MANAGEMENT (OUTPATIENT)
Dept: CALL CENTER | Facility: HOSPITAL | Age: 64
End: 2019-08-28

## 2019-08-28 LAB
BH CV VAS HEPATIC PORTAL VEIN DIAMETER: 1.38 CM
BH CV VAS SMA AORTA EDV: 9.93 CM/S
BH CV VAS SMA AORTA PSV: 47.7 CM/S
BH CV VAS SMA HEPATIC EDV: 5.94 CM/S
BH CV VAS SMA HEPATIC PSV: 44.4 CM/S
BH CV VAS SMA SPLENIC EDV: 29 CM/S
BH CV VAS SMA SPLENIC PSV: 97.8 CM/S
H CAPSUL AG UR-MCNC: <0.5 NG/ML
Lab: NORMAL

## 2019-08-28 RX ORDER — BUMETANIDE 2 MG/1
1 TABLET ORAL DAILY
Qty: 30 TABLET | Refills: 11 | Status: SHIPPED | OUTPATIENT
Start: 2019-08-28 | End: 2019-09-17 | Stop reason: HOSPADM

## 2019-08-28 RX ORDER — METOPROLOL TARTRATE 100 MG/1
100 TABLET ORAL 2 TIMES DAILY
Qty: 60 TABLET | Refills: 11 | Status: SHIPPED | OUTPATIENT
Start: 2019-08-28 | End: 2019-09-17 | Stop reason: HOSPADM

## 2019-08-28 NOTE — OUTREACH NOTE
CHF Week 1 Survey      Responses   Facility patient discharged from?  [place]   Does the patient have one of the following disease processes/diagnoses(primary or secondary)?  CHF   Is there a successful TCM telephone encounter documented?  No   CHF Week 1 attempt successful?  Yes   Call start time  1008   Call end time  1032   Discharge diagnosis  Chronic systolic CHF, ischemic cardiomyopathy, persistent atrial fibrillation, RODOLFO on CKD, elevated liver enzymes, vegetations on ICD lead   Is patient permission given to speak with other caregiver?  Yes   List who call center can speak with  wife   Person spoke with today (if not patient) and relationship  wife   Medication alerts for this patient  PICC- wife is doing IV infusion   Meds reviewed with patient/caregiver?  Yes   Is the patient having any side effects they believe may be caused by any medication additions or changes?  No   Does the patient have all medications ordered at discharge?  No   What is keeping the patient from filling the prescriptions?  Lost script/didn't receive [pharmacy told pt that they did not receive the Rx for bumex or metoprolol. ]   Nursing Interventions  Nurse provided patient education, Nurse advised patient to call provider   Notified Case Management  Script issues   Is the patient taking all medications as directed (includes completed medication regime)?  No   What is preventing the patient from taking all medications as directed?  Other   Nursing Interventions  Nurse provided patient education, Advised patient to call provider   What is the Home health agency?    [facility]    Has home health visited the patient within 72 hours of discharge?  Yes   Home health comments  PICC care.   Psychosocial issues?  No   Comments  Pt c/o severe back pain, will discuss with PCP. Monitoring wts, bp, glucose at home.    Did the patient receive a copy of their discharge instructions?  Yes   Nursing interventions  Reviewed instructions with  patient   What is the patient's perception of their health status since discharge?  Improving   Nursing interventions  Nurse provided patient education   Is the patient weighing daily?  Yes   Does the patient have scales?  Yes   Daily weight interventions  Education provided on importance of daily weight   Is the patient able to teach back Heart Failure diet management?  Yes   Is the patient able to teach back Heart Failure Zones?  Yes   Is the patient able to teach back signs and symptoms of worsening condition? (i.e. weight gain, shortness of air, etc.)  Yes   Is the patient/caregiver able to teach back the hierarchy of who to call/visit for symptoms/problems? PCP, Specialist, Home health nurse, Urgent Care, ED, 911  Yes    CHF Week 1 call completed?  Yes          Marivel Renee RN

## 2019-08-28 NOTE — OUTREACH NOTE
Call no aneurysm Case Management Call Center Follow-up      Responses   [facility] Call Center Tracking Reason?  No Script   Has the Call Center Case Management Follow-up issue been resolved?  Yes   Follow-up Comments  Verified with pharmacy, [facility] (#[phone]), that Rx's for bumetanide and metoprolol were not received.  Spoke with Dr. Poli Edwards's PA, Marisol Hernadez, who will call Rx's to pharmacy today.           ROLA Loredo    8/28/2019, 1:13 PM

## 2019-09-05 ENCOUNTER — READMISSION MANAGEMENT (OUTPATIENT)
Dept: CALL CENTER | Facility: HOSPITAL | Age: 64
End: 2019-09-05

## 2019-09-05 NOTE — OUTREACH NOTE
CHF Week 2 Survey      Responses   Facility patient discharged from?  [place]   Does the patient have one of the following disease processes/diagnoses(primary or secondary)?  CHF   Week 2 attempt successful?  Yes   Call start time  1719   Call end time  1730   Discharge diagnosis  Chronic systolic CHF, ischemic cardiomyopathy, persistent atrial fibrillation, RODOLFO on CKD, elevated liver enzymes, vegetations on ICD lead   Is patient permission given to speak with other caregiver?  Yes   List who call center can speak with  wife   Person spoke with today (if not patient) and relationship  wife   Medication alerts for this patient  PICC- wife is doing IV infusion   Meds reviewed with patient/caregiver?  Yes   Is the patient having any side effects they believe may be caused by any medication additions or changes?  No   Does the patient have all medications ordered at discharge?  Yes   Does the patient have a primary care provider?   Yes   What is the Home health agency?    [facility]    Has home health visited the patient within 72 hours of discharge?  Yes   Home health comments  PICC care.   Psychosocial issues?  No   Comments  Patient has edema and weight gain. He has traveled to [facility] for doctor's appts. last 2 days. He is tired and weak. Encouraged wife if he continues to gain weight, or has other symptoms such as SOB, cough or congestion to seek medical attention. She verbalized understanding.     Did the patient receive a copy of their discharge instructions?  Yes   Nursing interventions  Reviewed instructions with patient   What is the patient's perception of their health status since discharge?  Improving   Nursing interventions  Nurse provided patient education   Is the patient weighing daily?  Yes   Does the patient have scales?  Yes   Daily weight interventions  Education provided on importance of daily weight   Is the patient able to teach back Heart Failure diet management?  Yes   Is the patient able to  teach back Heart Failure Zones?  Yes   Is the patient able to teach back signs and symptoms of worsening condition? (i.e. weight gain, shortness of air, etc.)  Yes   Is the patient/caregiver able to teach back the hierarchy of who to call/visit for symptoms/problems? PCP, Specialist, Home health nurse, Urgent Care, ED, 911  Yes   CHF Week 2 call completed?  Yes          Jamie Bowling RN

## 2019-09-09 ENCOUNTER — APPOINTMENT (OUTPATIENT)
Dept: GENERAL RADIOLOGY | Facility: HOSPITAL | Age: 64
End: 2019-09-09

## 2019-09-09 ENCOUNTER — APPOINTMENT (OUTPATIENT)
Dept: CT IMAGING | Facility: HOSPITAL | Age: 64
End: 2019-09-09

## 2019-09-09 ENCOUNTER — TELEPHONE (OUTPATIENT)
Dept: CARDIOLOGY | Facility: CLINIC | Age: 64
End: 2019-09-09

## 2019-09-09 ENCOUNTER — HOSPITAL ENCOUNTER (INPATIENT)
Facility: HOSPITAL | Age: 64
LOS: 7 days | Discharge: HOME HEALTH | End: 2019-09-17
Attending: EMERGENCY MEDICINE | Admitting: INTERNAL MEDICINE

## 2019-09-09 DIAGNOSIS — J90 PLEURAL EFFUSION ON LEFT: ICD-10-CM

## 2019-09-09 DIAGNOSIS — I50.43 ACUTE ON CHRONIC COMBINED SYSTOLIC AND DIASTOLIC CONGESTIVE HEART FAILURE (HCC): ICD-10-CM

## 2019-09-09 DIAGNOSIS — IMO0001 IDDM (INSULIN DEPENDENT DIABETES MELLITUS): ICD-10-CM

## 2019-09-09 DIAGNOSIS — I48.92 ATRIAL FIBRILLATION AND FLUTTER (HCC): ICD-10-CM

## 2019-09-09 DIAGNOSIS — R06.09 DYSPNEA ON EXERTION: ICD-10-CM

## 2019-09-09 DIAGNOSIS — I50.9 ACUTE ON CHRONIC CONGESTIVE HEART FAILURE, UNSPECIFIED HEART FAILURE TYPE (HCC): ICD-10-CM

## 2019-09-09 DIAGNOSIS — R06.03 RESPIRATORY DISTRESS: Primary | ICD-10-CM

## 2019-09-09 DIAGNOSIS — N18.9 CHRONIC KIDNEY DISEASE, UNSPECIFIED CKD STAGE: ICD-10-CM

## 2019-09-09 DIAGNOSIS — I48.91 ATRIAL FIBRILLATION AND FLUTTER (HCC): ICD-10-CM

## 2019-09-09 DIAGNOSIS — J81.0 ACUTE PULMONARY EDEMA (HCC): ICD-10-CM

## 2019-09-09 DIAGNOSIS — I25.810 CORONARY ARTERY DISEASE INVOLVING CORONARY BYPASS GRAFT OF NATIVE HEART WITHOUT ANGINA PECTORIS: ICD-10-CM

## 2019-09-09 DIAGNOSIS — R18.8 OTHER ASCITES: ICD-10-CM

## 2019-09-09 DIAGNOSIS — I10 ESSENTIAL HYPERTENSION: ICD-10-CM

## 2019-09-09 DIAGNOSIS — R10.11 RUQ ABDOMINAL PAIN: ICD-10-CM

## 2019-09-09 LAB
ALBUMIN SERPL-MCNC: 3 G/DL (ref 3.5–5.2)
ALBUMIN/GLOB SERPL: 0.8 G/DL
ALP SERPL-CCNC: 242 U/L (ref 39–117)
ALT SERPL W P-5'-P-CCNC: 22 U/L (ref 1–41)
ANION GAP SERPL CALCULATED.3IONS-SCNC: 12 MMOL/L (ref 5–15)
AST SERPL-CCNC: 33 U/L (ref 1–40)
BASOPHILS # BLD AUTO: 0.06 10*3/MM3 (ref 0–0.2)
BASOPHILS NFR BLD AUTO: 0.8 % (ref 0–1.5)
BILIRUB SERPL-MCNC: 1.3 MG/DL (ref 0.2–1.2)
BUN BLD-MCNC: 36 MG/DL (ref 8–23)
BUN/CREAT SERPL: 24 (ref 7–25)
CALCIUM SPEC-SCNC: 8.6 MG/DL (ref 8.6–10.5)
CHLORIDE SERPL-SCNC: 102 MMOL/L (ref 98–107)
CO2 SERPL-SCNC: 23 MMOL/L (ref 22–29)
CREAT BLD-MCNC: 1.5 MG/DL (ref 0.76–1.27)
D-LACTATE SERPL-SCNC: 1.6 MMOL/L (ref 0.5–2)
DEPRECATED RDW RBC AUTO: 83.8 FL (ref 37–54)
EOSINOPHIL # BLD AUTO: 0.17 10*3/MM3 (ref 0–0.4)
EOSINOPHIL NFR BLD AUTO: 2.2 % (ref 0.3–6.2)
ERYTHROCYTE [DISTWIDTH] IN BLOOD BY AUTOMATED COUNT: 27.8 % (ref 12.3–15.4)
GFR SERPL CREATININE-BSD FRML MDRD: 47 ML/MIN/1.73
GLOBULIN UR ELPH-MCNC: 3.8 GM/DL
GLUCOSE BLD-MCNC: 199 MG/DL (ref 65–99)
HCT VFR BLD AUTO: 43.2 % (ref 37.5–51)
HGB BLD-MCNC: 12.8 G/DL (ref 13–17.7)
HOLD SPECIMEN: NORMAL
HOLD SPECIMEN: NORMAL
IMM GRANULOCYTES # BLD AUTO: 0.03 10*3/MM3 (ref 0–0.05)
IMM GRANULOCYTES NFR BLD AUTO: 0.4 % (ref 0–0.5)
LYMPHOCYTES # BLD AUTO: 1.49 10*3/MM3 (ref 0.7–3.1)
LYMPHOCYTES NFR BLD AUTO: 19.7 % (ref 19.6–45.3)
MCH RBC QN AUTO: 24.9 PG (ref 26.6–33)
MCHC RBC AUTO-ENTMCNC: 29.6 G/DL (ref 31.5–35.7)
MCV RBC AUTO: 84 FL (ref 79–97)
MONOCYTES # BLD AUTO: 0.45 10*3/MM3 (ref 0.1–0.9)
MONOCYTES NFR BLD AUTO: 5.9 % (ref 5–12)
NEUTROPHILS # BLD AUTO: 5.38 10*3/MM3 (ref 1.7–7)
NEUTROPHILS NFR BLD AUTO: 71 % (ref 42.7–76)
NRBC BLD AUTO-RTO: 0 /100 WBC (ref 0–0.2)
NT-PROBNP SERPL-MCNC: 6224 PG/ML (ref 5–900)
PLATELET # BLD AUTO: 181 10*3/MM3 (ref 140–450)
PMV BLD AUTO: 9.7 FL (ref 6–12)
POTASSIUM BLD-SCNC: 4.1 MMOL/L (ref 3.5–5.2)
PROT SERPL-MCNC: 6.8 G/DL (ref 6–8.5)
RBC # BLD AUTO: 5.14 10*6/MM3 (ref 4.14–5.8)
SODIUM BLD-SCNC: 137 MMOL/L (ref 136–145)
TROPONIN T SERPL-MCNC: 0.03 NG/ML (ref 0–0.03)
WBC NRBC COR # BLD: 7.58 10*3/MM3 (ref 3.4–10.8)
WHOLE BLOOD HOLD SPECIMEN: NORMAL
WHOLE BLOOD HOLD SPECIMEN: NORMAL

## 2019-09-09 PROCEDURE — 80053 COMPREHEN METABOLIC PANEL: CPT | Performed by: EMERGENCY MEDICINE

## 2019-09-09 PROCEDURE — 85025 COMPLETE CBC W/AUTO DIFF WBC: CPT | Performed by: EMERGENCY MEDICINE

## 2019-09-09 PROCEDURE — 94640 AIRWAY INHALATION TREATMENT: CPT

## 2019-09-09 PROCEDURE — 83880 ASSAY OF NATRIURETIC PEPTIDE: CPT | Performed by: EMERGENCY MEDICINE

## 2019-09-09 PROCEDURE — 87040 BLOOD CULTURE FOR BACTERIA: CPT | Performed by: EMERGENCY MEDICINE

## 2019-09-09 PROCEDURE — 99284 EMERGENCY DEPT VISIT MOD MDM: CPT

## 2019-09-09 PROCEDURE — 84484 ASSAY OF TROPONIN QUANT: CPT | Performed by: EMERGENCY MEDICINE

## 2019-09-09 PROCEDURE — 94799 UNLISTED PULMONARY SVC/PX: CPT

## 2019-09-09 PROCEDURE — 74176 CT ABD & PELVIS W/O CONTRAST: CPT

## 2019-09-09 PROCEDURE — 71250 CT THORAX DX C-: CPT

## 2019-09-09 PROCEDURE — 93005 ELECTROCARDIOGRAM TRACING: CPT

## 2019-09-09 PROCEDURE — 93005 ELECTROCARDIOGRAM TRACING: CPT | Performed by: EMERGENCY MEDICINE

## 2019-09-09 PROCEDURE — 71045 X-RAY EXAM CHEST 1 VIEW: CPT

## 2019-09-09 PROCEDURE — 83605 ASSAY OF LACTIC ACID: CPT | Performed by: EMERGENCY MEDICINE

## 2019-09-09 RX ORDER — ALBUTEROL SULFATE 2.5 MG/3ML
2.5 SOLUTION RESPIRATORY (INHALATION) ONCE
Status: COMPLETED | OUTPATIENT
Start: 2019-09-09 | End: 2019-09-09

## 2019-09-09 RX ORDER — SODIUM CHLORIDE 0.9 % (FLUSH) 0.9 %
10 SYRINGE (ML) INJECTION AS NEEDED
Status: DISCONTINUED | OUTPATIENT
Start: 2019-09-09 | End: 2019-09-17 | Stop reason: HOSPADM

## 2019-09-09 RX ADMIN — ALBUTEROL SULFATE 2.5 MG: 2.5 SOLUTION RESPIRATORY (INHALATION) at 22:50

## 2019-09-09 NOTE — TELEPHONE ENCOUNTER
Patient's wife called to let us know that the patient has gained 14 pounds since 8/4/19. He went to his PCP today and they instructed him to go to the closest ER for IV diuresis. She said that he is wheezing,short of breath, and has generalized edema with weeping. She states that he is not in any type of distress. I also instructed her to take him to the closest ER. She verbalized understanding.

## 2019-09-10 ENCOUNTER — READMISSION MANAGEMENT (OUTPATIENT)
Dept: CALL CENTER | Facility: HOSPITAL | Age: 64
End: 2019-09-10

## 2019-09-10 PROBLEM — R06.09 DYSPNEA ON EXERTION: Status: ACTIVE | Noted: 2019-09-10

## 2019-09-10 PROBLEM — R06.00 DYSPNEA: Status: ACTIVE | Noted: 2019-09-10

## 2019-09-10 PROBLEM — R06.02 SHORTNESS OF BREATH: Status: ACTIVE | Noted: 2019-09-10

## 2019-09-10 PROBLEM — S30.1XXA RECTUS SHEATH HEMATOMA: Status: ACTIVE | Noted: 2019-09-10

## 2019-09-10 LAB
ALBUMIN SERPL-MCNC: 3 G/DL (ref 3.5–5.2)
ALBUMIN/GLOB SERPL: 0.6 G/DL
ALP SERPL-CCNC: 273 U/L (ref 39–117)
ALT SERPL W P-5'-P-CCNC: 25 U/L (ref 1–41)
ANION GAP SERPL CALCULATED.3IONS-SCNC: 17 MMOL/L (ref 5–15)
APTT PPP: 36.8 SECONDS (ref 24–37)
AST SERPL-CCNC: 38 U/L (ref 1–40)
BASOPHILS # BLD AUTO: 0.06 10*3/MM3 (ref 0–0.2)
BASOPHILS NFR BLD AUTO: 0.7 % (ref 0–1.5)
BILIRUB SERPL-MCNC: 1.4 MG/DL (ref 0.2–1.2)
BUN BLD-MCNC: 38 MG/DL (ref 8–23)
BUN/CREAT SERPL: 23.8 (ref 7–25)
CALCIUM SPEC-SCNC: 9 MG/DL (ref 8.6–10.5)
CHLORIDE SERPL-SCNC: 99 MMOL/L (ref 98–107)
CHOLEST SERPL-MCNC: 101 MG/DL (ref 0–200)
CO2 SERPL-SCNC: 21 MMOL/L (ref 22–29)
CREAT BLD-MCNC: 1.6 MG/DL (ref 0.76–1.27)
DEPRECATED RDW RBC AUTO: 85.6 FL (ref 37–54)
EOSINOPHIL # BLD AUTO: 0.2 10*3/MM3 (ref 0–0.4)
EOSINOPHIL NFR BLD AUTO: 2.3 % (ref 0.3–6.2)
ERYTHROCYTE [DISTWIDTH] IN BLOOD BY AUTOMATED COUNT: 28 % (ref 12.3–15.4)
GFR SERPL CREATININE-BSD FRML MDRD: 44 ML/MIN/1.73
GLOBULIN UR ELPH-MCNC: 4.8 GM/DL
GLUCOSE BLD-MCNC: 172 MG/DL (ref 65–99)
GLUCOSE BLDC GLUCOMTR-MCNC: 137 MG/DL (ref 70–130)
GLUCOSE BLDC GLUCOMTR-MCNC: 140 MG/DL (ref 70–130)
GLUCOSE BLDC GLUCOMTR-MCNC: 143 MG/DL (ref 70–130)
GLUCOSE BLDC GLUCOMTR-MCNC: 155 MG/DL (ref 70–130)
HBA1C MFR BLD: 6 % (ref 4.8–5.6)
HCT VFR BLD AUTO: 47.2 % (ref 37.5–51)
HDLC SERPL-MCNC: 20 MG/DL (ref 40–60)
HGB BLD-MCNC: 13.8 G/DL (ref 13–17.7)
IMM GRANULOCYTES # BLD AUTO: 0.04 10*3/MM3 (ref 0–0.05)
IMM GRANULOCYTES NFR BLD AUTO: 0.5 % (ref 0–0.5)
INR PPP: 2.09 (ref 0.85–1.16)
LDLC SERPL CALC-MCNC: 60 MG/DL (ref 0–100)
LDLC/HDLC SERPL: 2.99 {RATIO}
LYMPHOCYTES # BLD AUTO: 2.4 10*3/MM3 (ref 0.7–3.1)
LYMPHOCYTES NFR BLD AUTO: 27.3 % (ref 19.6–45.3)
MAGNESIUM SERPL-MCNC: 1.9 MG/DL (ref 1.6–2.4)
MCH RBC QN AUTO: 25 PG (ref 26.6–33)
MCHC RBC AUTO-ENTMCNC: 29.2 G/DL (ref 31.5–35.7)
MCV RBC AUTO: 85.5 FL (ref 79–97)
MONOCYTES # BLD AUTO: 0.6 10*3/MM3 (ref 0.1–0.9)
MONOCYTES NFR BLD AUTO: 6.8 % (ref 5–12)
NEUTROPHILS # BLD AUTO: 5.48 10*3/MM3 (ref 1.7–7)
NEUTROPHILS NFR BLD AUTO: 62.4 % (ref 42.7–76)
NRBC BLD AUTO-RTO: 0 /100 WBC (ref 0–0.2)
NT-PROBNP SERPL-MCNC: 7388 PG/ML (ref 5–900)
PLATELET # BLD AUTO: 199 10*3/MM3 (ref 140–450)
PMV BLD AUTO: 9.9 FL (ref 6–12)
POTASSIUM BLD-SCNC: 4.2 MMOL/L (ref 3.5–5.2)
PROT SERPL-MCNC: 7.8 G/DL (ref 6–8.5)
PROTHROMBIN TIME: 22.6 SECONDS (ref 11.2–14.3)
RBC # BLD AUTO: 5.52 10*6/MM3 (ref 4.14–5.8)
SODIUM BLD-SCNC: 137 MMOL/L (ref 136–145)
TRIGL SERPL-MCNC: 106 MG/DL (ref 0–150)
TROPONIN T SERPL-MCNC: 0.03 NG/ML (ref 0–0.03)
VLDLC SERPL-MCNC: 21.2 MG/DL
WBC NRBC COR # BLD: 8.78 10*3/MM3 (ref 3.4–10.8)

## 2019-09-10 PROCEDURE — 86901 BLOOD TYPING SEROLOGIC RH(D): CPT

## 2019-09-10 PROCEDURE — 25010000002 CEFEPIME PER 500 MG: Performed by: NURSE PRACTITIONER

## 2019-09-10 PROCEDURE — 25010000002 PIPERACILLIN-TAZOBACTAM: Performed by: EMERGENCY MEDICINE

## 2019-09-10 PROCEDURE — 99223 1ST HOSP IP/OBS HIGH 75: CPT | Performed by: INTERNAL MEDICINE

## 2019-09-10 PROCEDURE — 84484 ASSAY OF TROPONIN QUANT: CPT | Performed by: NURSE PRACTITIONER

## 2019-09-10 PROCEDURE — 86612 BLASTOMYCES ANTIBODY: CPT | Performed by: INTERNAL MEDICINE

## 2019-09-10 PROCEDURE — 25010000002 DAPTOMYCIN PER 1 MG: Performed by: NURSE PRACTITIONER

## 2019-09-10 PROCEDURE — 86606 ASPERGILLUS ANTIBODY: CPT | Performed by: INTERNAL MEDICINE

## 2019-09-10 PROCEDURE — 25010000002 ONDANSETRON PER 1 MG: Performed by: NURSE PRACTITIONER

## 2019-09-10 PROCEDURE — 83735 ASSAY OF MAGNESIUM: CPT | Performed by: NURSE PRACTITIONER

## 2019-09-10 PROCEDURE — 85730 THROMBOPLASTIN TIME PARTIAL: CPT | Performed by: INTERNAL MEDICINE

## 2019-09-10 PROCEDURE — 63710000001 INSULIN DETEMIR PER 5 UNITS: Performed by: NURSE PRACTITIONER

## 2019-09-10 PROCEDURE — 86698 HISTOPLASMA ANTIBODY: CPT | Performed by: INTERNAL MEDICINE

## 2019-09-10 PROCEDURE — 80053 COMPREHEN METABOLIC PANEL: CPT | Performed by: NURSE PRACTITIONER

## 2019-09-10 PROCEDURE — 86900 BLOOD TYPING SEROLOGIC ABO: CPT

## 2019-09-10 PROCEDURE — 25010000002 FUROSEMIDE PER 20 MG: Performed by: EMERGENCY MEDICINE

## 2019-09-10 PROCEDURE — 99222 1ST HOSP IP/OBS MODERATE 55: CPT | Performed by: INTERNAL MEDICINE

## 2019-09-10 PROCEDURE — 85025 COMPLETE CBC W/AUTO DIFF WBC: CPT | Performed by: NURSE PRACTITIONER

## 2019-09-10 PROCEDURE — 85610 PROTHROMBIN TIME: CPT | Performed by: INTERNAL MEDICINE

## 2019-09-10 PROCEDURE — 83880 ASSAY OF NATRIURETIC PEPTIDE: CPT | Performed by: NURSE PRACTITIONER

## 2019-09-10 PROCEDURE — 63710000001 INSULIN LISPRO (HUMAN) PER 5 UNITS: Performed by: NURSE PRACTITIONER

## 2019-09-10 PROCEDURE — 82962 GLUCOSE BLOOD TEST: CPT

## 2019-09-10 PROCEDURE — 94799 UNLISTED PULMONARY SVC/PX: CPT

## 2019-09-10 PROCEDURE — 83036 HEMOGLOBIN GLYCOSYLATED A1C: CPT | Performed by: NURSE PRACTITIONER

## 2019-09-10 PROCEDURE — 80061 LIPID PANEL: CPT | Performed by: NURSE PRACTITIONER

## 2019-09-10 RX ORDER — DEXTROSE MONOHYDRATE 25 G/50ML
25 INJECTION, SOLUTION INTRAVENOUS
Status: DISCONTINUED | OUTPATIENT
Start: 2019-09-10 | End: 2019-09-17 | Stop reason: HOSPADM

## 2019-09-10 RX ORDER — PRAVASTATIN SODIUM 20 MG
20 TABLET ORAL NIGHTLY
Status: DISCONTINUED | OUTPATIENT
Start: 2019-09-10 | End: 2019-09-14

## 2019-09-10 RX ORDER — ASPIRIN 81 MG/1
81 TABLET ORAL DAILY
Status: DISCONTINUED | OUTPATIENT
Start: 2019-09-10 | End: 2019-09-17 | Stop reason: HOSPADM

## 2019-09-10 RX ORDER — UREA 10 %
27 LOTION (ML) TOPICAL 2 TIMES DAILY
Status: DISCONTINUED | OUTPATIENT
Start: 2019-09-10 | End: 2019-09-17 | Stop reason: HOSPADM

## 2019-09-10 RX ORDER — BUMETANIDE 1 MG/1
1 TABLET ORAL DAILY
Status: DISCONTINUED | OUTPATIENT
Start: 2019-09-10 | End: 2019-09-10

## 2019-09-10 RX ORDER — METOPROLOL TARTRATE 100 MG/1
100 TABLET ORAL 2 TIMES DAILY
Status: DISCONTINUED | OUTPATIENT
Start: 2019-09-10 | End: 2019-09-11

## 2019-09-10 RX ORDER — TAMSULOSIN HYDROCHLORIDE 0.4 MG/1
0.4 CAPSULE ORAL DAILY
Status: DISCONTINUED | OUTPATIENT
Start: 2019-09-10 | End: 2019-09-17 | Stop reason: HOSPADM

## 2019-09-10 RX ORDER — BUMETANIDE 1 MG/1
1 TABLET ORAL
Status: DISCONTINUED | OUTPATIENT
Start: 2019-09-10 | End: 2019-09-10

## 2019-09-10 RX ORDER — FUROSEMIDE 10 MG/ML
20 INJECTION INTRAMUSCULAR; INTRAVENOUS ONCE
Status: COMPLETED | OUTPATIENT
Start: 2019-09-10 | End: 2019-09-10

## 2019-09-10 RX ORDER — ALPRAZOLAM 1 MG/1
1 TABLET ORAL 2 TIMES DAILY PRN
Status: ON HOLD | COMMUNITY
End: 2019-01-01

## 2019-09-10 RX ORDER — SODIUM CHLORIDE 0.9 % (FLUSH) 0.9 %
10 SYRINGE (ML) INJECTION EVERY 12 HOURS SCHEDULED
Status: DISCONTINUED | OUTPATIENT
Start: 2019-09-10 | End: 2019-09-17 | Stop reason: HOSPADM

## 2019-09-10 RX ORDER — GABAPENTIN 100 MG/1
100 CAPSULE ORAL NIGHTLY
Status: DISCONTINUED | OUTPATIENT
Start: 2019-09-10 | End: 2019-09-17 | Stop reason: HOSPADM

## 2019-09-10 RX ORDER — ACETAMINOPHEN 160 MG/5ML
650 SOLUTION ORAL EVERY 4 HOURS PRN
Status: DISCONTINUED | OUTPATIENT
Start: 2019-09-10 | End: 2019-09-17 | Stop reason: HOSPADM

## 2019-09-10 RX ORDER — BUMETANIDE 0.25 MG/ML
1 INJECTION INTRAMUSCULAR; INTRAVENOUS EVERY 12 HOURS
Status: DISCONTINUED | OUTPATIENT
Start: 2019-09-10 | End: 2019-09-16

## 2019-09-10 RX ORDER — PANTOPRAZOLE SODIUM 40 MG/1
40 TABLET, DELAYED RELEASE ORAL
Status: DISCONTINUED | OUTPATIENT
Start: 2019-09-10 | End: 2019-09-17 | Stop reason: HOSPADM

## 2019-09-10 RX ORDER — IPRATROPIUM BROMIDE AND ALBUTEROL SULFATE 2.5; .5 MG/3ML; MG/3ML
3 SOLUTION RESPIRATORY (INHALATION)
Status: DISCONTINUED | OUTPATIENT
Start: 2019-09-10 | End: 2019-09-17 | Stop reason: HOSPADM

## 2019-09-10 RX ORDER — FERROUS SULFATE 325(65) MG
325 TABLET ORAL 2 TIMES DAILY WITH MEALS
Status: DISCONTINUED | OUTPATIENT
Start: 2019-09-10 | End: 2019-09-17 | Stop reason: HOSPADM

## 2019-09-10 RX ORDER — FUROSEMIDE 10 MG/ML
40 INJECTION INTRAMUSCULAR; INTRAVENOUS EVERY 12 HOURS
Status: CANCELLED | OUTPATIENT
Start: 2019-09-10 | End: 2019-09-11

## 2019-09-10 RX ORDER — SODIUM CHLORIDE 0.9 % (FLUSH) 0.9 %
10 SYRINGE (ML) INJECTION AS NEEDED
Status: DISCONTINUED | OUTPATIENT
Start: 2019-09-10 | End: 2019-09-17 | Stop reason: HOSPADM

## 2019-09-10 RX ORDER — NICOTINE POLACRILEX 4 MG
15 LOZENGE BUCCAL
Status: DISCONTINUED | OUTPATIENT
Start: 2019-09-10 | End: 2019-09-17 | Stop reason: HOSPADM

## 2019-09-10 RX ORDER — ACETAMINOPHEN 650 MG/1
650 SUPPOSITORY RECTAL EVERY 4 HOURS PRN
Status: DISCONTINUED | OUTPATIENT
Start: 2019-09-10 | End: 2019-09-17 | Stop reason: HOSPADM

## 2019-09-10 RX ORDER — ONDANSETRON 2 MG/ML
4 INJECTION INTRAMUSCULAR; INTRAVENOUS EVERY 6 HOURS PRN
Status: DISCONTINUED | OUTPATIENT
Start: 2019-09-10 | End: 2019-09-17 | Stop reason: HOSPADM

## 2019-09-10 RX ORDER — DOCUSATE SODIUM 100 MG/1
100 CAPSULE, LIQUID FILLED ORAL 2 TIMES DAILY PRN
Status: DISCONTINUED | OUTPATIENT
Start: 2019-09-10 | End: 2019-09-17 | Stop reason: HOSPADM

## 2019-09-10 RX ORDER — ACETAMINOPHEN 325 MG/1
650 TABLET ORAL EVERY 4 HOURS PRN
Status: DISCONTINUED | OUTPATIENT
Start: 2019-09-10 | End: 2019-09-17 | Stop reason: HOSPADM

## 2019-09-10 RX ADMIN — MICONAZOLE NITRATE: 20 POWDER TOPICAL at 09:49

## 2019-09-10 RX ADMIN — PIPERACILLIN SODIUM,TAZOBACTAM SODIUM 3.38 G: 3; .375 INJECTION, POWDER, FOR SOLUTION INTRAVENOUS at 02:03

## 2019-09-10 RX ADMIN — IPRATROPIUM BROMIDE AND ALBUTEROL SULFATE 3 ML: 2.5; .5 SOLUTION RESPIRATORY (INHALATION) at 16:23

## 2019-09-10 RX ADMIN — FERROUS SULFATE TAB 325 MG (65 MG ELEMENTAL FE) 325 MG: 325 (65 FE) TAB at 17:38

## 2019-09-10 RX ADMIN — IPRATROPIUM BROMIDE AND ALBUTEROL SULFATE 3 ML: 2.5; .5 SOLUTION RESPIRATORY (INHALATION) at 12:46

## 2019-09-10 RX ADMIN — SERTRALINE HYDROCHLORIDE 25 MG: 50 TABLET ORAL at 21:25

## 2019-09-10 RX ADMIN — PANTOPRAZOLE SODIUM 40 MG: 40 TABLET, DELAYED RELEASE ORAL at 04:53

## 2019-09-10 RX ADMIN — DAPTOMYCIN 700 MG: 500 INJECTION, POWDER, LYOPHILIZED, FOR SOLUTION INTRAVENOUS at 04:53

## 2019-09-10 RX ADMIN — BUMETANIDE 1 MG: 1 TABLET ORAL at 10:05

## 2019-09-10 RX ADMIN — FUROSEMIDE 20 MG: 10 INJECTION, SOLUTION INTRAMUSCULAR; INTRAVENOUS at 00:59

## 2019-09-10 RX ADMIN — ASPIRIN 81 MG: 81 TABLET, COATED ORAL at 09:49

## 2019-09-10 RX ADMIN — MICONAZOLE NITRATE: 20 POWDER TOPICAL at 21:25

## 2019-09-10 RX ADMIN — ONDANSETRON 4 MG: 2 INJECTION INTRAMUSCULAR; INTRAVENOUS at 03:43

## 2019-09-10 RX ADMIN — INSULIN LISPRO 2 UNITS: 100 INJECTION, SOLUTION INTRAVENOUS; SUBCUTANEOUS at 09:47

## 2019-09-10 RX ADMIN — Medication 27 MG: at 09:48

## 2019-09-10 RX ADMIN — GABAPENTIN 100 MG: 100 CAPSULE ORAL at 21:25

## 2019-09-10 RX ADMIN — CEFEPIME HYDROCHLORIDE 2 G: 2 INJECTION, POWDER, FOR SOLUTION INTRAVENOUS at 09:50

## 2019-09-10 RX ADMIN — INSULIN DETEMIR 10 UNITS: 100 INJECTION, SOLUTION SUBCUTANEOUS at 21:27

## 2019-09-10 RX ADMIN — IPRATROPIUM BROMIDE AND ALBUTEROL SULFATE 3 ML: 2.5; .5 SOLUTION RESPIRATORY (INHALATION) at 19:37

## 2019-09-10 RX ADMIN — TAMSULOSIN HYDROCHLORIDE 0.4 MG: 0.4 CAPSULE ORAL at 09:49

## 2019-09-10 RX ADMIN — Medication 27 MG: at 21:24

## 2019-09-10 RX ADMIN — FERROUS SULFATE TAB 325 MG (65 MG ELEMENTAL FE) 325 MG: 325 (65 FE) TAB at 09:48

## 2019-09-10 RX ADMIN — PRAVASTATIN SODIUM 20 MG: 20 TABLET ORAL at 21:24

## 2019-09-10 RX ADMIN — IPRATROPIUM BROMIDE AND ALBUTEROL SULFATE 3 ML: 2.5; .5 SOLUTION RESPIRATORY (INHALATION) at 07:35

## 2019-09-10 RX ADMIN — SODIUM CHLORIDE, PRESERVATIVE FREE 10 ML: 5 INJECTION INTRAVENOUS at 21:25

## 2019-09-10 RX ADMIN — CEFEPIME HYDROCHLORIDE 2 G: 2 INJECTION, POWDER, FOR SOLUTION INTRAVENOUS at 04:52

## 2019-09-10 RX ADMIN — BUMETANIDE 1 MG: 0.25 INJECTION INTRAMUSCULAR; INTRAVENOUS at 21:24

## 2019-09-10 NOTE — PLAN OF CARE
Problem: Patient Care Overview  Goal: Individualization and Mutuality  Outcome: Ongoing (interventions implemented as appropriate)  Drowsy and sleeping most of day, 's/ diuretic given but metoprolol held this am, CT thoro to be done [place] due to eliquis taken yest am  Goal: Discharge Needs Assessment  Outcome: Ongoing (interventions implemented as appropriate)    Goal: Interprofessional Rounds/Family Conf  Outcome: Ongoing (interventions implemented as appropriate)

## 2019-09-10 NOTE — H&P
[facility] Medicine Services  HISTORY AND PHYSICAL    Patient Name: Rob Miranda  : 1955  MRN: [id number]  Primary Care Physician: Kreis, Samuel Duane, MD  Date of admission: 2019      Subjective   Subjective     Chief Complaint:  Rob Miranda is a 64-year-old male presenting to the emergency room with increased shortness of breath, abdominal edema for 3 days.    HPI:  Rob Miranda is a 64 y.o. male with history of systolic heart failure, coronary artery disease with CABG x2, essential hypertension, ischemic cardiomyopathy with a EF of 20%, hyperlipidemia, diabetes mellitus insulin-dependent, atrial fibrillation on Eliquis, chronic kidney disease and COPD presenting to the emergency room with increased shortness of breath, abdominal edema for 3 days.  Patient reports wheezing, increased shortness of breath especially with exertion, fatigue, sudden weight gain of 20 pounds over the past week. He reports abdominal distention with abdominal pain and low back pain.  He has experienced nausea vomiting with last emesis yesterday morning.  Patient denies hematemesis.  Also reports constipation.  Denies fever, chills, sweats, chest pain, heart palpitations or syncope dizziness. Patient smokes less than 1/2 pk of cigarettes daily.    Patient recently hospitalized at the [facility] diagnosed with legionnaires disease and kidney failure that required dialysis.  Patient was on life support during the hospitalization but recovered and was discharged to rehab in July.  In mid August, the patient presented to [facility] cardiology for EVA/ECV AV node ablation. He was also receiving an upgrade to biventricular ICD.  This time, it was discovered that the patient has vegetation on his ICD leads.  He is presently on IV antibiotics followed by infectious disease as well as cardiology.  She was discharged on 2019 with home health and IV antibiotics.    Clinical  Findings:   CT chest demonstrates left greater than right pleural effusion and coarse left upper lobe and right middle lobe infiltrates.     CT of the abdomen and pelvic demonstrates nephrolithiasis without hydronephrosis or  ureterolithiasis. There is ascites but no bowel obstruction. There is cholelithiasis without CT evidence of cholecystitis or biliary obstruction    CXRAY: Cardomegaly with probable mild volume overload with left sided effusion and compressive atelectasis or pneumonia. No pneumothorax.        Review of Systems:  Gen- No fevers, chills. Positive for 20 lb weight gain over the past week and fatigue  ENT: negative for congestion, rhinorrhea, and sore throat  CV- No chest pain, palpitations, positive for edema to lower extremities.   Resp- positive for shortness of breath, miller with exertion, cough with thick white sputum  GI- positive for nauseam vomiting, constipation and abdominal pain. Neg for diarrhea  NEUO: denies syncope, dizziness  SKIN: positive for edema to lower extremities    All other systems reviewed and are negative.     Personal History     Past Medical History:   Diagnosis Date   • Atherosclerotic cardiovascular disease    • Chronic anticoagulation    • Chronic kidney disease    • Congestive heart failure (CMS/[facility])    • COPD (chronic obstructive pulmonary disease) (CMS/[facility])    • DM (diabetes mellitus) (CMS/[facility])     insulin dependant   • Dyslipidemia    • Hx of atrial flutter     and a- fib   • Hx of myocardial infarction 1992    s/p CABG    • Hyperlipidemia    • Hypertension    • Ischemic cardiomyopathy     advanced   • Legionnaire's disease (CMS/[facility])    • Sinus bradycardia     EPS, 03/19/2007, Dr. Márquez:  Successful radiofrequency ablation of right atrial flutter   • Skin cancer    • Tobacco use        Past Surgical History:   Procedure Laterality Date   • CARDIAC DEFIBRILLATOR PLACEMENT     • CARDIOVERSION      x 2 procedures   • CORONARY ARTERY BYPASS GRAFT  1991    [facility]  Hospital        Family History: family history includes Cancer (age of onset: 74) in his mother; Diabetes in his father and mother; Heart attack (age of onset: 72) in his father; Heart disease in his mother. Otherwise pertinent FHx was reviewed and unremarkable.     Social History:  reports that he has been smoking cigarettes.  He has smoked for the past 48.00 years. He has never used smokeless tobacco. He reports that he does not drink alcohol or use drugs.  Social History     Social History Narrative    Lives at home with his wife, Destiny.       Medications:    Available home medication information reviewed.    (Not in a hospital admission)    Allergies   Allergen Reactions   • Shellfish-Derived Products Hives, Shortness Of Breath and Swelling       Objective   Objective     Vital Signs:   Temp:  [97.9 °F (36.6 °C)] 97.9 °F (36.6 °C)  Heart Rate:  [] 101  Resp:  [22] 22  BP: (103-109)/(65-83) 103/83        Physical Exam   Constitutional: He is oriented to person, place, and time. He appears well-developed.   HENT:   Head: Normocephalic and atraumatic.   Eyes: Conjunctivae are normal. Pupils are equal, round, and reactive to light.   Neck: Neck supple. No JVD present.   Cardiovascular: Normal rate, regular rhythm and normal heart sounds. Exam reveals no gallop and no friction rub.   No murmur heard.  Pulmonary/Chest: Effort normal and breath sounds normal. No respiratory distress.   Abdominal: He exhibits distension and mass. There is tenderness. There is no guarding.   Mass noted in the epigastric abdominal area   Musculoskeletal: Normal range of motion. He exhibits edema.   Lower extremities   Neurological: He is alert and oriented to person, place, and time. No cranial nerve deficit.   Skin: Skin is warm and dry.   Psychiatric: He has a normal mood and affect.   Vitals reviewed.       Results Reviewed:  I have personally reviewed current lab and radiology data.    Results from last 7 days   Lab Units  09/09/19 2057   WBC 10*3/mm3 7.58   HEMOGLOBIN g/dL 12.8*   HEMATOCRIT % 43.2   PLATELETS 10*3/mm3 181     Results from last 7 days   Lab Units 09/09/19 2058 09/09/19 2057   SODIUM mmol/L  --  137   POTASSIUM mmol/L  --  4.1   CHLORIDE mmol/L  --  102   CO2 mmol/L  --  23.0   BUN mg/dL  --  36*   CREATININE mg/dL  --  1.50*   GLUCOSE mg/dL  --  199*   CALCIUM mg/dL  --  8.6   ALT (SGPT) U/L  --  22   AST (SGOT) U/L  --  33   TROPONIN T ng/mL  --  0.034*   PROBNP pg/mL  --  6,224.0*   LACTATE mmol/L 1.6  --      Estimated Creatinine Clearance: 62.5 mL/min (A) (by C-G formula based on SCr of 1.5 mg/dL (H)).  Brief Urine Lab Results  (Last result in the past 365 days)      Color   Clarity   Blood   Leuk Est   Nitrite   Protein   CREAT   Urine HCG        08/20/19 1316             102.4           Imaging Results (last 24 hours)     Procedure Component Value Units Date/Time    CT Chest Without Contrast [[id number]] Collected:  09/09/19 2349     Updated:  09/09/19 2351    Narrative:       CT Chest WO    INDICATION:   Abdominal pain and short of air    TECHNIQUE:   CT of the thorax without IV contrast. Coronal and sagittal reconstructions were obtained.  Radiation dose reduction techniques included automated exposure control or exposure modulation based on body size. Count of known CT and cardiac nuc med studies  performed in previous 12 months: 1.     COMPARISON:   None    FINDINGS:  There is a moderate left pleural effusion, and there is coarse left upper lobe infiltrate or atelectasis. There is compressive atelectasis of the left lower lobe. There is a small right effusion, and there is coarse right middle lobe infiltrate, and some  right basilar atelectasis or infiltrate.    There is no mediastinal soft tissue mass. There is no acute bony abnormality.      Impression:       Left greater than right pleural effusion and coarse left upper lobe and right middle lobe infiltrates.    Signer Name: Loco Martínez MD    Signed: 9/9/2019 11:49 PM   Workstation Name: [facility]    [facility]    CT Abdomen Pelvis Without Contrast [[id number]] Collected:  09/09/19 2341     Updated:  09/09/19 2343    Narrative:       CT Abdomen Pelvis WO    INDICATION:   Abdominal pain, short of air    TECHNIQUE:   CT of the abdomen and pelvis without contrast. Coronal and sagittal reconstructions were obtained.  Radiation dose reduction techniques included automated exposure control or exposure modulation based on body size. Radiation audit for number of CT and  nuclear cardiology exams performed in the last year: 1.      COMPARISON:   Abdominal pelvic CT dated March 2018    FINDINGS:    There is a large left pleural effusion and left lower lobe atelectasis. There is a small right pleural effusion.    Abdomen: Unenhanced images of the liver are normal. There is cholelithiasis but no CT evidence to suggest cholecystitis or biliary obstruction. The spleen and pancreas are unremarkable, and the there is bilateral nephrolithiasis without hydronephrosis or  ureterolithiasis.    The aorta is normal in caliber. There is no bowel obstruction. There is mild to moderate ascites.    Pelvis:  There is no hernia or bowel obstruction. The appendix is normal.    There is no acute bony abnormality.      Impression:       There is nephrolithiasis without hydronephrosis or ureterolithiasis. There is ascites but no bowel obstruction. There is cholelithiasis without CT evidence of cholecystitis or biliary obstruction.      Signer Name: Loco Martínez MD   Signed: 9/9/2019 11:41 PM   Workstation Name: [facility]    [facility]    XR Chest 1 View [[id number]] Collected:  09/09/19 2154     Updated:  09/09/19 2156    Narrative:       CR Chest 1 Vw    INDICATION:   64-year-old male with difficulty breathing in the ED tonight. Lower leg swelling for a couple of days.     COMPARISON:    3/8/2015    FINDINGS:  Single portable  AP view(s) of the chest.  Heart is enlarged but stable status post median sternotomy. Right-sided PICC line tip can be followed to the mid to distal SVC level but no further given technical factors. Interstitial prominence in both lungs  suspicious for mild vascular congestion. There is a new left-sided pleural effusion with compressive atelectasis or pneumonia. No pneumothorax.      Impression:         1. Cardiomegaly and probable mild volume overload.  2. Left-sided effusion and compressive atelectasis or pneumonia. No pneumothorax.     Signer Name: Yordy Downey MD   Signed: 9/9/2019 9:54 PM   Workstation Name: [id number]    [facility]        Results for orders placed during the hospital encounter of 08/19/19   Adult Transesophageal Echo (EVA) W/ Cont if Necessary Per Protocol    Narrative · Estimated EF appears to be in the range of less than 20%.  · Left ventricular systolic function is severely decreased.  · Moderate mitral valve regurgitation is present  · Moderate tricuspid valve regurgitation is present.  · Moderately reduced right ventricular systolic function noted.  · A small mobile density is present on the atrial portion of the RV lead   as well as the RA lead.  · The tip of the indewelling catheter in the RA, appears thickened.          Assessment/Plan   Assessment / Plan     Active Hospital Problems    Diagnosis POA   • Dyspnea on exertion [R06.09] Unknown     Priority: High   • Acute on chronic systolic CHF (congestive heart failure) (CMS/[facility]) [I50.23] Unknown     Priority: Medium     a. Previous MI.  b. CABG in 1991.   c. Left heart catheterization 2004: Patent grafts, EF estimated at 30% to 35%.   d. A 2D echocardiogram, 09/08/2006: EF estimated at 30% to 35%.   e. GXT Cardiolite, 09/08/2006: EF estimated at 20% to 25%, no inducible ischemia.   f. St. Jr DDD-ICD, 12/04/2006, Dr. Márquez.  g. Echocardiogram, June 2012 with EF 25%.   h. ICD generator change out, May  2013.   i. Echo: 4/27/16: LVEF 25% mil-mod TR, MR       • RODOLFO (acute kidney injury) (CMS/[facility]) [N17.9] Yes   • Elevated LFTs [R94.5] Yes   • Atrial fibrillation and flutter,s/p pulmonary renee ablationx2 in 2/14 and 3/15.on Tikosyn  [I48.91, I48.92] Yes     a. Initiation of Tikosyn, May 2013.  b. Inappropriate ICD shocks secondary to atrial fibrillation with RVR.    c. Pulmonary vein ablation, 02/04/2014; inability to isolate the right inferior pulmonary vein secondary to near proximity to the esophagus.    d. EVA, 02/04/2014; EF 23%, left atrium severely dilated.    e. On  03/16/2015, pulmonary vein ablation (re-isolation of all four pulmonary veins with linear lines along the left roof and mitral annulus, ablation of left atrial rotors). Procedure complicated by transient hypotension secondary to acidosis in the setting of severe LV dysfunction, and obstructive pulmonary disease.  f. Recurrent episode of atrial fibrillation with internal cardioversion, 07/02/2015.      DEVICE INTERROGATION: Interrogation of the device demonstrates normal DDD ICD function with 2% atrial fibrillation, longest episode of 4 hours.         • IDDM (insulin dependent diabetes mellitus) (CMS/[facility]) [E11.9, Z79.4] Not Applicable   • Advanced Ischemic cardiomyopathy with estimated ejection fraction of less than 20% in January 2015, status post biventricular ICD implantation.in 2007,with gen.change in 2013. [I25.5] Yes     g. Previous MI.  h. CABG in 1991.   i. Left heart catheterization 2004: Patent grafts, EF estimated at 30% to 35%.   j. A 2D echocardiogram, 09/08/2006: EF estimated at 30% to 35%.   k. GXT Cardiolite, 09/08/2006: EF estimated at 20% to 25%, no inducible ischemia.   l. St. Jr DDD-ICD, 12/04/2006, Dr. Márquez.  m. Echocardiogram, June 2012 with EF 25%.    n. ICD generator change out, May 2013.         • Dyslipidemia, on Crestor.  [E78.5] Yes   • Coronary artery disease involving coronary bypass graft of native heart without  angina pectoris [I25.810] Yes   • Essential hypertension [I10] Yes     Rob Miranda is a 64 y.o. male with history of systolic heart failure, coronary artery disease with CABG x2, essential hypertension, ischemic cardiomyopathy with a EF of 20%, hyperlipidemia, diabetes mellitus insulin-dependent, atrial fibrillation on Eliquis, chronic kidney disease and COPD presenting to the emergency room with increased shortness of breath, abdominal edema for 3 days.  Patient reports wheezing, increased shortness of breath especially with exertion, fatigue, sudden weight gain of 20 pounds over the past week. He reports abdominal distention with abdominal pain and low back pain.  He has experienced nausea vomiting with last emesis yesterday morning.     Dyspnea/Pleural effusion  -Albuterol nebs prn  -continuous pulse ox  -patient received furosemide 20 mg IV in the ER  -possible thoracentesis tomorrow    vegetation on ICD leads  -continue Maxipime and Cubicin  -consult ID      Atrial Fibrillation/CAD  -continue metoprolol  -Telemetry  -eliquis 5mg bid  -asa 81 mg daily      Systolic Heart Failure  -daily weight  -strict I&OI  -Continue bumex   -Cardiology consult      Abdominal pain with nausea and vomiting  -zofran 4 mg IV q 6 hours prn    Chronic Kidney Disease  -avoid all nephrotoxic medications  -Monitor patient's kidney function    Diabetes insulin dependent  -Lispro at the mild correction scale   -Accu checks before meals and at bedtime  -Levemir 10 units at bedtime  - A1c          DVT prophylaxis: Patient is on Eliquis for Atrial Fib    CODE STATUS:    Code Status and Medical Interventions:   Ordered at: 09/10/19 0201     Level Of Support Discussed With:    Patient     Code Status:    CPR     Medical Interventions (Level of Support Prior to Arrest):    Full       Admission Status:  I believe this patient meets  OBSERVATION status, however if further evaluation or treatment plans warrant, status may change.  Based  upon current information, I predict patient's care encounter to be less than or equal to 2 midnights.        Electronically signed by CLARIBEL Hernandez, 09/10/19, 2:14 AM.      Brief Attending Admission Attestation     I have seen and examined the patient, performing an independent face-to-face diagnostic evaluation with plan of care reviewed and developed with the advanced practice clinician (APC).      Brief Summary Statement:   Rob Miranda is a 64 y.o. male with history of ongoing tobacco abuse, PVD, DMII, COPD, Ischemic Cardiomyopathy with EF less than 20% and BiV ICD, atrial fibrillation, recent RODOLFO requiring dialysis, Legionella pneumonia with mechanical ventilation, recent EVA revealing vegetations on ICD leads and dialysis catheter with discharge home from this facility on 8/27/19 now returns with cough, worsening shortness of breath and abdominal pain. Mr Miranda says he has experienced progressively worsening dyspnea over the past three days. Cough, productive of white sputum. Denies fever or chills. No chest pain, but endorses mid abdominal pain that is tender to the touch, also of three day duration. No nausea, vomiting or diarrhea.   Remainder of detailed HPI is as noted above and has been reviewed and/or edited by me for completeness.      Attending Physical Exam:  Constitutional -no acute distress, non toxic, in bed  HEENT-NCAT, mucous membranes moist  CV-RRR, S1 S2 normal, no m/r/g  Resp-bibasilar rales  Abd-soft, tender to palpation throughout mid-epigastrium, non-distended, normo active bowel sounds  Ext-lower legs and feet erythemtous with edema andnumerous scattered ulcerations  Neuro-alert and oriented, speech clear  Psych-normal affect   Skin- No rash on exposed UE or LE bilaterally  Lines- PICC RUE    apixaban 5 mg Oral Q12H   aspirin 81 mg Oral Daily   bumetanide 1 mg Oral Daily   cefepime 2 g Intravenous Once   cefepime 2 g Intravenous Q8H   DAPTOmycin 8 mg/kg (Adjusted) Intravenous  Q24H   ferrous sulfate 325 mg Oral BID With Meals   gabapentin 100 mg Oral Nightly   insulin detemir 10 Units Subcutaneous Nightly   insulin lispro 0-7 Units Subcutaneous 4x Daily With Meals & Nightly   ipratropium-albuterol 3 mL Nebulization 4x Daily - RT   magnesium (as) gluconate 27 mg Oral BID   metoprolol tartrate 100 mg Oral BID   miconazole  Topical BID   pantoprazole 40 mg Oral Q AM   sertraline 25 mg Oral Nightly   sodium chloride 10 mL Intravenous Q12H   tamsulosin 0.4 mg Oral Daily         CT Abdomen 9/9/18 personally reviewed. Right rectus sheath hematoma visible to my eye.    Brief Assessment/Plan :    Progressive dyspnea  - likely combination of CHF, pulmonary infiltrates and pleural effusions  - difficult to tell if infiltrates represent infection vs atelectasis, but favor the latter as patient continues on broad spectrum antibiotics (Check sputum culture)  - would benefit from left thoracentesis - eliquis held - therapeutic and diagnostic - will increase home bumex from 1 mg daily to 1 mg BID, monitor renal function given recent RODOLFO    Rectus sheath hematoma  - not mentioned on radiology report but apparent on my CT scan review -- may wish to go over images with radiology in the morning.  - suspect secondary to cough   - fairly small size at present, hemoglobin stable, eliquis held currently for thoracentesis, can probably resume soon afterwards.    Possible Endocarditis  - continue Dapto and Cefepime    Severe PVD  - high risk for loss of limb, particularly with ongoing smoking  - continue asa, restart NOAC when possible    Tobacco abuse  - ongoing  - strongly encouraged cessation        See above for further detailed assessment and plan developed with APC which I have reviewed and/or edited for completeness.      Electronically signed by Andreas Jack MD, 09/10/19, 3:08 AM.

## 2019-09-10 NOTE — PROGRESS NOTES
Discharge Planning Assessment  [facility]     Patient Name: Rob Miranda  MRN: [id number]  Today's Date: 9/10/2019    Admit Date: 9/9/2019    Discharge Needs Assessment     Row Name 09/10/19 0924       Living Environment    Lives With  spouse    Current Living Arrangements  home/apartment/condo    Primary Care Provided by  self    Provides Primary Care For  no one;no one, unable/limited ability to care for self    Family Caregiver if Needed  spouse    Quality of Family Relationships  involved;helpful    Able to Return to Prior Arrangements  yes       Transition Planning    Patient/Family Anticipates Transition to  home with family;home with help/services    Transportation Anticipated  family or friend will provide       Discharge Needs Assessment    Readmission Within the Last 30 Days  unable to assess    Concerns to be Addressed  discharge planning    Equipment Currently Used at Home  walker, rolling;wheelchair;shower chair    Anticipated Changes Related to Illness  inability to care for self    Equipment Needed After Discharge  none    Outpatient/Agency/Support Group Needs  homecare agency    Patient's Choice of Community Agency(s)  [facility]         Discharge Plan     Row Name 09/10/19 0940       Plan    Plan  home with HH    Patient/Family in Agreement with Plan  yes    Plan Comments  Pt lives in [facility] with his wife. He reports she assists him with any needed ADLs. He uses a wheelchair or walker for mobility and also has a shower chair. Pt is followed by [facility] for PT/OT/Skilled nursing for dx of CKD with HF, DM and COPD. Pt is followed by his PCP and his medications are covered by insurance. At this time his plan for discharge is to return home with [facility]. CM will continue to follow.        Destination      No service coordination in this encounter.      Durable Medical Equipment      No service coordination in this encounter.      Dialysis/Infusion      No service coordination in this encounter.       Home Medical Care      Service Provider Request Status Selected Services Address Phone Number Fax Number    [facility] Pending - No Request Sent N/A [address] , [facility] [id number] [phone]      Therapy      No service coordination in this encounter.      Community Resources      No service coordination in this encounter.          Demographic Summary     Row Name 09/10/19 0924       General Information    Admission Type  observation    Referral Source  physician    Reason for Consult  discharge planning    General Information Comments  PCP Iron Rivera        Contact Information    Permission Granted to Share Info With  ;family/designee    Contact Information Comments  Manasa Reynaley [phone]        Functional Status     Row Name 09/10/19 0924       Functional Status, IADL    Medications  assistive person    Meal Preparation  assistive person    Housekeeping  assistive person    Laundry  assistive person    Shopping  assistive person       Mental Status    General Appearance WDL  WDL       Mental Status Summary    Recent Changes in Mental Status/Cognitive Functioning  no changes        Psychosocial    No documentation.       Abuse/Neglect    No documentation.       Legal    No documentation.       Substance Abuse    No documentation.       Patient Forms    No documentation.           Julieta Barrett RN

## 2019-09-10 NOTE — NURSING NOTE
Pt. Referred for Phase II Cardiac Rehab. Staff discussed benefits of exercise, program protocol, and brochure provided. Pt refuses program at this time. Home exercise guidelines discussed with patient. Teach back verified.

## 2019-09-10 NOTE — CONSULTS
[facility] at [facility]  Consultation H&P    Patient: Rob Miranda  1955    PCP:  Kreis, Samuel Duane, MD   Treatment Team:   Attending Provider: Joanna Jin MD  Consulting Physician: Poli Edwards MD  Consulting Physician: Wai Escobedo MD  Admitting Provider: Joanna Jin MD   9/9/2019      DATE OF CONSULTATION: 9/10/2019 10:27 AM     IDENTIFICATION: A 64 y.o. male     REASON FOR CONSULTATION: a/c chf, possible endocarditis, aflutter     PROBLEM LIST:    Acute on chronic systolic CHF (congestive heart failure) (CMS/[facility])    Coronary artery disease involving coronary bypass graft of native heart without angina pectoris    Essential hypertension    Advanced Ischemic cardiomyopathy with estimated ejection fraction of less than 20% in January 2015, status post biventricular ICD implantation.in 2007,with gen.change in 2013.    Dyslipidemia, on Crestor.     IDDM (insulin dependent diabetes mellitus) (CMS/[facility])    Atrial fibrillation and flutter,s/p pulmonary renee ablationx2 in 2/14 and 3/15.on Tikosyn     RODOLFO (acute kidney injury) (CMS/[facility])    Elevated LFTs    Dyspnea on exertion    Rectus sheath hematoma    [facility] Problem List:  1. PAF/flutter            A)Tikosyn 2013            B)ICD Shocks secondary to Afib with RVR            C)PVA 2014, inability to isoate Right inferior pulmonary vent secondary to near proximity to esophagus            D)Repeat PVA 3/16/2015 wit re-isolaton of all four pulmonary veins.  Roof lines and mitral annulas left atrial rotor ablation            E)Recurrent Afib with ECV 4/17 and 10/17            F)Chadsvasc=4, Eliquis BID            G)Aflutter(recurrent) with ECV to NSR 11/15/18            H)Recurrent persistent Flutter since June 19            I) Plan for EVA/ECV + AVN ablation + upgrade to Bi-V ICD 08/2019 - aborted secondary to RODOLFO, transaminitis and vegetations on ICD lead necessitating [facility] hospital admission 8/19/19-8/26/19  2.  DCM            A)Remote MI/CABG 1991            B)Patent grafts by [facility] 2004, persistent ICM EF<30%            C)Echo 2006 EF 35%            D)St. Jr ICD Dr. Márquez 2006            E)Gen change 2013.              F)Recent Echo [facility] 6/19 EF 20%.             G) Echo 8/19/19: EF <20%, moderate MR, moderate TR, small mobile density present on RA as well as RV leads  3. Chronic SHF Class III  4. DM (Insulin dependant)  5. HTN  6. HLD  7. Insomnia   8. PAD: Severe bilateral lower extremity disease by CTA 3/18, followed by Dr. Vasquez   9. Tobacco Abuse(Ongoing)  10. ARF, HD.  Last HD 7/26/19 Followed by Dr. Cosme Gonsalez, [place]  11. Recent Legionnaires PNA Admit to [facility], transfer to  6/18/19 with two weeks on Ventilator, required HD, discharged to Rehab in [place] 7/31/19.     Past Medical History:   Diagnosis Date   • Atherosclerotic cardiovascular disease    • Chronic anticoagulation    • Chronic kidney disease    • Congestive heart failure (CMS/HCC)    • COPD (chronic obstructive pulmonary disease) (CMS/HCC)    • DM (diabetes mellitus) (CMS/[facility])     insulin dependant   • Dyslipidemia    • Hx of atrial flutter     and a- fib   • Hx of myocardial infarction 1992    s/p CABG    • Hyperlipidemia    • Hypertension    • Ischemic cardiomyopathy     advanced   • Legionnaire's disease (CMS/HCC)    • Sinus bradycardia     EPS, 03/19/2007, Dr. Márquez:  Successful radiofrequency ablation of right atrial flutter   • Skin cancer    • Tobacco use      Past Surgical History:   Procedure Laterality Date   • CARDIAC DEFIBRILLATOR PLACEMENT     • CARDIOVERSION      x 2 procedures   • CORONARY ARTERY BYPASS GRAFT  1991    [facility]        Allergies  Allergies   Allergen Reactions   • Shellfish-Derived Products Hives, Shortness Of Breath and Swelling       Current Medications    Current Facility-Administered Medications:   •  acetaminophen (TYLENOL) tablet 650 mg, 650 mg, Oral, Q4H PRN **OR** acetaminophen (TYLENOL)  160 MG/5ML solution 650 mg, 650 mg, Oral, Q4H PRN **OR** acetaminophen (TYLENOL) suppository 650 mg, 650 mg, Rectal, Q4H PRN, Hina Ryan APRN  •  aspirin EC tablet 81 mg, 81 mg, Oral, Daily, Hina Ryan APRN, 81 mg at 09/10/19 0949  •  bumetanide (BUMEX) injection 1 mg, 1 mg, Intravenous, Q12H, Lissette Jones PA  •  cefepime (MAXIPIME) 2 g/100 mL 0.9% NS (mbp), 2 g, Intravenous, Q8H, Hina Ryan APRN, 2 g at 09/10/19 0950  •  DAPTOmycin (CUBICIN) 700 mg in sodium chloride 0.9 % 50 mL IVPB, 8 mg/kg (Adjusted), Intravenous, Q24H, Hina Ryan APRN, Last Rate: 100 mL/hr at 09/10/19 0453, 700 mg at 09/10/19 0453  •  dextrose (D50W) 25 g/ 50mL Intravenous Solution 25 g, 25 g, Intravenous, Q15 Min PRN, Hina Ryan APRN  •  dextrose (GLUTOSE) oral gel 15 g, 15 g, Oral, Q15 Min PRN, Hina Ryan APRN  •  docusate sodium (COLACE) capsule 100 mg, 100 mg, Oral, BID PRN, Hina Ryan APRN  •  ferrous sulfate tablet 325 mg, 325 mg, Oral, BID With Meals, Hina Ryan APRN, 325 mg at 09/10/19 0948  •  gabapentin (NEURONTIN) capsule 100 mg, 100 mg, Oral, Nightly, Hina Ryan APRN  •  glucagon (human recombinant) (GLUCAGEN DIAGNOSTIC) injection 1 mg, 1 mg, Subcutaneous, Q15 Min PRN, Hina Ryan APRN  •  Hold medication, 1 each, Does not apply, Continuous PRN, Andreas Jack MD  •  insulin detemir (LEVEMIR) injection 10 Units, 10 Units, Subcutaneous, Nightly, Hina Ryan APRN  •  insulin lispro (humaLOG) injection 0-7 Units, 0-7 Units, Subcutaneous, 4x Daily With Meals & Nightly, Hina Ryan APRN, 2 Units at 09/10/19 0947  •  ipratropium-albuterol (DUO-NEB) nebulizer solution 3 mL, 3 mL, Nebulization, 4x Daily - RT, Hina Ryan APRN, 3 mL at 09/10/19 0735  •  magnesium (as) gluconate (MAGONATE) tablet 27 mg, 27 mg, Oral, BID, Hina Ryan APRN, 27 mg at 09/10/19 0948  •  metoprolol tartrate (LOPRESSOR) tablet 100 mg, 100 mg, Oral, BID, Connor  CLARIBEL Santamaria  •  miconazole (MICOTIN) 2 % powder, , Topical, BID, Hina Ryan APRN  •  ondansetron (ZOFRAN) injection 4 mg, 4 mg, Intravenous, Q6H PRN, Hina Ryan APRN, 4 mg at 09/10/19 0343  •  pantoprazole (PROTONIX) EC tablet 40 mg, 40 mg, Oral, Q AM, iHna Ryan APRN, 40 mg at 09/10/19 0453  •  Pharmacy Consult - [facility] [place], , Does not apply, Daily, Hira Velasco, [facility]  •  pravastatin (PRAVACHOL) tablet 20 mg, 20 mg, Oral, Nightly, Lissette Jones PA  •  sertraline (ZOLOFT) tablet 25 mg, 25 mg, Oral, Nightly, Hina Ryan APRN  •  sodium chloride 0.9 % flush 10 mL, 10 mL, Intravenous, PRN, Sachin Palacios, DO  •  sodium chloride 0.9 % flush 10 mL, 10 mL, Intravenous, Q12H, Hina Ryan APRN  •  sodium chloride 0.9 % flush 10 mL, 10 mL, Intravenous, PRN, Hina Ryan APRN  •  tamsulosin (FLOMAX) 24 hr capsule 0.4 mg, 0.4 mg, Oral, Daily, Hina Ryan APRN, 0.4 mg at 09/10/19 0949    hold 1 each       History of Present Illness   Rob Miranda is a 64 y.o. year old male with a PMH significant for chronic systolic CHF with EF 20%, CAD, persistent symptomatic atrial flutter on Eliquis, recent vegetation seen on ICD leads undergoing IV abx treatment for possible endocarditis, HTN, HLD, PVD, T2DM, COPD with ongoing tobacco abuse, and CKD who presented to the [facility] ED early this morning 9/10/2019 with complaints of worsening shortness of breath and lower extremity edema and weight gain x3 days.  He states he is noticed symptoms over the last week that is significantly worsened over the last few days.  He endorses wheezing, coughing, and increased fatigue and reports his abdomen is very swollen. He felt he had gained ~20 lbs in the last week.     Of note, he was recently hospitalized at [facility] in mid August where he presented for a planned EVA/ECV/AV node ablation with upgrade to a by V ICD, however when the EVA was performed he was found to have vegetations on his ICD leads and the  procedure was aborted.  He was also noted to have worsening renal function and elevated liver enzymes.  Renal function improved during this admission with adjusting medications.  His change in liver function was thought to be due to congestive hepatopathy, and also improved during the admission.  He was started on IV antibiotics per ID and discharged with a PICC line to continue home IV antibiotics for 6 weeks. He states he had been getting one infusion every day, and one infusion every other day. He had 4 weeks yet, and had not followed up w ID outpt yet.     Also of note, he was hospitalized at [facility] in June for Legionella pneumonia, was intubated for 2 weeks and required hemodialysis during the stay.    At present he states he is having less dyspnea, and his LE edema is improved, abdominal tightness is slightly improved.  He denies any CP, fever/chills, abdominal pain or GI symptoms.  He has received 1 dose of IV lasix. BNP was 7388 this am, we do not have a baseline for him. He states he has not had trouble w volume overload before. His HR has been high [id number] at home.     ROS  Review of Systems   Constitution: Positive for weight gain. Negative for chills, fever and night sweats.   Cardiovascular: Positive for dyspnea on exertion, irregular heartbeat, leg swelling and orthopnea. Negative for chest pain.   Respiratory: Positive for cough, shortness of breath and wheezing.    Gastrointestinal: Negative for abdominal pain, diarrhea, nausea and vomiting.   All other systems reviewed and are negative.      SOCIAL HX  Social History     Socioeconomic History   • Marital status:      Spouse name: Not on file   • Number of children: 2   • Years of education: Not on file   • Highest education level: Not on file   Occupational History   • Occupation:      Employer: DISABLED     Comment: Disable because of heart attacks    Tobacco Use   • Smoking status: Current Every Day Smoker     Years: 48.00      Types: Cigarettes   • Smokeless tobacco: Never Used   • Tobacco comment: 5-6 CAD   Substance and Sexual Activity   • Alcohol use: No     Frequency: Never   • Drug use: No   • Sexual activity: Defer   Social History Narrative    Lives at home with his wife, Destiny.       FAMILY HX  Family History   Problem Relation Age of Onset   • Cancer Mother 74   • Heart disease Mother    • Diabetes Mother    • Heart attack Father 72        in his 80's   • Diabetes Father        OBJECTIVE:  Vitals:    09/10/19 0600 09/10/19 0726 09/10/19 0738 09/10/19 0948   BP:  109/76  106/78   BP Location:  Left arm     Patient Position:  Sitting     Pulse:  109 101    Resp:  16 16    Temp:  97.6 °F (36.4 °C)     TempSrc:  Oral     SpO2:   92%    Weight: 89.2 kg (196 lb 11.2 oz)      Height:         No intake/output data recorded.  I/O this shift:  In: 240 [P.O.:240]  Out: -   Intake & Output (last 3 days)       09/07 0701 - 09/08 0700 09/08 0701 - 09/09 0700 09/09 0701 - 09/10 0700 09/10 0701 - 09/11 0700    P.O.    240    Total Intake(mL/kg)    240 (2.7)    Net    +240            Urine Unmeasured Occurrence    1 x    Stool Unmeasured Occurrence    1 x           PHYSICAL EXAMINATION:  Physical Exam   Constitutional: He is oriented to person, place, and time. He appears well-developed and well-nourished. No distress.   HENT:   Head: Normocephalic and atraumatic.   Right Ear: External ear normal.   Left Ear: External ear normal.   Nose: Nose normal.   Eyes: Conjunctivae and EOM are normal.   Neck: Neck supple. No hepatojugular reflux and no JVD present. Carotid bruit is not present. No thyromegaly present.   Cardiovascular: Regular rhythm, S1 normal, S2 normal, normal heart sounds and intact distal pulses. Tachycardia present. Exam reveals no gallop, no distant heart sounds and no midsystolic click.   No murmur heard.  Pulses:       Radial pulses are 2+ on the right side, and 2+ on the left side.        Dorsalis pedis pulses are 0 on the right  side, and 0 on the left side.        Posterior tibial pulses are 0 on the right side, and 0 on the left side.   Pulmonary/Chest: Effort normal. No respiratory distress. He has decreased breath sounds (L>R). He has wheezes. He has no rhonchi. He has no rales.   Abdominal: Bowel sounds are normal. There is no hepatosplenomegaly. There is no tenderness.   tight   Musculoskeletal: Normal range of motion. He exhibits edema.   Neurological: He is alert and oriented to person, place, and time.   No focal deficits.   Skin: Skin is warm and dry. No erythema.   Jaundiced   Psychiatric: He has a normal mood and affect. Thought content normal.   Nursing note and vitals reviewed.      Diagnostic Data:  Lab Results (last 24 hours)     Procedure Component Value Units Date/Time    Hemoglobin A1c [[id number]]  (Abnormal) Collected:  09/10/19 0315    Specimen:  Blood Updated:  09/10/19 0856     Hemoglobin A1C 6.00 %     Narrative:       Hemoglobin A1C Ranges:    Increased Risk for Diabetes  5.7% to 6.4%  Diabetes                     >= 6.5%  Diabetic Goal                < 7.0%    POC Glucose Once [[id number]]  (Abnormal) Collected:  09/10/19 0728    Specimen:  Blood Updated:  09/10/19 0730     Glucose 155 mg/dL     aPTT [[id number]]  (Normal) Collected:  09/10/19 0436    Specimen:  Blood Updated:  09/10/19 0532     PTT 36.8 seconds     Narrative:       PTT = The equivalent PTT values for the therapeutic range of heparin levels at 0.3 to 0.5 U/ml are 55 to 70 seconds.    Protime-INR [[id number]]  (Abnormal) Collected:  09/10/19 0436    Specimen:  Blood Updated:  09/10/19 0532     Protime 22.6 Seconds      INR 2.09    Comprehensive Metabolic Panel [[id number]]  (Abnormal) Collected:  09/10/19 0316    Specimen:  Blood Updated:  09/10/19 0417     Glucose 172 mg/dL      BUN 38 mg/dL      Creatinine 1.60 mg/dL      Sodium 137 mmol/L      Potassium 4.2 mmol/L      Chloride 99 mmol/L      CO2 21.0 mmol/L      Calcium 9.0 mg/dL      Total Protein  7.8 g/dL      Albumin 3.00 g/dL      ALT (SGPT) 25 U/L      AST (SGOT) 38 U/L      Comment: Specimen hemolyzed.  Results may be affected.        Alkaline Phosphatase 273 U/L      Total Bilirubin 1.4 mg/dL      eGFR Non African Amer 44 mL/min/1.73      Globulin 4.8 gm/dL      A/G Ratio 0.6 g/dL      BUN/Creatinine Ratio 23.8     Anion Gap 17.0 mmol/L     Narrative:       GFR Normal >60  Chronic Kidney Disease <60  Kidney Failure <15    Troponin [[id number]]  (Normal) Collected:  09/10/19 0316    Specimen:  Blood Updated:  09/10/19 0414     Troponin T 0.028 ng/mL     Narrative:       Troponin T Reference Range:  <= 0.03 ng/mL-   Negative for AMI  >0.03 ng/mL-     Abnormal for myocardial necrosis.  Clinicians would have to utilize clinical acumen, EKG, Troponin and serial changes to determine if it is an Acute Myocardial Infarction or myocardial injury due to an underlying chronic condition.     Magnesium [[id number]]  (Normal) Collected:  09/10/19 0316    Specimen:  Blood Updated:  09/10/19 0414     Magnesium 1.9 mg/dL     Lipid Panel [[id number]]  (Abnormal) Collected:  09/10/19 0316    Specimen:  Blood Updated:  09/10/19 0414     Total Cholesterol 101 mg/dL      Triglycerides 106 mg/dL      HDL Cholesterol 20 mg/dL      LDL Cholesterol  60 mg/dL      VLDL Cholesterol 21.2 mg/dL      LDL/HDL Ratio 2.99    Narrative:       Cholesterol Reference Ranges  (U.S. Department of Health and Human Services ATP III Classifications)    Desirable          <200 mg/dL  Borderline High    200-239 mg/dL  High Risk          >240 mg/dL      Triglyceride Reference Ranges  (U.S. Department of Health and Human Services ATP III Classifications)    Normal           <150 mg/dL  Borderline High  150-199 mg/dL  High             200-499 mg/dL  Very High        >500 mg/dL    HDL Reference Ranges  (U.S. Department of Health and Human Services ATP III Classifcations)    Low     <40 mg/dl (major risk factor for CHD)  High    >60 mg/dl ('negative' risk  factor for CHD)        LDL Reference Ranges  (U.S. Department of Health and Human Services ATP III Classifcations)    Optimal          <100 mg/dL  Near Optimal     100-129 mg/dL  Borderline High  130-159 mg/dL  High             160-189 mg/dL  Very High        >189 mg/dL    BNP [[id number]]  (Abnormal) Collected:  09/10/19 0316    Specimen:  Blood Updated:  09/10/19 0410     proBNP 7,388.0 pg/mL     Narrative:       Among patients with dyspnea, NT-proBNP is highly sensitive for the detection of acute congestive heart failure. In addition NT-proBNP of <300 pg/ml effectively rules out acute congestive heart failure with 99% negative predictive value.    CBC Auto Differential [[id number]]  (Abnormal) Collected:  09/10/19 0315    Specimen:  Blood Updated:  09/10/19 0409     WBC 8.78 10*3/mm3      RBC 5.52 10*6/mm3      Hemoglobin 13.8 g/dL      Hematocrit 47.2 %      MCV 85.5 fL      MCH 25.0 pg      MCHC 29.2 g/dL      RDW 28.0 %      RDW-SD 85.6 fl      MPV 9.9 fL      Platelets 199 10*3/mm3      Neutrophil % 62.4 %      Lymphocyte % 27.3 %      Monocyte % 6.8 %      Eosinophil % 2.3 %      Basophil % 0.7 %      Immature Grans % 0.5 %      Neutrophils, Absolute 5.48 10*3/mm3      Lymphocytes, Absolute 2.40 10*3/mm3      Monocytes, Absolute 0.60 10*3/mm3      Eosinophils, Absolute 0.20 10*3/mm3      Basophils, Absolute 0.06 10*3/mm3      Immature Grans, Absolute 0.04 10*3/mm3      nRBC 0.0 /100 WBC     Blood Culture - Blood, Arm, Right [[id number]] Collected:  09/09/19 2100    Specimen:  Blood from Arm, Right Updated:  09/09/19 2228    Blood Culture - Blood, Hand, Right [[id number]] Collected:  09/09/19 2115    Specimen:  Blood from Hand, Right Updated:  09/09/19 2226    Troponin [[id number]]  (Abnormal) Collected:  09/09/19 2057    Specimen:  Blood Updated:  09/09/19 2140     Troponin T 0.034 ng/mL     Narrative:       Troponin T Reference Range:  <= 0.03 ng/mL-   Negative for AMI  >0.03 ng/mL-     Abnormal for myocardial  necrosis.  Clinicians would have to utilize clinical acumen, EKG, Troponin and serial changes to determine if it is an Acute Myocardial Infarction or myocardial injury due to an underlying chronic condition.     Comprehensive Metabolic Panel [[id number]]  (Abnormal) Collected:  09/09/19 2057    Specimen:  Blood Updated:  09/09/19 2136     Glucose 199 mg/dL      BUN 36 mg/dL      Creatinine 1.50 mg/dL      Sodium 137 mmol/L      Potassium 4.1 mmol/L      Chloride 102 mmol/L      CO2 23.0 mmol/L      Calcium 8.6 mg/dL      Total Protein 6.8 g/dL      Albumin 3.00 g/dL      ALT (SGPT) 22 U/L      AST (SGOT) 33 U/L      Comment: Specimen hemolyzed.  Results may be affected.        Alkaline Phosphatase 242 U/L      Total Bilirubin 1.3 mg/dL      eGFR Non African Amer 47 mL/min/1.73      Globulin 3.8 gm/dL      A/G Ratio 0.8 g/dL      BUN/Creatinine Ratio 24.0     Anion Gap 12.0 mmol/L     Narrative:       GFR Normal >60  Chronic Kidney Disease <60  Kidney Failure <15    CBC & Differential [[id number]] Collected:  09/09/19 2057    Specimen:  Blood Updated:  09/09/19 2134    Narrative:       The following orders were created for panel order CBC & Differential.  Procedure                               Abnormality         Status                     ---------                               -----------         ------                     CBC Auto Differential[[id number]]        Abnormal            Final result                 Please view results for these tests on the individual orders.    CBC Auto Differential [[id number]]  (Abnormal) Collected:  09/09/19 2057    Specimen:  Blood Updated:  09/09/19 2134     WBC 7.58 10*3/mm3      RBC 5.14 10*6/mm3      Hemoglobin 12.8 g/dL      Hematocrit 43.2 %      MCV 84.0 fL      MCH 24.9 pg      MCHC 29.6 g/dL      RDW 27.8 %      RDW-SD 83.8 fl      MPV 9.7 fL      Platelets 181 10*3/mm3      Neutrophil % 71.0 %      Lymphocyte % 19.7 %      Monocyte % 5.9 %      Eosinophil % 2.2 %       Basophil % 0.8 %      Immature Grans % 0.4 %      Neutrophils, Absolute 5.38 10*3/mm3      Lymphocytes, Absolute 1.49 10*3/mm3      Monocytes, Absolute 0.45 10*3/mm3      Eosinophils, Absolute 0.17 10*3/mm3      Basophils, Absolute 0.06 10*3/mm3      Immature Grans, Absolute 0.03 10*3/mm3      nRBC 0.0 /100 WBC     BNP [[id number]]  (Abnormal) Collected:  09/09/19 2057    Specimen:  Blood Updated:  09/09/19 2133     proBNP 6,224.0 pg/mL     Narrative:       Among patients with dyspnea, NT-proBNP is highly sensitive for the detection of acute congestive heart failure. In addition NT-proBNP of <300 pg/ml effectively rules out acute congestive heart failure with 99% negative predictive value.    Lactic Acid, Plasma [[id number]]  (Normal) Collected:  09/09/19 2058    Specimen:  Blood Updated:  09/09/19 2125     Lactate 1.6 mmol/L      Comment: Falsely depressed results may occur on samples drawn from patients receiving N-Acetylcysteine (NAC) or Metamizole.           ECG/EMG Results (last 24 hours)     Procedure Component Value Units Date/Time    ECG 12 Lead [[id number]] Collected:  09/09/19 2027     Updated:  09/10/19 0646               Acute on chronic systolic CHF (congestive heart failure) (CMS/[facility])    Coronary artery disease involving coronary bypass graft of native heart without angina pectoris    Essential hypertension    Advanced Ischemic cardiomyopathy with estimated ejection fraction of less than 20% in January 2015, status post biventricular ICD implantation.in 2007,with gen.change in 2013.    Dyslipidemia, on Crestor.     IDDM (insulin dependent diabetes mellitus) (CMS/[facility])    Atrial fibrillation and flutter,s/p pulmonary renee ablationx2 in 2/14 and 3/15.on Tikosyn     RODOLFO (acute kidney injury) (CMS/[facility])    Elevated LFTs    Dyspnea on exertion    Rectus sheath hematoma        ASSESSMENT/PLAN:  A/C SHF  - 8/19/2019 EVA EF 20%  - pleural effusions and possible infiltrate on chest CT  - proBNP 7000  - s/p IV lasix  20mg x1 w improvement of symptoms  - will transition PO bumex to IV. Follow renal function. Strict Is and Os      Pleural effusions  - possible L thoracentesis, holding Eliquis    Possible Endocarditis  - w PICC, outpt abx infusions per ID for 4 more weeks  - continue abx     Aflutter/afib  - planned AVN and upgrade to BiVICD once infection clears  - holding Eliquis for possible thoracentesis  - rate controlled on metoprolol tartrate 100mg BID    CKD  - hx HD during [facility] 6/[facility]  - baseline Cr ~1.4-1.6    CAD  - remote CABG  - cont ASA, BB    Severe PVD    Ongoing Tobacco Abuse  - cessation counseling     HTN  - controlled    HLD  - off statin d/t possible liver injury last admission, although elevated LFTs were determined to be d/t congestive hepatopathy per GI. Will resume statin therapy w low dose pravastatin.    T2DM  - ssi        Lissette Jones PA-C  10:27 AM 9/10/2019     Scribed for Dr. MANUEL London MD by Lissette Jones PA-C. 9/10/2019  10:27 AM   Imanuel md, personally performed the services described in this documentation as scribed by the above named individual in my presence, and it is both accurate and complete.  9/10/2019  4:45 PM

## 2019-09-10 NOTE — PLAN OF CARE
Problem: Patient Care Overview  Goal: Plan of Care Review  Outcome: Ongoing (interventions implemented as appropriate)   09/10/19 0415   Coping/Psychosocial   Plan of Care Reviewed With patient   Plan of Care Review   Progress no change   OTHER   Outcome Summary New admit her for SOB and abdominal pain. VSS. Will continue to monitor.        Problem: Fall Risk (Adult)  Goal: Identify Related Risk Factors and Signs and Symptoms  Outcome: Ongoing (interventions implemented as appropriate)   09/10/19 0415   Fall Risk (Adult)   Related Risk Factors (Fall Risk) fatigue/slow reaction;environment unfamiliar;age-related changes   Signs and Symptoms (Fall Risk) presence of risk factors     Goal: Absence of Fall  Outcome: Ongoing (interventions implemented as appropriate)   09/10/19 0415   Fall Risk (Adult)   Absence of Fall making progress toward outcome       Problem: Breathing Pattern Ineffective (Adult)  Goal: Identify Related Risk Factors and Signs and Symptoms  Outcome: Ongoing (interventions implemented as appropriate)   09/10/19 0415   Breathing Pattern Ineffective (Adult)   Related Risk Factors (Breathing Pattern Ineffective) underlying condition;fatigue;deconditioning;smoking;pain   Signs and Symptoms (Breathing Pattern Ineffective) accessory muscle use;activity intolerance;breath sounds abnormal;breathing pattern altered;breathlessness     Goal: Effective Oxygenation/Ventilation  Outcome: Ongoing (interventions implemented as appropriate)   09/10/19 0415   Breathing Pattern Ineffective (Adult)   Effective Oxygenation/Ventilation making progress toward outcome     Goal: Anxiety/Fear Reduction  Outcome: Ongoing (interventions implemented as appropriate)   09/10/19 0415   Breathing Pattern Ineffective (Adult)   Anxiety/Fear Reduction making progress toward outcome

## 2019-09-10 NOTE — CONSULTS
INFECTIOUS DISEASE CONSULT/INITIAL HOSPITAL VISIT    Rob Miranda  1955  [id number]    Date of consult: 9/10/2019    Admit date: 9/9/2019    Requesting Provider: No ref. provider found  Evaluating physician: Wai Escobedo MD  Reason for Consultation: sepsis, endocarditis, related to pacemaker  Chief Complaint: above      Subjective   History of present illness:  Patient is a 64 y.o.  Yr old male with diabetes mellitus type 2, COPD, BPH, depression, hypertension, CAD, ischemic cardiomyopathy with ejection fraction less than 15%, biventricular ICD implant 2007 with generator change 2013, atrial fibrillation, acute kidney injury on dialysis in July at  last dialyzed 7/26/2019 with tunneled catheter in place, with a EVA on 8/19/2019 reported positive for vegetation on the RA and RV leads as well as the tip of the dialysis catheter who was admitted to [facility] on 8/19/2019.  Patient denied any high fevers or chills.  He has been ill previously since June with a diagnosis of left lower lobe pneumonia in June 2019 with a positive urine antigen for Legionella treated with doxycycline and levofloxacin, finishing his therapy at the [facility] after deteriorating with acute hypoxic resp failure admitted to ICU, then discharged approximately 6/19/2019.  Went to Rehab and then discharged around 7/7/19.  Patient at that time was also found to have acute renal failure receiving some hemodialysis via a tunneled temp dialysis right chest catheter.  The patient receives his routine cardiac care by Dr. Edwards.  He continued to have persistent atrial fibrillation and atrial flutter.  A transesophageal echocardiogram done on 8/19/2019 reported possible vegetations on the right atrial/right ventricular lead as well as the dialysis catheter.  Previously the patient received antibiotics in early July 2019.  He did not have subsequent fevers.  He had continued issues with respiratory status  and heart failure.  I was consulted on 8/20/2019 for further evaluation and treatment.  The patient has no other localizing signs or symptoms of infection.  The patient had an episode of MRSA left groin infection with a positive culture at [facility] on 1/30/2019 confirmed with the microbiology tech.  Blood cultures obtained at [facility] from January 2019, as well as 6/13/2019, 6/17/2019 were negative.  Urine was positive for Legionella antigen study at [facility] June 2019.  He received a treatment course for this at the [facility].  He has no other localizing signs or symptoms of infection.    The patient was discharged home to receive daptomycin 500 mg every 48 hours, cefepime 1 g every 24 hours for pacer lead related ICD endocarditis with negative cultures to continue until 10/1/2019.  The patient was admitted to the emergency room on 9/9/2019 complaining of increasing shortness of breath, and increased weight gain of 20 pounds with increased abdominal swelling.  He denied any high fevers or chills.  A CT scan of the chest on 9/9 revealed a moderate left-sided pleural effusion, some coarse left upper lobe infiltrate/atelectasis, compressive atelectasis of the left lower lobe, small right pleural effusion.  The CT of the abdomen and pelvis revealed some nephrolithiasis without hydronephrosis, and some ascites but no bowel obstruction.  Chest x-ray revealed cardiomegaly and mild volume overload.  Portal and hepatic venous ultrasound was negative on 9/3.  Laboratories revealed proBNP of 7388, negative troponin, creatinine 1.60, sodium 137, potassium 4.2, bicarb 21, ALT 25, AST 38, albumin 3, alkaline phosphatase 273, total bili 1.4, WBC 8.78, hemoglobin 13.8, platelets 199.  Lactic acid was 1.6, blood cultures obtained on 9/9 were negative.  CPK normal 8/21.  I was consulted on 9/10/2019 for further evaluation and treatment.    Past Medical History:    Diagnosis Date   • Atherosclerotic cardiovascular disease    • Chronic anticoagulation    • Chronic kidney disease    • Congestive heart failure (CMS/[facility])    • COPD (chronic obstructive pulmonary disease) (CMS/[facility])    • DM (diabetes mellitus) (CMS/[facility])     insulin dependant   • Dyslipidemia    • Hx of atrial flutter     and a- fib   • Hx of myocardial infarction 1992    s/p CABG    • Hyperlipidemia    • Hypertension    • Ischemic cardiomyopathy     advanced   • Legionnaire's disease (CMS/[facility])    • Sinus bradycardia     EPS, 03/19/2007, Dr. Márquez:  Successful radiofrequency ablation of right atrial flutter   • Skin cancer    • Tobacco use        Past Surgical History:   Procedure Laterality Date   • CARDIAC DEFIBRILLATOR PLACEMENT     • CARDIOVERSION      x 2 procedures   • CORONARY ARTERY BYPASS GRAFT  1991    [facility]        Pediatric History   Patient Guardian Status   • Not on file     Other Topics Concern   • Not on file   Social History Narrative    Lives at home with his wife, Destiny.     , lives with his wife in [facility], 1.5 packs/day  family history includes Cancer (age of onset: 74) in his mother; Diabetes in his father and mother; Heart attack (age of onset: 72) in his father; Heart disease in his mother.    Allergies   Allergen Reactions   • Shellfish-Derived Products Hives, Shortness Of Breath and Swelling       There is no immunization history for the selected administration types on file for this patient.    Medication:    Current Facility-Administered Medications:   •  acetaminophen (TYLENOL) tablet 650 mg, 650 mg, Oral, Q4H PRN **OR** acetaminophen (TYLENOL) 160 MG/5ML solution 650 mg, 650 mg, Oral, Q4H PRN **OR** acetaminophen (TYLENOL) suppository 650 mg, 650 mg, Rectal, Q4H PRN, Hina Ryan APRN  •  aspirin EC tablet 81 mg, 81 mg, Oral, Daily, Hina Ryan APRN, 81 mg at 09/10/19 0949  •  bumetanide (BUMEX) injection 1 mg, 1 mg, Intravenous, Q12H, Karen  MIGUEL Mclaughlin  •  cefepime (MAXIPIME) 2 g/100 mL 0.9% NS (mbp), 2 g, Intravenous, Q8H, Hina Ryan APRN, 2 g at 09/10/19 0950  •  DAPTOmycin (CUBICIN) 700 mg in sodium chloride 0.9 % 50 mL IVPB, 8 mg/kg (Adjusted), Intravenous, Q24H, Hina Ryan APRN, Last Rate: 100 mL/hr at 09/10/19 0453, 700 mg at 09/10/19 0453  •  dextrose (D50W) 25 g/ 50mL Intravenous Solution 25 g, 25 g, Intravenous, Q15 Min PRN, Hina Ryan APRN  •  dextrose (GLUTOSE) oral gel 15 g, 15 g, Oral, Q15 Min PRN, Hina Ryan APRN  •  docusate sodium (COLACE) capsule 100 mg, 100 mg, Oral, BID PRN, Hina Ryan APRN  •  ferrous sulfate tablet 325 mg, 325 mg, Oral, BID With Meals, Hina Ryan APRN, 325 mg at 09/10/19 0948  •  gabapentin (NEURONTIN) capsule 100 mg, 100 mg, Oral, Nightly, Hina Ryan APRN  •  glucagon (human recombinant) (GLUCAGEN DIAGNOSTIC) injection 1 mg, 1 mg, Subcutaneous, Q15 Min PRN, Hina Ryan APRN  •  Hold medication, 1 each, Does not apply, Continuous PRN, Andreas Jack MD  •  insulin detemir (LEVEMIR) injection 10 Units, 10 Units, Subcutaneous, Nightly, Hina Ryan APRN  •  insulin lispro (humaLOG) injection 0-7 Units, 0-7 Units, Subcutaneous, 4x Daily With Meals & Nightly, Hina Ryan APRN, 2 Units at 09/10/19 0947  •  ipratropium-albuterol (DUO-NEB) nebulizer solution 3 mL, 3 mL, Nebulization, 4x Daily - RT, Hina Ryan APRN, 3 mL at 09/10/19 0735  •  magnesium (as) gluconate (MAGONATE) tablet 27 mg, 27 mg, Oral, BID, Hina Ryan APRN, 27 mg at 09/10/19 0948  •  metoprolol tartrate (LOPRESSOR) tablet 100 mg, 100 mg, Oral, BID, Hina Ryan, CLARIBEL  •  miconazole (MICOTIN) 2 % powder, , Topical, BID, Hina Ryan, APRN  •  ondansetron (ZOFRAN) injection 4 mg, 4 mg, Intravenous, Q6H PRN, Hina Ryan APRN, 4 mg at 09/10/19 0343  •  pantoprazole (PROTONIX) EC tablet 40 mg, 40 mg, Oral, Q AM, Hina Ryan APRN, 40 mg at 09/10/19 0453  •   Pharmacy Consult - [facility], , Does not apply, Daily, Hira Velasco, [facility]  •  pravastatin (PRAVACHOL) tablet 20 mg, 20 mg, Oral, Nightly, Lissette Jones PA  •  sertraline (ZOLOFT) tablet 25 mg, 25 mg, Oral, Nightly, Elly Ryanrie, APRN  •  sodium chloride 0.9 % flush 10 mL, 10 mL, Intravenous, PRN, Sachin Palacios, DO  •  sodium chloride 0.9 % flush 10 mL, 10 mL, Intravenous, Q12H, Connor, Hina, APRN  •  sodium chloride 0.9 % flush 10 mL, 10 mL, Intravenous, PRN, Connor, Hina, APRN  •  tamsulosin (FLOMAX) 24 hr capsule 0.4 mg, 0.4 mg, Oral, Daily, Connor Hina, APRN, 0.4 mg at 09/10/19 0937    Please refer to the medical record for a full medication list    Review of Systems:    Constitutional-- No Fever, chills or sweats.  Appetite fair, and no malaise. No fatigue.  HEENT-- No new vision, hearing or throat complaints.  No epistaxis or oral sores.  Denies odynophagia or dysphagia.  No odynophagia or dysphagia. No headache, photophobia or neck stiffness.  CV-- No chest pain, palpitation or syncope  Resp-- some SOB/nonprod cough/no Hemoptysis, no wheezes  GI- No nausea, vomiting, or diarrhea.  No hematochezia, melena, or hematemesis. Denies jaundice or chronic liver disease.  -- No dysuria, hematuria, or flank pain.  Denies hesitancy, urgency or flank pain.  Lymph- no swollen lymph nodes in neck/axilla or groin.   Heme- No active bruising or bleeding; no Hx of DVT or PE.  MS-- no swelling or pain in the bones or joints of arms/legs.  No new back pain.  Neuro-- No acute focal weakness or numbness in the arms or legs.  No seizures.  Skin--No rashes or lesions, right chest old site dialysis ok, no nodules    Physical Exam:   Vital Signs   Temp:  [97.5 °F (36.4 °C)-97.9 °F (36.6 °C)] 97.6 °F (36.4 °C)  Heart Rate:  [] 101  Resp:  [16-22] 16  BP: (103-109)/(65-83) 106/78    Temp  Min: 97.5 °F (36.4 °C)  Max: 97.9 °F (36.6 °C)  BP  Min: 103/83  Max: 109/76  Pulse  Min: 93  Max: 112  Resp  Min: 16  Max:  "22  SpO2  Min: 90 %  Max: 96 %    Blood pressure 106/78, pulse 101, temperature 97.6 °F (36.4 °C), temperature source Oral, resp. rate 16, height 182.9 cm (72\"), weight 89.2 kg (196 lb 11.2 oz), SpO2 92 %.  GENERAL: Awake and alert, in moderate distress. Appears older than stated age.  HEENT:  Normocephalic, atraumatic.  Oropharynx without thrush. Dentition in good repair. No cervical adenopathy. No neck masses.  Ears externally normal, Nose externally normal.  EYES: PERRL. No conjunctival injection. No icterus. EOM full.  LYMPHATICS: No lymphadenopathy of the neck or axillary or inguinal regions.   HEART: 2/6 syst murmur lsb, no gallop, or pericardial friction rub. Reg rate rhythm, No JVD at 45 degrees.  LUNGS: rales at bases, with decreased breath sounds. No respiratory distress, no use of accessory muscles.  No wheezes.  ABDOMEN: Soft, nontender, nondistended. No appreciable HSM.  Bowel sounds normal.  GENITAL: No external lesions, breasts without masses, back straight, no CVAT, rectal external without lesions.   SKIN: Warm and dry without cutaneous eruptions.  No nodules.    PSYCHIATRIC: Mental status lucid. No confusion.  EXT:  No cellulitic change.  NEURO: Oriented to name, CN 2 to 12 intact, DTR 1 + and symmetric, sensory intact to LT upper and lower extremitiy, motor 5/5 upper and lower extremity, cerebellar and gait not tested.      Results Review:   I reviewed the patient's new clinical results.  I reviewed the patient's new imaging results and agree with the interpretation.  I reviewed the patient's other test results and agree with the interpretation    Results from last 7 days   Lab Units 09/10/19  0315 09/09/19  2057   WBC 10*3/mm3 8.78 7.58   HEMOGLOBIN g/dL 13.8 12.8*   HEMATOCRIT % 47.2 43.2   PLATELETS 10*3/mm3 199 181     Results from last 7 days   Lab Units 09/10/19  0316   SODIUM mmol/L 137   POTASSIUM mmol/L 4.2   CHLORIDE mmol/L 99   CO2 mmol/L 21.0*   BUN mg/dL 38*   CREATININE mg/dL 1.60* "   GLUCOSE mg/dL 172*   CALCIUM mg/dL 9.0     Results from last 7 days   Lab Units 09/10/19  0316   ALK PHOS U/L 273*   BILIRUBIN mg/dL 1.4*   ALT (SGPT) U/L 25   AST (SGOT) U/L 38                 Results from last 7 days   Lab Units 09/09/19  2058   LACTATE mmol/L 1.6     Estimated Creatinine Clearance: 60.4 mL/min (A) (by C-G formula based on SCr of 1.6 mg/dL (H)).    Microbiology:  Microbiology Results Abnormal     None      Microbiology cultures 6/13 at [facility], ([place], micro), negative, 6/17-, 6/17 BAL negative; blood cultures 8/19/2019-x2 at [facility], 6/16/19 ur antigen positive    Radiology:  Imaging Results (last 72 hours)     ** No results found for the last 72 hours. **          IMPRESSION:     1.  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  This could have been partially treated with the numerous antibiotics he has received since June 2019.  Otherwise does not have many symptoms consistent with endocarditis in terms of fever, or embolic phenomenon.  The renal failure potentially could have been immune complex mediated.  Working toward finishing his daptomycin and cefepime on 10/1/2019.  Cryptococcal antigen negative, chlamydia antigen negative, Q fever negative, T spot negative, Brucella negative, Bartonella negative, histoplasma negative on approximately 8/20/2019.  2.  Legionella pneumonia positive urine antigen 6/16/2019.  Eventually transferred to the [facility] with acute hypoxic respiratory failure and was treated and finished therapy with greater than 10 days of levofloxacin and doxycycline.  Clinically resolved.  Very unlikely cause of endocarditis.  3.  MRSA left groin cellulitis with abscess January 2019, unlikely to have a bearing on the current case.  Resolved.  4.  Acute on chronic renal failure.  Required dialysis via a tunneled catheter right  chest which was recently removed.  Last dialysis approximately 2 weeks ago.  Nephrology following.  Creatinine 1.60.  5.  Chronic ischemic cardiomyopathy with ejection fraction less than 15% status post previous ICD 2007 with generator change 2013.  Probable exacerbation of systolic congestive heart failure.  6.  Anemia, chronic disease and related to renal failure.  7.  Elevated transaminitis possibly related to medications versus hypoperfusion, amiodarone and Lipitor discontinued.  Clinically resolved.  8.  Diabetes mellitus type 2 with minimal increased risk for infection.  A1c 6.0.  9.  COPD.  10.  Thrombocytopenia.  Previously, resolved.  11.  Acute on chronic hypoxic and hypercapnic respiratory failure.  Worse.    RECOMMENDATIONS:    1.  Diagnostically, continue to follow patient's physical exam, CBC, CMP, CRP, CPK, blood cultures x2 obtained on 9/9/2019, fungal serologies.  2.  Therapeutically, continue daptomycin 700 mg IV every 24 hours, cefepime 1 g IV daily until 10/1/2019 for 6 weeks for treatment of potential endocarditis.  Watch toxicity in terms of liver, rhabdomyolysis, mental status, and bone marrow.  CBC, CMP, CRP, CPK should be monitored at least weekly while on this regimen.  3.  The risk benefit ratio has been discussed with the family, patient, and Dr. Edwards.  If his cultures are positive for bacteria such as MRSA, may be difficult to clear infection, and further discussion may be needed for removal of device.  If cultures are negative, I would treat with antibiotics for 6 weeks then follow off antibiotics.  Side effects of antibiotics were discussed with the patient and family.  4.  Oxygen support therapy.  5.  Patient may require a [place] catheter for long-term venous access.    I discussed the patients findings and my recommendations with patient, nursing staff, primary care team and consulting provider    Thank you for asking me to see Rob Miranda.  Our group would be pleased to  follow this patient over the course of their hospitalization and assist with outpatient antimicrobial therapy, as indicated.  Further recommendations depend on the results of the cultures and clinical course.    Wai Escobedo MD  9/10/2019

## 2019-09-10 NOTE — ED PROVIDER NOTES
"Subjective   Mr. Rivas \"Ed\" Mark Miranda is a 64 year old male who presents to the ED with c/o shortness of breath and abdominal swelling.  Patient's wife feels the patient's problems began this past June, when he was diagnosed with legionnaire's disease and kidney failure necessitating dialysis at .  He stayed there for 2 weeks on life support but ultimately made a recovery and was able to be discharged to rehab in July.  Mid-August the patient presented to [facility] cardiology to have a EVA plus ECV as well as AV node ablation plus upgrade to biventricular ICD.  Unfortunately while here it was discovered his kidney function was poor and he was discovered to have vegetation on his ICD leads.  He was admitted to the cardiologist service with ID consult (Dr. Escobedo).  He was discharged August 26, 2019 with home health and IV antibiotics.    Over the past three days the patient states his condition has been acutely worsening.  He tells me he's developed a cough and wheeze, and been very fatigued.  He's gained nearly 20 lbs in this time period and his abdomen is now very swollen.  He states he's never had these issues before and presents to the ED for evaluation.  On exam, patient states he feels quite poorly.  He complains of pain in his abdomen and low back.  Denies any associated fevers or chills.    Other past medical history includes chronic obstructive pulmonary disease, diabetes mellitus, congestive heart failure, hypertension, hyperlipidemia, ischemic cardiomyopathy, coronary artery disease and tobacco abuse.        History provided by:  Patient, friend and spouse  Shortness of Breath   Severity:  Severe  Onset quality:  Sudden  Duration:  3 days  Timing:  Constant  Progression:  Worsening  Chronicity:  New  Relieved by:  Nothing  Associated symptoms: abdominal pain, cough and wheezing    Associated symptoms: no chest pain, no fever, no sore throat and no vomiting        Review of Systems   Constitutional: " Positive for fatigue and unexpected weight change (20 lbs/4 days). Negative for chills and fever.   HENT: Negative for congestion, rhinorrhea and sore throat.    Respiratory: Positive for cough, shortness of breath and wheezing.    Cardiovascular: Negative for chest pain.   Gastrointestinal: Positive for abdominal distention and abdominal pain. Negative for nausea and vomiting.   All other systems reviewed and are negative.      Past Medical History:   Diagnosis Date   • Atherosclerotic cardiovascular disease    • Chronic anticoagulation    • Congestive heart failure (CMS/[facility])    • COPD (chronic obstructive pulmonary disease) (CMS/[facility])    • DM (diabetes mellitus) (CMS/[facility])     insulin dependant   • Dyslipidemia    • Hx of atrial flutter     and a- fib   • Hx of myocardial infarction 1992    s/p CABG    • Hyperlipidemia    • Hypertension    • Ischemic cardiomyopathy     advanced   • Legionnaire's disease (CMS/[facility])    • Sinus bradycardia     EPS, 03/19/2007, Dr. Márquez:  Successful radiofrequency ablation of right atrial flutter   • Skin cancer    • Tobacco use        Allergies   Allergen Reactions   • Shellfish-Derived Products Hives, Shortness Of Breath and Swelling       Past Surgical History:   Procedure Laterality Date   • CARDIAC DEFIBRILLATOR PLACEMENT     • CARDIOVERSION      x 2 procedures   • CORONARY ARTERY BYPASS GRAFT  1991    [facility]        Family History   Problem Relation Age of Onset   • Cancer Mother 74   • Heart disease Mother    • Diabetes Mother    • Heart attack Father 72        in his 80's   • Diabetes Father        Social History     Socioeconomic History   • Marital status:      Spouse name: Not on file   • Number of children: 2   • Years of education: Not on file   • Highest education level: Not on file   Occupational History   • Occupation:      Employer: DISABLED     Comment: Disable because of heart attacks    Tobacco Use   • Smoking status: Current Every  Day Smoker     Years: 48.00     Types: Cigarettes   • Smokeless tobacco: Never Used   • Tobacco comment: 5-6 CAD   Substance and Sexual Activity   • Alcohol use: No     Frequency: Never   • Drug use: No   • Sexual activity: Defer         Objective   Physical Exam   Constitutional: He is oriented to person, place, and time. He appears well-developed and well-nourished. No distress.   HENT:   Head: Normocephalic and atraumatic.   Eyes: Conjunctivae are normal. Scleral icterus is present.   Scleral icterus.   Neck: Normal range of motion. Neck supple.   Cardiovascular: Normal rate, regular rhythm and intact distal pulses. Exam reveals distant heart sounds. Exam reveals no gallop and no friction rub.   No murmur heard.  Distant heart sounds.   Pulmonary/Chest: Effort normal. No respiratory distress. He has wheezes. He has no rales.   Mild bilateral wheezes.   Abdominal: Soft. Bowel sounds are normal. There is tenderness in the right upper quadrant. There is no guarding.   Mild abdominal distension.  Moderate right upper quadrant tenderness.  No lower abdominal or left-sided tenderness.   Musculoskeletal: Normal range of motion.        Right lower leg: He exhibits edema.        Left lower leg: He exhibits edema.   2+ lower extremity edema to the bilateral legs, slightly more pronounced proximal to the knees.   Neurological: He is alert and oriented to person, place, and time.   Skin: Skin is warm and dry. He is not diaphoretic.   Psychiatric: He has a normal mood and affect. His behavior is normal.   Nursing note and vitals reviewed.      Procedures         ED Course  ED Course as of Sep 10 0059   Tue Sep 10, 2019   0033 Paged on-call hospitalist. -CP  [MU]   0040 Spoke with Dr. Jack, on-call hospitalist, who will admit. -CP  [MU]      ED Course User Index  [MU] Jarocho Wilson       Recent Results (from the past 24 hour(s))   Comprehensive Metabolic Panel    Collection Time: 09/09/19  8:57 PM   Result Value Ref Range     Glucose 199 (H) 65 - 99 mg/dL    BUN 36 (H) 8 - 23 mg/dL    Creatinine 1.50 (H) 0.76 - 1.27 mg/dL    Sodium 137 136 - 145 mmol/L    Potassium 4.1 3.5 - 5.2 mmol/L    Chloride 102 98 - 107 mmol/L    CO2 23.0 22.0 - 29.0 mmol/L    Calcium 8.6 8.6 - 10.5 mg/dL    Total Protein 6.8 6.0 - 8.5 g/dL    Albumin 3.00 (L) 3.50 - 5.20 g/dL    ALT (SGPT) 22 1 - 41 U/L    AST (SGOT) 33 1 - 40 U/L    Alkaline Phosphatase 242 (H) 39 - 117 U/L    Total Bilirubin 1.3 (H) 0.2 - 1.2 mg/dL    eGFR Non African Amer 47 (L) >60 mL/min/1.73    Globulin 3.8 gm/dL    A/G Ratio 0.8 g/dL    BUN/Creatinine Ratio 24.0 7.0 - 25.0    Anion Gap 12.0 5.0 - 15.0 mmol/L   BNP    Collection Time: 09/09/19  8:57 PM   Result Value Ref Range    proBNP 6,224.0 (H) 5.0 - 900.0 pg/mL   Troponin    Collection Time: 09/09/19  8:57 PM   Result Value Ref Range    Troponin T 0.034 (C) 0.000 - 0.030 ng/mL   Light Blue Top    Collection Time: 09/09/19  8:57 PM   Result Value Ref Range    Extra Tube hold for add-on    Green Top (Gel)    Collection Time: 09/09/19  8:57 PM   Result Value Ref Range    Extra Tube Hold for add-ons.    Lavender Top    Collection Time: 09/09/19  8:57 PM   Result Value Ref Range    Extra Tube hold for add-on    Gold Top - SST    Collection Time: 09/09/19  8:57 PM   Result Value Ref Range    Extra Tube Hold for add-ons.    CBC Auto Differential    Collection Time: 09/09/19  8:57 PM   Result Value Ref Range    WBC 7.58 3.40 - 10.80 10*3/mm3    RBC 5.14 4.14 - 5.80 10*6/mm3    Hemoglobin 12.8 (L) 13.0 - 17.7 g/dL    Hematocrit 43.2 37.5 - 51.0 %    MCV 84.0 79.0 - 97.0 fL    MCH 24.9 (L) 26.6 - 33.0 pg    MCHC 29.6 (L) 31.5 - 35.7 g/dL    RDW 27.8 (H) 12.3 - 15.4 %    RDW-SD 83.8 (H) 37.0 - 54.0 fl    MPV 9.7 6.0 - 12.0 fL    Platelets 181 140 - 450 10*3/mm3    Neutrophil % 71.0 42.7 - 76.0 %    Lymphocyte % 19.7 19.6 - 45.3 %    Monocyte % 5.9 5.0 - 12.0 %    Eosinophil % 2.2 0.3 - 6.2 %    Basophil % 0.8 0.0 - 1.5 %    Immature Grans % 0.4  0.0 - 0.5 %    Neutrophils, Absolute 5.38 1.70 - 7.00 10*3/mm3    Lymphocytes, Absolute 1.49 0.70 - 3.10 10*3/mm3    Monocytes, Absolute 0.45 0.10 - 0.90 10*3/mm3    Eosinophils, Absolute 0.17 0.00 - 0.40 10*3/mm3    Basophils, Absolute 0.06 0.00 - 0.20 10*3/mm3    Immature Grans, Absolute 0.03 0.00 - 0.05 10*3/mm3    nRBC 0.0 0.0 - 0.2 /100 WBC   Lactic Acid, Plasma    Collection Time: 09/09/19  8:58 PM   Result Value Ref Range    Lactate 1.6 0.5 - 2.0 mmol/L     Note: In addition to lab results from this visit, the labs listed above may include labs taken at another facility or during a different encounter within the last 24 hours. Please correlate lab times with ED admission and discharge times for further clarification of the services performed during this visit.    CT Chest Without Contrast   Final Result   Left greater than right pleural effusion and coarse left upper lobe and right middle lobe infiltrates.      Signer Name: Loco Martínez MD    Signed: 9/9/2019 11:49 PM    Workstation Name: [facility]     Radiology [facility]      CT Abdomen Pelvis Without Contrast   Final Result   There is nephrolithiasis without hydronephrosis or ureterolithiasis. There is ascites but no bowel obstruction. There is cholelithiasis without CT evidence of cholecystitis or biliary obstruction.         Signer Name: Loco Martínez MD    Signed: 9/9/2019 11:41 PM    Workstation Name: [facility]     Radiology [facility]      XR Chest 1 View   Final Result      1. Cardiomegaly and probable mild volume overload.   2. Left-sided effusion and compressive atelectasis or pneumonia. No pneumothorax.       Signer Name: Yordy Downey MD    Signed: 9/9/2019 9:54 PM    Workstation Name: [facility]        Vitals:    09/09/19 2200 09/09/19 2215 09/09/19 2230 09/09/19 2250   BP:   103/83    BP Location:       Patient Position:       Pulse: 100 103 102 101   Resp:        Temp:       TempSrc:       SpO2: 90% 95% 96%    Weight:       Height:         Medications   sodium chloride 0.9 % flush 10 mL (not administered)   furosemide (LASIX) injection 20 mg (not administered)   albuterol (PROVENTIL) nebulizer solution 0.083% 2.5 mg/3mL (2.5 mg Nebulization Given 9/9/19 7860)     ECG/EMG Results (last 24 hours)     Procedure Component Value Units Date/Time    ECG 12 Lead [[id number]] Collected:  09/09/19 2027     Updated:  09/09/19 2025        ECG 12 Lead                           MDM    Final diagnoses:   Respiratory distress   Acute on chronic congestive heart failure, unspecified heart failure type (CMS/HCC)   Acute pulmonary edema (CMS/HCC)   Pleural effusion on left   Other ascites   RUQ abdominal pain   Chronic kidney disease, unspecified CKD stage       Documentation assistance provided by minerva Wilson.  Information recorded by the scribe was done at my direction and has been verified and validated by me.     Jarocho Wilson  09/10/19 0059       Sachin Palacios DO  09/10/19 4244

## 2019-09-10 NOTE — PROGRESS NOTES
[facility] Medicine Services  ADMISSION FOLLOW-UP NOTE          Patient admitted after midnight, H&P by my partner performed earlier on today's date reviewed.  Interim findings, labs, and charting also reviewed.        The [facility] Hospital Problem List has been managed and updated to include any new diagnoses:  Active Hospital Problems    Diagnosis  POA   • Dyspnea on exertion [R06.09]  Yes   • Rectus sheath hematoma [S30.1XXA]  Yes   • RODOLFO (acute kidney injury) (CMS/[facility]) [N17.9]  Yes   • Elevated LFTs [R94.5]  Yes   • Atrial fibrillation and flutter (CMS/[facility]) [I48.91, I48.92]  Yes   • IDDM (insulin dependent diabetes mellitus) (CMS/[facility]) [E11.9, Z79.4]  Not Applicable   • Ischemic cardiomyopathy [I25.5]  Yes   • Dyslipidemia [E78.5]  Yes   • Acute on chronic combined systolic and diastolic congestive heart failure (CMS/[facility]) [I50.43]  Yes   • Coronary artery disease involving coronary bypass graft of native heart without angina pectoris [I25.810]  Yes   • Essential hypertension [I10]  Yes      Resolved Hospital Problems   No resolved problems to display.         ADDITIONAL PLAN:  - detailed assessment and plan form admission reviewed  - continue Cefepime/Dapto IV antibiotics  (was on these as outpt prior to admission under [facility] for tx of endocarditis) as would cover pulm pathogen however currently favor CHF and atelectasis over infectious pulm process.  [facility] consulted to alert them of readmission.  - diagnostic and therapeutic thoracentesis tomorrow (last Eliquis dose 9/9/19 am).  Continue BID Bumex for now and pulm support.  - Cardiology consulted for readmission after recent d/c returns with A/C CHF and accelerated BP's.  - changed PO clonidine to clonidine patch for better bioavailability and compliance      Electronically signed by Joanna Jin MD, 09/10/19, 10:50 AM.

## 2019-09-11 ENCOUNTER — APPOINTMENT (OUTPATIENT)
Dept: CT IMAGING | Facility: HOSPITAL | Age: 64
End: 2019-09-11

## 2019-09-11 PROBLEM — I33.0 BACTERIAL ENDOCARDITIS: Status: ACTIVE | Noted: 2019-09-11

## 2019-09-11 LAB
ABO GROUP BLD: NORMAL
ABO GROUP BLD: NORMAL
ALBUMIN SERPL-MCNC: 2.6 G/DL (ref 3.5–5.2)
ALBUMIN/GLOB SERPL: 0.7 G/DL
ALP SERPL-CCNC: 210 U/L (ref 39–117)
ALT SERPL W P-5'-P-CCNC: 19 U/L (ref 1–41)
ANION GAP SERPL CALCULATED.3IONS-SCNC: 13 MMOL/L (ref 5–15)
APTT PPP: 34.4 SECONDS (ref 24–37)
AST SERPL-CCNC: 26 U/L (ref 1–40)
BASOPHILS # BLD AUTO: 0.04 10*3/MM3 (ref 0–0.2)
BASOPHILS NFR BLD AUTO: 0.6 % (ref 0–1.5)
BILIRUB SERPL-MCNC: 1.3 MG/DL (ref 0.2–1.2)
BLD GP AB SCN SERPL QL: NEGATIVE
BUN BLD-MCNC: 40 MG/DL (ref 8–23)
BUN/CREAT SERPL: 26.3 (ref 7–25)
CALCIUM SPEC-SCNC: 8.6 MG/DL (ref 8.6–10.5)
CHLORIDE SERPL-SCNC: 102 MMOL/L (ref 98–107)
CK SERPL-CCNC: 40 U/L (ref 20–200)
CO2 SERPL-SCNC: 24 MMOL/L (ref 22–29)
CREAT BLD-MCNC: 1.52 MG/DL (ref 0.76–1.27)
D-LACTATE SERPL-SCNC: 1.2 MMOL/L (ref 0.5–2)
DEPRECATED RDW RBC AUTO: 85.3 FL (ref 37–54)
EOSINOPHIL # BLD AUTO: 0.18 10*3/MM3 (ref 0–0.4)
EOSINOPHIL NFR BLD AUTO: 2.9 % (ref 0.3–6.2)
ERYTHROCYTE [DISTWIDTH] IN BLOOD BY AUTOMATED COUNT: 28 % (ref 12.3–15.4)
GFR SERPL CREATININE-BSD FRML MDRD: 47 ML/MIN/1.73
GLOBULIN UR ELPH-MCNC: 4 GM/DL
GLUCOSE BLD-MCNC: 138 MG/DL (ref 65–99)
GLUCOSE BLDC GLUCOMTR-MCNC: 113 MG/DL (ref 70–130)
GLUCOSE BLDC GLUCOMTR-MCNC: 128 MG/DL (ref 70–130)
GLUCOSE BLDC GLUCOMTR-MCNC: 195 MG/DL (ref 70–130)
GLUCOSE BLDC GLUCOMTR-MCNC: 93 MG/DL (ref 70–130)
HCT VFR BLD AUTO: 43.5 % (ref 37.5–51)
HGB BLD-MCNC: 12.5 G/DL (ref 13–17.7)
IMM GRANULOCYTES # BLD AUTO: 0.02 10*3/MM3 (ref 0–0.05)
IMM GRANULOCYTES NFR BLD AUTO: 0.3 % (ref 0–0.5)
INR PPP: 1.77 (ref 0.85–1.16)
LYMPHOCYTES # BLD AUTO: 1.27 10*3/MM3 (ref 0.7–3.1)
LYMPHOCYTES NFR BLD AUTO: 20.4 % (ref 19.6–45.3)
MCH RBC QN AUTO: 24.8 PG (ref 26.6–33)
MCHC RBC AUTO-ENTMCNC: 28.7 G/DL (ref 31.5–35.7)
MCV RBC AUTO: 86.1 FL (ref 79–97)
MONOCYTES # BLD AUTO: 0.25 10*3/MM3 (ref 0.1–0.9)
MONOCYTES NFR BLD AUTO: 4 % (ref 5–12)
NEUTROPHILS # BLD AUTO: 4.46 10*3/MM3 (ref 1.7–7)
NEUTROPHILS NFR BLD AUTO: 71.8 % (ref 42.7–76)
NRBC BLD AUTO-RTO: 0 /100 WBC (ref 0–0.2)
PLATELET # BLD AUTO: 174 10*3/MM3 (ref 140–450)
PMV BLD AUTO: 9.8 FL (ref 6–12)
POTASSIUM BLD-SCNC: 3.7 MMOL/L (ref 3.5–5.2)
PROT SERPL-MCNC: 6.6 G/DL (ref 6–8.5)
PROTHROMBIN TIME: 19.8 SECONDS (ref 11.2–14.3)
RBC # BLD AUTO: 5.05 10*6/MM3 (ref 4.14–5.8)
RH BLD: POSITIVE
RH BLD: POSITIVE
SODIUM BLD-SCNC: 139 MMOL/L (ref 136–145)
T&S EXPIRATION DATE: NORMAL
WBC NRBC COR # BLD: 6.22 10*3/MM3 (ref 3.4–10.8)

## 2019-09-11 PROCEDURE — 94799 UNLISTED PULMONARY SVC/PX: CPT

## 2019-09-11 PROCEDURE — 83605 ASSAY OF LACTIC ACID: CPT | Performed by: INTERNAL MEDICINE

## 2019-09-11 PROCEDURE — 63710000001 DIPHENHYDRAMINE PER 50 MG: Performed by: FAMILY MEDICINE

## 2019-09-11 PROCEDURE — 85730 THROMBOPLASTIN TIME PARTIAL: CPT | Performed by: RADIOLOGY

## 2019-09-11 PROCEDURE — 86850 RBC ANTIBODY SCREEN: CPT | Performed by: FAMILY MEDICINE

## 2019-09-11 PROCEDURE — 86900 BLOOD TYPING SEROLOGIC ABO: CPT | Performed by: FAMILY MEDICINE

## 2019-09-11 PROCEDURE — 82550 ASSAY OF CK (CPK): CPT | Performed by: INTERNAL MEDICINE

## 2019-09-11 PROCEDURE — 25010000002 DAPTOMYCIN PER 1 MG: Performed by: NURSE PRACTITIONER

## 2019-09-11 PROCEDURE — 86927 PLASMA FRESH FROZEN: CPT

## 2019-09-11 PROCEDURE — 82962 GLUCOSE BLOOD TEST: CPT

## 2019-09-11 PROCEDURE — 80053 COMPREHEN METABOLIC PANEL: CPT | Performed by: PHYSICIAN ASSISTANT

## 2019-09-11 PROCEDURE — P9017 PLASMA 1 DONOR FRZ W/IN 8 HR: HCPCS

## 2019-09-11 PROCEDURE — 86901 BLOOD TYPING SEROLOGIC RH(D): CPT | Performed by: FAMILY MEDICINE

## 2019-09-11 PROCEDURE — 63710000001 INSULIN DETEMIR PER 5 UNITS: Performed by: NURSE PRACTITIONER

## 2019-09-11 PROCEDURE — 85025 COMPLETE CBC W/AUTO DIFF WBC: CPT | Performed by: INTERNAL MEDICINE

## 2019-09-11 PROCEDURE — 99232 SBSQ HOSP IP/OBS MODERATE 35: CPT | Performed by: FAMILY MEDICINE

## 2019-09-11 PROCEDURE — 36430 TRANSFUSION BLD/BLD COMPNT: CPT

## 2019-09-11 PROCEDURE — 25010000002 CEFEPIME PER 500 MG: Performed by: INTERNAL MEDICINE

## 2019-09-11 PROCEDURE — 85610 PROTHROMBIN TIME: CPT | Performed by: RADIOLOGY

## 2019-09-11 PROCEDURE — 99232 SBSQ HOSP IP/OBS MODERATE 35: CPT | Performed by: PHYSICIAN ASSISTANT

## 2019-09-11 RX ORDER — MILRINONE LACTATE 0.2 MG/ML
0.25 INJECTION, SOLUTION INTRAVENOUS CONTINUOUS
Status: DISCONTINUED | OUTPATIENT
Start: 2019-09-11 | End: 2019-09-17 | Stop reason: HOSPADM

## 2019-09-11 RX ORDER — METOPROLOL TARTRATE 50 MG/1
50 TABLET, FILM COATED ORAL 2 TIMES DAILY
Status: DISCONTINUED | OUTPATIENT
Start: 2019-09-11 | End: 2019-09-12

## 2019-09-11 RX ORDER — DIPHENHYDRAMINE HCL 25 MG
25 CAPSULE ORAL NIGHTLY
Status: DISCONTINUED | OUTPATIENT
Start: 2019-09-11 | End: 2019-09-17 | Stop reason: HOSPADM

## 2019-09-11 RX ORDER — MILRINONE LACTATE 0.2 MG/ML
.25-.75 INJECTION, SOLUTION INTRAVENOUS
Status: DISCONTINUED | OUTPATIENT
Start: 2019-09-11 | End: 2019-09-11

## 2019-09-11 RX ORDER — TEMAZEPAM 15 MG/1
15 CAPSULE ORAL NIGHTLY
Status: DISCONTINUED | OUTPATIENT
Start: 2019-09-11 | End: 2019-09-17 | Stop reason: HOSPADM

## 2019-09-11 RX ADMIN — DIPHENHYDRAMINE HYDROCHLORIDE 25 MG: 25 CAPSULE ORAL at 21:53

## 2019-09-11 RX ADMIN — INSULIN DETEMIR 10 UNITS: 100 INJECTION, SOLUTION SUBCUTANEOUS at 21:30

## 2019-09-11 RX ADMIN — FERROUS SULFATE TAB 325 MG (65 MG ELEMENTAL FE) 325 MG: 325 (65 FE) TAB at 08:36

## 2019-09-11 RX ADMIN — IPRATROPIUM BROMIDE AND ALBUTEROL SULFATE 3 ML: 2.5; .5 SOLUTION RESPIRATORY (INHALATION) at 17:11

## 2019-09-11 RX ADMIN — METOPROLOL TARTRATE 50 MG: 50 TABLET ORAL at 23:52

## 2019-09-11 RX ADMIN — INSULIN LISPRO 2 UNITS: 100 INJECTION, SOLUTION INTRAVENOUS; SUBCUTANEOUS at 22:02

## 2019-09-11 RX ADMIN — TEMAZEPAM 15 MG: 15 CAPSULE ORAL at 23:52

## 2019-09-11 RX ADMIN — FERROUS SULFATE TAB 325 MG (65 MG ELEMENTAL FE) 325 MG: 325 (65 FE) TAB at 17:32

## 2019-09-11 RX ADMIN — IPRATROPIUM BROMIDE AND ALBUTEROL SULFATE 3 ML: 2.5; .5 SOLUTION RESPIRATORY (INHALATION) at 22:06

## 2019-09-11 RX ADMIN — PRAVASTATIN SODIUM 20 MG: 20 TABLET ORAL at 21:53

## 2019-09-11 RX ADMIN — CEFEPIME HYDROCHLORIDE 2 G: 2 INJECTION, POWDER, FOR SOLUTION INTRAVENOUS at 10:10

## 2019-09-11 RX ADMIN — TAMSULOSIN HYDROCHLORIDE 0.4 MG: 0.4 CAPSULE ORAL at 08:36

## 2019-09-11 RX ADMIN — Medication 27 MG: at 10:20

## 2019-09-11 RX ADMIN — Medication 27 MG: at 21:52

## 2019-09-11 RX ADMIN — BUMETANIDE 1 MG: 0.25 INJECTION INTRAMUSCULAR; INTRAVENOUS at 21:54

## 2019-09-11 RX ADMIN — DAPTOMYCIN 700 MG: 500 INJECTION, POWDER, LYOPHILIZED, FOR SOLUTION INTRAVENOUS at 05:22

## 2019-09-11 RX ADMIN — SERTRALINE HYDROCHLORIDE 25 MG: 50 TABLET ORAL at 21:53

## 2019-09-11 RX ADMIN — IPRATROPIUM BROMIDE AND ALBUTEROL SULFATE 3 ML: 2.5; .5 SOLUTION RESPIRATORY (INHALATION) at 13:32

## 2019-09-11 RX ADMIN — MICONAZOLE NITRATE: 20 POWDER TOPICAL at 21:54

## 2019-09-11 RX ADMIN — METOPROLOL TARTRATE 50 MG: 50 TABLET ORAL at 11:48

## 2019-09-11 RX ADMIN — BUMETANIDE 1 MG: 0.25 INJECTION INTRAMUSCULAR; INTRAVENOUS at 10:07

## 2019-09-11 RX ADMIN — ACETAMINOPHEN 650 MG: 325 TABLET ORAL at 07:45

## 2019-09-11 RX ADMIN — MILRINONE LACTATE IN DEXTROSE 0.25 MCG/KG/MIN: 200 INJECTION, SOLUTION INTRAVENOUS at 20:03

## 2019-09-11 RX ADMIN — ASPIRIN 81 MG: 81 TABLET, COATED ORAL at 08:36

## 2019-09-11 RX ADMIN — IPRATROPIUM BROMIDE AND ALBUTEROL SULFATE 3 ML: 2.5; .5 SOLUTION RESPIRATORY (INHALATION) at 07:57

## 2019-09-11 RX ADMIN — GABAPENTIN 100 MG: 100 CAPSULE ORAL at 21:53

## 2019-09-11 NOTE — PLAN OF CARE
Problem: Patient Care Overview  Goal: Plan of Care Review  Outcome: Ongoing (interventions implemented as appropriate)   09/11/19 0431   Coping/Psychosocial   Plan of Care Reviewed With patient   Plan of Care Review   Progress improving   OTHER   Outcome Summary Still has occassional SOB. Plan for thoracentesis today.NPO since mignight, consent signed, and CHG x2 completed. VSS. 2LNC while sleeping. Will continue to monitor.        Problem: Fall Risk (Adult)  Goal: Identify Related Risk Factors and Signs and Symptoms  Outcome: Ongoing (interventions implemented as appropriate)   09/11/19 0431   Fall Risk (Adult)   Related Risk Factors (Fall Risk) age-related changes;fatigue/slow reaction;gait/mobility problems;environment unfamiliar   Signs and Symptoms (Fall Risk) presence of risk factors     Goal: Absence of Fall  Outcome: Ongoing (interventions implemented as appropriate)   09/11/19 0431   Fall Risk (Adult)   Absence of Fall making progress toward outcome       Problem: Breathing Pattern Ineffective (Adult)  Goal: Identify Related Risk Factors and Signs and Symptoms  Outcome: Ongoing (interventions implemented as appropriate)   09/11/19 0431   Breathing Pattern Ineffective (Adult)   Related Risk Factors (Breathing Pattern Ineffective) fatigue;deconditioning;smoking;underlying condition   Signs and Symptoms (Breathing Pattern Ineffective) activity intolerance;breath sounds abnormal;breathing pattern altered     Goal: Effective Oxygenation/Ventilation  Outcome: Ongoing (interventions implemented as appropriate)   09/11/19 0431   Breathing Pattern Ineffective (Adult)   Effective Oxygenation/Ventilation making progress toward outcome     Goal: Anxiety/Fear Reduction  Outcome: Ongoing (interventions implemented as appropriate)   09/11/19 0431   Breathing Pattern Ineffective (Adult)   Anxiety/Fear Reduction making progress toward outcome

## 2019-09-11 NOTE — PROGRESS NOTES
[facility]  Progress Note       LOS: 1 day   Patient Care Team:  Kreis, Samuel Duane, MD as PCP - General  Kreis, Samuel Duane, MD as PCP - Family Medicine    Chief Complaint:  CHF    Subjective   Feeling better this AM. Dyspnea is improving and currently laying in bed with no SOB. No CP. Has wheezes. Complains of mild HA.         Review of Systems:   Pertinent positives in HPI, all others reviewed and negative.      Objective       Current Facility-Administered Medications:   •  acetaminophen (TYLENOL) tablet 650 mg, 650 mg, Oral, Q4H PRN **OR** acetaminophen (TYLENOL) 160 MG/5ML solution 650 mg, 650 mg, Oral, Q4H PRN **OR** acetaminophen (TYLENOL) suppository 650 mg, 650 mg, Rectal, Q4H PRN, Hina Ryan APRN  •  aspirin EC tablet 81 mg, 81 mg, Oral, Daily, Hina Ryan APRN, 81 mg at 09/10/19 0949  •  bumetanide (BUMEX) injection 1 mg, 1 mg, Intravenous, Q12H, Lissette Jones PA, 1 mg at 09/10/19 2124  •  cefepime (MAXIPIME) 2 g/100 mL 0.9% NS (mbp), 2 g, Intravenous, Q24H, Wai Escobedo MD  •  DAPTOmycin (CUBICIN) 700 mg in sodium chloride 0.9 % 50 mL IVPB, 8 mg/kg (Adjusted), Intravenous, Q24H, Hina Ryan APRN, Last Rate: 100 mL/hr at 09/11/19 0522, 700 mg at 09/11/19 0522  •  dextrose (D50W) 25 g/ 50mL Intravenous Solution 25 g, 25 g, Intravenous, Q15 Min PRN, Hina Ryan APRN  •  dextrose (GLUTOSE) oral gel 15 g, 15 g, Oral, Q15 Min PRN, Hina Ryan APRN  •  docusate sodium (COLACE) capsule 100 mg, 100 mg, Oral, BID PRN, Hina Ryan APRN  •  ferrous sulfate tablet 325 mg, 325 mg, Oral, BID With Meals, Hina Ryan APRN, 325 mg at 09/10/19 1738  •  gabapentin (NEURONTIN) capsule 100 mg, 100 mg, Oral, Nightly, Hina Ryan APRN, 100 mg at 09/10/19 2125  •  glucagon (human recombinant) (GLUCAGEN DIAGNOSTIC) injection 1 mg, 1 mg, Subcutaneous, Q15 Min PRN, Hina Ryan APRN  •  Hold medication, 1 each, Does not apply,  Continuous PRN, Andreas Jack MD  •  insulin detemir (LEVEMIR) injection 10 Units, 10 Units, Subcutaneous, Nightly, Hina Ryan APRN, 10 Units at 09/10/19 2127  •  insulin lispro (humaLOG) injection 0-7 Units, 0-7 Units, Subcutaneous, 4x Daily With Meals & Nightly, Hina Ryan APRN, 2 Units at 09/10/19 0947  •  ipratropium-albuterol (DUO-NEB) nebulizer solution 3 mL, 3 mL, Nebulization, 4x Daily - RT, Hina Ryan APRN, 3 mL at 09/10/19 1937  •  magnesium (as) gluconate (MAGONATE) tablet 27 mg, 27 mg, Oral, BID, Hina Ryan APRN, 27 mg at 09/10/19 2124  •  metoprolol tartrate (LOPRESSOR) tablet 100 mg, 100 mg, Oral, BID, Hina Ryan APRN  •  miconazole (MICOTIN) 2 % powder, , Topical, BID, Hina Ryan APRN  •  ondansetron (ZOFRAN) injection 4 mg, 4 mg, Intravenous, Q6H PRN, Hina Ryan APRN, 4 mg at 09/10/19 0343  •  pantoprazole (PROTONIX) EC tablet 40 mg, 40 mg, Oral, Q AM, Hina Ryan APRN, 40 mg at 09/10/19 0453  •  Pharmacy Consult - MTM, , Does not apply, Daily, Hira Velasco, [facility]  •  pravastatin (PRAVACHOL) tablet 20 mg, 20 mg, Oral, Nightly, Lissette Jones PA, 20 mg at 09/10/19 2124  •  sertraline (ZOLOFT) tablet 25 mg, 25 mg, Oral, Nightly, Hina Ryan APRN, 25 mg at 09/10/19 2125  •  sodium chloride 0.9 % flush 10 mL, 10 mL, Intravenous, PRN, Sachin Palacios, DO  •  sodium chloride 0.9 % flush 10 mL, 10 mL, Intravenous, Q12H, Hina Ryan APRN, 10 mL at 09/10/19 2125  •  sodium chloride 0.9 % flush 10 mL, 10 mL, Intravenous, PRN, Hina Ryan APRN  •  tamsulosin (FLOMAX) 24 hr capsule 0.4 mg, 0.4 mg, Oral, Daily, Hina Ryan APRN, 0.4 mg at 09/10/19 0949    Vital Sign Min/Max for last 24 hours  Temp  Min: 97.7 °F (36.5 °C)  Max: 98.4 °F (36.9 °C)   BP  Min: 99/74  Max: 111/82   Pulse  Min: 101  Max: 109   Resp  Min: 14  Max: 18   SpO2  Min: 92 %  Max: 96 %   Flow (L/min)  Min: 2  Max: 2   Weight  Min: 97.5 kg (215 lb)  Max: 97.5 kg  "(215 lb)     Flowsheet Rows      First Filed Value   Admission Height  182.9 cm (72\") Documented at 09/09/2019 2006   Admission Weight  99.8 kg (220 lb) Documented at 09/09/2019 2006            Intake/Output Summary (Last 24 hours) at 9/11/2019 0732  Last data filed at 9/11/2019 0325  Gross per 24 hour   Intake 685 ml   Output 2075 ml   Net -1390 ml       Physical Exam:     General Appearance:    Alert, cooperative, in no acute distress   Lungs:     Diffuse rhonchi and wheezes    Heart:    Regular, tachycardic, normal S1 and S2, no            murmur, no gallop, no rub, no click   Chest Wall:    No abnormalities observed   Abdomen:     Normal bowel sounds, no masses, no organomegaly, soft        non-tender, non-distended, no guarding, no rebound                tenderness   Extremities:   Moves all extremities well, 2+ edema, no cyanosis, no             redness   Pulses:   Pulses palpable and equal bilaterally   Skin:   No bleeding, bruising or rash. Erythematous feet        Results Review:   Results from last 7 days   Lab Units 09/10/19  0315 09/09/19  2057   WBC 10*3/mm3 8.78 7.58   HEMOGLOBIN g/dL 13.8 12.8*   HEMATOCRIT % 47.2 43.2   PLATELETS 10*3/mm3 199 181     Results from last 7 days   Lab Units 09/10/19  0316 09/09/19  2057   SODIUM mmol/L 137 137   POTASSIUM mmol/L 4.2 4.1   CHLORIDE mmol/L 99 102   CO2 mmol/L 21.0* 23.0   BUN mg/dL 38* 36*   CREATININE mg/dL 1.60* 1.50*   GLUCOSE mg/dL 172* 199*      Results from last 7 days   Lab Units 09/10/19  0315   HEMOGLOBIN A1C % 6.00*     Results from last 7 days   Lab Units 09/10/19  0316   CHOLESTEROL mg/dL 101   TRIGLYCERIDES mg/dL 106   HDL CHOL mg/dL 20*         Results from last 7 days   Lab Units 09/10/19  0316   PROBNP pg/mL 7,388.0*     Results from last 7 days   Lab Units 09/10/19  0436   PROTIME Seconds 22.6*   INR  2.09*   APTT seconds 36.8     Results from last 7 days   Lab Units 09/10/19  0316 09/09/19  2057   TROPONIN T ng/mL 0.028 0.034* "       Intake/Output Summary (Last 24 hours) at 9/11/2019 0732  Last data filed at 9/11/2019 0325  Gross per 24 hour   Intake 685 ml   Output 2075 ml   Net -1390 ml       I personally viewed and interpreted the patient's EKG/Telemetry data    EKG: none for review    Telemetry: Sinus tachycardia vs atrial flutter 101 to 109 bpm    Ejection Fraction  No results found for: EF    Echo EF Estimated  No results found for: ECHOEFEST      Present on Admission:  • Dyspnea on exertion  • Acute on chronic combined systolic and diastolic congestive heart failure (CMS/HCC)  • Coronary artery disease involving coronary bypass graft of native heart without angina pectoris  • Essential hypertension  • Ischemic cardiomyopathy  • Dyslipidemia  • Atrial fibrillation and flutter (CMS/HCC)  • Elevated LFTs  • Rectus sheath hematoma  • Dyspnea    Assessment/Plan   1. Chronic systolic CHF/ICM with acute exacerbation  - EF 20%.    - S/p previous ICD implant  - Continue BBL, although has been held due to low BP. Continue if SBP is above 100 mmHg.  No ACE/ARB secondary to renal dysfunction  - consider Milrinone  - Has diuresed well with IV Bumex. 1400 ml NET Output. Continue IV Bumex today. Transition to PO tomorrow.   - planned for thoracentesis today.     2. 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 Persistent atrial fibrillation/atrial flutter:   - Continue metoprolol for HR control. Will decrease to 50 mg BID due to low BP.   - Eliquis 5 mg bid currently on hold for thoracentesis today.   - at last admission, AVN RFA and upgrade to BiV ICD aborted due to worsening renal function    4. CKD:   - baseline 1.4-1.6. Monitor closely    5. CAD:  - remote CABG, on ASA, BB  - off statin due to elevated liver enzymes last admission, but Pravastatin restarted yesterday.     Plan for disposition: thoracentesis today. BMP in AM. Continue Bumex.      Electronically signed by MIGUEL Ferrer, 09/11/19, 7:32 AM.

## 2019-09-11 NOTE — PROGRESS NOTES
INFECTIOUS DISEASE Progress Note    Rob Miranda  1955  [id number]    Admit date: 9/9/2019    Requesting Provider: No ref. provider found  Evaluating physician: Wai Escobedo MD  Reason for Consultation: sepsis, endocarditis, related to pacemaker  Chief Complaint: above      Subjective   History of present illness:  Patient is a 64 y.o.  Yr old male with diabetes mellitus type 2, COPD, BPH, depression, hypertension, CAD, ischemic cardiomyopathy with ejection fraction less than 15%, biventricular ICD implant 2007 with generator change 2013, atrial fibrillation, acute kidney injury on dialysis in July at  last dialyzed 7/26/2019 with tunneled catheter in place, with a EVA on 8/19/2019 reported positive for vegetation on the RA and RV leads as well as the tip of the dialysis catheter who was [facility] to [facility] on [facility].  Patient denied any high fevers or chills.  He has been ill previously since June with a diagnosis of left lower lobe pneumonia in June 2019 with a positive urine antigen for Legionella treated with doxycycline and levofloxacin, finishing his therapy at the [facility] after deteriorating with acute hypoxic resp failure admitted to ICU, then discharged approximately 6/19/2019.  Went to Rehab and then discharged around 7/7/19.  Patient at that time was also found to have acute renal failure receiving some hemodialysis via a tunneled temp dialysis right chest catheter.  The patient receives his routine cardiac care by Dr. Edwards.  He continued to have persistent atrial fibrillation and atrial flutter.  A transesophageal echocardiogram done on 8/19/2019 reported possible vegetations on the right atrial/right ventricular lead as well as the dialysis catheter.  Previously the patient received antibiotics in early July 2019.  He did not have subsequent fevers.  He had continued issues with respiratory status and heart failure.  I was consulted on 8/20/2019  for further evaluation and treatment.  The patient has no other localizing signs or symptoms of infection.  The patient had an episode of MRSA left groin infection with a positive culture at [facility] on 1/30/2019 confirmed with the microbiology tech.  Blood cultures obtained at [facility] from January 2019, as well as 6/13/2019, 6/17/2019 were negative.  Urine was positive for Legionella antigen study at [facility] June 2019.  He received a treatment course for this at the [facility].  He has no other localizing signs or symptoms of infection.    The patient was discharged home to receive daptomycin 500 mg every 48 hours, cefepime 1 g every 24 hours for pacer lead related ICD endocarditis with negative cultures to continue until 10/1/2019.  The patient was admitted to the emergency room on 9/9/2019 complaining of increasing shortness of breath, and increased weight gain of 20 pounds with increased abdominal swelling.  He denied any high fevers or chills.  A CT scan of the chest on 9/9 revealed a moderate left-sided pleural effusion, some coarse left upper lobe infiltrate/atelectasis, compressive atelectasis of the left lower lobe, small right pleural effusion.  The CT of the abdomen and pelvis revealed some nephrolithiasis without hydronephrosis, and some ascites but no bowel obstruction.  Chest x-ray revealed cardiomegaly and mild volume overload.  Portal and hepatic venous ultrasound was negative on 9/3.  Laboratories revealed proBNP of 7388, negative troponin, creatinine 1.60, sodium 137, potassium 4.2, bicarb 21, ALT 25, AST 38, albumin 3, alkaline phosphatase 273, total bili 1.4, WBC 8.78, hemoglobin 13.8, platelets 199.  Lactic acid was 1.6, blood cultures obtained on 9/9 were negative.  CPK normal 8/21.  I was consulted on 9/10/2019 for further evaluation and treatment.    9/11/2019 history reviewed.  Breathing improved.  Patient continues on  daptomycin and cefepime until 10/1/2019 for culture negative, ICD vegetation related endocarditis.  No fevers or muscle pain.    Past Medical History:   Diagnosis Date   • Atherosclerotic cardiovascular disease    • Chronic anticoagulation    • Chronic kidney disease    • Congestive heart failure (CMS/[facility])    • COPD (chronic obstructive pulmonary disease) (CMS/[facility])    • DM (diabetes mellitus) (CMS/[facility])     insulin dependant   • Dyslipidemia    • Hx of atrial flutter     and a- fib   • Hx of myocardial infarction 1992    s/p CABG    • Hyperlipidemia    • Hypertension    • Ischemic cardiomyopathy     advanced   • Legionnaire's disease (CMS/[facility])    • Sinus bradycardia     EPS, 03/19/2007, Dr. Márquez:  Successful radiofrequency ablation of right atrial flutter   • Skin cancer    • Tobacco use        Past Surgical History:   Procedure Laterality Date   • CARDIAC DEFIBRILLATOR PLACEMENT     • CARDIOVERSION      x 2 procedures   • CORONARY ARTERY BYPASS GRAFT  1991    [facility]        Pediatric History   Patient Guardian Status   • Not on file     Other Topics Concern   • Not on file   Social History Narrative    Lives at home with his wife, Destiny.     , lives with his [place] in [facility], 1.5 packs/day  family history includes Cancer (age of onset: 74) in his mother; Diabetes in his father and mother; Heart attack (age of onset: 72) in his father; Heart disease in his mother.    Allergies   Allergen Reactions   • Shellfish-Derived Products Hives, Shortness Of Breath and Swelling       There is no immunization history for the selected administration types on file for this patient.    Medication:    Current Facility-Administered Medications:   •  acetaminophen (TYLENOL) tablet 650 mg, 650 mg, Oral, Q4H PRN **OR** acetaminophen (TYLENOL) 160 MG/5ML solution 650 mg, 650 mg, Oral, Q4H PRN **OR** acetaminophen (TYLENOL) suppository 650 mg, 650 mg, Rectal, Q4H PRN, Hina Ryan APRN  •  aspirin EC  tablet 81 mg, 81 mg, Oral, Daily, Hina Ryan APRN, 81 mg at 09/10/19 0949  •  bumetanide (BUMEX) injection 1 mg, 1 mg, Intravenous, Q12H, Lissette Jones PA, 1 mg at 09/10/19 2124  •  cefepime (MAXIPIME) 2 g/100 mL 0.9% NS (mbp), 2 g, Intravenous, Q24H, Wai Escobedo MD  •  DAPTOmycin (CUBICIN) 700 mg in sodium chloride 0.9 % 50 mL IVPB, 8 mg/kg (Adjusted), Intravenous, Q24H, Hina Ryan APRN, Last Rate: 100 mL/hr at 09/11/19 0522, 700 mg at 09/11/19 0522  •  dextrose (D50W) 25 g/ 50mL Intravenous Solution 25 g, 25 g, Intravenous, Q15 Min PRN, Hina Ryan APRN  •  dextrose (GLUTOSE) oral gel 15 g, 15 g, Oral, Q15 Min PRN, Hina Ryan APRN  •  docusate sodium (COLACE) capsule 100 mg, 100 mg, Oral, BID PRN, Hina Ryan APRN  •  ferrous sulfate tablet 325 mg, 325 mg, Oral, BID With Meals, Hina Ryan APRN, 325 mg at 09/10/19 1738  •  gabapentin (NEURONTIN) capsule 100 mg, 100 mg, Oral, Nightly, Hina Ryan APRN, 100 mg at 09/10/19 2125  •  glucagon (human recombinant) (GLUCAGEN DIAGNOSTIC) injection 1 mg, 1 mg, Subcutaneous, Q15 Min PRN, Hina Ryan APRN  •  Hold medication, 1 each, Does not apply, Continuous PRN, Andreas Jack MD  •  insulin detemir (LEVEMIR) injection 10 Units, 10 Units, Subcutaneous, Nightly, Hina Ryan APRN, 10 Units at 09/10/19 2127  •  insulin lispro (humaLOG) injection 0-7 Units, 0-7 Units, Subcutaneous, 4x Daily With Meals & Nightly, Hina Ryan APRN, 2 Units at 09/10/19 0947  •  ipratropium-albuterol (DUO-NEB) nebulizer solution 3 mL, 3 mL, Nebulization, 4x Daily - RT, Hina Ryan APRN, 3 mL at 09/10/19 1937  •  magnesium (as) gluconate (MAGONATE) tablet 27 mg, 27 mg, Oral, BID, Hina Ryan APRN, 27 mg at 09/10/19 2124  •  metoprolol tartrate (LOPRESSOR) tablet 50 mg, 50 mg, Oral, BID, Leda Gong PA  •  miconazole (MICOTIN) 2 % powder, , Topical, BID, Hina Ryan APRN  •  ondansetron (ZOFRAN)  injection 4 mg, 4 mg, Intravenous, Q6H PRN, Hina Ryan APRN, 4 mg at 09/10/19 0343  •  pantoprazole (PROTONIX) EC tablet 40 mg, 40 mg, Oral, Q AM, Elly Ryanrie, APRN, 40 mg at 09/10/19 0453  •  Pharmacy Consult - [facility], , Does not apply, Daily, Hira Velasco, [facility]  •  pravastatin (PRAVACHOL) tablet 20 mg, 20 mg, Oral, Nightly, Lissette Jones PA, 20 mg at 09/10/19 2124  •  sertraline (ZOLOFT) tablet 25 mg, 25 mg, Oral, Nightly, Hina Ryan, APRN, 25 mg at 09/10/19 2125  •  sodium chloride 0.9 % flush 10 mL, 10 mL, Intravenous, PRN, Sachin Palacios, DO  •  sodium chloride 0.9 % flush 10 mL, 10 mL, Intravenous, Q12H, Hina Ryan, APRN, 10 mL at 09/10/19 2125  •  sodium chloride 0.9 % flush 10 mL, 10 mL, Intravenous, PRN, Elly Ryanrie, APRN  •  tamsulosin (FLOMAX) 24 hr capsule 0.4 mg, 0.4 mg, Oral, Daily, Hina Ryan, APRN, 0.4 mg at 09/10/19 0949    Please refer to the medical record for a full medication list    Review of Systems:    Constitutional-- No Fever, chills or sweats.  Appetite good, and no malaise. No fatigue.  HEENT-- No new vision, hearing or throat complaints.  No epistaxis or oral sores.  Denies odynophagia or dysphagia.  No odynophagia or dysphagia. No headache, photophobia or neck stiffness.  CV-- No chest pain, palpitation or syncope  Resp-- some SOB/nonprod cough/no Hemoptysis, no wheezes  GI- No nausea, vomiting, or diarrhea.  No hematochezia, melena, or hematemesis. Denies jaundice or chronic liver disease.  -- No dysuria, hematuria, or flank pain.   Lymph- no swollen lymph nodes in neck/axilla or groin.   Heme- No active bruising or bleeding; no Hx of DVT or PE.  MS-- no swelling or pain in the bones or joints of arms/legs.  No new back pain.  Neuro-- No acute focal weakness or numbness in the arms or legs.  No seizures.  Skin--No rashes or lesions, right chest old site dialysis ok    Physical Exam:   Vital Signs   Temp:  [97.7 °F (36.5 °C)-98.4 °F (36.9 °C)]  "97.7 °F (36.5 °C)  Heart Rate:  [103-109] 108  Resp:  [14-18] 18  BP: ()/(71-82) 101/77    Temp  Min: 97.7 °F (36.5 °C)  Max: 98.4 °F (36.9 °C)  BP  Min: 99/74  Max: 111/82  Pulse  Min: 103  Max: 109  Resp  Min: 14  Max: 18  SpO2  Min: 94 %  Max: 96 %    Blood pressure 101/77, pulse 108, temperature 97.7 °F (36.5 °C), temperature source Oral, resp. rate 18, height 182.9 cm (72\"), weight 97.5 kg (215 lb), SpO2 95 %.  GENERAL: Awake and alert, in mild distress. Appears older than stated age.  HEENT:  Normocephalic, atraumatic.  Oropharynx without thrush. Dentition in good repair. No cervical adenopathy. No neck masses.  Ears externally normal, Nose externally normal.  EYES: No conjunctival injection. No icterus. EOM full.  LYMPHATICS: No lymphadenopathy of the neck or axillary or inguinal regions.   HEART: 2/6 syst murmur lsb, no gallop, or pericardial friction rub. Reg rate rhythm, No JVD at 45 degrees.  LUNGS: rales at bases, with decreased breath sounds. No respiratory distress, no use of accessory muscles.  No wheezes.  ABDOMEN: Soft, nontender, nondistended. No appreciable HSM.  Bowel sounds normal.  SKIN: Warm and dry without cutaneous eruptions.  No nodules.  IV access okay.  PSYCHIATRIC: Mental status lucid. No confusion.  EXT:  No cellulitic change.  NEURO: Oriented to name, nonfocal.    Results Review:   I reviewed the patient's new clinical results.  I reviewed the patient's new imaging results and agree with the interpretation.  I reviewed the patient's other test results and agree with the interpretation    Results from last 7 days   Lab Units 09/10/19  0315 09/09/19  2057   WBC 10*3/mm3 8.78 7.58   HEMOGLOBIN g/dL 13.8 12.8*   HEMATOCRIT % 47.2 43.2   PLATELETS 10*3/mm3 199 181     Results from last 7 days   Lab Units 09/10/19  0316   SODIUM mmol/L 137   POTASSIUM mmol/L 4.2   CHLORIDE mmol/L 99   CO2 mmol/L 21.0*   BUN mg/dL 38*   CREATININE mg/dL 1.60*   GLUCOSE mg/dL 172*   CALCIUM mg/dL 9.0 "     Results from last 7 days   Lab Units 09/10/19  0316   ALK PHOS U/L 273*   BILIRUBIN mg/dL 1.4*   ALT (SGPT) U/L 25   AST (SGOT) U/L 38                 Results from last 7 days   Lab Units 09/09/19  2058   LACTATE mmol/L 1.6     Estimated Creatinine Clearance: 58 mL/min (A) (by C-G formula based on SCr of 1.6 mg/dL (H)).    Microbiology:  Microbiology Results Abnormal     Procedure Component Value - Date/Time    Blood Culture - Blood, Hand, Right [[id number]] Collected:  09/09/19 2115    Lab Status:  Preliminary result Specimen:  Blood from Hand, Right Updated:  09/10/19 2256     Blood Culture No growth at 24 hours    Blood Culture - Blood, Arm, Right [[id number]] Collected:  09/09/19 2100    Lab Status:  Preliminary result Specimen:  Blood from Arm, Right Updated:  09/10/19 2256     Blood Culture No growth at 24 hours      Microbiology cultures 6/13 at [facility], (joão Trevino), negative, 6/17-, 6/17 BAL negative; blood cultures 8/19/2019-x2 at [facility], 6/16/19 ur antigen positive    Radiology:  Imaging Results (last 72 hours)     ** No results found for the last 72 hours. **          IMPRESSION:     1.  Endocarditis, culture negative, probably antibiotic modified related to ICD leads in right atrium and right ventricle, as well as dialysis catheter by transesophageal echocardiogram from 8/19/2019.  This could have been partially treated with the numerous antibiotics he has received since June 2019.  Otherwise does not have many symptoms consistent with endocarditis in terms of fever, or embolic phenomenon.  The renal failure potentially could have been immune complex mediated.  Working toward finishing his daptomycin and cefepime on 10/1/2019.  Cryptococcal antigen negative, chlamydia antigen negative, Q fever negative, T spot negative, Brucella negative, Bartonella negative, histoplasma negative on approximately 8/20/2019.  2.  Legionella pneumonia positive urine antigen 6/16/2019.  Eventually  transferred to the [facility] with acute hypoxic respiratory failure and was treated and finished therapy with greater than 10 days of levofloxacin and doxycycline.  Clinically resolved.  Very unlikely cause of endocarditis.  3.  MRSA left groin cellulitis with abscess January 2019, unlikely to have a bearing on the current case.  Resolved.  4.  Acute on chronic renal failure.  Required dialysis via a tunneled catheter right chest which was recently removed.  Last dialysis approximately 2 weeks ago.  Nephrology following.  Creatinine 1.60.  5.  Chronic ischemic cardiomyopathy with ejection fraction less than 15% status post previous ICD 2007 with generator change 2013.  Probable exacerbation of systolic congestive heart failure.  6.  Anemia, chronic disease and related to renal failure.  7.  Elevated transaminitis possibly related to medications versus hypoperfusion, amiodarone and Lipitor discontinued.  Clinically resolved.  8.  Diabetes mellitus type 2 with minimal increased risk for infection.  A1c 6.0.  9.  COPD.  10.  Thrombocytopenia.  Previously, resolved.  11.  Acute on chronic hypoxic and hypercapnic respiratory failure.  Worse.    Plan:    1.  Diagnostically, continue to follow patient's physical exam, CBC, CMP, CRP, CPK, blood cultures x2 obtained on 9/9/2019, fungal serologies.  2.  Therapeutically, continue daptomycin 700 mg IV every 24 hours, cefepime 1 g IV daily until 10/1/2019 for 6 weeks for treatment of potential endocarditis.  Watch toxicity in terms of liver, rhabdomyolysis, mental status, and bone marrow.  CBC, CMP, CRP, CPK should be monitored at least weekly while on this regimen.  3.  The risk benefit ratio has been discussed with the family, patient, and Dr. Edwards.  If his cultures are positive for bacteria such as MRSA, may be difficult to clear infection, and further discussion may be needed for removal of device.  If cultures are negative, I would treat with antibiotics  for 6 weeks then follow off antibiotics.  Side effects of antibiotics were discussed with the patient and family.  4.  Oxygen support therapy.  5.  Would consider holding statin therapy while on daptomycin to decrease risk of rhabdomyolysis.    I discussed the patients findings and my recommendations with patient, nursing staff, primary care team and consulting provider    Our group would be pleased to follow this patient over the course of their hospitalization and assist with outpatient antimicrobial therapy, as indicated.  Further recommendations depend on the results of the cultures and clinical course.    Wai Escobedo MD  9/11/2019

## 2019-09-11 NOTE — PLAN OF CARE
Problem: Patient Care Overview  Goal: Discharge Needs Assessment  Outcome: Ongoing (interventions implemented as appropriate)  Pt/inr 1.77 and pt was to have l thor in CT, FFP given, to check INR in am, to have CT l thor in am, BP low 100's and best bumex iv and metoprolol along with FFP ok, 's, milrinone gtt to be started (PICC single lumen), o2 at 2l today neccessary  Goal: Interprofessional Rounds/Family Conf  Outcome: Ongoing (interventions implemented as appropriate)

## 2019-09-11 NOTE — PROGRESS NOTES
Continued Stay Note   [place]     Patient Name: Rob Miranda  MRN: [id number]  Today's Date: 9/11/2019    Admit Date: 9/9/2019    Discharge Plan     Row Name 09/11/19 1429       Plan    Plan  update    Patient/Family in Agreement with Plan  yes    Plan Comments  Spoke with patient and wife at bedside regarding discharge plan.  Patient dozing in and out, patient wife confirms that he is current with [facility] and would like to continue HH at discharge.  No discharge needs verbalized.  CM following.  Patient plan is to discharge home and resume [facility] HH via car with family to transport when medically ready.     Final Discharge Disposition Code  06 - home with home health care        Discharge Codes    No documentation.       Expected Discharge Date and Time     Expected Discharge Date Expected Discharge Time    Sep 13, 2019             Delores Rodriguez RN

## 2019-09-11 NOTE — PROGRESS NOTES
[facility] Medicine Services  PROGRESS NOTE    Patient Name: Rob Miranda  : 1955  MRN: [id number]    Date of Admission: 2019  Primary Care Physician: Kreis, Samuel Duane, MD    Subjective   Subjective     CC:  A/C CHF    HPI:  Less dyspneic, appears comfortable at rest on NC.  Mild increased abd girth compared to baseline.    Review of Systems  Gen- No fevers, chills  CV- No chest pain, palpitations  Resp- No cough, (+)dyspnea  GI- No N/V/D, abd pain  Skin - No rash    All other systems reviewed and negative.     Objective   Objective     Vital Signs:   Temp:  [96.1 °F (35.6 °C)-98.5 °F (36.9 °C)] 98.2 °F (36.8 °C)  Heart Rate:  [106-112] 109  Resp:  [14-18] 16  BP: (100-109)/(67-79) 105/71        Physical Exam:  Constitutional: No acute distress, awake, alert, chronically ill appearing, centrally overweight body habitus  Respiratory: Dull entire LLL, right basilar crackles, good effort, nonlabored respirations at rest but mild dyspnea with long conversation   Cardiovascular: RRR, no murmur  Gastrointestinal: Positive bowel sounds, soft, nontender, mild distended  Musculoskeletal: 1+ peripheral edema, normal muscle tone for age  Psychiatric: Appropriate affect, good insight and judgement, cooperative    Results Reviewed:    Results from last 7 days   Lab Units 19  0819 19  0550 09/10/19  0436 09/10/19  0315 09/09/19  2057   WBC 10*3/mm3  --  6.22  --  8.78 7.58   HEMOGLOBIN g/dL  --  12.5*  --  13.8 12.8*   HEMATOCRIT %  --  43.5  --  47.2 43.2   PLATELETS 10*3/mm3  --  174  --  199 181   INR  1.77*  --  2.09*  --   --      Results from last 7 days   Lab Units 19  0550 09/10/19  0316 09/09/19  2057   SODIUM mmol/L 139 137 137   POTASSIUM mmol/L 3.7 4.2 4.1   CHLORIDE mmol/L 102 99 102   CO2 mmol/L 24.0 21.0* 23.0   BUN mg/dL 40* 38* 36*   CREATININE mg/dL 1.52* 1.60* 1.50*   GLUCOSE mg/dL 138* 172* 199*   CALCIUM mg/dL 8.6 9.0 8.6   ALT (SGPT) U/L 19 25  22   AST (SGOT) U/L 26 38 33   TROPONIN T ng/mL  --  0.028 0.034*   PROBNP pg/mL  --  7,388.0* 6,224.0*     Estimated Creatinine Clearance: 61 mL/min (A) (by C-G formula based on SCr of 1.52 mg/dL (H)).    Microbiology Results Abnormal     Procedure Component Value - Date/Time    Blood Culture - Blood, Hand, Right [[id number]] Collected:  09/09/19 2115    Lab Status:  Preliminary result Specimen:  Blood from Hand, Right Updated:  09/10/19 2256     Blood Culture No growth at 24 hours    Blood Culture - Blood, Arm, Right [[id number]] Collected:  09/09/19 2100    Lab Status:  Preliminary result Specimen:  Blood from Arm, Right Updated:  09/10/19 2256     Blood Culture No growth at 24 hours          Imaging Results (last 24 hours)     ** No results found for the last 24 hours. **          Results for orders placed during the hospital encounter of 08/19/19   Adult Transesophageal Echo (EVA) W/ Cont if Necessary Per Protocol    Narrative · Estimated EF appears to be in the range of less than 20%.  · Left ventricular systolic function is severely decreased.  · Moderate mitral valve regurgitation is present  · Moderate tricuspid valve regurgitation is present.  · Moderately reduced right ventricular systolic function noted.  · A small mobile density is present on the atrial portion of the RV lead   as well as the RA lead.  · The tip of the indewelling catheter in the RA, appears thickened.          I have reviewed the medications:  Scheduled Meds:  aspirin 81 mg Oral Daily   bumetanide 1 mg Intravenous Q12H   cefepime 2 g Intravenous Q24H   DAPTOmycin 8 mg/kg (Adjusted) Intravenous Q24H   diphenhydrAMINE 25 mg Oral Nightly   ferrous sulfate 325 mg Oral BID With Meals   gabapentin 100 mg Oral Nightly   insulin detemir 10 Units Subcutaneous Nightly   insulin lispro 0-7 Units Subcutaneous 4x Daily With Meals & Nightly   ipratropium-albuterol 3 mL Nebulization 4x Daily - RT   magnesium (as) gluconate 27 mg Oral BID   metoprolol  tartrate 50 mg Oral BID   miconazole  Topical BID   pantoprazole 40 mg Oral Q AM   pharmacy consult - MTM  Does not apply Daily   pravastatin 20 mg Oral Nightly   sertraline 25 mg Oral Nightly   sodium chloride 10 mL Intravenous Q12H   tamsulosin 0.4 mg Oral Daily   temazepam 15 mg Oral Nightly     Continuous Infusions:  hold 1 each   milrinone 0.25-0.75 mcg/kg/min     PRN Meds:.•  acetaminophen **OR** acetaminophen **OR** acetaminophen  •  dextrose  •  dextrose  •  docusate sodium  •  glucagon (human recombinant)  •  hold  •  ondansetron  •  sodium chloride  •  sodium chloride      Assessment/Plan   Assessment / Plan     Active Hospital Problems    Diagnosis  POA   • Dyspnea on exertion [R06.09]  Yes   • Rectus sheath hematoma [S30.1XXA]  Yes   • Dyspnea [R06.00]  Yes   • Elevated LFTs [R94.5]  Yes   • Atrial fibrillation and flutter (CMS/HCC) [I48.91, I48.92]  Yes   • IDDM (insulin dependent diabetes mellitus) (CMS/HCC) [E11.9, Z79.4]  Not Applicable   • Ischemic cardiomyopathy [I25.5]  Yes   • Dyslipidemia [E78.5]  Yes   • Acute on chronic combined systolic and diastolic congestive heart failure (CMS/HCC) [I50.43]  Yes   • Coronary artery disease involving coronary bypass graft of native heart without angina pectoris [I25.810]  Yes   • Essential hypertension [I10]  Yes      Resolved Hospital Problems   No resolved problems to display.        Brief Hospital Course to date:  Rob Miranda is a 64 y.o. male with history of ongoing tobacco abuse, PVD, DMII, COPD, Ischemic Cardiomyopathy with EF less than 20% and BiV ICD, atrial fibrillation on Eliquis, recent RODOLFO requiring temporary dialysis during June [facility] admission for Legionella pneumonia with mechanical ventilation. Recent admission 8/19 - 8/26/19 for planned AVN ablation and upgrade to BiVICD revealing vegetations on ICD leads, AVN ablation and BiVICD not performed instead now on 6wks duration of IV abx under [facility] management for endocarditis with plans for  procedures after infection clears.  He was discharged home from this facility on 8/26/19 but re-presented to [facility] ED with cough and worsening shortness of breath.  Imaging evaluation found moderate left and minimal right pleural effusions, also labs with markedly elevated BNP.    - thoracentesis planned but delayed currently awaiting Eliquis wash-out, this morning's INR >1.5 cut-off, given FFP for [place]-procedure stasis but timing after type/cross not sufficient to realistically do procedure today -->  Since today he is much less dyspneic after increased diuresis will plan for thoracentesis in am, recheck am INR and presume it will be <1.5     - continue BID IV Bumex, his volume status is difficult since hypotension and renal function limit ability to be aggressive and he has EF<20%    - Dr. Escobedo follows for pt's ongoing IV abx therapy related to endocarditis, plans for full 6wk duration then would be appropriate for Cards to pursue AVN ablation and BiVICD upgrade    - pt very debilitated, multiple recent hospital stays -->  PT/OT/CM to assess, likely needs skilled rehab prior to transition back to home since was failing HH PT    DVT Prophylaxis:  Eliquis    Disposition: I expect the patient to be discharged TBD.    CODE STATUS:   Code Status and Medical Interventions:   Ordered at: 09/10/19 0201     Level Of Support Discussed With:    Patient     Code Status:    CPR     Medical Interventions (Level of Support Prior to Arrest):    Full         Electronically signed by Joanna Jin MD, 09/11/19, 5:14 PM.

## 2019-09-11 NOTE — OUTREACH NOTE
CHF Week 3 Survey      Responses   Facility patient discharged from?  [facility]   Does the patient have one of the following disease processes/diagnoses(primary or secondary)?  CHF   Week 3 attempt successful?  No   Revoke  Readmitted          Luann Burns RN

## 2019-09-12 ENCOUNTER — APPOINTMENT (OUTPATIENT)
Dept: GENERAL RADIOLOGY | Facility: HOSPITAL | Age: 64
End: 2019-09-12

## 2019-09-12 LAB
ABO + RH BLD: NORMAL
ANION GAP SERPL CALCULATED.3IONS-SCNC: 12 MMOL/L (ref 5–15)
BACTERIA SPEC AEROBE CULT: NORMAL
BACTERIA SPEC AEROBE CULT: NORMAL
BH BB BLOOD EXPIRATION DATE: NORMAL
BH BB BLOOD TYPE BARCODE: 8400
BH BB DISPENSE STATUS: NORMAL
BH BB PRODUCT CODE: NORMAL
BH BB UNIT NUMBER: NORMAL
BUN BLD-MCNC: 35 MG/DL (ref 8–23)
BUN/CREAT SERPL: 25 (ref 7–25)
CALCIUM SPEC-SCNC: 8.9 MG/DL (ref 8.6–10.5)
CHLORIDE SERPL-SCNC: 103 MMOL/L (ref 98–107)
CO2 SERPL-SCNC: 27 MMOL/L (ref 22–29)
CREAT BLD-MCNC: 1.4 MG/DL (ref 0.76–1.27)
GFR SERPL CREATININE-BSD FRML MDRD: 51 ML/MIN/1.73
GLUCOSE BLD-MCNC: 103 MG/DL (ref 65–99)
GLUCOSE BLDC GLUCOMTR-MCNC: 106 MG/DL (ref 70–130)
GLUCOSE BLDC GLUCOMTR-MCNC: 125 MG/DL (ref 70–130)
GLUCOSE BLDC GLUCOMTR-MCNC: 251 MG/DL (ref 70–130)
GLUCOSE BLDC GLUCOMTR-MCNC: 340 MG/DL (ref 70–130)
GLUCOSE BLDC GLUCOMTR-MCNC: 57 MG/DL (ref 70–130)
GLUCOSE BLDC GLUCOMTR-MCNC: 66 MG/DL (ref 70–130)
INR PPP: 1.64 (ref 0.85–1.16)
POTASSIUM BLD-SCNC: 3.4 MMOL/L (ref 3.5–5.2)
PROTHROMBIN TIME: 18.6 SECONDS (ref 11.2–14.3)
SODIUM BLD-SCNC: 142 MMOL/L (ref 136–145)
UNIT  ABO: NORMAL
UNIT  RH: NORMAL

## 2019-09-12 PROCEDURE — 82962 GLUCOSE BLOOD TEST: CPT

## 2019-09-12 PROCEDURE — 80048 BASIC METABOLIC PNL TOTAL CA: CPT | Performed by: PHYSICIAN ASSISTANT

## 2019-09-12 PROCEDURE — 99232 SBSQ HOSP IP/OBS MODERATE 35: CPT | Performed by: FAMILY MEDICINE

## 2019-09-12 PROCEDURE — 97166 OT EVAL MOD COMPLEX 45 MIN: CPT

## 2019-09-12 PROCEDURE — 71045 X-RAY EXAM CHEST 1 VIEW: CPT

## 2019-09-12 PROCEDURE — 94799 UNLISTED PULMONARY SVC/PX: CPT

## 2019-09-12 PROCEDURE — 25010000002 CEFEPIME PER 500 MG: Performed by: INTERNAL MEDICINE

## 2019-09-12 PROCEDURE — 85610 PROTHROMBIN TIME: CPT | Performed by: FAMILY MEDICINE

## 2019-09-12 PROCEDURE — 63710000001 DIPHENHYDRAMINE PER 50 MG: Performed by: FAMILY MEDICINE

## 2019-09-12 PROCEDURE — 25010000002 DAPTOMYCIN PER 1 MG: Performed by: NURSE PRACTITIONER

## 2019-09-12 PROCEDURE — 63710000001 INSULIN DETEMIR PER 5 UNITS: Performed by: NURSE PRACTITIONER

## 2019-09-12 PROCEDURE — 97161 PT EVAL LOW COMPLEX 20 MIN: CPT

## 2019-09-12 RX ORDER — METOPROLOL TARTRATE 100 MG/1
100 TABLET ORAL 2 TIMES DAILY
Status: DISCONTINUED | OUTPATIENT
Start: 2019-09-12 | End: 2019-09-13

## 2019-09-12 RX ADMIN — Medication 27 MG: at 20:13

## 2019-09-12 RX ADMIN — TEMAZEPAM 15 MG: 15 CAPSULE ORAL at 20:13

## 2019-09-12 RX ADMIN — INSULIN LISPRO 5 UNITS: 100 INJECTION, SOLUTION INTRAVENOUS; SUBCUTANEOUS at 20:18

## 2019-09-12 RX ADMIN — TAMSULOSIN HYDROCHLORIDE 0.4 MG: 0.4 CAPSULE ORAL at 08:58

## 2019-09-12 RX ADMIN — CEFEPIME HYDROCHLORIDE 2 G: 2 INJECTION, POWDER, FOR SOLUTION INTRAVENOUS at 09:09

## 2019-09-12 RX ADMIN — MILRINONE LACTATE IN DEXTROSE 0.25 MCG/KG/MIN: 200 INJECTION, SOLUTION INTRAVENOUS at 06:53

## 2019-09-12 RX ADMIN — SODIUM CHLORIDE, PRESERVATIVE FREE 10 ML: 5 INJECTION INTRAVENOUS at 08:58

## 2019-09-12 RX ADMIN — BUMETANIDE 1 MG: 0.25 INJECTION INTRAMUSCULAR; INTRAVENOUS at 22:27

## 2019-09-12 RX ADMIN — SODIUM CHLORIDE, PRESERVATIVE FREE 10 ML: 5 INJECTION INTRAVENOUS at 20:14

## 2019-09-12 RX ADMIN — METOPROLOL TARTRATE 50 MG: 50 TABLET ORAL at 08:58

## 2019-09-12 RX ADMIN — DAPTOMYCIN 700 MG: 500 INJECTION, POWDER, LYOPHILIZED, FOR SOLUTION INTRAVENOUS at 05:08

## 2019-09-12 RX ADMIN — SERTRALINE HYDROCHLORIDE 25 MG: 50 TABLET ORAL at 20:13

## 2019-09-12 RX ADMIN — Medication 27 MG: at 08:59

## 2019-09-12 RX ADMIN — IPRATROPIUM BROMIDE AND ALBUTEROL SULFATE 3 ML: 2.5; .5 SOLUTION RESPIRATORY (INHALATION) at 15:53

## 2019-09-12 RX ADMIN — BUMETANIDE 1 MG: 0.25 INJECTION INTRAMUSCULAR; INTRAVENOUS at 08:57

## 2019-09-12 RX ADMIN — PANTOPRAZOLE SODIUM 40 MG: 40 TABLET, DELAYED RELEASE ORAL at 05:08

## 2019-09-12 RX ADMIN — ASPIRIN 81 MG: 81 TABLET, COATED ORAL at 08:58

## 2019-09-12 RX ADMIN — FERROUS SULFATE TAB 325 MG (65 MG ELEMENTAL FE) 325 MG: 325 (65 FE) TAB at 17:46

## 2019-09-12 RX ADMIN — FERROUS SULFATE TAB 325 MG (65 MG ELEMENTAL FE) 325 MG: 325 (65 FE) TAB at 08:58

## 2019-09-12 RX ADMIN — PRAVASTATIN SODIUM 20 MG: 20 TABLET ORAL at 20:13

## 2019-09-12 RX ADMIN — MICONAZOLE NITRATE 1 APPLICATION: 20 POWDER TOPICAL at 08:57

## 2019-09-12 RX ADMIN — IPRATROPIUM BROMIDE AND ALBUTEROL SULFATE 3 ML: 2.5; .5 SOLUTION RESPIRATORY (INHALATION) at 12:34

## 2019-09-12 RX ADMIN — METOPROLOL TARTRATE 100 MG: 100 TABLET ORAL at 20:13

## 2019-09-12 RX ADMIN — IPRATROPIUM BROMIDE AND ALBUTEROL SULFATE 3 ML: 2.5; .5 SOLUTION RESPIRATORY (INHALATION) at 08:01

## 2019-09-12 RX ADMIN — IPRATROPIUM BROMIDE AND ALBUTEROL SULFATE 3 ML: 2.5; .5 SOLUTION RESPIRATORY (INHALATION) at 20:56

## 2019-09-12 RX ADMIN — GABAPENTIN 100 MG: 100 CAPSULE ORAL at 20:13

## 2019-09-12 RX ADMIN — MILRINONE LACTATE IN DEXTROSE 0.25 MCG/KG/MIN: 200 INJECTION, SOLUTION INTRAVENOUS at 20:41

## 2019-09-12 RX ADMIN — INSULIN DETEMIR 10 UNITS: 100 INJECTION, SOLUTION SUBCUTANEOUS at 20:19

## 2019-09-12 RX ADMIN — DIPHENHYDRAMINE HYDROCHLORIDE 25 MG: 25 CAPSULE ORAL at 20:13

## 2019-09-12 RX ADMIN — INSULIN LISPRO 4 UNITS: 100 INJECTION, SOLUTION INTRAVENOUS; SUBCUTANEOUS at 17:45

## 2019-09-12 NOTE — PLAN OF CARE
Problem: Patient Care Overview  Goal: Plan of Care Review  Outcome: Ongoing (interventions implemented as appropriate)   09/12/19 6191   Coping/Psychosocial   Plan of Care Reviewed With patient   Plan of Care Review   Progress no change   OTHER   Outcome Summary VSS. On 2 Liters O2. Ambulated with Therapy. No complaints. will monitor.     Goal: Individualization and Mutuality  Outcome: Ongoing (interventions implemented as appropriate)    Goal: Discharge Needs Assessment  Outcome: Ongoing (interventions implemented as appropriate)      Problem: Fall Risk (Adult)  Goal: Identify Related Risk Factors and Signs and Symptoms  Outcome: Outcome(s) achieved Date Met: 09/12/19    Goal: Absence of Fall  Outcome: Ongoing (interventions implemented as appropriate)      Problem: Breathing Pattern Ineffective (Adult)  Goal: Identify Related Risk Factors and Signs and Symptoms  Outcome: Outcome(s) achieved Date Met: 09/12/19    Goal: Effective Oxygenation/Ventilation  Outcome: Ongoing (interventions implemented as appropriate)    Goal: Anxiety/Fear Reduction  Outcome: Ongoing (interventions implemented as appropriate)

## 2019-09-12 NOTE — PROGRESS NOTES
[facility] Medicine Services  PROGRESS NOTE    Patient Name: Rob Miranda  : 1955  MRN: [id number]    Date of Admission: 2019  Primary Care Physician: Kreis, Samuel Duane, MD    Subjective   Subjective     CC:  A/C CHF    HPI:  Up to chair, comfortable on 2L at rest, still with NGUYEN.  Improved BLE edema although still 2+ to distal thighs.      Review of Systems  Gen- No fevers, chills  CV- No chest pain, palpitations  Resp- No cough, (+)dyspnea  GI- No N/V/D, abd pain  Skin - No rash    All other systems reviewed and negative.     Objective   Objective     Vital Signs:   Temp:  [97.5 °F (36.4 °C)-98.4 °F (36.9 °C)] 97.7 °F (36.5 °C)  Heart Rate:  [105-132] 112  Resp:  [16-20] 16  BP: (105-129)/(65-93) 113/74        Physical Exam:  Constitutional: No acute distress, awake, alert, chronically ill appearing, centrally overweight body habitus  Respiratory: Dull entire LLL, right basilar crackles, good effort, nonlabored respirations at rest but mild dyspnea with long conversation   Cardiovascular: RRR, no murmur  Gastrointestinal: Positive bowel sounds, soft, nontender, mild distended  Musculoskeletal: 2+ peripheral edema to distal thighs, normal muscle tone for age  Psychiatric: Appropriate affect, good insight and judgement, cooperative    Results Reviewed:    Results from last 7 days   Lab Units 19  0544 19  0819 19  0550 09/10/19  0436 09/10/19  0315 09/09/19  2057   WBC 10*3/mm3  --   --  6.22  --  8.78 7.58   HEMOGLOBIN g/dL  --   --  12.5*  --  13.8 12.8*   HEMATOCRIT %  --   --  43.5  --  47.2 43.2   PLATELETS 10*3/mm3  --   --  174  --  199 181   INR  1.64* 1.77*  --  2.09*  --   --      Results from last 7 days   Lab Units 19  0544 19  0550 09/10/19  0316 09/09/19  2057   SODIUM mmol/L 142 139 137 137   POTASSIUM mmol/L 3.4* 3.7 4.2 4.1   CHLORIDE mmol/L 103 102 99 102   CO2 mmol/L 27.0 24.0 21.0* 23.0   BUN mg/dL 35* 40* 38* 36*    CREATININE mg/dL 1.40* 1.52* 1.60* 1.50*   GLUCOSE mg/dL 103* 138* 172* 199*   CALCIUM mg/dL 8.9 8.6 9.0 8.6   ALT (SGPT) U/L  --  19 25 22   AST (SGOT) U/L  --  26 38 33   TROPONIN T ng/mL  --   --  0.028 0.034*   PROBNP pg/mL  --   --  7,388.0* 6,224.0*     Estimated Creatinine Clearance: 67.3 mL/min (A) (by C-G formula based on SCr of 1.4 mg/dL (H)).    Microbiology Results Abnormal     Procedure Component Value - Date/Time    Blood Culture - Blood, Hand, Right [[id number]] Collected:  09/09/19 2115    Lab Status:  Preliminary result Specimen:  Blood from Hand, Right Updated:  09/11/19 2233     Blood Culture No growth at 2 days    Blood Culture - Blood, Arm, Right [[id number]] Collected:  09/09/19 2100    Lab Status:  Preliminary result Specimen:  Blood from Arm, Right Updated:  09/11/19 2233     Blood Culture No growth at 2 days          Imaging Results (last 24 hours)     Procedure Component Value Units Date/Time    XR Chest 1 View [[id number]] Collected:  09/12/19 0949     Updated:  09/12/19 1506    Narrative:       EXAMINATION: XR CHEST 1 VW-09/12/2019:      INDICATION: Re-evaluate effusion to determine if thora still needed  after aggressive diuresis; R06.03-Acute respiratory distress;  I50.9-Heart failure, unspecified; J81.0-Acute pulmonary edema;  X03-[place] effusion, not elsewhere classified; R18.8-Other ascites;  R10.11-Right upper quadrant pain; N18.9-Chronic kidney disease,  unspecified.      COMPARISON: 09/09/2019.     FINDINGS: Right upper extremity PICC line is seen with its tip partly  obscured by the pacing wires, but at least in the proximal SVC. The  heart is enlarged. There is increasing pulmonary vascular congestion and  moderate interstitial edema. There is persistent opacity of the left  lung base, similar to prior exam. No pneumothorax is seen.       Impression:       1.  Worsening congestive heart failure.  2.  Persistent opacity of the left base, whether atelectasis or  effusion,  unchanged from 09/09/2019.     D:  09/12/2019  E:  09/12/2019     This report was finalized on 9/12/2019 3:03 PM by DR. Vincent Puri MD.             Results for orders placed during the hospital encounter of 08/19/19   Adult Transesophageal Echo (EVA) W/ Cont if Necessary Per Protocol    Narrative · Estimated EF appears to be in the range of less than 20%.  · Left ventricular systolic function is severely decreased.  · Moderate mitral valve regurgitation is present  · Moderate tricuspid valve regurgitation is present.  · Moderately reduced right ventricular systolic function noted.  · A small mobile density is present on the atrial portion of the RV lead   as well as the RA lead.  · The tip of the indewelling catheter in the RA, appears thickened.          I have reviewed the medications:  Scheduled Meds:    aspirin 81 mg Oral Daily   bumetanide 1 mg Intravenous Q12H   cefepime 2 g Intravenous Q24H   DAPTOmycin 8 mg/kg (Adjusted) Intravenous Q24H   diphenhydrAMINE 25 mg Oral Nightly   ferrous sulfate 325 mg Oral BID With Meals   gabapentin 100 mg Oral Nightly   insulin detemir 10 Units Subcutaneous Nightly   insulin lispro 0-7 Units Subcutaneous 4x Daily With Meals & Nightly   ipratropium-albuterol 3 mL Nebulization 4x Daily - RT   magnesium (as) gluconate 27 mg Oral BID   metoprolol tartrate 100 mg Oral BID   miconazole  Topical BID   pantoprazole 40 mg Oral Q AM   pharmacy consult - MTM  Does not apply Daily   pravastatin 20 mg Oral Nightly   sertraline 25 mg Oral Nightly   sodium chloride 10 mL Intravenous Q12H   tamsulosin 0.4 mg Oral Daily   temazepam 15 mg Oral Nightly     Continuous Infusions:    hold 1 each    milrinone 0.25 mcg/kg/min Last Rate: 0.25 mcg/kg/min (09/12/19 0653)     PRN Meds:.•  acetaminophen **OR** acetaminophen **OR** acetaminophen  •  dextrose  •  dextrose  •  docusate sodium  •  glucagon (human recombinant)  •  hold  •  ondansetron  •  sodium chloride  •  sodium  chloride      Assessment/Plan   Assessment / Plan     Active Hospital Problems    Diagnosis  POA   • **Acute on chronic combined systolic and diastolic congestive heart failure (CMS/HCC) [I50.43]  Yes   • Bacterial endocarditis [I33.0]  Yes   • Dyspnea on exertion [R06.09]  Yes   • Rectus sheath hematoma [S30.1XXA]  Yes   • Dyspnea [R06.00]  Yes   • Elevated LFTs [R94.5]  Yes   • Atrial fibrillation and flutter (CMS/HCC) [I48.91, I48.92]  Yes   • IDDM (insulin dependent diabetes mellitus) (CMS/HCC) [E11.9, Z79.4]  Not Applicable   • Ischemic cardiomyopathy [I25.5]  Yes   • Dyslipidemia [E78.5]  Yes   • Coronary artery disease involving coronary bypass graft of native heart without angina pectoris [I25.810]  Yes   • Essential hypertension [I10]  Yes      Resolved Hospital Problems   No resolved problems to display.        Brief Hospital Course to date:  Rob Miranda is a 64 y.o. male with history of ongoing tobacco abuse, PVD, DMII, COPD, Ischemic Cardiomyopathy with EF less than 20% and BiV ICD, atrial fibrillation on Eliquis, recent RODOLFO requiring temporary dialysis during June [facility] admission for Legionella pneumonia with mechanical ventilation. Recent admission 8/19 - 8/26/19 for planned AVN ablation and upgrade to BiVICD revealing vegetations on ICD leads, AVN ablation and BiVICD not performed instead now on 6wks duration of IV abx under [facility] management for endocarditis with plans for procedures after infection clears.  He was discharged home from this facility on 8/26/19 but re-presented to [facility] ED with cough and worsening shortness of breath.  Imaging evaluation found moderate left and minimal right pleural effusions, also labs with markedly elevated BNP.    - thoracentesis planned but delayed currently awaiting Eliquis wash-out, INR  needs to be <1.5 cut-off  -->  Since he is much less dyspneic after increased diuresis will plan for thoracentesis in am, recheck am INR and presume it will be <1.5     -  continue BID IV Bumex, his volume status is difficult since hypotension and renal function limit ability to be aggressive and he has EF<20%.  May need to discuss realistic prognosis with Cards, could this be someone who needs early Palliative consideration for end stage CHF??    - Dr. Escobedo follows for pt's ongoing IV abx therapy related to endocarditis, plans for full 6wk duration then would be appropriate for Cards to pursue AVN ablation and BiVICD upgrade    - pt very debilitated, multiple recent hospital stays -->  PT/OT/CM follow      DVT Prophylaxis:  Eliquis    Disposition: I expect the patient to be discharged TBD.    CODE STATUS:   Code Status and Medical Interventions:   Ordered at: 09/10/19 0201     Level Of Support Discussed With:    Patient     Code Status:    CPR     Medical Interventions (Level of Support Prior to Arrest):    Full         Electronically signed by Joanna Jin MD, 09/12/19, 5:12 PM.

## 2019-09-12 NOTE — PLAN OF CARE
Problem: Patient Care Overview  Goal: Plan of Care Review  Outcome: Ongoing (interventions implemented as appropriate)   09/12/19 0451   Coping/Psychosocial   Plan of Care Reviewed With patient   Plan of Care Review   Progress no change   OTHER   Outcome Summary Pt has rested well overnight. Pt remains on 0.25 mcg/kg/hr Millirone. Blood pressure staying WNL. Pt has been NPO since midnight and has had no complaints. VSS. Neb treatment given for wheezing.       Problem: Fall Risk (Adult)  Goal: Identify Related Risk Factors and Signs and Symptoms  Outcome: Ongoing (interventions implemented as appropriate)   09/12/19 0451   Fall Risk (Adult)   Related Risk Factors (Fall Risk) environment unfamiliar   Signs and Symptoms (Fall Risk) presence of risk factors

## 2019-09-12 NOTE — PLAN OF CARE
Problem: Patient Care Overview  Goal: Plan of Care Review  Outcome: Ongoing (interventions implemented as appropriate)   09/12/19 0827   Coping/Psychosocial   Plan of Care Reviewed With patient   OTHER   Outcome Summary OT eval complete. Pt required min A to don socks, CGA STS with RW. Pt presents with weakness, decreased activity tolerance and balance limiting independence with self care. OT will follow to advance pt toward PLOF with ADLs.

## 2019-09-12 NOTE — THERAPY EVALUATION
Patient Name: Rob Miranda  : 1955    MRN: [id number]                              Today's Date: 2019       Admit Date: 2019    Visit Dx:     ICD-10-CM ICD-9-CM   1. Respiratory distress R06.03 786.09   2. Acute on chronic congestive heart failure, unspecified heart failure type (CMS/[facility]) I50.9 428.0   3. Acute pulmonary edema (CMS/[facility]) J81.0 518.4   4. Pleural effusion on left J90 511.9   5. Other ascites R18.8 789.59   6. RUQ abdominal pain R10.11 789.01   7. Chronic kidney disease, unspecified CKD stage N18.9 585.9     Patient Active Problem List   Diagnosis   • Acute on chronic combined systolic and diastolic congestive heart failure (CMS/[facility])   • Coronary artery disease involving coronary bypass graft of native heart without angina pectoris   • Essential hypertension   • History of ASCVD (atherosclerotic cardiovascular disease), status post myocardial infarction, CABG    • Ischemic cardiomyopathy   • Dyslipidemia   • IDDM (insulin dependent diabetes mellitus) (CMS/[facility])   • Atrial fibrillation and flutter (CMS/[facility])   • Sinus bradycardia   • Hyperlipidemia   • Tobacco use   • Hypomagnesemia   • ASCVD (arteriosclerotic cardiovascular disease), status post CABG in .   • Peripheral arterial disease both lower extremities.   • Persistent atrial fibrillation (CMS/[facility])   • Elevated LFTs   • Dyspnea on exertion   • Rectus sheath hematoma   • Dyspnea   • Bacterial endocarditis     Past Medical History:   Diagnosis Date   • Atherosclerotic cardiovascular disease    • Chronic anticoagulation    • Chronic kidney disease    • Congestive heart failure (CMS/[facility])    • COPD (chronic obstructive pulmonary disease) (CMS/[facility])    • DM (diabetes mellitus) (CMS/[facility])     insulin dependant   • Dyslipidemia    • Hx of atrial flutter     and a- fib   • Hx of myocardial infarction     s/p CABG    • Hyperlipidemia    • Hypertension    • Ischemic cardiomyopathy     advanced   • Legionnaire's disease  (CMS/[facility])    • Sinus bradycardia     EPS, 03/19/2007, Dr. Márquez:  Successful radiofrequency ablation of right atrial flutter   • Skin cancer    • Tobacco use      Past Surgical History:   Procedure Laterality Date   • CARDIAC DEFIBRILLATOR PLACEMENT     • CARDIOVERSION      x 2 procedures   • CORONARY ARTERY BYPASS GRAFT  1991    [facility]      General Information     Row Name 09/12/19 0854          PT Evaluation Time/Intention    Document Type  evaluation  -VG     Mode of Treatment  individual therapy;physical therapy  -VG     Row Name 09/12/19 0854          General Information    Patient Profile Reviewed?  yes  -VG     Prior Level of Function  independent:;all household mobility;ADL's;dependent:;community mobility  -VG     Existing Precautions/Restrictions  fall;oxygen therapy device and L/min  -VG     Barriers to Rehab  medically complex;previous functional deficit  -VG     Row Name 09/12/19 0854          Relationship/Environment    Lives With  spouse  -VG     Row Name 09/12/19 0854          Resource/Environmental Concerns    Current Living Arrangements  home/apartment/condo  -VG     Row Name 09/12/19 0854          Home Main Entrance    Number of Stairs, Main Entrance  none  -VG     Row Name 09/12/19 0854          Stairs Within Home, Primary    Number of Stairs, Within Home, Primary  none  -VG     Row Name 09/12/19 0854          Cognitive Assessment/Intervention- PT/OT    Orientation Status (Cognition)  oriented to;person;place;time  -VG     Row Name 09/12/19 0854          Safety Issues, Functional Mobility    Safety Issues Affecting Function (Mobility)  insight into deficits/self awareness  -VG     Impairments Affecting Function (Mobility)  balance;endurance/activity tolerance;strength  -VG       User Key  (r) = Recorded By, (t) = Taken By, (c) = Cosigned By    Initials Name Provider Type    VG Yusra Warner, PT Physical Therapist        Mobility     Row Name 09/12/19 0887          Bed Mobility  Assessment/Treatment    Bed Mobility Assessment/Treatment  supine-sit  -VG     Supine-Sit Shorter (Bed Mobility)  supervision;verbal cues  -VG     Assistive Device (Bed Mobility)  bed rails;head of bed elevated  -VG     Comment (Bed Mobility)  Cues for sequencing. Inc time/effort.  -VG     Row Name 09/12/19 0858          Transfer Assessment/Treatment    Comment (Transfers)  Cues for hand placement and sequencing.  -VG     Row Name 09/12/19 0858          Sit-Stand Transfer    Sit-Stand Shorter (Transfers)  contact guard;verbal cues  -VG     Assistive Device (Sit-Stand Transfers)  walker, front-wheeled  -VG     Row Name 09/12/19 0858          Gait/Stairs Assessment/Training    Gait/Stairs Assessment/Training  gait/ambulation independence  -VG     Shorter Level (Gait)  contact guard;verbal cues  -VG     Assistive Device (Gait)  walker, front-wheeled  -VG     Distance in Feet (Gait)  200  -VG     Pattern (Gait)  step-to  -VG     Deviations/Abnormal Patterns (Gait)  gait speed decreased;stride length decreased  -VG     Bilateral Gait Deviations  forward flexed posture  -VG     Comment (Gait/Stairs)  Distance limited by fatigue and weakness. Cues for upright posture and small steps with turning. Demonstrates impaired endurance, balance, strength.  -VG       User Key  (r) = Recorded By, (t) = Taken By, (c) = Cosigned By    Initials Name Provider Type    VG Yusra Warner, PT Physical Therapist        Obj/Interventions     Row Name 09/12/19 0900          General ROM    GENERAL ROM COMMENTS  BLEs WFL  -VG     Row Name 09/12/19 0900          MMT (Manual Muscle Testing)    General MMT Comments  BLEs 4+/5  -VG     Row Name 09/12/19 0900          Sensory Assessment/Intervention    Sensory General Assessment  no sensation deficits identified  -VG       User Key  (r) = Recorded By, (t) = Taken By, (c) = Cosigned By    Initials Name Provider Type    VG Yusra Warner, PT Physical Therapist        Goals/Plan      Row Name 09/12/19 0902          Bed Mobility Goal 1 (PT)    Activity/Assistive Device (Bed Mobility Goal 1, PT)  sit to supine/supine to sit  -VG     [place] Level/Cues Needed (Bed Mobility Goal 1, PT)  independent  -VG     Time Frame (Bed Mobility Goal 1, PT)  long term goal (LTG);2 weeks  -VG     Row Name 09/12/19 0902          Transfer Goal 1 (PT)    Activity/Assistive Device (Transfer Goal 1, PT)  sit-to-stand/stand-to-sit;walker, rolling  -VG     [place] Level/Cues Needed (Transfer Goal 1, PT)  conditional independence  -VG     Time Frame (Transfer Goal 1, PT)  long term goal (LTG);2 weeks  -VG     Row Name 09/12/19 0902          Gait Training Goal 1 (PT)    Activity/Assistive Device (Gait Training Goal 1, PT)  gait (walking locomotion);walker, rolling  -VG     [place] Level (Gait Training Goal 1, PT)  conditional independence  -VG     Distance (Gait Goal 1, PT)  500  -VG     Time Frame (Gait Training Goal 1, PT)  long term goal (LTG);2 weeks  -VG     Row Name 09/12/19 0902          Patient Education Goal (PT)    Activity (Patient Education Goal, PT)  HEP  -VG     [place]/Cues/Accuracy (Memory Goal 2, PT)  independent  -VG     Time Frame (Patient Education Goal, PT)  long term goal (LTG);2 weeks  -VG       User Key  (r) = Recorded By, (t) = Taken By, (c) = Cosigned By    Initials Name Provider Type    VG Yusra Warner, PT Physical Therapist        Clinical Impression     Row Name 09/12/19 0901          Pain Assessment    Additional Documentation  Pain Scale: Numbers Pre/Post-Treatment (Group)  -VG     Row Name 09/12/19 0901          Pain Scale: Numbers Pre/Post-Treatment    Pain Scale: Numbers, Pretreatment  0/10 - no pain  -VG     Pain Scale: Numbers, Post-Treatment  0/10 - no pain  -VG     Row Name 09/12/19 0901          Plan of Care Review    Plan of Care Reviewed With  patient  -VG     Row Name 09/12/19 0901          Physical Therapy Clinical Impression    Criteria for Skilled  Interventions Met (PT Clinical Impression)  yes;treatment indicated  -VG     Rehab Potential (PT Clinical Summary)  good, to achieve stated therapy goals  -VG     Row Name 09/12/19 0901          Vital Signs    Pre Systolic BP Rehab  121  -VG     Pre Treatment Diastolic BP  72  -VG     Post Systolic BP Rehab  122  -VG     Post Treatment Diastolic BP  67  -VG     Pretreatment Heart Rate (beats/min)  108  -VG     Posttreatment Heart Rate (beats/min)  113  -VG     Pre SpO2 (%)  96  -VG     O2 Delivery Pre Treatment  supplemental O2  -VG     Intra SpO2 (%)  91  -VG     O2 Delivery Intra Treatment  supplemental O2  -VG     Post SpO2 (%)  95  -VG     O2 Delivery Post Treatment  supplemental O2  -VG     Pre Patient Position  Supine  -VG     Intra Patient Position  Sitting  -VG     Post Patient Position  Sitting  -VG     Row Name 09/12/19 0901          Positioning and Restraints    Pre-Treatment Position  in bed  -VG     Post Treatment Position  chair  -VG     In Chair  reclined;call light within reach;encouraged to call for assist;exit alarm on;legs elevated  -VG       User Key  (r) = Recorded By, (t) = Taken By, (c) = Cosigned By    Initials Name Provider Type    Yusra Valerio, PT Physical Therapist        Outcome Measures     Row Name 09/12/19 0904          How much help from another person do you currently need...    Turning from your back to your side while in flat bed without using bedrails?  4  -VG     Moving from lying on back to sitting on the side of a flat bed without bedrails?  4  -VG     Moving to and from a bed to a chair (including a wheelchair)?  3  -VG     Standing up from a chair using your arms (e.g., wheelchair, bedside chair)?  3  -VG     Climbing 3-5 steps with a railing?  2  -VG     To walk in hospital room?  3  -VG     AM-PAC 6 Clicks Score (PT)  19  -VG     Row Name 09/12/19 0904          Functional Assessment    Outcome Measure Options  AM-PAC 6 Clicks Basic Mobility (PT)  -VG       User Key   (r) = Recorded By, (t) = Taken By, (c) = Cosigned By    Initials Name Provider Type    VG Yusra Warner, PT Physical Therapist        Physical Therapy Education     Title: PT OT SLP Therapies (In Progress)     Topic: Physical Therapy (In Progress)     Point: Mobility training (Done)     Learning Progress Summary           Patient Acceptance, E, VU by VG at 9/12/2019  9:03 AM                   Point: Body mechanics (Done)     Learning Progress Summary           Patient Acceptance, E, VU by  at 9/12/2019  9:03 AM                   Point: Precautions (Done)     Learning Progress Summary           Patient Acceptance, E, VU by VG at 9/12/2019  9:03 AM                               User Key     Initials Effective Dates Name Provider Type Discipline     05/29/18 -  Yusra Warner, PT Physical Therapist PT              PT Recommendation and Plan  Planned Therapy Interventions (PT Eval): balance training, bed mobility training, gait training, home exercise program, patient/family education, stair training, strengthening, transfer training  Outcome Summary/Treatment Plan (PT)  Anticipated Discharge Disposition (PT): home with assist, home with home health  Plan of Care Reviewed With: patient  Outcome Summary: IP PT eval completed. Pt ambulated 200 ft with RW and CGA, limited by fatigue. VSS on 2LO2NC. Demonstrates impaired endurance, balance, strength. Recommend appropriate for home with assist and HH PT upon d/c. Will continue to progress pt as able per POC.     Time Calculation:   PT Charges     Row Name 09/12/19 0819             Time Calculation    Start Time  0819  -      PT Received On  09/12/19  -      PT Goal Re-Cert Due Date  09/22/19  -        User Key  (r) = Recorded By, (t) = Taken By, (c) = Cosigned By    Initials Name Provider Type     Yusra Warner, PT Physical Therapist        Therapy Charges for Today     Code Description Service Date Service Provider Modifiers Qty    [id number] HC PT EVAL  LOW COMPLEXITY 4 9/12/2019 Yusra Warner, PT GP 1          PT G-Codes  Outcome Measure Options: AM-PAC 6 Clicks Basic Mobility (PT)  AM-PAC 6 Clicks Score (PT): 19    Sara Warner, PT  9/12/2019

## 2019-09-12 NOTE — THERAPY EVALUATION
Acute Care - Occupational Therapy Initial Evaluation  [facility]     Patient Name: Rob Miranda  : 1955  MRN: [id number]  Today's Date: 2019  Onset of Illness/Injury or Date of Surgery: 19  Date of Referral to OT: 19  Referring Physician: MD Davin    Admit Date: 2019       ICD-10-CM ICD-9-CM   1. Respiratory distress R06.03 786.09   2. Acute on chronic congestive heart failure, unspecified heart failure type (CMS/HCC) I50.9 428.0   3. Acute pulmonary edema (CMS/HCC) J81.0 518.4   4. Pleural effusion on left J90 511.9   5. Other ascites R18.8 789.59   6. RUQ abdominal pain R10.11 789.01   7. Chronic kidney disease, unspecified CKD stage N18.9 585.9     Patient Active Problem List   Diagnosis   • Acute on chronic combined systolic and diastolic congestive heart failure (CMS/HCC)   • Coronary artery disease involving coronary bypass graft of native heart without angina pectoris   • Essential hypertension   • History of ASCVD (atherosclerotic cardiovascular disease), status post myocardial infarction, CABG    • Ischemic cardiomyopathy   • Dyslipidemia   • IDDM (insulin dependent diabetes mellitus) (CMS/[facility])   • Atrial fibrillation and flutter (CMS/HCC)   • Sinus bradycardia   • Hyperlipidemia   • Tobacco use   • Hypomagnesemia   • ASCVD (arteriosclerotic cardiovascular disease), status post CABG in .   • Peripheral arterial disease both lower extremities.   • Persistent atrial fibrillation (CMS/HCC)   • Elevated LFTs   • Dyspnea on exertion   • Rectus sheath hematoma   • Dyspnea   • Bacterial endocarditis     Past Medical History:   Diagnosis Date   • Atherosclerotic cardiovascular disease    • Chronic anticoagulation    • Chronic kidney disease    • Congestive heart failure (CMS/HCC)    • COPD (chronic obstructive pulmonary disease) (CMS/HCC)    • DM (diabetes mellitus) (CMS/[facility])     insulin dependant   • Dyslipidemia    • Hx of atrial flutter     and a- fib   • Hx of  myocardial infarction 1992    s/p CABG    • Hyperlipidemia    • Hypertension    • Ischemic cardiomyopathy     advanced   • Legionnaire's disease (CMS/HCC)    • Sinus bradycardia     EPS, 03/19/2007, Dr. Márquez:  Successful radiofrequency ablation of right atrial flutter   • Skin cancer    • Tobacco use      Past Surgical History:   Procedure Laterality Date   • CARDIAC DEFIBRILLATOR PLACEMENT     • CARDIOVERSION      x 2 procedures   • CORONARY ARTERY BYPASS GRAFT  1991    [facility]           OT ASSESSMENT FLOWSHEET (last 12 hours)      Occupational Therapy Evaluation     Row Name 09/12/19 0840                   OT Evaluation Time/Intention    Subjective Information  no complaints  -AC        Document Type  evaluation  -AC        Mode of Treatment  occupational therapy  -AC        Patient Effort  good  -AC           General Information    Patient Profile Reviewed?  yes  -AC        Onset of Illness/Injury or Date of Surgery  09/09/19  -AC        Referring Physician  MD Davin  -AC        Patient/Family Observations  Pt received in chair.  On O2, IV intact  -AC        Prior Level of Function  independent:;all household mobility;ADL's  -AC        Equipment Currently Used at Home  rollator;walker, rolling;shower chair;commode, bedside  -AC        Pertinent History of Current Functional Problem  Pt admit with SOA and abdominal swelling  -AC        Existing Precautions/Restrictions  fall;oxygen therapy device and L/min  -AC        Risks Reviewed  patient:;LOB;increased discomfort  -AC        Benefits Reviewed  patient:;improve function;increase independence;increase strength;increase balance;increase knowledge  -AC        Barriers to Rehab  medically complex;previous functional deficit  -AC           Relationship/Environment    Primary Source of Support/Comfort  spouse  -AC        Lives With  spouse  -AC           Resource/Environmental Concerns    Current Living Arrangements  home/apartment/condo  -AC            Cognitive Assessment/Intervention- PT/OT    Orientation Status (Cognition)  oriented to;person;place;time  -AC        Follows Commands (Cognition)  WFL  -AC           Safety Issues, Functional Mobility    Safety Issues Affecting Function (Mobility)  insight into deficits/self awareness  -        Impairments Affecting Function (Mobility)  balance;endurance/activity tolerance;strength  -AC           Bed Mobility Assessment/Treatment    Comment (Bed Mobility)  UIC  -AC           Transfer Assessment/Treatment    Transfer Assessment/Treatment  sit-stand transfer;stand-sit transfer  -        Comment (Transfers)  Vcs for hand placement  -           Sit-Stand Transfer    Sit-Stand [place] (Transfers)  contact guard  -        Assistive Device (Sit-Stand Transfers)  walker, front-wheeled  -AC           Stand-Sit Transfer    Stand-Sit [place] (Transfers)  supervision  -        Assistive Device (Stand-Sit Transfers)  walker, front-wheeled  -AC           ADL Assessment/Intervention    BADL Assessment/Intervention  lower body dressing  -AC           Lower Body Dressing Assessment/Training    Lower Body Dressing [place] Level  doff;don;socks;minimum assist (75% patient effort)  -        Lower Body Dressing Position  unsupported sitting  -           BADL Safety/Performance    Impairments, BADL Safety/Performance  balance;endurance/activity tolerance;strength  -AC           General ROM    GENERAL ROM COMMENTS  BUE PROM WFL  -AC           MMT (Manual Muscle Testing)    General MMT Comments  R shld 2+/5,  L shld 3-/5,  otherwise BUE grossly 4-/5  -AC           Sensory Assessment/Intervention    Sensory General Assessment  no sensation deficits identified BUE  -AC           Positioning and Restraints    Pre-Treatment Position  sitting in chair/recliner  -AC        Post Treatment Position  chair  -AC        In Chair  reclined;call light within reach;encouraged to call for assist;exit alarm on  -AC            Pain Assessment    Additional Documentation  Pain Scale: Numbers Pre/Post-Treatment (Group)  -AC           Pain Scale: Numbers Pre/Post-Treatment    Pain Scale: Numbers, Pretreatment  0/10 - no pain  -AC        Pain Scale: Numbers, Post-Treatment  0/10 - no pain  -AC           Plan of Care Review    Plan of Care Reviewed With  patient  -AC           Clinical Impression (OT)    Date of Referral to OT  09/11/19  -AC        OT Diagnosis  ADL decline  -AC        Patient/Family Goals Statement (OT Eval)  [facility]  -AC        Rehab Potential (OT Eval)  good, to achieve stated therapy goals  -AC        Therapy Frequency (OT Eval)  daily  -AC        Care Plan Review (OT)  evaluation/treatment results reviewed  -AC        Anticipated Discharge Disposition (OT)  home with assist;home with home health  -AC           Vital Signs    Pre Systolic BP Rehab  122  -AC        Pre Treatment Diastolic BP  67  -AC        Post Systolic BP Rehab  120  -AC        Post Treatment Diastolic BP  72  -AC        Pretreatment Heart Rate (beats/min)  110  -AC        Posttreatment Heart Rate (beats/min)  111  -AC        Pre SpO2 (%)  94  -AC        O2 Delivery Pre Treatment  supplemental O2  -AC        O2 Delivery Intra Treatment  supplemental O2  -AC        Post SpO2 (%)  93  -AC        O2 Delivery Post Treatment  supplemental O2  -AC        Pre Patient Position  Sitting  -AC        Intra Patient Position  Standing  -AC        Post Patient Position  Sitting  -AC           Planned OT Interventions    Planned Therapy Interventions (OT Eval)  activity tolerance training;BADL retraining;functional balance retraining;occupation/activity based interventions;adaptive equipment training;strengthening exercise;transfer/mobility retraining  -AC           OT Goals    Transfer Goal Selection (OT)  transfer, OT goal 1  -AC        Dressing Goal Selection (OT)  dressing, OT goal 1  -AC        Toileting Goal Selection (OT)  toileting, OT goal 1   -AC        Strength Goal Selection (OT)  strength, OT goal 1  -AC        Additional Documentation  Strength Goal Selection (OT) (Row)  -AC           Transfer Goal 1 (OT)    Activity/Assistive Device (Transfer Goal 1, OT)  bed-to-chair/chair-to-bed;toilet;walker, rolling  -AC        Andreas Level/Cues Needed (Transfer Goal 1, OT)  supervision required  -AC        Time Frame (Transfer Goal 1, OT)  by discharge  -AC        Progress/Outcome (Transfer Goal 1, OT)  goal ongoing  -AC           Dressing Goal 1 (OT)    Activity/Assistive Device (Dressing Goal 1, OT)  lower body dressing  -AC        Andreas/Cues Needed (Dressing Goal 1, OT)  set-up required;supervision required  -AC        Time Frame (Dressing Goal 1, OT)  by discharge  -AC        Progress/Outcome (Dressing Goal 1, OT)  goal ongoing  -AC           Toileting Goal 1 (OT)    Activity/Device (Toileting Goal 1, OT)  adjust/manage clothing;perform perineal hygiene  -AC        Andreas Level/Cues Needed (Toileting Goal 1, OT)  supervision required  -AC        Time Frame (Toileting Goal 1, OT)  by discharge  -AC        Progress/Outcome (Toileting Goal 1, OT)  goal ongoing  -AC           Strength Goal 1 (OT)    Strength Goal 1 (OT)  pt will complete BUE AROM 15 reps daily as needed to increase strength and endurance to support ADLs  -AC        Time Frame (Strength Goal 1, OT)  by discharge  -AC        Progress/Outcome (Strength Goal 1, OT)  goal ongoing  -AC           Living Environment    Home Accessibility  -- ramp to enter, walk in shower  -AC          User Key  (r) = Recorded By, (t) = Taken By, (c) = Cosigned By    Initials Name Effective Dates    Guadalupe Apple, OT 06/23/15 -          Occupational Therapy Education     Title: PT OT SLP Therapies (In Progress)     Topic: Occupational Therapy (In Progress)     Point: ADL training (Done)     Description: Instruct learner(s) on proper safety adaptation and remediation techniques during self care or  transfers.   Instruct in proper use of assistive devices.    Learning Progress Summary           Patient Acceptance, GABBI, VU by  at 9/12/2019  8:40 AM    Comment:  benefits of activity, role of OT                               User Key     Initials Effective Dates Name Provider Type Discipline     06/23/15 -  Guadalupe Anderson, OT Occupational Therapist OT                  OT Recommendation and Plan  Outcome Summary/Treatment Plan (OT)  Anticipated Discharge Disposition (OT): home with assist, home with home health  Planned Therapy Interventions (OT Eval): activity tolerance training, BADL retraining, functional balance retraining, occupation/activity based interventions, adaptive equipment training, strengthening exercise, transfer/mobility retraining  Therapy Frequency (OT Eval): daily  Plan of Care Review  Plan of Care Reviewed With: patient  Plan of Care Reviewed With: patient  Outcome Summary: OT eval complete.  Pt required min A to don socks,  CGA STS with RW.  Pt presents with weakness, decreased activity tolerance and balance limiting independence with self care.  OT will follow to advance pt toward PLOF with ADLs.     Outcome Measures     Row Name 09/12/19 0840             How much help from another is currently needed...    Putting on and taking off regular lower body clothing?  3  -AC      Bathing (including washing, rinsing, and drying)  2  -AC      Toileting (which includes using toilet bed pan or urinal)  3  -AC      Putting on and taking off regular upper body clothing  3  -AC      Taking care of personal grooming (such as brushing teeth)  4  -AC      Eating meals  4  -AC      AM-PAC 6 Clicks Score (OT)  19  -AC         Functional Assessment    Outcome Measure Options  AM-PAC 6 Clicks Daily Activity (OT)  -        User Key  (r) = Recorded By, (t) = Taken By, (c) = Cosigned By    Initials Name Provider Type    AC Guadalupe Anderson, OT Occupational Therapist          Time Calculation:   Time Calculation-  OT     Row Name 09/12/19 0840             Time Calculation- OT    OT Start Time  0840  -      OT Received On  09/12/19  -      OT Goal Re-Cert Due Date  09/22/19  -        User Key  (r) = Recorded By, (t) = Taken By, (c) = Cosigned By    Initials Name Provider Type    AC Guadalupe Anderson, OT Occupational Therapist        Therapy Charges for Today     Code Description Service Date Service Provider Modifiers Qty    [id number] HC OT EVAL MOD COMPLEXITY 4 9/12/2019 Guadalupe Anderson, OT GO 1               Guadalupe Anderson OT  9/12/2019

## 2019-09-12 NOTE — PROGRESS NOTES
INFECTIOUS DISEASE Progress Note    Rob Miranda  1955  [id number]    Admit date: 9/9/2019    Requesting Provider: No ref. provider found  Evaluating physician: Wai Escobedo MD  Reason for Consultation: sepsis, endocarditis, related to pacemaker  Chief Complaint: above      Subjective   History of present illness:  Patient is a 64 y.o.  Yr old male with diabetes mellitus type 2, COPD, BPH, depression, hypertension, CAD, ischemic cardiomyopathy with ejection fraction less than 15%, biventricular ICD implant 2007 with generator change 2013, atrial fibrillation, acute kidney injury on dialysis in July at  last dialyzed 7/26/2019 with tunneled catheter in place, with a EVA on 8/19/2019 reported positive for vegetation on the RA and RV leads as well as the tip of the dialysis catheter who was admitted to [facility] on 8/19/2019.  Patient denied any high fevers or chills.  He has been ill previously since June with a diagnosis of left lower lobe pneumonia in June 2019 with a positive urine antigen for Legionella treated with doxycycline and levofloxacin, finishing his therapy at the [facility] after deteriorating with acute hypoxic resp failure admitted to ICU, then discharged approximately 6/19/2019.  Went to Rehab and then discharged around 7/7/19.  Patient at that time was also found to have acute renal failure receiving some hemodialysis via a tunneled temp dialysis right chest catheter.  The patient receives his routine cardiac care by Dr. Edwards.  He continued to have persistent atrial fibrillation and atrial flutter.  A transesophageal echocardiogram done on 8/19/2019 reported possible vegetations on the right atrial/right ventricular lead as well as the dialysis catheter.  Previously the patient received antibiotics in early July 2019.  He did not have subsequent fevers.  He had continued issues with respiratory status and heart failure.  I was consulted on 8/20/2019  for further evaluation and treatment.  The patient has no other localizing signs or symptoms of infection.  The patient had an episode of MRSA left groin infection with a positive culture at [facility] on 1/30/2019 confirmed with the microbiology tech.  Blood cultures obtained at [facility] from January 2019, as well as 6/13/2019, 6/17/2019 were negative.  Urine was positive for Legionella antigen study at [facility] June 2019.  He received a treatment course for this at the [facility].  He has no other localizing signs or symptoms of infection.    The patient was discharged home to receive daptomycin 500 mg every 48 hours, cefepime 1 g every 24 hours for pacer lead related ICD endocarditis with negative cultures to continue until 10/1/2019.  The patient was admitted to the emergency room on 9/9/2019 complaining of increasing shortness of breath, and increased weight gain of 20 pounds with increased abdominal swelling.  He denied any high fevers or chills.  A CT scan of the chest on 9/9 revealed a moderate left-sided pleural effusion, some coarse left upper lobe infiltrate/atelectasis, compressive atelectasis of the left lower lobe, small right pleural effusion.  The CT of the abdomen and pelvis revealed some nephrolithiasis without hydronephrosis, and some ascites but no bowel obstruction.  Chest x-ray revealed cardiomegaly and mild volume overload.  Portal and hepatic venous ultrasound was negative on 9/3.  Laboratories revealed proBNP of 7388, negative troponin, creatinine 1.60, sodium 137, potassium 4.2, bicarb 21, ALT 25, AST 38, albumin 3, alkaline phosphatase 273, total bili 1.4, WBC 8.78, hemoglobin 13.8, platelets 199.  Lactic acid was 1.6, blood cultures obtained on 9/9 were negative.  CPK normal 8/21.  I was consulted on 9/10/2019 for further evaluation and treatment.    9/11/2019 history reviewed.  Breathing improved.  Patient continues on  daptomycin and cefepime until 10/1/2019 for culture negative, ICD vegetation related endocarditis.  No fevers or muscle pain.    9/12/2019 history reviewed.  Tolerating daptomycin and cefepime until 10/1 for culture negative endocarditis.  No high fevers or chills.  Breathing improved.  No high fevers.    Past Medical History:   Diagnosis Date   • Atherosclerotic cardiovascular disease    • Chronic anticoagulation    • Chronic kidney disease    • Congestive heart failure (CMS/[facility])    • COPD (chronic obstructive pulmonary disease) (CMS/[facility])    • DM (diabetes mellitus) (CMS/[facility])     insulin dependant   • Dyslipidemia    • Hx of atrial flutter     and a- fib   • Hx of myocardial infarction 1992    s/p CABG    • Hyperlipidemia    • Hypertension    • Ischemic cardiomyopathy     advanced   • Legionnaire's disease (CMS/[facility])    • Sinus bradycardia     EPS, 03/19/2007, Dr. Márquez:  Successful radiofrequency ablation of right atrial flutter   • Skin cancer    • Tobacco use        Past Surgical History:   Procedure Laterality Date   • CARDIAC DEFIBRILLATOR PLACEMENT     • CARDIOVERSION      x 2 procedures   • CORONARY ARTERY BYPASS GRAFT  1991    [facility]        Pediatric History   Patient Guardian Status   • Not on file     Other Topics Concern   • Not on file   Social History Narrative    Lives at home with his wife, Destiny.     , lives with his wife in [facility], 1.5 packs/day  family history includes Cancer (age of onset: 74) in his mother; Diabetes in his father and mother; Heart attack (age of onset: 72) in his father; Heart disease in his mother.    Allergies   Allergen Reactions   • Shellfish-Derived Products Hives, Shortness Of Breath and Swelling       There is no immunization history for the selected administration types on file for this patient.    Medication:    Current Facility-Administered Medications:   •  acetaminophen (TYLENOL) tablet 650 mg, 650 mg, Oral, Q4H PRN, 650 mg at 09/11/19  0745 **OR** acetaminophen (TYLENOL) 160 MG/5ML solution 650 mg, 650 mg, Oral, Q4H PRN **OR** acetaminophen (TYLENOL) suppository 650 mg, 650 mg, Rectal, Q4H PRN, Hina Ryan APRN  •  aspirin EC tablet 81 mg, 81 mg, Oral, Daily, Hina Ryan APRN, 81 mg at 09/12/19 0858  •  bumetanide (BUMEX) injection 1 mg, 1 mg, Intravenous, Q12H, Lissette Jones PA, 1 mg at 09/12/19 0857  •  cefepime (MAXIPIME) 2 g/100 mL 0.9% NS (mbp), 2 g, Intravenous, Q24H, Wai Escobedo MD, 2 g at 09/12/19 0909  •  DAPTOmycin (CUBICIN) 700 mg in sodium chloride 0.9 % 50 mL IVPB, 8 mg/kg (Adjusted), Intravenous, Q24H, Hina Ryan APRN, Last Rate: 100 mL/hr at 09/12/19 0508, 700 mg at 09/12/19 0508  •  dextrose (D50W) 25 g/ 50mL Intravenous Solution 25 g, 25 g, Intravenous, Q15 Min PRN, Hina Ryan APRN  •  dextrose (GLUTOSE) oral gel 15 g, 15 g, Oral, Q15 Min PRN, Hina Ryan APRN  •  diphenhydrAMINE (BENADRYL) capsule 25 mg, 25 mg, Oral, Nightly, Joanna Jin MD, 25 mg at 09/11/19 2153  •  docusate sodium (COLACE) capsule 100 mg, 100 mg, Oral, BID PRN, Hina Ryan APRN  •  ferrous sulfate tablet 325 mg, 325 mg, Oral, BID With Meals, Hina Ryan APRN, 325 mg at 09/12/19 0858  •  gabapentin (NEURONTIN) capsule 100 mg, 100 mg, Oral, Nightly, Hina Ryan APRN, 100 mg at 09/11/19 2153  •  glucagon (human recombinant) (GLUCAGEN DIAGNOSTIC) injection 1 mg, 1 mg, Subcutaneous, Q15 Min PRN, Ryan, Hina, APRN  •  Hold medication, 1 each, Does not apply, Continuous PRN, Andreas Jack MD  •  insulin detemir (LEVEMIR) injection 10 Units, 10 Units, Subcutaneous, Nightly, Hina Ryan APRN, 10 Units at 09/11/19 2130  •  insulin lispro (humaLOG) injection 0-7 Units, 0-7 Units, Subcutaneous, 4x Daily With Meals & Nightly, Hina Ryan APRN, 2 Units at 09/11/19 2202  •  ipratropium-albuterol (DUO-NEB) nebulizer solution 3 mL, 3 mL, Nebulization, 4x Daily - RT, Hina Ryan APRN, 3 mL  at 09/12/19 1234  •  magnesium (as) gluconate (MAGONATE) tablet 27 mg, 27 mg, Oral, BID, Hina Ryan APRN, 27 mg at 09/12/19 0859  •  metoprolol tartrate (LOPRESSOR) tablet 100 mg, 100 mg, Oral, BID, Leda Gong PA  •  miconazole (MICOTIN) 2 % powder, , Topical, BID, Hina Ryan APRN, 1 application at 09/12/19 0857  •  milrinone 20 mg/100 mL (0.2 mg/mL) in 5 % dextrose infusion, 0.25 mcg/kg/min, Intravenous, Continuous, Dianne Alba PA, Last Rate: 7.31 mL/hr at 09/12/19 0653, 0.25 mcg/kg/min at 09/12/19 0653  •  ondansetron (ZOFRAN) injection 4 mg, 4 mg, Intravenous, Q6H PRN, Hina Ryan APRN, 4 mg at 09/10/19 0343  •  pantoprazole (PROTONIX) EC tablet 40 mg, 40 mg, Oral, Q AM, Hina Ryan APRN, 40 mg at 09/12/19 0508  •  Pharmacy Consult - [place], , Does not apply, Daily, Hira Velasco, [facility]  •  pravastatin (PRAVACHOL) tablet 20 mg, 20 mg, Oral, Nightly, Lissette Jones PA, 20 mg at 09/11/19 2153  •  sertraline (ZOLOFT) tablet 25 mg, 25 mg, Oral, Nightly, Hina Ryan APRN, 25 mg at 09/11/19 2153  •  sodium chloride 0.9 % flush 10 mL, 10 mL, Intravenous, PRN, Sachin Palacios, DO  •  sodium chloride 0.9 % flush 10 mL, 10 mL, Intravenous, Q12H, Hina Ryan APRN, 10 mL at 09/12/19 0858  •  sodium chloride 0.9 % flush 10 mL, 10 mL, Intravenous, PRN, Hina Ryan APRN  •  tamsulosin (FLOMAX) 24 hr capsule 0.4 mg, 0.4 mg, Oral, Daily, Hina Ryan APRN, 0.4 mg at 09/12/19 9787  •  temazepam (RESTORIL) capsule 15 mg, 15 mg, Oral, Nightly, Joanna Jin MD, 15 mg at 09/11/19 0094    Please refer to the medical record for a full medication list    Review of Systems:    Constitutional-- No Fever, chills or sweats.  Appetite good, and no malaise. No fatigue.  HEENT-- No new vision, hearing or throat complaints.  No epistaxis or oral sores.  Denies odynophagia or dysphagia.  No odynophagia or dysphagia. No headache, photophobia or neck stiffness.  CV-- No chest pain,  "palpitation or syncope  Resp-- some SOB/nonprod cough/no Hemoptysis, no wheezes  GI- No nausea, vomiting, or diarrhea.  No hematochezia, melena, or hematemesis. Denies jaundice or chronic liver disease.  -- No dysuria, hematuria, or flank pain.   Lymph- no swollen lymph nodes in neck/axilla or groin.   Heme- No active bruising or bleeding; no Hx of DVT or PE.  MS-- no swelling or pain in the bones or joints of arms/legs.  No new back pain.  Neuro-- No acute focal weakness or numbness in the arms or legs.  No seizures.  Skin--No rashes or lesions, right chest old site dialysis ok, no nodules    Physical Exam:   Vital Signs   Temp:  [97.5 °F (36.4 °C)-98.4 °F (36.9 °C)] 97.5 °F (36.4 °C)  Heart Rate:  [105-132] 110  Resp:  [16-18] 18  BP: (100-129)/(65-93) 114/74    Temp  Min: 97.5 °F (36.4 °C)  Max: 98.4 °F (36.9 °C)  BP  Min: 100/72  Max: 129/87  Pulse  Min: 105  Max: 132  Resp  Min: 16  Max: 18  SpO2  Min: 87 %  Max: 100 %    Blood pressure 114/74, pulse 110, temperature 97.5 °F (36.4 °C), temperature source Oral, resp. rate 18, height 182.9 cm (72\"), weight 101 kg (222 lb 4.8 oz), SpO2 98 %.  GENERAL: Awake and alert, in normal distress. Appears older than stated age.  Answers questions.  HEENT:  Normocephalic, atraumatic.  Oropharynx without thrush. Dentition in good repair. No cervical adenopathy. No neck masses.  Ears externally normal, Nose externally normal.  EYES: No conjunctival injection. No icterus. EOM full.  LYMPHATICS: No lymphadenopathy of the neck or axillary or inguinal regions.   HEART: 2/6 syst murmur lsb, no gallop, or pericardial friction rub. Reg rate rhythm, No JVD at 45 degrees.  LUNGS: rales at bases, with decreased breath sounds. No respiratory distress, no use of accessory muscles.  Occasional wheezes end expiratory.  ABDOMEN: Soft, nontender, nondistended. No appreciable HSM.  Bowel sounds normal.  SKIN: Warm and dry without cutaneous eruptions.  No nodules.  IV access " okay.  PSYCHIATRIC: Mental status lucid. No confusion.  EXT:  No cellulitic change.  NEURO: Oriented to name, nonfocal.    Results Review:   I reviewed the patient's new clinical results.  I reviewed the patient's new imaging results and agree with the interpretation.  I reviewed the patient's other test results and agree with the interpretation    Results from last 7 days   Lab Units 09/11/19  0550 09/10/19  0315 09/09/19  2057   WBC 10*3/mm3 6.22 8.78 7.58   HEMOGLOBIN g/dL 12.5* 13.8 12.8*   HEMATOCRIT % 43.5 47.2 43.2   PLATELETS 10*3/mm3 174 199 181     Results from last 7 days   Lab Units 09/12/19  0544   SODIUM mmol/L 142   POTASSIUM mmol/L 3.4*   CHLORIDE mmol/L 103   CO2 mmol/L 27.0   BUN mg/dL 35*   CREATININE mg/dL 1.40*   GLUCOSE mg/dL 103*   CALCIUM mg/dL 8.9     Results from last 7 days   Lab Units 09/11/19  0550   ALK PHOS U/L 210*   BILIRUBIN mg/dL 1.3*   ALT (SGPT) U/L 19   AST (SGOT) U/L 26                 Results from last 7 days   Lab Units 09/11/19  0550   LACTATE mmol/L 1.2     Estimated Creatinine Clearance: 67.3 mL/min (A) (by C-G formula based on SCr of 1.4 mg/dL (H)).    Microbiology:  Microbiology Results Abnormal     Procedure Component Value - Date/Time    Blood Culture - Blood, Hand, Right [[id number]] Collected:  09/09/19 2115    Lab Status:  Preliminary result Specimen:  Blood from Hand, Right Updated:  09/11/19 2233     Blood Culture No growth at 2 days    Blood Culture - Blood, Arm, Right [[id number]] Collected:  09/09/19 2100    Lab Status:  Preliminary result Specimen:  Blood from Arm, Right Updated:  09/11/19 2233     Blood Culture No growth at 2 days      Microbiology cultures 6/13 at [facility], (joão Trevino), negative, 6/17-, 6/17 BAL negative; blood cultures 8/19/2019-x2 at [facility], 6/16/19 ur antigen positive    Radiology:  Imaging Results (last 72 hours)     ** No results found for the last 72 hours. **          IMPRESSION:     1.  Endocarditis, culture  negative, probably antibiotic modified related to ICD leads in right atrium and right ventricle, as well as dialysis catheter by transesophageal echocardiogram from 8/19/2019.  This could have been partially treated with the numerous antibiotics he has received since June 2019.  Otherwise does not have many symptoms consistent with endocarditis in terms of fever, or embolic phenomenon.  The renal failure potentially could have been immune complex mediated.  Working toward finishing his daptomycin and cefepime on 10/1/2019.  Cryptococcal antigen negative, chlamydia antigen negative, Q fever negative, T spot negative, Brucella negative, Bartonella negative, histoplasma negative on approximately 8/20/2019.  2.  Legionella pneumonia positive urine antigen 6/16/2019.  Eventually transferred to the [facility] with acute hypoxic respiratory failure and was treated and finished therapy with greater than 10 days of levofloxacin and doxycycline.  Clinically resolved.  Very unlikely cause of endocarditis.  3.  MRSA left groin cellulitis with abscess January 2019, unlikely to have a bearing on the current case.  Resolved.  4.  Acute on chronic renal failure.  Required dialysis via a tunneled catheter right chest which was recently removed.  Last dialysis approximately 2 weeks ago.  Nephrology following.  Creatinine 1.40.  5.  Chronic ischemic cardiomyopathy with ejection fraction less than 15% status post previous ICD 2007 with generator change 2013.  Probable exacerbation of systolic congestive heart failure.  6.  Anemia, chronic disease and related to renal failure.  7.  Elevated transaminitis possibly related to medications versus hypoperfusion, amiodarone and Lipitor discontinued.  Clinically resolved.  8.  Diabetes mellitus type 2 with minimal increased risk for infection.  A1c 6.0.  9.  COPD.  10.  Thrombocytopenia.  Previously, resolved.  11.  Acute on chronic hypoxic and hypercapnic respiratory failure.    12.  Hypokalemia 3.4, new.    Still has a marginal prognosis given his multiorgan failures.    Plan:    1.  Diagnostically, continue to follow patient's physical exam, CBC, CMP, CRP, CPK, blood cultures x2 obtained on 9/9/2019, fungal serologies.  2.  Therapeutically, continue daptomycin 700 mg IV every 24 hours, cefepime 1 g IV daily until 10/1/2019 for 6 weeks for treatment of potential endocarditis.  Watch toxicity in terms of liver, rhabdomyolysis, mental status, and bone marrow.  CBC, CMP, CRP, CPK should be monitored at least weekly while on this regimen.  3.  The risk benefit ratio has been discussed with the family, patient, and Dr. Edwards.  If his cultures are positive for bacteria such as MRSA, may be difficult to clear infection, and further discussion may be needed for removal of device.  If cultures are negative, I would treat with antibiotics for 6 weeks then follow off antibiotics.  Side effects of antibiotics were discussed with the patient and family.  4.  Oxygen support therapy.  5.  Would consider holding statin therapy while on daptomycin to decrease risk of rhabdomyolysis.    I discussed the patients findings and my recommendations with patient, nursing staff, primary care team and consulting provider    Our group would be pleased to follow this patient over the course of their hospitalization and assist with outpatient antimicrobial therapy, as indicated.  Further recommendations depend on the results of the cultures and clinical course.    Wai Escobedo MD  9/12/2019

## 2019-09-12 NOTE — PROGRESS NOTES
[facility] at [facility]  Progress Note       LOS: 2 days   Patient Care Team:  Kreis, Samuel Duane, MD as PCP - General  Kreis, Samuel Duane, MD as PCP - Family Medicine  Ranjith Hernandez MD as Cardiologist (Cardiology)    Chief Complaint:  CHF    Subjective   Feeling better today. NO CP. Breathing has improved. Comfortable on 2 L sitting up in chair.       Review of Systems:   Pertinent positives in HPI, all others reviewed and negative.      Objective       Current Facility-Administered Medications:   •  acetaminophen (TYLENOL) tablet 650 mg, 650 mg, Oral, Q4H PRN, 650 mg at 09/11/19 0745 **OR** acetaminophen (TYLENOL) 160 MG/5ML solution 650 mg, 650 mg, Oral, Q4H PRN **OR** acetaminophen (TYLENOL) suppository 650 mg, 650 mg, Rectal, Q4H PRN, Hina Ryan APRN  •  aspirin EC tablet 81 mg, 81 mg, Oral, Daily, Hina Ryan APRN, 81 mg at 09/11/19 0836  •  bumetanide (BUMEX) injection 1 mg, 1 mg, Intravenous, Q12H, Lissette Jones PA, 1 mg at 09/11/19 2154  •  cefepime (MAXIPIME) 2 g/100 mL 0.9% NS (mbp), 2 g, Intravenous, Q24H, Wai Escobedo MD, 2 g at 09/11/19 1010  •  DAPTOmycin (CUBICIN) 700 mg in sodium chloride 0.9 % 50 mL IVPB, 8 mg/kg (Adjusted), Intravenous, Q24H, Hina Ryan APRN, Last Rate: 100 mL/hr at 09/12/19 0508, 700 mg at 09/12/19 0508  •  dextrose (D50W) 25 g/ 50mL Intravenous Solution 25 g, 25 g, Intravenous, Q15 Min PRN, Hina Ryan APRN  •  dextrose (GLUTOSE) oral gel 15 g, 15 g, Oral, Q15 Min PRN, Hina Ryan APRN  •  diphenhydrAMINE (BENADRYL) capsule 25 mg, 25 mg, Oral, Nightly, Joanna Jin MD, 25 mg at 09/11/19 2153  •  docusate sodium (COLACE) capsule 100 mg, 100 mg, Oral, BID PRN, Hina Ryan APRN  •  ferrous sulfate tablet 325 mg, 325 mg, Oral, BID With Meals, Hina Ryan APRN, 325 mg at 09/11/19 1732  •  gabapentin (NEURONTIN) capsule 100 mg, 100 mg, Oral, Nightly, Hina Ryan APRN, 100 mg at 09/11/19 2153  •   glucagon (human recombinant) (GLUCAGEN DIAGNOSTIC) injection 1 mg, 1 mg, Subcutaneous, Q15 Min PRN, Hina Ryan APRN  •  Hold medication, 1 each, Does not apply, Continuous PRN, Andreas Jack MD  •  insulin detemir (LEVEMIR) injection 10 Units, 10 Units, Subcutaneous, Nightly, Hina Ryan APRN, 10 Units at 09/11/19 2130  •  insulin lispro (humaLOG) injection 0-7 Units, 0-7 Units, Subcutaneous, 4x Daily With Meals & Nightly, Hina Ryan APRN, 2 Units at 09/11/19 2202  •  ipratropium-albuterol (DUO-NEB) nebulizer solution 3 mL, 3 mL, Nebulization, 4x Daily - RT, Hina Ryan APRN, 3 mL at 09/12/19 0801  •  magnesium (as) gluconate (MAGONATE) tablet 27 mg, 27 mg, Oral, BID, Hina Ryan APRN, 27 mg at 09/11/19 2152  •  metoprolol tartrate (LOPRESSOR) tablet 50 mg, 50 mg, Oral, BID, Leda Gong PA, 50 mg at 09/11/19 2352  •  miconazole (MICOTIN) 2 % powder, , Topical, BID, Hina Ryan APRN  •  milrinone 20 mg/100 mL (0.2 mg/mL) in 5 % dextrose infusion, 0.25 mcg/kg/min, Intravenous, Continuous, Dianne Alba PA, Last Rate: 7.31 mL/hr at 09/12/19 0653, 0.25 mcg/kg/min at 09/12/19 0653  •  ondansetron (ZOFRAN) injection 4 mg, 4 mg, Intravenous, Q6H PRN, Hina Ryan APRN, 4 mg at 09/10/19 0343  •  pantoprazole (PROTONIX) EC tablet 40 mg, 40 mg, Oral, Q AM, Hina Ryan APRN, 40 mg at 09/12/19 0508  •  Pharmacy Consult - MTM, , Does not apply, Daily, Hira Velasco, [facility]  •  pravastatin (PRAVACHOL) tablet 20 mg, 20 mg, Oral, Nightly, Little, Lissette, PA, 20 mg at 09/11/19 2153  •  sertraline (ZOLOFT) tablet 25 mg, 25 mg, Oral, Nightly, Hina Ryan APRN, 25 mg at 09/11/19 2153  •  sodium chloride 0.9 % flush 10 mL, 10 mL, Intravenous, PRN, Sachin Palacios, DO  •  sodium chloride 0.9 % flush 10 mL, 10 mL, Intravenous, Q12H, Hina Ryan APRN, 10 mL at 09/10/19 2125  •  sodium chloride 0.9 % flush 10 mL, 10 mL, Intravenous, PRN, Hina Ryan APRN  •   "tamsulosin (FLOMAX) 24 hr capsule 0.4 mg, 0.4 mg, Oral, Daily, Hina Ryan APRN, 0.4 mg at 09/11/19 0836  •  temazepam (RESTORIL) capsule 15 mg, 15 mg, Oral, Nightly, Joanna Jin MD, 15 mg at 09/11/19 2352    Vital Sign Min/Max for last 24 hours  Temp  Min: 97.6 °F (36.4 °C)  Max: 98.5 °F (36.9 °C)   BP  Min: 100/72  Max: 129/87   Pulse  Min: 105  Max: 132   Resp  Min: 16  Max: 18   SpO2  Min: 87 %  Max: 100 %   Flow (L/min)  Min: 2  Max: 2   Weight  Min: 101 kg (222 lb 4.8 oz)  Max: 101 kg (222 lb 4.8 oz)     Flowsheet Rows      First Filed Value   Admission Height  182.9 cm (72\") Documented at 09/09/2019 2006   Admission Weight  99.8 kg (220 lb) Documented at 09/09/2019 2006            Intake/Output Summary (Last 24 hours) at 9/12/2019 0856  Last data filed at 9/12/2019 0444  Gross per 24 hour   Intake 726 ml   Output 2025 ml   Net -1299 ml       Physical Exam:     General Appearance:    Alert, cooperative, in no acute distress   Lungs:     Diffuse rhonchi and wheezes    Heart:    Regular, tachycardic, normal S1 and S2, no            murmur, no gallop, no rub, no click   Chest Wall:    No abnormalities observed   Abdomen:     Normal bowel sounds, no masses, no organomegaly, soft        non-tender, non-distended, no guarding, no rebound                tenderness   Extremities:   Moves all extremities well, 2+ edema, no cyanosis, no             redness   Pulses:   Pulses palpable and equal bilaterally   Skin:   No bleeding, bruising or rash. Erythematous feet        Results Review:   Results from last 7 days   Lab Units 09/11/19  0550 09/10/19  0315 09/09/19  2057   WBC 10*3/mm3 6.22 8.78 7.58   HEMOGLOBIN g/dL 12.5* 13.8 12.8*   HEMATOCRIT % 43.5 47.2 43.2   PLATELETS 10*3/mm3 174 199 181     Results from last 7 days   Lab Units 09/12/19  0544 09/11/19  0550 09/10/19  0316   SODIUM mmol/L 142 139 137   POTASSIUM mmol/L 3.4* 3.7 4.2   CHLORIDE mmol/L 103 102 99   CO2 mmol/L 27.0 24.0 21.0*   BUN mg/dL 35* 40* " 38*   CREATININE mg/dL 1.40* 1.52* 1.60*   GLUCOSE mg/dL 103* 138* 172*      Results from last 7 days   Lab Units 09/10/19  0315   HEMOGLOBIN A1C % 6.00*     Results from last 7 days   Lab Units 09/10/19  0316   CHOLESTEROL mg/dL 101   TRIGLYCERIDES mg/dL 106   HDL CHOL mg/dL 20*         Results from last 7 days   Lab Units 09/10/19  0316   PROBNP pg/mL 7,388.0*     Results from last 7 days   Lab Units 09/12/19  0544 09/11/19  0819 09/10/19  0436   PROTIME Seconds 18.6* 19.8* 22.6*   INR  1.64* 1.77* 2.09*   APTT seconds  --  34.4 36.8     Results from last 7 days   Lab Units 09/11/19  0550 09/10/19  0316 09/09/19 2057   CK TOTAL U/L 40  --   --    TROPONIN T ng/mL  --  0.028 0.034*       Intake/Output Summary (Last 24 hours) at 9/12/2019 0856  Last data filed at 9/12/2019 0444  Gross per 24 hour   Intake 726 ml   Output 2025 ml   Net -1299 ml       I personally viewed and interpreted the patient's EKG/Telemetry data    EKG: none for review today    Telemetry: atrial flutter 105 -132 bpm     Ejection Fraction  No results found for: EF    Echo EF Estimated  No results found for: ECHOEFEST      Present on Admission:  • Dyspnea on exertion  • Acute on chronic combined systolic and diastolic congestive heart failure (CMS/HCC)  • Coronary artery disease involving coronary bypass graft of native heart without angina pectoris  • Essential hypertension  • Ischemic cardiomyopathy  • Dyslipidemia  • Atrial fibrillation and flutter (CMS/HCC)  • Elevated LFTs  • Rectus sheath hematoma  • Dyspnea  • Bacterial endocarditis    Assessment/Plan   1. Chronic systolic CHF/ICM with acute exacerbation  - EF 20%.    - S/p previous ICD implant  - Continue BBL, although has been held due to low BP. Continue if SBP is above 100 mmHg.  No ACE/ARB secondary to renal dysfunction  - on IV Milrinone  - Has diuresed well with IV Bumex, another 1300 mL NET output over last 24 hours.  - planned for thoracentesis today, however INR is 1.6.     2.  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 Persistent atrial fibrillation/atrial flutter:   - Continue metoprolol for HR control. Increase Metoprolol to 100 mg BID for better rate control.   - Eliquis 5 mg bid currently on hold for thoracentesis  - at last admission, AVN RFA and upgrade to BiV ICD aborted due to worsening renal function. May plan for that in the future.     4. CKD:   - baseline 1.4-1.6. Monitor closely. Cr is stable.     5. CAD:  - remote CABG, on ASA, BB  - off statin due to elevated liver enzymes last admission, but Pravastatin restarted this admission.     Plan for disposition: possible plan for thoracentesis today, but will need to be delayed due to INR still above 1.5. Further plan per hospitalists and IR.     Electronically signed by MIGUEL Ferrer, 09/12/19, 8:56 AM.

## 2019-09-12 NOTE — PROGRESS NOTES
Continued Stay Note  [facility]     Patient Name: Rob Miranda  MRN: [id number]  Today's Date: 9/12/2019    Admit Date: 9/9/2019    Discharge Plan     Row Name 09/12/19 1504       Plan    Plan  update    Patient/Family in Agreement with Plan  yes    Plan Comments  Spoke with patient at bedside regarding discharge plan.  Patient walked with PT today who reported he was cleared to go home with HH.  Patient verbalized understanding and is in agreement with plan.  No discharge needs verbalized.  CM following.  Patient plan is to discharge home and resume [facility] via car with family to transport when medically ready.     Final Discharge Disposition Code  06 - home with home health care        Discharge Codes    No documentation.       Expected Discharge Date and Time     Expected Discharge Date Expected Discharge Time    Sep 13, 2019             Delores Rodriguez RN

## 2019-09-12 NOTE — PLAN OF CARE
Problem: Patient Care Overview  Goal: Plan of Care Review   09/12/19 0903   Coping/Psychosocial   Plan of Care Reviewed With patient   OTHER   Outcome Summary IP PT eval completed. Pt ambulated 200 ft with RW and CGA, limited by fatigue. VSS on 2LO2NC. Demonstrates impaired endurance, balance, strength. Recommend appropriate for home with assist and HH PT upon d/c. Will continue to progress pt as able per POC.

## 2019-09-13 ENCOUNTER — APPOINTMENT (OUTPATIENT)
Dept: CT IMAGING | Facility: HOSPITAL | Age: 64
End: 2019-09-13

## 2019-09-13 LAB
A FLAVUS AB SER QL ID: NEGATIVE
A FUMIGATUS AB SER QL ID: NEGATIVE
A NIGER AB SER QL ID: NEGATIVE
ALBUMIN SERPL-MCNC: 2.8 G/DL (ref 3.5–5.2)
ALBUMIN/GLOB SERPL: 0.6 G/DL
ALP SERPL-CCNC: 202 U/L (ref 39–117)
ALT SERPL W P-5'-P-CCNC: 17 U/L (ref 1–41)
ANION GAP SERPL CALCULATED.3IONS-SCNC: 11 MMOL/L (ref 5–15)
APPEARANCE FLD: CLEAR
AST SERPL-CCNC: 24 U/L (ref 1–40)
B DERMAT AB TITR SER: NEGATIVE {TITER}
BASOPHILS # BLD AUTO: 0.03 10*3/MM3 (ref 0–0.2)
BASOPHILS NFR BLD AUTO: 0.4 % (ref 0–1.5)
BILIRUB SERPL-MCNC: 1.4 MG/DL (ref 0.2–1.2)
BUN BLD-MCNC: 32 MG/DL (ref 8–23)
BUN/CREAT SERPL: 24.6 (ref 7–25)
CALCIUM SPEC-SCNC: 8.6 MG/DL (ref 8.6–10.5)
CHLORIDE SERPL-SCNC: 103 MMOL/L (ref 98–107)
CK SERPL-CCNC: 42 U/L (ref 20–200)
CO2 SERPL-SCNC: 29 MMOL/L (ref 22–29)
COLOR FLD: YELLOW
CREAT BLD-MCNC: 1.3 MG/DL (ref 0.76–1.27)
D-LACTATE SERPL-SCNC: 1.8 MMOL/L (ref 0.5–2)
DEPRECATED RDW RBC AUTO: 88.4 FL (ref 37–54)
EOSINOPHIL # BLD AUTO: 0.22 10*3/MM3 (ref 0–0.4)
EOSINOPHIL NFR BLD AUTO: 2.8 % (ref 0.3–6.2)
ERYTHROCYTE [DISTWIDTH] IN BLOOD BY AUTOMATED COUNT: 27.4 % (ref 12.3–15.4)
GFR SERPL CREATININE-BSD FRML MDRD: 56 ML/MIN/1.73
GLOBULIN UR ELPH-MCNC: 4.4 GM/DL
GLUCOSE BLD-MCNC: 85 MG/DL (ref 65–99)
GLUCOSE BLDC GLUCOMTR-MCNC: 101 MG/DL (ref 70–130)
GLUCOSE BLDC GLUCOMTR-MCNC: 110 MG/DL (ref 70–130)
GLUCOSE BLDC GLUCOMTR-MCNC: 162 MG/DL (ref 70–130)
GLUCOSE BLDC GLUCOMTR-MCNC: 177 MG/DL (ref 70–130)
GLUCOSE FLD-MCNC: 117 MG/DL
H CAPSUL AB TITR SER ID: NEGATIVE {TITER}
HCT VFR BLD AUTO: 45.3 % (ref 37.5–51)
HGB BLD-MCNC: 12.6 G/DL (ref 13–17.7)
IMM GRANULOCYTES # BLD AUTO: 0.03 10*3/MM3 (ref 0–0.05)
IMM GRANULOCYTES NFR BLD AUTO: 0.4 % (ref 0–0.5)
INR PPP: 1.5 (ref 0.85–1.16)
LDH FLD-CCNC: 59 U/L
LYMPHOCYTES # BLD AUTO: 1.56 10*3/MM3 (ref 0.7–3.1)
LYMPHOCYTES NFR BLD AUTO: 20.2 % (ref 19.6–45.3)
LYMPHOCYTES NFR FLD MANUAL: 66 %
MACROPHAGE FLUID: 20 %
MCH RBC QN AUTO: 24.7 PG (ref 26.6–33)
MCHC RBC AUTO-ENTMCNC: 27.8 G/DL (ref 31.5–35.7)
MCV RBC AUTO: 88.6 FL (ref 79–97)
MESOTHL CELL NFR FLD MANUAL: 1 %
MONOCYTES # BLD AUTO: 0.67 10*3/MM3 (ref 0.1–0.9)
MONOCYTES NFR BLD AUTO: 8.7 % (ref 5–12)
MONOCYTES NFR FLD: 5 %
NEUTROPHILS # BLD AUTO: 5.21 10*3/MM3 (ref 1.7–7)
NEUTROPHILS NFR BLD AUTO: 67.5 % (ref 42.7–76)
NEUTROPHILS NFR FLD MANUAL: 8 %
NRBC BLD AUTO-RTO: 0 /100 WBC (ref 0–0.2)
PH FLD: 7.88 [PH]
PLATELET # BLD AUTO: 169 10*3/MM3 (ref 140–450)
PMV BLD AUTO: 9.4 FL (ref 6–12)
POTASSIUM BLD-SCNC: 3.7 MMOL/L (ref 3.5–5.2)
PROT FLD-MCNC: 1.6 G/DL
PROT SERPL-MCNC: 7.2 G/DL (ref 6–8.5)
PROTHROMBIN TIME: 17.4 SECONDS (ref 11.2–14.3)
RBC # BLD AUTO: 5.11 10*6/MM3 (ref 4.14–5.8)
RBC # FLD AUTO: <2000 /MM3
SODIUM BLD-SCNC: 143 MMOL/L (ref 136–145)
WBC # FLD AUTO: 331 /MM3
WBC NRBC COR # BLD: 7.72 10*3/MM3 (ref 3.4–10.8)

## 2019-09-13 PROCEDURE — 83615 LACTATE (LD) (LDH) ENZYME: CPT | Performed by: INTERNAL MEDICINE

## 2019-09-13 PROCEDURE — 87070 CULTURE OTHR SPECIMN AEROBIC: CPT | Performed by: INTERNAL MEDICINE

## 2019-09-13 PROCEDURE — 25010000002 CEFEPIME PER 500 MG: Performed by: INTERNAL MEDICINE

## 2019-09-13 PROCEDURE — 0W9B3ZZ DRAINAGE OF LEFT PLEURAL CAVITY, PERCUTANEOUS APPROACH: ICD-10-PCS | Performed by: INTERNAL MEDICINE

## 2019-09-13 PROCEDURE — 83986 ASSAY PH BODY FLUID NOS: CPT | Performed by: INTERNAL MEDICINE

## 2019-09-13 PROCEDURE — 87075 CULTR BACTERIA EXCEPT BLOOD: CPT | Performed by: INTERNAL MEDICINE

## 2019-09-13 PROCEDURE — 25010000003 LIDOCAINE 1 % SOLUTION: Performed by: RADIOLOGY

## 2019-09-13 PROCEDURE — 87015 SPECIMEN INFECT AGNT CONCNTJ: CPT | Performed by: INTERNAL MEDICINE

## 2019-09-13 PROCEDURE — 94799 UNLISTED PULMONARY SVC/PX: CPT

## 2019-09-13 PROCEDURE — 25010000002 DAPTOMYCIN PER 1 MG: Performed by: NURSE PRACTITIONER

## 2019-09-13 PROCEDURE — 80053 COMPREHEN METABOLIC PANEL: CPT | Performed by: INTERNAL MEDICINE

## 2019-09-13 PROCEDURE — C1729 CATH, DRAINAGE: HCPCS

## 2019-09-13 PROCEDURE — 84157 ASSAY OF PROTEIN OTHER: CPT | Performed by: INTERNAL MEDICINE

## 2019-09-13 PROCEDURE — 75989 ABSCESS DRAINAGE UNDER X-RAY: CPT

## 2019-09-13 PROCEDURE — 82550 ASSAY OF CK (CPK): CPT | Performed by: INTERNAL MEDICINE

## 2019-09-13 PROCEDURE — 89051 BODY FLUID CELL COUNT: CPT | Performed by: INTERNAL MEDICINE

## 2019-09-13 PROCEDURE — 85610 PROTHROMBIN TIME: CPT | Performed by: FAMILY MEDICINE

## 2019-09-13 PROCEDURE — 88112 CYTOPATH CELL ENHANCE TECH: CPT | Performed by: INTERNAL MEDICINE

## 2019-09-13 PROCEDURE — 99232 SBSQ HOSP IP/OBS MODERATE 35: CPT | Performed by: INTERNAL MEDICINE

## 2019-09-13 PROCEDURE — 87205 SMEAR GRAM STAIN: CPT | Performed by: INTERNAL MEDICINE

## 2019-09-13 PROCEDURE — 88305 TISSUE EXAM BY PATHOLOGIST: CPT | Performed by: INTERNAL MEDICINE

## 2019-09-13 PROCEDURE — 63710000001 DIPHENHYDRAMINE PER 50 MG: Performed by: FAMILY MEDICINE

## 2019-09-13 PROCEDURE — 85025 COMPLETE CBC W/AUTO DIFF WBC: CPT | Performed by: INTERNAL MEDICINE

## 2019-09-13 PROCEDURE — 63710000001 INSULIN DETEMIR PER 5 UNITS: Performed by: NURSE PRACTITIONER

## 2019-09-13 PROCEDURE — 87102 FUNGUS ISOLATION CULTURE: CPT | Performed by: INTERNAL MEDICINE

## 2019-09-13 PROCEDURE — 82962 GLUCOSE BLOOD TEST: CPT

## 2019-09-13 PROCEDURE — 84478 ASSAY OF TRIGLYCERIDES: CPT | Performed by: INTERNAL MEDICINE

## 2019-09-13 PROCEDURE — 83605 ASSAY OF LACTIC ACID: CPT | Performed by: INTERNAL MEDICINE

## 2019-09-13 PROCEDURE — 87206 SMEAR FLUORESCENT/ACID STAI: CPT | Performed by: INTERNAL MEDICINE

## 2019-09-13 PROCEDURE — 82945 GLUCOSE OTHER FLUID: CPT | Performed by: INTERNAL MEDICINE

## 2019-09-13 RX ORDER — LIDOCAINE HYDROCHLORIDE 10 MG/ML
20 INJECTION, SOLUTION INFILTRATION; PERINEURAL ONCE
Status: COMPLETED | OUTPATIENT
Start: 2019-09-13 | End: 2019-09-13

## 2019-09-13 RX ORDER — METOPROLOL TARTRATE 50 MG/1
50 TABLET, FILM COATED ORAL ONCE
Status: COMPLETED | OUTPATIENT
Start: 2019-09-13 | End: 2019-09-13

## 2019-09-13 RX ADMIN — INSULIN LISPRO 2 UNITS: 100 INJECTION, SOLUTION INTRAVENOUS; SUBCUTANEOUS at 18:21

## 2019-09-13 RX ADMIN — Medication 27 MG: at 09:00

## 2019-09-13 RX ADMIN — SODIUM CHLORIDE, PRESERVATIVE FREE 10 ML: 5 INJECTION INTRAVENOUS at 09:01

## 2019-09-13 RX ADMIN — PANTOPRAZOLE SODIUM 40 MG: 40 TABLET, DELAYED RELEASE ORAL at 05:20

## 2019-09-13 RX ADMIN — PRAVASTATIN SODIUM 20 MG: 20 TABLET ORAL at 21:13

## 2019-09-13 RX ADMIN — Medication 27 MG: at 21:13

## 2019-09-13 RX ADMIN — INSULIN DETEMIR 10 UNITS: 100 INJECTION, SOLUTION SUBCUTANEOUS at 21:20

## 2019-09-13 RX ADMIN — CEFEPIME HYDROCHLORIDE 2 G: 2 INJECTION, POWDER, FOR SOLUTION INTRAVENOUS at 09:00

## 2019-09-13 RX ADMIN — SERTRALINE HYDROCHLORIDE 25 MG: 50 TABLET ORAL at 21:13

## 2019-09-13 RX ADMIN — SODIUM CHLORIDE, PRESERVATIVE FREE 10 ML: 5 INJECTION INTRAVENOUS at 21:16

## 2019-09-13 RX ADMIN — DIPHENHYDRAMINE HYDROCHLORIDE 25 MG: 25 CAPSULE ORAL at 21:13

## 2019-09-13 RX ADMIN — MILRINONE LACTATE IN DEXTROSE 0.25 MCG/KG/MIN: 200 INJECTION, SOLUTION INTRAVENOUS at 09:51

## 2019-09-13 RX ADMIN — BUMETANIDE 1 MG: 0.25 INJECTION INTRAMUSCULAR; INTRAVENOUS at 21:14

## 2019-09-13 RX ADMIN — GABAPENTIN 100 MG: 100 CAPSULE ORAL at 21:14

## 2019-09-13 RX ADMIN — BUMETANIDE 1 MG: 0.25 INJECTION INTRAMUSCULAR; INTRAVENOUS at 09:00

## 2019-09-13 RX ADMIN — IPRATROPIUM BROMIDE AND ALBUTEROL SULFATE 3 ML: 2.5; .5 SOLUTION RESPIRATORY (INHALATION) at 15:26

## 2019-09-13 RX ADMIN — ASPIRIN 81 MG: 81 TABLET, COATED ORAL at 08:59

## 2019-09-13 RX ADMIN — INSULIN LISPRO 2 UNITS: 100 INJECTION, SOLUTION INTRAVENOUS; SUBCUTANEOUS at 21:13

## 2019-09-13 RX ADMIN — DAPTOMYCIN 700 MG: 500 INJECTION, POWDER, LYOPHILIZED, FOR SOLUTION INTRAVENOUS at 05:20

## 2019-09-13 RX ADMIN — METOPROLOL TARTRATE 50 MG: 50 TABLET ORAL at 09:54

## 2019-09-13 RX ADMIN — LIDOCAINE HYDROCHLORIDE 20 ML: 10 INJECTION, SOLUTION INFILTRATION; PERINEURAL at 11:40

## 2019-09-13 RX ADMIN — TAMSULOSIN HYDROCHLORIDE 0.4 MG: 0.4 CAPSULE ORAL at 08:59

## 2019-09-13 RX ADMIN — IPRATROPIUM BROMIDE AND ALBUTEROL SULFATE 3 ML: 2.5; .5 SOLUTION RESPIRATORY (INHALATION) at 19:33

## 2019-09-13 RX ADMIN — TEMAZEPAM 15 MG: 15 CAPSULE ORAL at 21:13

## 2019-09-13 RX ADMIN — METOPROLOL TARTRATE 150 MG: 100 TABLET ORAL at 22:59

## 2019-09-13 RX ADMIN — FERROUS SULFATE TAB 325 MG (65 MG ELEMENTAL FE) 325 MG: 325 (65 FE) TAB at 18:21

## 2019-09-13 RX ADMIN — FERROUS SULFATE TAB 325 MG (65 MG ELEMENTAL FE) 325 MG: 325 (65 FE) TAB at 08:59

## 2019-09-13 NOTE — PLAN OF CARE
Problem: Patient Care Overview  Goal: Plan of Care Review  Outcome: Ongoing (interventions implemented as appropriate)   09/13/19 8060   Coping/Psychosocial   Plan of Care Reviewed With patient   Plan of Care Review   Progress no change   OTHER   Outcome Summary VSS. Metoprolol increased for tachycardia. Thoracentesis done today with 1900 ml drained fron left. No complaints.      Goal: Individualization and Mutuality  Outcome: Ongoing (interventions implemented as appropriate)    Goal: Discharge Needs Assessment  Outcome: Ongoing (interventions implemented as appropriate)      Problem: Fall Risk (Adult)  Goal: Absence of Fall  Outcome: Ongoing (interventions implemented as appropriate)      Problem: Breathing Pattern Ineffective (Adult)  Goal: Effective Oxygenation/Ventilation  Outcome: Ongoing (interventions implemented as appropriate)    Goal: Anxiety/Fear Reduction  Outcome: Ongoing (interventions implemented as appropriate)

## 2019-09-13 NOTE — PROGRESS NOTES
[facility]  PROGRESS NOTE    Patient Name: Rob Miranda  : 1955  MRN: [id number]    Date of Admission: 2019  Primary Care Physician: Kreis, Samuel Duane, MD    Subjective   Subjective     CC:  A/C CHF    HPI:  Still having some dyspnea, but improved today compared to admission. Denies cough. Denies fever or chills.       Review of Systems  Gen- No fevers, chills  CV- No chest pain, palpitations  Resp- No cough, + dyspnea  GI- No N/V/D, abd pain        Objective   Objective     Vital Signs:   Temp:  [97.3 °F (36.3 °C)-98.1 °F (36.7 °C)] 97.3 °F (36.3 °C)  Heart Rate:  [103-127] 118  Resp:  [16-20] 18  BP: (101-122)/(63-85) 116/70        Physical Exam:  Constitutional -no acute distress, non toxic, sitting up in chair  HEENT-NCAT, mucous membranes moist  CV-RRR, S1 S2 normal, no m/r/g  Resp-diminished left base, rales right base, rare expiratory wheezes  Abd-soft, non-tender, non-distended, normo active bowel sounds  Ext-Trace LE edema bilaterally  Neuro-alert and oriented, speech clear, moves all extremities   Psych-normal affect   Skin-  Venous stasis changes LE bilaterally      Results Reviewed:    Results from last 7 days   Lab Units 19  081950  09/10/19  0315   WBC 10*3/mm3 7.72  --   --   --  6.22  --  8.78   HEMOGLOBIN g/dL 12.6*  --   --   --  12.5*  --  13.8   HEMATOCRIT % 45.3  --   --   --  43.5  --  47.2   PLATELETS 10*3/mm3 169  --   --   --  174  --  199   INR   --  1.50* 1.64* 1.77*  --    < >  --     < > = values in this interval not displayed.     Results from last 7 days   Lab Units 1944 19  0550 09/10/19  0316 19   SODIUM mmol/L 143 142 139 137 137   POTASSIUM mmol/L 3.7 3.4* 3.7 4.2 4.1   CHLORIDE mmol/L 103 103 102 99 102   CO2 mmol/L 29.0 27.0 24.0 21.0* 23.0   BUN mg/dL 32* 35* 40* 38* 36*   CREATININE mg/dL 1.30* 1.40* 1.52*  1.60* 1.50*   GLUCOSE mg/dL 85 103* 138* 172* 199*   CALCIUM mg/dL 8.6 8.9 8.6 9.0 8.6   ALT (SGPT) U/L 17  --  19 25 22   AST (SGOT) U/L 24  --  26 38 33   TROPONIN T ng/mL  --   --   --  0.028 0.034*   PROBNP pg/mL  --   --   --  7,388.0* 6,224.0*     Estimated Creatinine Clearance: 71 mL/min (A) (by C-G formula based on SCr of 1.3 mg/dL (H)).    Microbiology Results Abnormal     Procedure Component Value - Date/Time    Blood Culture - Blood, Hand, Right [[id number]] Collected:  09/09/19 2115    Lab Status:  Preliminary result Specimen:  Blood from Hand, Right Updated:  09/12/19 2242     Blood Culture No growth at 3 days    Blood Culture - Blood, Arm, Right [[id number]] Collected:  09/09/19 2100    Lab Status:  Preliminary result Specimen:  Blood from Arm, Right Updated:  09/12/19 2242     Blood Culture No growth at 3 days          Imaging Results (last 24 hours)     Procedure Component Value Units Date/Time    XR Chest 1 View [[id number]] Collected:  09/12/19 0949     Updated:  09/12/19 1506    Narrative:       EXAMINATION: XR CHEST 1 VW-09/12/2019:      INDICATION: Re-evaluate effusion to determine if thora still needed  after aggressive diuresis; R06.03-Acute respiratory distress;  I50.9-Heart failure, unspecified; J81.0-Acute pulmonary edema;  A66-Xgonyyp effusion, not elsewhere classified; R18.8-Other ascites;  R10.11-Right upper quadrant pain; N18.9-Chronic kidney disease,  unspecified.      COMPARISON: 09/09/2019.     FINDINGS: Right upper extremity PICC line is seen with its tip partly  obscured by the pacing wires, but at least in the proximal SVC. The  heart is enlarged. There is increasing pulmonary vascular congestion and  moderate interstitial edema. There is persistent opacity of the left  lung base, similar to prior exam. No pneumothorax is seen.       Impression:       1.  Worsening congestive heart failure.  2.  Persistent opacity of the left base, whether atelectasis or  effusion, unchanged from  09/09/2019.     D:  09/12/2019  E:  09/12/2019     This report was finalized on 9/12/2019 3:03 PM by DR. Vincent Puri MD.             Results for orders placed during the hospital encounter of 08/19/19   Adult Transesophageal Echo (EVA) W/ Cont if Necessary Per Protocol    Narrative · Estimated EF appears to be in the range of less than 20%.  · Left ventricular systolic function is severely decreased.  · Moderate mitral valve regurgitation is present  · Moderate tricuspid valve regurgitation is present.  · Moderately reduced right ventricular systolic function noted.  · A small mobile density is present on the atrial portion of the RV lead   as well as the RA lead.  · The tip of the indewelling catheter in the RA, appears thickened.          I have reviewed the medications:  Scheduled Meds:    aspirin 81 mg Oral Daily   bumetanide 1 mg Intravenous Q12H   cefepime 2 g Intravenous Q24H   DAPTOmycin 8 mg/kg (Adjusted) Intravenous Q24H   diphenhydrAMINE 25 mg Oral Nightly   ferrous sulfate 325 mg Oral BID With Meals   gabapentin 100 mg Oral Nightly   insulin detemir 10 Units Subcutaneous Nightly   insulin lispro 0-7 Units Subcutaneous 4x Daily With Meals & Nightly   ipratropium-albuterol 3 mL Nebulization 4x Daily - RT   magnesium (as) gluconate 27 mg Oral BID   metoprolol tartrate 150 mg Oral BID   miconazole  Topical BID   pantoprazole 40 mg Oral Q AM   pharmacy consult - MTM  Does not apply Daily   pravastatin 20 mg Oral Nightly   sertraline 25 mg Oral Nightly   sodium chloride 10 mL Intravenous Q12H   tamsulosin 0.4 mg Oral Daily   temazepam 15 mg Oral Nightly     Continuous Infusions:    hold 1 each    milrinone 0.25 mcg/kg/min Last Rate: 0.25 mcg/kg/min (09/13/19 0951)     PRN Meds:.•  acetaminophen **OR** acetaminophen **OR** acetaminophen  •  dextrose  •  dextrose  •  docusate sodium  •  glucagon (human recombinant)  •  hold  •  ondansetron  •  sodium chloride  •  sodium chloride    CT chest 9/9/19 personally  reviewed, moderate to large left pleural effusion to my eye.    Assessment/Plan   Assessment / Plan     Active Hospital Problems    Diagnosis  POA   • **Acute on chronic combined systolic and diastolic congestive heart failure (CMS/HCC) [I50.43]  Yes   • Bacterial endocarditis [I33.0]  Yes   • Dyspnea on exertion [R06.09]  Yes   • Rectus sheath hematoma [S30.1XXA]  Yes   • Dyspnea [R06.00]  Yes   • Elevated LFTs [R94.5]  Yes   • Atrial fibrillation and flutter (CMS/[facility]) [I48.91, I48.92]  Yes   • IDDM (insulin dependent diabetes mellitus) (CMS/[facility]) [E11.9, Z79.4]  Not Applicable   • Ischemic cardiomyopathy [I25.5]  Yes   • Dyslipidemia [E78.5]  Yes   • Coronary artery disease involving coronary bypass graft of native heart without angina pectoris [I25.810]  Yes   • Essential hypertension [I10]  Yes      Resolved Hospital Problems   No resolved problems to display.        Brief Hospital Course to date:  Rob Miranda is a 64 y.o. male with history of ongoing tobacco abuse, PVD, DMII, COPD, Ischemic Cardiomyopathy with EF less than 20% and BiV ICD, atrial fibrillation on Eliquis, recent RODOLFO requiring temporary dialysis during June [facility] admission for Legionella pneumonia with mechanical ventilation. Recent admission 8/19 - 8/26/19 for planned AVN ablation and upgrade to BiVICD revealing vegetations on ICD leads, AVN ablation and BiVICD not performed instead now on 6wks [facility] with plans for procedures after infection clears.  He was discharged home from this facility on 8/26/19 but re-presented to [facility] ED with cough and worsening shortness of breath.  Imaging evaluation found moderate left and minimal right pleural effusions, also labs with markedly elevated BNP.    Dyspnea on exertion  - likely combination of CHF, pleural effusion and tobacco abues  - thoracentesis today on left  - continue IV Bumex 1 mg bid with milrinone -- monitor renal function, check bmp  am    Acute on chronic systolic CHF/ICM  - dyspnea improving, continue diuresis    Endocarditis  - lead vegetations  - dapto and cefepime until 10/1  - weekly cbc, cmp, crp and cpk    Persistent afib/flutter  - eliquis on hold for thoracentesis today  - metoprolol increased to 150 bid for elevated HR    CKD  - creatinine 1.3 today, check bmp am    CAD  - h/p CABG  - pravastatin resumed  - check liver enzymes am, slightly elevated this admit.      DVT Prophylaxis:  Mechanical, resume eliquis later today or am after thoracentesis    Disposition: I expect the patient to be discharged TBD.    CODE STATUS:   Code Status and Medical Interventions:   Ordered at: 09/10/19 0201     Level Of Support Discussed With:    Patient     Code Status:    CPR     Medical Interventions (Level of Support Prior to Arrest):    Full         Electronically signed by Andreas Jack MD, 09/13/19, 11:02 AM.

## 2019-09-13 NOTE — PLAN OF CARE
Problem: Patient Care Overview  Goal: Plan of Care Review  Outcome: Ongoing (interventions implemented as appropriate)   09/13/19 7022   Coping/Psychosocial   Plan of Care Reviewed With patient   Plan of Care Review   Progress no change   OTHER   Outcome Summary VSS. Pt resting well. Pt is incontinent of urine at night r/t late admission of bumex. Pt scratched hisself on left leg and made it bleed. VSS

## 2019-09-13 NOTE — PROGRESS NOTES
Continued Stay Note   [facility]     Patient Name: Rob Miranda  MRN: [id number]  Today's Date: 9/13/2019    Admit Date: 9/9/2019    Discharge Plan     Row Name 09/13/19 1105       Plan    Plan  update    Patient/Family in Agreement with Plan  yes    Plan Comments  Per CM consult call [facility] and spoke to Junior Rodriguez regarding need for IV Milroinone at home who will priced medication and call back.  Spoke with patient at bedside regarding discharge plan.  Patient unsure of when he will be going home, no discharge needs verbalized.  CM following.  Patient plan is to discharge home and resume [facility] via car with family to transport when medically ready.     Final Discharge Disposition Code  06 - home with home health care        Discharge Codes    No documentation.       Expected Discharge Date and Time     Expected Discharge Date Expected Discharge Time    Sep 13, 2019             Delores Rodriguez RN

## 2019-09-13 NOTE — PROGRESS NOTES
[facility] at [facility]  Progress Note       LOS: 3 days   Patient Care Team:  Kreis, Samuel Duane, MD as PCP - General  Kreis, Samuel Duane, MD as PCP - Family Medicine  Ranjith Hernandez MD as Cardiologist (Cardiology)    Chief Complaint:  F/u acute on chronic systolic CHF, AF    Subjective     Interval History: Patient diuresed well yesterday.  Feels his SOB and swelling has improved.  Remains in atrial fibrillation, rates averaging 115 bpm.          Review of Systems:   Pertinent positives in HPI, all others reviewed and negative.      Objective       Current Facility-Administered Medications:   •  acetaminophen (TYLENOL) tablet 650 mg, 650 mg, Oral, Q4H PRN, 650 mg at 09/11/19 0745 **OR** acetaminophen (TYLENOL) 160 MG/5ML solution 650 mg, 650 mg, Oral, Q4H PRN **OR** acetaminophen (TYLENOL) suppository 650 mg, 650 mg, Rectal, Q4H PRN, Hina Ryan APRN  •  aspirin EC tablet 81 mg, 81 mg, Oral, Daily, Hina Ryan APRN, 81 mg at 09/13/19 0859  •  bumetanide (BUMEX) injection 1 mg, 1 mg, Intravenous, Q12H, Lissette Jones PA, 1 mg at 09/13/19 0900  •  cefepime (MAXIPIME) 2 g/100 mL 0.9% NS (mbp), 2 g, Intravenous, Q24H, Wai Escobedo MD, 2 g at 09/13/19 0900  •  DAPTOmycin (CUBICIN) 700 mg in sodium chloride 0.9 % 50 mL IVPB, 8 mg/kg (Adjusted), Intravenous, Q24H, Hina Ryan APRN, Last Rate: 100 mL/hr at 09/13/19 0520, 700 mg at 09/13/19 0520  •  dextrose (D50W) 25 g/ 50mL Intravenous Solution 25 g, 25 g, Intravenous, Q15 Min PRN, Hina Ryan APRN  •  dextrose (GLUTOSE) oral gel 15 g, 15 g, Oral, Q15 Min PRN, Hina Ryan APRN  •  diphenhydrAMINE (BENADRYL) capsule 25 mg, 25 mg, Oral, Nightly, Joanna Jin MD, 25 mg at 09/12/19 2013  •  docusate sodium (COLACE) capsule 100 mg, 100 mg, Oral, BID PRN, Hina Ryan APRN  •  ferrous sulfate tablet 325 mg, 325 mg, Oral, BID With Meals, Hina Ryan APRN, 325 mg at 09/13/19 0859  •  gabapentin  (NEURONTIN) capsule 100 mg, 100 mg, Oral, Nightly, Hina Ryan APRN, 100 mg at 09/12/19 2013  •  glucagon (human recombinant) (GLUCAGEN DIAGNOSTIC) injection 1 mg, 1 mg, Subcutaneous, Q15 Min PRN, Hina Ryan APRN  •  Hold medication, 1 each, Does not apply, Continuous PRN, Andreas Jack MD  •  insulin detemir (LEVEMIR) injection 10 Units, 10 Units, Subcutaneous, Nightly, Hina Ryan APRN, 10 Units at 09/12/19 2019  •  insulin lispro (humaLOG) injection 0-7 Units, 0-7 Units, Subcutaneous, 4x Daily With Meals & Nightly, Hina Ryan APRN, 5 Units at 09/12/19 2018  •  ipratropium-albuterol (DUO-NEB) nebulizer solution 3 mL, 3 mL, Nebulization, 4x Daily - RT, Hina Ryan APRN, 3 mL at 09/12/19 2056  •  magnesium (as) gluconate (MAGONATE) tablet 27 mg, 27 mg, Oral, BID, Hina Ryan APRN, 27 mg at 09/13/19 0900  •  metoprolol tartrate (LOPRESSOR) tablet 100 mg, 100 mg, Oral, BID, Leda Gong PA, 100 mg at 09/12/19 2013  •  miconazole (MICOTIN) 2 % powder, , Topical, BID, Hina Ryan APRN, 1 application at 09/12/19 0857  •  milrinone 20 mg/100 mL (0.2 mg/mL) in 5 % dextrose infusion, 0.25 mcg/kg/min, Intravenous, Continuous, Dianne Alba PA, Last Rate: 7.31 mL/hr at 09/12/19 2041, 0.25 mcg/kg/min at 09/12/19 2041  •  ondansetron (ZOFRAN) injection 4 mg, 4 mg, Intravenous, Q6H PRN, Hina Ryan APRN, 4 mg at 09/10/19 0343  •  pantoprazole (PROTONIX) EC tablet 40 mg, 40 mg, Oral, Q AM, Hina Rayn APRN, 40 mg at 09/13/19 0520  •  Pharmacy Consult - [facility], , Does not apply, Daily, Hira Velasco, [facility]  •  pravastatin (PRAVACHOL) tablet 20 mg, 20 mg, Oral, Nightly, Lissette Jones PA, 20 mg at 09/12/19 2013  •  sertraline (ZOLOFT) tablet 25 mg, 25 mg, Oral, Nightly, Hina Ryan APRN, 25 mg at 09/12/19 2013  •  sodium chloride 0.9 % flush 10 mL, 10 mL, Intravenous, PRN, Suzanne, Sachin, DO  •  sodium chloride 0.9 % flush 10 mL, 10 mL, Intravenous, Q12H,  "Hina Ryan APRN, 10 mL at 09/13/19 0901  •  sodium chloride 0.9 % flush 10 mL, 10 mL, Intravenous, PRN, Hina Ryan APRN  •  tamsulosin (FLOMAX) 24 hr capsule 0.4 mg, 0.4 mg, Oral, Daily, Hina Ryan APRN, 0.4 mg at 09/13/19 0859  •  temazepam (RESTORIL) capsule 15 mg, 15 mg, Oral, Nightly, Joanna Jin MD, 15 mg at 09/12/19 2013    Vital Sign Min/Max for last 24 hours  Temp  Min: 97.3 °F (36.3 °C)  Max: 98.1 °F (36.7 °C)   BP  Min: 101/63  Max: 122/81   Pulse  Min: 110  Max: 127   Resp  Min: 16  Max: 20   SpO2  Min: 87 %  Max: 98 %   Flow (L/min)  Min: 2  Max: 2   Weight  Min: 96.5 kg (212 lb 11.2 oz)  Max: 96.5 kg (212 lb 11.2 oz)     Flowsheet Rows      First Filed Value   Admission Height  182.9 cm (72\") Documented at 09/09/2019 2006   Admission Weight  99.8 kg (220 lb) Documented at 09/09/2019 2006            Intake/Output Summary (Last 24 hours) at 9/13/2019 0918  Last data filed at 9/13/2019 0700  Gross per 24 hour   Intake --   Output 3600 ml   Net -3600 ml       Physical Exam:     General Appearance:    Alert, cooperative, in no acute distress   Lungs:     Rhonchi and expiratory wheezes bilaterally, respirations regular, even and unlabored    Heart:    Irreg irreg, normal S1 and S2, no murmur, no gallop, no rub, no click   Chest Wall:    No abnormalities observed   Abdomen:     Normal bowel sounds, no masses, soft, non-tender, non-distended   Extremities:   Moves all extremities well, 1+ LE edema, no cyanosis, no        redness   Pulses:   Pulses palpable and equal bilaterally   Skin:   No bleeding, bruising or rash        Results Review:   Results from last 7 days   Lab Units 09/13/19  0330 09/11/19  0550 09/10/19  0315   WBC 10*3/mm3 7.72 6.22 8.78   HEMOGLOBIN g/dL 12.6* 12.5* 13.8   HEMATOCRIT % 45.3 43.5 47.2   PLATELETS 10*3/mm3 169 174 199     Results from last 7 days   Lab Units 09/13/19  0330 09/12/19  0544 09/11/19  0550   SODIUM mmol/L 143 142 139   POTASSIUM mmol/L 3.7 3.4* " 3.7   CHLORIDE mmol/L 103 103 102   CO2 mmol/L 29.0 27.0 24.0   BUN mg/dL 32* 35* 40*   CREATININE mg/dL 1.30* 1.40* 1.52*   GLUCOSE mg/dL 85 103* 138*      Results from last 7 days   Lab Units 09/10/19  0315   HEMOGLOBIN A1C % 6.00*     Results from last 7 days   Lab Units 09/10/19  0316   CHOLESTEROL mg/dL 101   TRIGLYCERIDES mg/dL 106   HDL CHOL mg/dL 20*         Results from last 7 days   Lab Units 09/10/19  0316   PROBNP pg/mL 7,388.0*     Results from last 7 days   Lab Units 09/13/19  0329 09/12/19  0544 09/11/19  0819   PROTIME Seconds 17.4* 18.6* 19.8*   INR  1.50* 1.64* 1.77*   APTT seconds  --   --  34.4     Results from last 7 days   Lab Units 09/13/19  0330 09/11/19  0550 09/10/19  0316 09/09/19  2057   CK TOTAL U/L 42 40  --   --    TROPONIN T ng/mL  --   --  0.028 0.034*     Results from last 7 days   Lab Units 09/10/19  0316   MAGNESIUM mg/dL 1.9       Intake/Output Summary (Last 24 hours) at 9/13/2019 0918  Last data filed at 9/13/2019 0700  Gross per 24 hour   Intake --   Output 3600 ml   Net -3600 ml       I personally viewed and interpreted the patient's EKG/Telemetry data    EKG: None for review today    Telemetry: Atrial fibrillation, HR -127 bpm    Ejection Fraction  No results found for: EF    Echo EF Estimated  No results found for: ECHOEFEST      Present on Admission:  • Dyspnea on exertion  • Acute on chronic combined systolic and diastolic congestive heart failure (CMS/HCC)  • Coronary artery disease involving coronary bypass graft of native heart without angina pectoris  • Essential hypertension  • Ischemic cardiomyopathy  • Dyslipidemia  • Atrial fibrillation and flutter (CMS/HCC)  • Elevated LFTs  • Rectus sheath hematoma  • Dyspnea  • Bacterial endocarditis    Assessment/Plan   1. Chronic systolic CHF/ICM with acute exacerbation  - EF 20%.    - S/p previous ICD implant  - Continue BBL, although has been held due to low BP. Continue if SBP is above 100 mmHg.  No ACE/ARB secondary  to renal dysfunction  - on IV Milrinone  - Diuresing well with IV bumex, net negative 3.6L.  Feels clinically improved.  - Probable thoracentesis today, INR 1.5     2. 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 Persistent atrial fibrillation/atrial flutter:   - On metoprolol for heart rates.  Rates remain elevated.  Increase metoprolol to 150 mg bid.  - Eliquis 5 mg bid currently on hold for thoracentesis  - at last admission, AVN RFA and upgrade to BiV ICD aborted due to worsening renal function. May plan for that in the future.      4. CKD:   - baseline 1.4-1.6. Monitor closely. Cr is stable and improving since admission.      5. CAD:  - remote CABG, on ASA, BB  - off statin due to elevated liver enzymes last admission, but Pravastatin restarted this admission.      Plan: Probable thoracentesis today.  Continue IV milrinone (plan to go home on continuous infusion).  Increase metoprolol to 150 mg bid for better rate control.      Electronically signed by Linda Noe, CLARIBEL, 09/13/19, 9:18 AM.

## 2019-09-13 NOTE — NURSING NOTE
Patient placed on cardiac monitors, vitals stable. Images taken and reviewed by Dr. Hitchcock. A total volume of 1900 ml serous fluid removed from the left pleural space. Patient tolerated well. Report to 6B.

## 2019-09-13 NOTE — PROGRESS NOTES
INFECTIOUS DISEASE Progress Note    Rob Miranda  1955  [id number]    Admit date: 9/9/2019    Requesting Provider: No ref. provider found  Evaluating physician: Wai Escobedo MD  Reason for Consultation: sepsis, endocarditis, related to pacemaker  Chief Complaint: above      Subjective   History of present illness:  Patient is a 64 y.o.  Yr old male with diabetes mellitus type 2, COPD, BPH, depression, hypertension, CAD, ischemic cardiomyopathy with ejection fraction less than 15%, biventricular ICD implant 2007 with generator change 2013, atrial fibrillation, acute kidney injury on dialysis in July at  last dialyzed 7/26/2019 with tunneled catheter in place, with a EVA on 8/19/2019 reported positive for vegetation on the RA and RV leads as well as the tip of the dialysis catheter who was admitted to [facility] on 8/19/2019.  Patient denied any high fevers or chills.  He has been ill previously since June with a diagnosis of left lower lobe pneumonia in June 2019 with a positive urine antigen for Legionella treated with doxycycline and levofloxacin, finishing his therapy at the [facility] after deteriorating with acute hypoxic resp failure admitted to ICU, then discharged approximately 6/19/2019.  Went to Rehab and then discharged around 7/7/19.  Patient at that time was also found to have acute renal failure receiving some hemodialysis via a tunneled temp dialysis right chest catheter.  The patient receives his routine cardiac care by Dr. Edwards.  He continued to have persistent atrial fibrillation and atrial flutter.  A transesophageal echocardiogram done on 8/19/2019 reported possible vegetations on the right atrial/right ventricular lead as well as the dialysis catheter.  Previously the patient received antibiotics in early July 2019.  He did not have subsequent fevers.  He had continued issues with respiratory status and heart failure.  I was consulted on 8/20/2019  for further evaluation and treatment.  The patient has no other localizing signs or symptoms of infection.  The patient had an episode of MRSA left groin infection with a positive culture at [facility] on 1/30/2019 confirmed with the microbiology tech.  Blood cultures obtained at [facility] from January 2019, as well as 6/13/2019, 6/17/2019 were negative.  Urine was positive for Legionella antigen study at [facility] June 2019.  He received a treatment course for this at the [facility].  He has no other localizing signs or symptoms of infection.    The patient was discharged home to receive daptomycin 500 mg every 48 hours, cefepime 1 g every 24 hours for pacer lead related ICD endocarditis with negative cultures to continue until 10/1/2019.  The patient was admitted to the emergency room on 9/9/2019 complaining of increasing shortness of breath, and increased weight gain of 20 pounds with increased abdominal swelling.  He denied any high fevers or chills.  A CT scan of the chest on 9/9 revealed a moderate left-sided pleural effusion, some coarse left upper lobe infiltrate/atelectasis, compressive atelectasis of the left lower lobe, small right pleural effusion.  The CT of the abdomen and pelvis revealed some nephrolithiasis without hydronephrosis, and some ascites but no bowel obstruction.  Chest x-ray revealed cardiomegaly and mild volume overload.  Portal and hepatic venous ultrasound was negative on 9/3.  Laboratories revealed proBNP of 7388, negative troponin, creatinine 1.60, sodium 137, potassium 4.2, bicarb 21, ALT 25, AST 38, albumin 3, alkaline phosphatase 273, total bili 1.4, WBC 8.78, hemoglobin 13.8, platelets 199.  Lactic acid was 1.6, blood cultures obtained on 9/9 were negative.  CPK normal 8/21.  I was consulted on 9/10/2019 for further evaluation and treatment.    9/11/2019 history reviewed.  Breathing improved.  Patient continues on  daptomycin and cefepime until 10/1/2019 for culture negative, ICD vegetation related endocarditis.  No fevers or muscle pain.    9/12/2019 history reviewed.  Tolerating daptomycin and cefepime until 10/1 for culture negative endocarditis.  No high fevers or chills.  Breathing improved.  No high fevers.  9/13/2019 history reviewed.  Continues on daptomycin and cefepime until 10/1 for culture negative ICD related endocarditis.  CHF improved.  Will be requiring IV milrinone at home.    Past Medical History:   Diagnosis Date   • Atherosclerotic cardiovascular disease    • Chronic anticoagulation    • Chronic kidney disease    • Congestive heart failure (CMS/HCC)    • COPD (chronic obstructive pulmonary disease) (CMS/HCC)    • DM (diabetes mellitus) (CMS/[facility])     insulin dependant   • Dyslipidemia    • Hx of atrial flutter     and a- fib   • Hx of myocardial infarction 1992    s/p CABG    • Hyperlipidemia    • Hypertension    • Ischemic cardiomyopathy     advanced   • Legionnaire's disease (CMS/[facility])    • Sinus bradycardia     EPS, 03/19/2007, Dr. Márquez:  Successful radiofrequency ablation of right atrial flutter   • Skin cancer    • Tobacco use        Past Surgical History:   Procedure Laterality Date   • CARDIAC DEFIBRILLATOR PLACEMENT     • CARDIOVERSION      x 2 procedures   • CORONARY ARTERY BYPASS GRAFT  1991    [facility]        Pediatric History   Patient Guardian Status   • Not on file     Other Topics Concern   • Not on file   Social History Narrative    Lives at home with his wife, Destiny.     , lives with his [place] in [facility], 1.5 packs/day  family history includes Cancer (age of onset: 74) in his mother; Diabetes in his father and mother; Heart attack (age of onset: 72) in his father; Heart disease in his mother.    Allergies   Allergen Reactions   • Shellfish-Derived Products Hives, Shortness Of Breath and Swelling       There is no immunization history for the selected  administration types on file for this patient.    Medication:    Current Facility-Administered Medications:   •  acetaminophen (TYLENOL) tablet 650 mg, 650 mg, Oral, Q4H PRN, 650 mg at 09/11/19 0745 **OR** acetaminophen (TYLENOL) 160 MG/5ML solution 650 mg, 650 mg, Oral, Q4H PRN **OR** acetaminophen (TYLENOL) suppository 650 mg, 650 mg, Rectal, Q4H PRN, Hina Ryan APRN  •  aspirin EC tablet 81 mg, 81 mg, Oral, Daily, Hina Ryan APRN, 81 mg at 09/13/19 0859  •  bumetanide (BUMEX) injection 1 mg, 1 mg, Intravenous, Q12H, Lissette Jones PA, 1 mg at 09/13/19 0900  •  cefepime (MAXIPIME) 2 g/100 mL 0.9% NS (mbp), 2 g, Intravenous, Q24H, Wai Escobedo MD, 2 g at 09/13/19 0900  •  DAPTOmycin (CUBICIN) 700 mg in sodium chloride 0.9 % 50 mL IVPB, 8 mg/kg (Adjusted), Intravenous, Q24H, Hina Ryan APRN, Last Rate: 100 mL/hr at 09/13/19 0520, 700 mg at 09/13/19 0520  •  dextrose (D50W) 25 g/ 50mL Intravenous Solution 25 g, 25 g, Intravenous, Q15 Min PRN, Hina Ryan APRN  •  dextrose (GLUTOSE) oral gel 15 g, 15 g, Oral, Q15 Min PRN, Hina Ryan APRN  •  diphenhydrAMINE (BENADRYL) capsule 25 mg, 25 mg, Oral, Nightly, Joanna Jin MD, 25 mg at 09/12/19 2013  •  docusate sodium (COLACE) capsule 100 mg, 100 mg, Oral, BID PRN, Hina Ryan APRN  •  ferrous sulfate tablet 325 mg, 325 mg, Oral, BID With Meals, Hina Ryan APRN, 325 mg at 09/13/19 0859  •  gabapentin (NEURONTIN) capsule 100 mg, 100 mg, Oral, Nightly, Hina Ryan APRN, 100 mg at 09/12/19 2013  •  glucagon (human recombinant) (GLUCAGEN DIAGNOSTIC) injection 1 mg, 1 mg, Subcutaneous, Q15 Min PRN, Hina Ryan APRN  •  Hold medication, 1 each, Does not apply, Continuous PRN, Andreas Jack MD  •  insulin detemir (LEVEMIR) injection 10 Units, 10 Units, Subcutaneous, Nightly, Hina Ryan APRN, 10 Units at 09/12/19 2019  •  insulin lispro (humaLOG) injection 0-7 Units, 0-7 Units, Subcutaneous, 4x Daily  With Meals & Nightly, Hina Ryan APRN, 5 Units at 09/12/19 2018  •  ipratropium-albuterol (DUO-NEB) nebulizer solution 3 mL, 3 mL, Nebulization, 4x Daily - RT, Hina Ryan APRN, 3 mL at 09/12/19 2056  •  magnesium (as) gluconate (MAGONATE) tablet 27 mg, 27 mg, Oral, BID, Hina Ryan APRN, 27 mg at 09/13/19 0900  •  metoprolol tartrate (LOPRESSOR) tablet 150 mg, 150 mg, Oral, BID, Linda Noe APRN  •  miconazole (MICOTIN) 2 % powder, , Topical, BID, Hina Ryan APRN, 1 application at 09/12/19 0857  •  milrinone 20 mg/100 mL (0.2 mg/mL) in 5 % dextrose infusion, 0.25 mcg/kg/min, Intravenous, Continuous, Dianne Alba PA, Last Rate: 7.31 mL/hr at 09/13/19 0951, 0.25 mcg/kg/min at 09/13/19 0951  •  ondansetron (ZOFRAN) injection 4 mg, 4 mg, Intravenous, Q6H PRN, Hina Ryan APRN, 4 mg at 09/10/19 0343  •  pantoprazole (PROTONIX) EC tablet 40 mg, 40 mg, Oral, Q AM, Hina Ryan APRN, 40 mg at 09/13/19 0520  •  Pharmacy Consult - MTM, , Does not apply, Daily, Hira Velasco, [facility]  •  pravastatin (PRAVACHOL) tablet 20 mg, 20 mg, Oral, Nightly, Lissette Jones PA, 20 mg at 09/12/19 2013  •  sertraline (ZOLOFT) tablet 25 mg, 25 mg, Oral, Nightly, Hina Ryan APRN, 25 mg at 09/12/19 2013  •  sodium chloride 0.9 % flush 10 mL, 10 mL, Intravenous, PRN, Sachin Palacios, DO  •  sodium chloride 0.9 % flush 10 mL, 10 mL, Intravenous, Q12H, Hina Ryan APRN, 10 mL at 09/13/19 0901  •  sodium chloride 0.9 % flush 10 mL, 10 mL, Intravenous, PRN, Hina Ryan APRN  •  tamsulosin (FLOMAX) 24 hr capsule 0.4 mg, 0.4 mg, Oral, Daily, Hina Ryan APRN, 0.4 mg at 09/13/19 0859  •  temazepam (RESTORIL) capsule 15 mg, 15 mg, Oral, Nightly, Joanna Jin MD, 15 mg at 09/12/19 2013    Please refer to the medical record for a full medication list    Review of Systems:    Constitutional-- No Fever, chills or sweats.  Appetite good, and no malaise. No fatigue.  HEENT-- No  "new vision, hearing or throat complaints.  No epistaxis or oral sores.  Denies odynophagia or dysphagia.  No odynophagia or dysphagia. No headache, photophobia or neck stiffness.  CV-- No chest pain, palpitation or syncope  Resp-- some SOB/nonprod cough/no Hemoptysis, no wheezes  GI- No nausea, vomiting, or diarrhea.  No hematochezia, melena, or hematemesis. Denies jaundice or chronic liver disease.  -- No dysuria, hematuria, or flank pain.   Lymph- no swollen lymph nodes in neck/axilla or groin.   Heme- No active bruising or bleeding.  MS-- no swelling or pain in the bones or joints of arms/legs.  No new back pain.  Neuro-- No acute focal weakness or numbness in the arms or legs.  No seizures.  Skin--No rashes or lesions, right chest old site dialysis ok, no nodules    Physical Exam:   Vital Signs   Temp:  [97.3 °F (36.3 °C)-98.1 °F (36.7 °C)] 97.3 °F (36.3 °C)  Heart Rate:  [] 112  Resp:  [16-20] 20  BP: ()/(49-85) 86/53    Temp  Min: 97.3 °F (36.3 °C)  Max: 98.1 °F (36.7 °C)  BP  Min: 80/49  Max: 122/81  Pulse  Min: 95  Max: 127  Resp  Min: 16  Max: 20  SpO2  Min: 87 %  Max: 95 %    Blood pressure (!) 86/53, pulse 112, temperature 97.3 °F (36.3 °C), temperature source Oral, resp. rate 20, height 182.9 cm (72\"), weight 96.5 kg (212 lb 11.2 oz), SpO2 95 %.  GENERAL: Awake and alert, in minor distress. Appears older than stated age.  More alert.  HEENT:  Normocephalic, atraumatic.  Oropharynx without thrush. Dentition in good repair. No cervical adenopathy. No neck masses.  Ears externally normal, Nose externally normal.  EYES: No conjunctival injection. No icterus. EOM full.  LYMPHATICS: No lymphadenopathy of the neck or axillary or inguinal regions.   HEART: 2/6 syst murmur lsb, no gallop, or pericardial friction rub. Reg rate rhythm, No JVD at 45 degrees.  LUNGS: rales at bases, with decreased breath sounds. No respiratory distress, no use of accessory muscles.  Occasional wheezes end " expiratory.  ABDOMEN: Soft, nontender, nondistended. No appreciable HSM.  Bowel sounds normal.  SKIN: Warm and dry without cutaneous eruptions.  No nodules.  IV access okay.  No rash.  PSYCHIATRIC: Mental status lucid. No confusion.  EXT:  No cellulitic change.  NEURO: Oriented to name, nonfocal.    Results Review:   I reviewed the patient's new clinical results.  I reviewed the patient's new imaging results and agree with the interpretation.  I reviewed the patient's other test results and agree with the interpretation    Results from last 7 days   Lab Units 09/13/19  0330 09/11/19  0550 09/10/19  0315   WBC 10*3/mm3 7.72 6.22 8.78   HEMOGLOBIN g/dL 12.6* 12.5* 13.8   HEMATOCRIT % 45.3 43.5 47.2   PLATELETS 10*3/mm3 169 174 199     Results from last 7 days   Lab Units 09/13/19  0330   SODIUM mmol/L 143   POTASSIUM mmol/L 3.7   CHLORIDE mmol/L 103   CO2 mmol/L 29.0   BUN mg/dL 32*   CREATININE mg/dL 1.30*   GLUCOSE mg/dL 85   CALCIUM mg/dL 8.6     Results from last 7 days   Lab Units 09/13/19  0330   ALK PHOS U/L 202*   BILIRUBIN mg/dL 1.4*   ALT (SGPT) U/L 17   AST (SGOT) U/L 24                 Results from last 7 days   Lab Units 09/13/19  0330   LACTATE mmol/L 1.8     Estimated Creatinine Clearance: 71 mL/min (A) (by C-G formula based on SCr of 1.3 mg/dL (H)).    Microbiology:  Microbiology Results Abnormal     Procedure Component Value - Date/Time    Blood Culture - Blood, Hand, Right [[id number]] Collected:  09/09/19 2115    Lab Status:  Preliminary result Specimen:  Blood from Hand, Right Updated:  09/12/19 2242     Blood Culture No growth at 3 days    Blood Culture - Blood, Arm, Right [[id number]] Collected:  09/09/19 2100    Lab Status:  Preliminary result Specimen:  Blood from Arm, Right Updated:  09/12/19 2242     Blood Culture No growth at 3 days      Microbiology cultures 6/13 at [facility], (joão Trevino), negative, 6/17-, 6/17 BAL negative; blood cultures 8/19/2019-x2 at [facility], 6/16/19  ur antigen positive    Radiology:  Imaging Results (last 72 hours)     ** No results found for the last 72 hours. **          IMPRESSION:     1.  Endocarditis, culture negative, probably antibiotic modified related to ICD leads in right atrium and right ventricle, as well as dialysis catheter by transesophageal echocardiogram from 8/19/2019.  This could have been partially treated with the numerous antibiotics he has received since June 2019.  Otherwise does not have many symptoms consistent with endocarditis in terms of fever, or embolic phenomenon.  The renal failure potentially could have been immune complex mediated.  Working toward finishing his daptomycin and cefepime on 10/1/2019.  Cryptococcal antigen negative, chlamydia antigen negative, Q fever negative, T spot negative, Brucella negative, Bartonella negative, histoplasma negative on approximately 8/20/2019.  2.  Legionella pneumonia positive urine antigen 6/16/2019.  Eventually transferred to the [facility] with acute hypoxic respiratory failure and was treated and finished therapy with greater than 10 days of levofloxacin and doxycycline.  Clinically resolved.  Very unlikely cause of endocarditis.  3.  MRSA left groin cellulitis with abscess January 2019, unlikely to have a bearing on the current case.  Resolved.  4.  Acute on chronic renal failure.  Required dialysis via a tunneled catheter right chest which was recently removed.  Last dialysis approximately 2 weeks ago.  Nephrology following.  Creatinine 1.30  5.  Chronic ischemic cardiomyopathy with ejection fraction less than 15% status post previous ICD 2007 with generator change 2013.  Probable exacerbation of systolic congestive heart failure.  6.  Anemia, chronic disease and related to renal failure.  7.  Elevated transaminitis possibly related to medications versus hypoperfusion, amiodarone and Lipitor discontinued.  Clinically resolved.  8.  Diabetes mellitus type 2 with minimal  increased risk for infection.  A1c 6.0.  9.  COPD.  10.  Thrombocytopenia.  Previously, resolved.  11.  Acute on chronic hypoxic and hypercapnic respiratory failure.   12.  Hypokalemia 3.4, resolved.  13.  Cholestasis, bili 1.4, new.  Probably medications versus congestive liver dysfunction from his heart failure.  Elevated alkaline phosphatase 202, new, similar issue.    Still has a poor prognosis given his multiorgan failures.    Plan:    1.  Diagnostically, continue to follow patient's physical exam, CBC, CMP, CRP, CPK, blood cultures x2 obtained on 9/9/2019, fungal serologies.  2.  Therapeutically, continue daptomycin 700 mg IV every 24 hours, cefepime 1 g IV daily until 10/1/2019 for 6 weeks for treatment of potential endocarditis.  Watch toxicity in terms of liver, rhabdomyolysis, mental status, and bone marrow.  CBC, CMP, CRP, CPK should be monitored at least weekly while on this regimen.  3.  The risk benefit ratio has been discussed with the family, patient, and Dr. Edwards.  If his cultures are positive for bacteria such as MRSA, may be difficult to clear infection, and further discussion may be needed for removal of device.  If cultures are negative, I would treat with antibiotics for 6 weeks then follow off antibiotics.  Side effects of antibiotics were discussed with the patient and family.  4.  Oxygen support therapy.  5.  Would consider holding statin therapy while on daptomycin to decrease risk of rhabdomyolysis.    I discussed the patients findings and my recommendations with patient, nursing staff, primary care team and consulting provider    Our group would be pleased to follow this patient over the course of their hospitalization and assist with outpatient antimicrobial therapy, as indicated.  Further recommendations depend on the results of the cultures and clinical course.    Wai Escobedo MD  9/13/2019

## 2019-09-14 ENCOUNTER — APPOINTMENT (OUTPATIENT)
Dept: GENERAL RADIOLOGY | Facility: HOSPITAL | Age: 64
End: 2019-09-14

## 2019-09-14 LAB
ALBUMIN SERPL-MCNC: 2.8 G/DL (ref 3.5–5.2)
ALBUMIN/GLOB SERPL: 0.7 G/DL
ALP SERPL-CCNC: 177 U/L (ref 39–117)
ALT SERPL W P-5'-P-CCNC: 14 U/L (ref 1–41)
ANION GAP SERPL CALCULATED.3IONS-SCNC: 9 MMOL/L (ref 5–15)
AST SERPL-CCNC: 19 U/L (ref 1–40)
BACTERIA SPEC AEROBE CULT: NORMAL
BACTERIA SPEC AEROBE CULT: NORMAL
BILIRUB SERPL-MCNC: 1.3 MG/DL (ref 0.2–1.2)
BUN BLD-MCNC: 26 MG/DL (ref 8–23)
BUN/CREAT SERPL: 26.8 (ref 7–25)
CALCIUM SPEC-SCNC: 8.4 MG/DL (ref 8.6–10.5)
CHLORIDE SERPL-SCNC: 101 MMOL/L (ref 98–107)
CO2 SERPL-SCNC: 32 MMOL/L (ref 22–29)
CREAT BLD-MCNC: 0.97 MG/DL (ref 0.76–1.27)
GFR SERPL CREATININE-BSD FRML MDRD: 78 ML/MIN/1.73
GLOBULIN UR ELPH-MCNC: 4 GM/DL
GLUCOSE BLD-MCNC: 91 MG/DL (ref 65–99)
GLUCOSE BLDC GLUCOMTR-MCNC: 152 MG/DL (ref 70–130)
GLUCOSE BLDC GLUCOMTR-MCNC: 162 MG/DL (ref 70–130)
GLUCOSE BLDC GLUCOMTR-MCNC: 98 MG/DL (ref 70–130)
POTASSIUM BLD-SCNC: 3.2 MMOL/L (ref 3.5–5.2)
POTASSIUM BLD-SCNC: 4 MMOL/L (ref 3.5–5.2)
PROT SERPL-MCNC: 6.8 G/DL (ref 6–8.5)
SODIUM BLD-SCNC: 142 MMOL/L (ref 136–145)
TRIGL FLD-MCNC: 19 MG/DL

## 2019-09-14 PROCEDURE — 71046 X-RAY EXAM CHEST 2 VIEWS: CPT

## 2019-09-14 PROCEDURE — 94799 UNLISTED PULMONARY SVC/PX: CPT

## 2019-09-14 PROCEDURE — 99232 SBSQ HOSP IP/OBS MODERATE 35: CPT | Performed by: INTERNAL MEDICINE

## 2019-09-14 PROCEDURE — 25010000002 CEFEPIME PER 500 MG: Performed by: INTERNAL MEDICINE

## 2019-09-14 PROCEDURE — 84132 ASSAY OF SERUM POTASSIUM: CPT | Performed by: INTERNAL MEDICINE

## 2019-09-14 PROCEDURE — 63710000001 DIPHENHYDRAMINE PER 50 MG: Performed by: FAMILY MEDICINE

## 2019-09-14 PROCEDURE — 82962 GLUCOSE BLOOD TEST: CPT

## 2019-09-14 PROCEDURE — 80053 COMPREHEN METABOLIC PANEL: CPT | Performed by: INTERNAL MEDICINE

## 2019-09-14 PROCEDURE — 63710000001 INSULIN DETEMIR PER 5 UNITS: Performed by: NURSE PRACTITIONER

## 2019-09-14 PROCEDURE — 25010000002 DAPTOMYCIN PER 1 MG: Performed by: NURSE PRACTITIONER

## 2019-09-14 RX ORDER — BENZONATATE 100 MG/1
100 CAPSULE ORAL EVERY 4 HOURS PRN
Status: DISCONTINUED | OUTPATIENT
Start: 2019-09-14 | End: 2019-09-17 | Stop reason: HOSPADM

## 2019-09-14 RX ORDER — POTASSIUM CHLORIDE 1.5 G/1.77G
40 POWDER, FOR SOLUTION ORAL AS NEEDED
Status: DISCONTINUED | OUTPATIENT
Start: 2019-09-14 | End: 2019-09-17 | Stop reason: HOSPADM

## 2019-09-14 RX ORDER — GUAIFENESIN 600 MG/1
600 TABLET, EXTENDED RELEASE ORAL EVERY 12 HOURS SCHEDULED
Status: DISCONTINUED | OUTPATIENT
Start: 2019-09-14 | End: 2019-09-17 | Stop reason: HOSPADM

## 2019-09-14 RX ORDER — FLUTICASONE PROPIONATE 50 MCG
2 SPRAY, SUSPENSION (ML) NASAL DAILY
Status: DISCONTINUED | OUTPATIENT
Start: 2019-09-14 | End: 2019-09-17 | Stop reason: HOSPADM

## 2019-09-14 RX ORDER — POTASSIUM CHLORIDE 750 MG/1
40 CAPSULE, EXTENDED RELEASE ORAL AS NEEDED
Status: DISCONTINUED | OUTPATIENT
Start: 2019-09-14 | End: 2019-09-17 | Stop reason: HOSPADM

## 2019-09-14 RX ORDER — POTASSIUM CHLORIDE 7.45 MG/ML
10 INJECTION INTRAVENOUS
Status: DISCONTINUED | OUTPATIENT
Start: 2019-09-14 | End: 2019-09-17 | Stop reason: HOSPADM

## 2019-09-14 RX ADMIN — TEMAZEPAM 15 MG: 15 CAPSULE ORAL at 22:05

## 2019-09-14 RX ADMIN — POTASSIUM CHLORIDE 40 MEQ: 750 CAPSULE, EXTENDED RELEASE ORAL at 09:57

## 2019-09-14 RX ADMIN — METOPROLOL TARTRATE 150 MG: 100 TABLET ORAL at 22:04

## 2019-09-14 RX ADMIN — IPRATROPIUM BROMIDE AND ALBUTEROL SULFATE 3 ML: 2.5; .5 SOLUTION RESPIRATORY (INHALATION) at 12:12

## 2019-09-14 RX ADMIN — APIXABAN 5 MG: 5 TABLET, FILM COATED ORAL at 09:57

## 2019-09-14 RX ADMIN — DIPHENHYDRAMINE HYDROCHLORIDE 25 MG: 25 CAPSULE ORAL at 22:05

## 2019-09-14 RX ADMIN — INSULIN DETEMIR 10 UNITS: 100 INJECTION, SOLUTION SUBCUTANEOUS at 22:02

## 2019-09-14 RX ADMIN — BUMETANIDE 1 MG: 0.25 INJECTION INTRAMUSCULAR; INTRAVENOUS at 08:45

## 2019-09-14 RX ADMIN — INSULIN LISPRO 2 UNITS: 100 INJECTION, SOLUTION INTRAVENOUS; SUBCUTANEOUS at 22:02

## 2019-09-14 RX ADMIN — FLUTICASONE PROPIONATE 2 SPRAY: 50 SPRAY, METERED NASAL at 22:02

## 2019-09-14 RX ADMIN — TAMSULOSIN HYDROCHLORIDE 0.4 MG: 0.4 CAPSULE ORAL at 08:46

## 2019-09-14 RX ADMIN — GUAIFENESIN 600 MG: 600 TABLET, EXTENDED RELEASE ORAL at 22:02

## 2019-09-14 RX ADMIN — ASPIRIN 81 MG: 81 TABLET, COATED ORAL at 08:46

## 2019-09-14 RX ADMIN — CEFEPIME HYDROCHLORIDE 2 G: 2 INJECTION, POWDER, FOR SOLUTION INTRAVENOUS at 09:57

## 2019-09-14 RX ADMIN — INSULIN LISPRO 2 UNITS: 100 INJECTION, SOLUTION INTRAVENOUS; SUBCUTANEOUS at 17:44

## 2019-09-14 RX ADMIN — POTASSIUM CHLORIDE 40 MEQ: 750 CAPSULE, EXTENDED RELEASE ORAL at 13:53

## 2019-09-14 RX ADMIN — IPRATROPIUM BROMIDE AND ALBUTEROL SULFATE 3 ML: 2.5; .5 SOLUTION RESPIRATORY (INHALATION) at 08:26

## 2019-09-14 RX ADMIN — IPRATROPIUM BROMIDE AND ALBUTEROL SULFATE 3 ML: 2.5; .5 SOLUTION RESPIRATORY (INHALATION) at 20:03

## 2019-09-14 RX ADMIN — FERROUS SULFATE TAB 325 MG (65 MG ELEMENTAL FE) 325 MG: 325 (65 FE) TAB at 08:46

## 2019-09-14 RX ADMIN — SODIUM CHLORIDE, PRESERVATIVE FREE 10 ML: 5 INJECTION INTRAVENOUS at 08:46

## 2019-09-14 RX ADMIN — APIXABAN 5 MG: 5 TABLET, FILM COATED ORAL at 22:04

## 2019-09-14 RX ADMIN — ACETAMINOPHEN 650 MG: 325 TABLET ORAL at 22:02

## 2019-09-14 RX ADMIN — IPRATROPIUM BROMIDE AND ALBUTEROL SULFATE 3 ML: 2.5; .5 SOLUTION RESPIRATORY (INHALATION) at 16:25

## 2019-09-14 RX ADMIN — SODIUM CHLORIDE, PRESERVATIVE FREE 10 ML: 5 INJECTION INTRAVENOUS at 22:05

## 2019-09-14 RX ADMIN — MILRINONE LACTATE IN DEXTROSE 0.25 MCG/KG/MIN: 200 INJECTION, SOLUTION INTRAVENOUS at 14:54

## 2019-09-14 RX ADMIN — Medication 27 MG: at 22:04

## 2019-09-14 RX ADMIN — FERROUS SULFATE TAB 325 MG (65 MG ELEMENTAL FE) 325 MG: 325 (65 FE) TAB at 17:44

## 2019-09-14 RX ADMIN — BUMETANIDE 1 MG: 0.25 INJECTION INTRAMUSCULAR; INTRAVENOUS at 22:03

## 2019-09-14 RX ADMIN — GABAPENTIN 100 MG: 100 CAPSULE ORAL at 22:04

## 2019-09-14 RX ADMIN — BENZONATATE 100 MG: 100 CAPSULE ORAL at 22:02

## 2019-09-14 RX ADMIN — METOPROLOL TARTRATE 150 MG: 100 TABLET ORAL at 08:45

## 2019-09-14 RX ADMIN — DAPTOMYCIN 700 MG: 500 INJECTION, POWDER, LYOPHILIZED, FOR SOLUTION INTRAVENOUS at 05:28

## 2019-09-14 RX ADMIN — MILRINONE LACTATE IN DEXTROSE 0.25 MCG/KG/MIN: 200 INJECTION, SOLUTION INTRAVENOUS at 01:11

## 2019-09-14 RX ADMIN — Medication 27 MG: at 08:45

## 2019-09-14 RX ADMIN — MICONAZOLE NITRATE: 20 POWDER TOPICAL at 22:08

## 2019-09-14 RX ADMIN — SERTRALINE HYDROCHLORIDE 25 MG: 50 TABLET ORAL at 22:04

## 2019-09-14 RX ADMIN — PANTOPRAZOLE SODIUM 40 MG: 40 TABLET, DELAYED RELEASE ORAL at 05:28

## 2019-09-14 RX ADMIN — INSULIN LISPRO 2 UNITS: 100 INJECTION, SOLUTION INTRAVENOUS; SUBCUTANEOUS at 11:53

## 2019-09-14 NOTE — PROGRESS NOTES
Rob Miranda  [id number]  1955   LOS: 4 days   Patient Care Team:  Kreis, Samuel Duane, MD as PCP - General  Kreis, Samuel Duane, MD as PCP - Family Medicine  Ranjith Hernandez MD as Cardiologist (Cardiology)    Chief Complaint: Follow-up on shortness of breath/CHF/ischemic cardiomyopathy    Subjective Patient still having some wheezing and has had some sputum production.  After having his left-sided thoracentesis yesterday he feels that his breathing is much better.  He notes that the edema in his legs has also decreased since he was first admitted.  He denies any chest pain, palpitations, or presyncope.    Objective     Vital Sign Min/Max for last 24 hours  Temp  Min: 97.3 °F (36.3 °C)  Max: 97.9 °F (36.6 °C)   BP  Min: 80/49  Max: 125/83   Pulse  Min: 92  Max: 121   Resp  Min: 18  Max: 20   SpO2  Min: 86 %  Max: 100 %   No Data Recorded   No Data Recorded         09/12/19  0548 09/13/19  0600   Weight: 101 kg (222 lb 4.8 oz) 96.5 kg (212 lb 11.2 oz)         Intake/Output Summary (Last 24 hours) at 9/14/2019 0822  Last data filed at 9/14/2019 0330  Gross per 24 hour   Intake 510 ml   Output 2075 ml   Net -1565 ml       Physical Exam:     General Appearance:    Alert, cooperative, in no acute distress   Lungs:    Diffuse expiratory wheezing to auscultation,respirations regular, even and                   Unlabored, 2 L O2 NC    Heart:    Irregular and normal rate, normal S1 and S2, no            murmur, no gallop, no rub, no click   Abdomen:  Extremities:   Soft, non-tender, bowel sounds audible x4    1+ edema, normal range of motion   Pulses:   Pulses palpable and equal bilaterally      Results Review:   Results from last 7 days   Lab Units 09/14/19  0501 09/13/19  0330 09/12/19  0544   SODIUM mmol/L 142 143 142   POTASSIUM mmol/L 3.2* 3.7 3.4*   CHLORIDE mmol/L 101 103 103   CO2 mmol/L 32.0* 29.0 27.0   BUN mg/dL 26* 32* 35*   CREATININE mg/dL 0.97 1.30* 1.40*   GLUCOSE mg/dL 91 85 103*   CALCIUM mg/dL  8.4* 8.6 8.9     Results from last 7 days   Lab Units 09/13/19  0330 09/11/19  0550 09/10/19  0315   WBC 10*3/mm3 7.72 6.22 8.78   HEMOGLOBIN g/dL 12.6* 12.5* 13.8   HEMATOCRIT % 45.3 43.5 47.2   PLATELETS 10*3/mm3 169 174 199     Results from last 7 days   Lab Units 09/10/19  0316   CHOLESTEROL mg/dL 101   TRIGLYCERIDES mg/dL 106   HDL CHOL mg/dL 20*   LDL CHOL mg/dL 60     Results from last 7 days   Lab Units 09/10/19  0315   HEMOGLOBIN A1C % 6.00*     Results from last 7 days   Lab Units 09/13/19  0330 09/11/19  0550 09/10/19  0316 09/09/19 2057   CK TOTAL U/L 42 40  --   --    TROPONIN T ng/mL  --   --  0.028 0.034*   · Chest x-ray 9/12/2019:  1.  Worsening congestive heart failure.  2.  Persistent opacity of the left base, whether atelectasis or  effusion, unchanged from 09/09/2019.  · No new chest x-ray or ECG to review  · Left CT-guided thoracentesis 9/13/2019:  Satisfactory CT-guided left thoracentesis, 1900 mL clear yellow fluid drained    Medication Review: Reviewed, milrinone 0.25 mcg/kg/min    Assessment/Plan   Patient with acute on chronic systolic heart failure/ischemic cardiomyopathy with EF 20% and is status post previous ICD implant.  He is currently on a milrinone gtt.  Unfortunately his renal function in the past has prohibited the use of Entresto even though today it is within normal limits.  He also chronically has low blood pressure which is also problematic for the initiation of Entresto.  He was hospitalized last month for possible endocarditis and has been receiving IV antibiotics via PICC line per ID.  He has persistent atrial fibrillation/flutter and is rate controlled.  He had a thoracentesis yesterday with 1900 mL of clear yellow fluid drained.  I ordered for  potassium replacement today.  Patient to restart Eliquis this morning now that thoracentesis is complete. I also will order a repeat chest x ray s/p thoracentesis. Patient has diffuse expiratory wheezing but is now expelling  sputum.      Acute on chronic combined systolic and diastolic congestive heart failure (CMS/HCC)    Coronary artery disease involving coronary bypass graft of native heart without angina pectoris    Essential hypertension    Ischemic cardiomyopathy    Dyslipidemia    IDDM (insulin dependent diabetes mellitus) (CMS/HCC)    Atrial fibrillation and flutter (CMS/HCC)    Elevated LFTs    Dyspnea on exertion    Rectus sheath hematoma    Dyspnea    Bacterial endocarditis    Electronically signed by CLARIBEL Marroquin, 09/14/19, 8:50 AM.    09/14/19  8:22 AM

## 2019-09-14 NOTE — PROGRESS NOTES
INFECTIOUS DISEASE Progress Note    Rob Miranda  1955  [id number]    Admit date: 9/9/2019    Requesting Provider: No ref. provider found  Evaluating physician: Wai Escobedo MD  Reason for Consultation: sepsis, endocarditis, related to pacemaker  Chief Complaint: above      Subjective   History of present illness:  Patient is a 64 y.o.  Yr old male with diabetes mellitus type 2, COPD, BPH, depression, hypertension, CAD, ischemic cardiomyopathy with ejection fraction less than 15%, biventricular ICD implant 2007 with generator change 2013, atrial fibrillation, acute kidney injury on dialysis in July at  last dialyzed 7/26/2019 with tunneled catheter in place, with a EVA on 8/19/2019 reported positive for vegetation on the RA and RV leads as well as the tip of the dialysis catheter who was admitted to [facility] on 8/19/2019.  Patient denied any high fevers or chills.  He has been ill previously since June with a diagnosis of left lower lobe pneumonia in June 2019 with a positive urine antigen for Legionella treated with doxycycline and levofloxacin, [facility] deteriorating with acute hypoxic resp failure admitted to ICU, then discharged approximately 6/19/2019.  Went to Rehab and then discharged around 7/7/19.  Patient at that time was also found to have acute renal failure receiving some hemodialysis via a tunneled temp dialysis right chest catheter.  The patient receives his routine cardiac care by Dr. Edwards.  He continued to have persistent atrial fibrillation and atrial flutter.  A transesophageal echocardiogram done on 8/19/2019 reported possible vegetations on the right atrial/right ventricular lead as well as the dialysis catheter.  Previously the patient received antibiotics in early July 2019.  He did not have subsequent fevers.  He had continued issues with respiratory status and heart failure.  I was consulted on 8/20/2019  for further evaluation and treatment.  The patient has no other localizing signs or symptoms of infection.  The patient had an episode of MRSA left groin infection with a positive culture at [facility] on 1/30/2019 confirmed with the microbiology tech.  Blood cultures obtained at [facility] from January 2019, as well as 6/13/2019, 6/17/2019 were negative.  Urine was positive for Legionella antigen study at [facility] June 2019.  He received a treatment course for this at the [facility].  He has no other localizing signs or symptoms of infection.    The patient was discharged home to receive daptomycin 500 mg every 48 hours, cefepime 1 g every 24 hours for pacer lead related ICD endocarditis with negative cultures to continue until 10/1/2019.  The patient was admitted to the emergency room on 9/9/2019 complaining of increasing shortness of breath, and increased weight gain of 20 pounds with increased abdominal swelling.  He denied any high fevers or chills.  A CT scan of the chest on 9/9 revealed a moderate left-sided pleural effusion, some coarse left upper lobe infiltrate/atelectasis, compressive atelectasis of the left lower lobe, small right pleural effusion.  The CT of the abdomen and pelvis revealed some nephrolithiasis without hydronephrosis, and some ascites but no bowel obstruction.  Chest x-ray revealed cardiomegaly and mild volume overload.  Portal and hepatic venous ultrasound was negative on 9/3.  Laboratories revealed proBNP of 7388, negative troponin, creatinine 1.60, sodium 137, potassium 4.2, bicarb 21, ALT 25, AST 38, albumin 3, alkaline phosphatase 273, total bili 1.4, WBC 8.78, hemoglobin 13.8, platelets 199.  Lactic acid was 1.6, blood cultures obtained on 9/9 were negative.  CPK normal 8/21.  I was consulted on 9/10/2019 for further evaluation and treatment.    9/11/2019 history reviewed.  Breathing improved.  Patient continues on  daptomycin and cefepime until 10/1/2019 for culture negative, ICD vegetation related endocarditis.  No fevers or muscle pain.    9/12/2019 history reviewed.  Tolerating daptomycin and cefepime until 10/1 for culture negative endocarditis.  No high fevers or chills.  Breathing improved.  No high fevers.  9/13/2019 history reviewed.  Continues on daptomycin and cefepime until 10/1 for culture negative ICD related endocarditis.  CHF improved.  Will be requiring IV milrinone at home.  9/14/2019 history reviewed.  On cefepime and doxycycline till 10/1 for culture negative endocarditis related to ICD.  No high fevers or chills.  Breathing improved.  No sputum production.    Past Medical History:   Diagnosis Date   • Atherosclerotic cardiovascular disease    • Chronic anticoagulation    • Chronic kidney disease    • Congestive heart failure (CMS/HCC)    • COPD (chronic obstructive pulmonary disease) (CMS/[facility]ly [facility])    • DM (diabetes mellitus) (CMS/Formerly [facility])     insulin dependant   • Dyslipidemia    • Hx of atrial flutter     and a- fib   • Hx of myocardial infarction 1992    s/p CABG    • Hyperlipidemia    • Hypertension    • Ischemic cardiomyopathy     advanced   • Legionnaire's disease (CMS/Formerly [facility])    • Sinus bradycardia     EPS, 03/19/2007, Dr. Márquez:  Successful radiofrequency ablation of right atrial flutter   • Skin cancer    • Tobacco use        Past Surgical History:   Procedure Laterality Date   • CARDIAC DEFIBRILLATOR PLACEMENT     • CARDIOVERSION      x 2 procedures   • CORONARY ARTERY BYPASS GRAFT  1991    [facility]        Pediatric History   Patient Guardian Status   • Not on file     Other Topics Concern   • Not on file   Social History Narrative    Lives at home with his wife, Destiny.     , lives with his wife in [facility], 1.5 packs/day  family history includes Cancer (age of onset: 74) in his mother; Diabetes in his father and mother; Heart attack (age of onset: 72) in his father; Heart disease  in his mother.    Allergies   Allergen Reactions   • Shellfish-Derived Products Hives, Shortness Of Breath and Swelling       There is no immunization history for the selected administration types on file for this patient.    Medication:    Current Facility-Administered Medications:   •  acetaminophen (TYLENOL) tablet 650 mg, 650 mg, Oral, Q4H PRN, 650 mg at 09/11/19 0745 **OR** acetaminophen (TYLENOL) 160 MG/5ML solution 650 mg, 650 mg, Oral, Q4H PRN **OR** acetaminophen (TYLENOL) suppository 650 mg, 650 mg, Rectal, Q4H PRN, Hina Ryan APRN  •  aspirin EC tablet 81 mg, 81 mg, Oral, Daily, Hina Ryan APRN, 81 mg at 09/13/19 0859  •  bumetanide (BUMEX) injection 1 mg, 1 mg, Intravenous, Q12H, Lissette Jones PA, 1 mg at 09/13/19 2114  •  cefepime (MAXIPIME) 2 g/100 mL 0.9% NS (mbp), 2 g, Intravenous, Q24H, aWi Escobedo MD, 2 g at 09/13/19 0900  •  DAPTOmycin (CUBICIN) 700 mg in sodium chloride 0.9 % 50 mL IVPB, 8 mg/kg (Adjusted), Intravenous, Q24H, Hina Ryan APRN, Last Rate: 100 mL/hr at 09/14/19 0528, 700 mg at 09/14/19 0528  •  dextrose (D50W) 25 g/ 50mL Intravenous Solution 25 g, 25 g, Intravenous, Q15 Min PRN, Hina Ryan APRN  •  dextrose (GLUTOSE) oral gel 15 g, 15 g, Oral, Q15 Min PRN, Hina Ryan APRN  •  diphenhydrAMINE (BENADRYL) capsule 25 mg, 25 mg, Oral, Nightly, Joanna Jin MD, 25 mg at 09/13/19 2113  •  docusate sodium (COLACE) capsule 100 mg, 100 mg, Oral, BID PRN, Hina Ryan APRN  •  ferrous sulfate tablet 325 mg, 325 mg, Oral, BID With Meals, Hina Ryan APRN, 325 mg at 09/13/19 1821  •  gabapentin (NEURONTIN) capsule 100 mg, 100 mg, Oral, Nightly, Hina Ryan APRN, 100 mg at 09/13/19 2114  •  glucagon (human recombinant) (GLUCAGEN DIAGNOSTIC) injection 1 mg, 1 mg, Subcutaneous, Q15 Min PRN, Hina Ryan APRN  •  Hold medication, 1 each, Does not apply, Continuous PRN, Andreas Jack MD  •  insulin detemir (LEVEMIR) injection 10  Units, 10 Units, Subcutaneous, Nightly, Hina Ryan APRN, 10 Units at 09/13/19 2120  •  insulin lispro (humaLOG) injection 0-7 Units, 0-7 Units, Subcutaneous, 4x Daily With Meals & Nightly, Hina Ryan APRN, 2 Units at 09/13/19 2113  •  ipratropium-albuterol (DUO-NEB) nebulizer solution 3 mL, 3 mL, Nebulization, 4x Daily - RT, Hina Ryan APRN, 3 mL at 09/14/19 0826  •  magnesium (as) gluconate (MAGONATE) tablet 27 mg, 27 mg, Oral, BID, Hina Ryan APRN, 27 mg at 09/13/19 2113  •  metoprolol tartrate (LOPRESSOR) tablet 150 mg, 150 mg, Oral, BID, Linda Noe APRN, 150 mg at 09/13/19 2259  •  miconazole (MICOTIN) 2 % powder, , Topical, BID, Hina Ryan APRN, 1 application at 09/12/19 0857  •  milrinone 20 mg/100 mL (0.2 mg/mL) in 5 % dextrose infusion, 0.25 mcg/kg/min, Intravenous, Continuous, Dianne Alba PA, Last Rate: 7.31 mL/hr at 09/14/19 0111, 0.25 mcg/kg/min at 09/14/19 0111  •  ondansetron (ZOFRAN) injection 4 mg, 4 mg, Intravenous, Q6H PRN, Hina Ryan APRN, 4 mg at 09/10/19 0343  •  pantoprazole (PROTONIX) EC tablet 40 mg, 40 mg, Oral, Q AM, Hina Ryan APRN, 40 mg at 09/14/19 0528  •  Pharmacy Consult - MTM, , Does not apply, Daily, Hira Velasco, [facility]  •  pravastatin (PRAVACHOL) tablet 20 mg, 20 mg, Oral, Nightly, Lissette Jones PA, 20 mg at 09/13/19 2113  •  sertraline (ZOLOFT) tablet 25 mg, 25 mg, Oral, Nightly, Hina Ryan APRN, 25 mg at 09/13/19 2113  •  sodium chloride 0.9 % flush 10 mL, 10 mL, Intravenous, PRN, Sachin Palacios, DO  •  sodium chloride 0.9 % flush 10 mL, 10 mL, Intravenous, Q12H, Hina Ryan, APRN, 10 mL at 09/13/19 2116  •  sodium chloride 0.9 % flush 10 mL, 10 mL, Intravenous, PRN, Hina Rayn APRN  •  tamsulosin (FLOMAX) 24 hr capsule 0.4 mg, 0.4 mg, Oral, Daily, Hina Ryan APRN, 0.4 mg at 09/13/19 0859  •  temazepam (RESTORIL) capsule 15 mg, 15 mg, Oral, Nightly, Joanna Jin MD, 15 mg at  "09/13/19 2113    Please refer to the medical record for a full medication list    Review of Systems:    Constitutional-- No Fever, chills or sweats.  Appetite good, and no malaise. No fatigue.  Slept well.  HEENT-- No new vision, hearing or throat complaints.  No epistaxis or oral sores.  Denies odynophagia or dysphagia.  No odynophagia or dysphagia. No headache, photophobia or neck stiffness.  CV-- No chest pain, palpitation or syncope  Resp-- some SOB/nonprod cough/no Hemoptysis, no wheezes  GI- No nausea, vomiting, or diarrhea.  No hematochezia, melena, or hematemesis. Denies jaundice or chronic liver disease.  -- No dysuria, hematuria, or flank pain.   Lymph- no swollen lymph nodes in neck/axilla or groin.   Heme- No active bruising or bleeding.  MS-- no swelling or pain in the bones or joints of arms/legs.  No new back pain.  Neuro-- No acute focal weakness or numbness in the arms or legs.  No seizures.  Skin--No rashes or lesions, right chest old site dialysis ok, no nodules    Physical Exam:   Vital Signs   Temp:  [97.3 °F (36.3 °C)-97.9 °F (36.6 °C)] 97.3 °F (36.3 °C)  Heart Rate:  [] 105  Resp:  [18-20] 18  BP: ()/(49-85) 111/74    Temp  Min: 97.3 °F (36.3 °C)  Max: 97.9 °F (36.6 °C)  BP  Min: 80/49  Max: 125/83  Pulse  Min: 92  Max: 121  Resp  Min: 18  Max: 20  SpO2  Min: 86 %  Max: 100 %    Blood pressure 111/74, pulse 105, temperature 97.3 °F (36.3 °C), temperature source Oral, resp. rate 18, height 182.9 cm (72\"), weight 96.5 kg (212 lb 11.2 oz), SpO2 93 %.  GENERAL: Awake and alert, in minimal distress. Appears older than stated age.  More alert.  HEENT:  Normocephalic, atraumatic.  Oropharynx without thrush. Dentition in good repair. No cervical adenopathy. No neck masses.  Ears externally normal, Nose externally normal.  EYES: No conjunctival injection. No icterus. EOM full.  LYMPHATICS: No lymphadenopathy of the neck or axillary or inguinal regions.   HEART: 2/6 syst murmur left " sternal border, no gallop, or pericardial friction rub. Reg rate rhythm, No JVD at 45 degrees.  LUNGS: rales at bases, with decreased breath sounds. No respiratory distress, no use of accessory muscles.  Occasional wheezes end expiratory.  ABDOMEN: Soft, nontender, nondistended. No appreciable HSM.  Bowel sounds normal.  SKIN: Warm and dry without cutaneous eruptions.  IV access okay.  No rash.  PSYCHIATRIC: Mental status lucid. No confusion.  EXT:  No cellulitic change.  NEURO: Oriented to name, nonfocal.    Results Review:   I reviewed the patient's new clinical results.  I reviewed the patient's new imaging results and agree with the interpretation.  I reviewed the patient's other test results and agree with the interpretation    Results from last 7 days   Lab Units 09/13/19  0330 09/11/19  0550 09/10/19  0315   WBC 10*3/mm3 7.72 6.22 8.78   HEMOGLOBIN g/dL 12.6* 12.5* 13.8   HEMATOCRIT % 45.3 43.5 47.2   PLATELETS 10*3/mm3 169 174 199     Results from last 7 days   Lab Units 09/14/19  0501   SODIUM mmol/L 142   POTASSIUM mmol/L 3.2*   CHLORIDE mmol/L 101   CO2 mmol/L 32.0*   BUN mg/dL 26*   CREATININE mg/dL 0.97   GLUCOSE mg/dL 91   CALCIUM mg/dL 8.4*     Results from last 7 days   Lab Units 09/14/19  0501   ALK PHOS U/L 177*   BILIRUBIN mg/dL 1.3*   ALT (SGPT) U/L 14   AST (SGOT) U/L 19                 Results from last 7 days   Lab Units 09/13/19  0330   LACTATE mmol/L 1.8     Estimated Creatinine Clearance: 95.2 mL/min (by C-G formula based on SCr of 0.97 mg/dL).    Microbiology:  Microbiology Results Abnormal     Procedure Component Value - Date/Time    Blood Culture - Blood, Hand, Right [[id number]] Collected:  09/09/19 2115    Lab Status:  Preliminary result Specimen:  Blood from Hand, Right Updated:  09/13/19 2240     Blood Culture No growth at 4 days    Blood Culture - Blood, Arm, Right [[id number]] Collected:  09/09/19 2100    Lab Status:  Preliminary result Specimen:  Blood from Arm, Right Updated:   09/13/19 2240     Blood Culture No growth at 4 days    Body Fluid Culture - Body Fluid, Pleural Cavity [[id number]] Collected:  09/13/19 1150    Lab Status:  Preliminary result Specimen:  Body Fluid from Pleural Cavity Updated:  09/13/19 1952     Gram Stain No WBCs or organisms seen      Microbiology cultures 6/13 at [facility], (joão Trevino), negative, 6/17-, 6/17 BAL negative; blood cultures 8/19/2019-x2 at [facility], 6/16/19 ur antigen positive    Radiology:  Imaging Results (last 72 hours)     ** No results found for the last 72 hours. **          IMPRESSION:     1.  Endocarditis, culture negative, probably antibiotic modified related to ICD leads in right atrium and right ventricle, as well as dialysis catheter by transesophageal echocardiogram from 8/19/2019.  This could have been partially treated with the numerous antibiotics he has received since June 2019.  Otherwise does not have many symptoms consistent with endocarditis in terms of fever, or embolic phenomenon.  The renal failure potentially could have been immune complex mediated.  Working toward finishing his daptomycin and cefepime on 10/1/2019.  Cryptococcal antigen negative, chlamydia antigen negative, Q fever negative, T spot negative, Brucella negative, Bartonella negative, histoplasma negative on approximately 8/20/2019.  2.  Legionella pneumonia positive urine antigen 6/16/2019.  Eventually transferred to the [facility] with acute hypoxic respiratory failure and was treated and finished therapy with greater than 10 days of levofloxacin and doxycycline.  Clinically resolved.  Very unlikely cause of endocarditis.  3.  MRSA left groin cellulitis with abscess January 2019, unlikely to have a bearing on the current case.  Resolved.  4.  Acute on chronic renal failure.  Required dialysis via a tunneled catheter right chest which was recently removed.  Last dialysis approximately 2 weeks ago.  Nephrology following.   Creatinine 1.30  5.  Chronic ischemic cardiomyopathy with ejection fraction less than 15% status post previous ICD 2007 with generator change 2013.  Probable exacerbation of systolic congestive heart failure.  6.  Anemia, chronic disease and related to renal failure.  7.  Elevated transaminitis possibly related to medications versus hypoperfusion, amiodarone and Lipitor discontinued.  Clinically resolved.  8.  Diabetes mellitus type 2 with minimal increased risk for infection.  A1c 6.0.  9.  COPD.  10.  Thrombocytopenia.  Previously, resolved.  11.  Acute on chronic hypoxic and hypercapnic respiratory failure.   12.  Hypokalemia 3.2, worse.  13.  Cholestasis, bili 1.3.  Probably medications versus congestive liver dysfunction from his heart failure.  Elevated alkaline phosphatase 177.    Still has a poor prognosis given his multiorgan failures.    Plan:    1.  Diagnostically, continue to follow patient's physical exam, CBC, CMP, CRP, CPK, blood cultures x2 obtained on 9/9/2019, fungal serologies.  2.  Therapeutically, continue daptomycin 700 mg IV every 24 hours, cefepime 1 g IV daily until 10/1/2019 for 6 weeks for treatment of potential endocarditis.  Watch toxicity in terms of liver, rhabdomyolysis, mental status, and bone marrow.  CBC, CMP, CRP, CPK should be monitored at least weekly while on this regimen.  3.  The risk benefit ratio has been discussed with the family, patient, and Dr. Edwards.  If his cultures are positive for bacteria such as MRSA, may be difficult to clear infection, and further discussion may be needed for removal of device.  If cultures are negative, I would treat with antibiotics for 6 weeks then follow off antibiotics.  Side effects of antibiotics were discussed with the patient and family.  4.  Oxygen support therapy.  5.  Hold statin therapy while on daptomycin to decrease risk of rhabdomyolysis.    I discussed the patients findings and my recommendations with patient, nursing staff,  primary care team and consulting provider    Our group would be pleased to follow this patient over the course of their hospitalization and assist with outpatient antimicrobial therapy, as indicated.  Further recommendations depend on the results of the cultures and clinical course.    Wai Escobedo MD  9/14/2019

## 2019-09-14 NOTE — PLAN OF CARE
Problem: Patient Care Overview  Goal: Plan of Care Review  Outcome: Ongoing (interventions implemented as appropriate)   09/14/19 2152   Coping/Psychosocial   Plan of Care Reviewed With patient   Plan of Care Review   Progress no change

## 2019-09-14 NOTE — PROGRESS NOTES
[facility] Medicine Services  PROGRESS NOTE    Patient Name: Rob Miranda  : 1955  MRN: [id number]    Date of Admission: 2019  Primary Care Physician: Kreis, Samuel Duane, MD    Subjective   Subjective     CC:  A/C CHF    HPI:  Dyspnea improved compared to admission, but still having some shortness of breath with exertion. Cough with whitish sputum. Leg swelling better than admission too.       Review of Systems  Gen- No fevers, chills  CV- No chest pain, palpitations  Resp- + cough, + dyspnea  GI- No N/V/D, abd pain            Objective   Objective     Vital Signs:   Temp:  [97.3 °F (36.3 °C)-97.9 °F (36.6 °C)] 97.9 °F (36.6 °C)  Heart Rate:  [] 103  Resp:  [18-20] 18  BP: ()/(64-85) 100/77        Physical Exam:  Constitutional -no acute distress, non toxic, in bed, lying flat   HEENT-NCAT, mucous membranes moist  CV-RRR, S1 S2 normal, no m/r/g  Resp-diminished at bases, no rhonchi or rales, + bilateral expiratory wheezes  Abd-soft, non-tender, non-distended, normo active bowel sounds  Ext-venous stasis changes LE bilaterally with trace edema LE bilaterally  Neuro-alert and oriented, speech clear, moves all extremities   Psych-normal affect   Skin- No rash on exposed UE or LE bilaterally        Results Reviewed:    Results from last 7 days   Lab Units 19  0330 19  0329 19  0544 19  0819 19  0550  09/10/19  0315   WBC 10*3/mm3 7.72  --   --   --  6.22  --  8.78   HEMOGLOBIN g/dL 12.6*  --   --   --  12.5*  --  13.8   HEMATOCRIT % 45.3  --   --   --  43.5  --  47.2   PLATELETS 10*3/mm3 169  --   --   --  174  --  199   INR   --  1.50* 1.64* 1.77*  --    < >  --     < > = values in this interval not displayed.     Results from last 7 days   Lab Units 19  0501 19  0330 19  0544 19  0550 09/10/19  0316 19   SODIUM mmol/L 142 143 142 139 137 137   POTASSIUM mmol/L 3.2* 3.7 3.4* 3.7 4.2 4.1   CHLORIDE  mmol/L 101 103 103 102 99 102   CO2 mmol/L 32.0* 29.0 27.0 24.0 21.0* 23.0   BUN mg/dL 26* 32* 35* 40* 38* 36*   CREATININE mg/dL 0.97 1.30* 1.40* 1.52* 1.60* 1.50*   GLUCOSE mg/dL 91 85 103* 138* 172* 199*   CALCIUM mg/dL 8.4* 8.6 8.9 8.6 9.0 8.6   ALT (SGPT) U/L 14 17  --  19 25 22   AST (SGOT) U/L 19 24  --  26 38 33   TROPONIN T ng/mL  --   --   --   --  0.028 0.034*   PROBNP pg/mL  --   --   --   --  7,388.0* 6,224.0*     Estimated Creatinine Clearance: 95.2 mL/min (by C-G formula based on SCr of 0.97 mg/dL).    Microbiology Results Abnormal     Procedure Component Value - Date/Time    Body Fluid Culture - Body Fluid, Pleural Cavity [[id number]] Collected:  09/13/19 1150    Lab Status:  Preliminary result Specimen:  Body Fluid from Pleural Cavity Updated:  09/14/19 1257     Body Fluid Culture No growth     Gram Stain No WBCs or organisms seen    Blood Culture - Blood, Hand, Right [[id number]] Collected:  09/09/19 2115    Lab Status:  Preliminary result Specimen:  Blood from Hand, Right Updated:  09/13/19 2240     Blood Culture No growth at 4 days    Blood Culture - Blood, Arm, Right [[id number]] Collected:  09/09/19 2100    Lab Status:  Preliminary result Specimen:  Blood from Arm, Right Updated:  09/13/19 2240     Blood Culture No growth at 4 days          Imaging Results (last 24 hours)     Procedure Component Value Units Date/Time    XR Chest PA & Lateral [[id number]] Collected:  09/14/19 1118     Updated:  09/14/19 1119    Narrative:          EXAMINATION: XR CHEST PA AND LATERAL-      INDICATION: SOB, s/p thoracentesis; R06.03-Acute respiratory distress;  I50.9-Heart failure, unspecified; J81.0-Acute pulmonary edema;  K42-Pznhfom effusion, not elsewhere classified; R18.8-Other ascites;  R10.11-Right upper quadrant pain; N18.9-Chronic kidney disease,  unspecified; I50.43-Acute on chronic combined systolic (congestive) and  diastolic (congestive) heart failure; I25.810-Atherosclerosis of coronar         COMPARISON: Chest x-ray 9/12/2019     FINDINGS: Persistent cardiomegaly with improved aeration left lung base  of decreased opacifications and/or effusion left lung base. Background  chronic changes. Right arm PICC remains in place. No pneumothorax.           Impression:       Interval reduction of left pleural effusion status post  thoracentesis low with decreased opacifications left lung base. No  pneumothorax or recurrent effusion or new parenchymal findings.          CT Guided Thoracentesis [[id number]] Collected:  09/13/19 1226     Updated:  09/13/19 1549    Narrative:       EXAMINATION: CT GUIDED THORACENTESIS- 09/13/2019     INDICATION: shortness of breath, pleural effusion, infiltrates, CHF.  Patient on Eliquis, held AM of 9/10/19; R06.03-Acute respiratory  distress; I50.9-Heart failure, unspecified; J81.0-Acute pulmonary edema;  C97-Firdszk effusion, not elsewhere classified; R18.8-Other ascites;  R10.11-Right upper quadrant pain; N18.9-Chronic kidney disease,  unspecified      TECHNIQUE: Limited helical CT scan of the chest without intravenous  contrast     The radiation dose reduction device was turned on for each scan per the  ALARA (As Low as Reasonably Achievable) protocol.     COMPARISON: CT 09/09/2019     FINDINGS: Small to moderate left and trace right pleural effusions.     Patient referred for left thoracentesis. Informed consent obtained and  signed. Patient placed in left anterior oblique positioning for  examination and procedure. Patient prepped and draped in typical sterile  fashion. 1% lidocaine used for local anesthetic of the left chest wall  with anesthetic provided to the level of the pleural surface. 8.5 [place]  curved catheter placed into the left pleural fluid collection with  confirmation of fluid return upon stylette removal and upon imaging with  subsequent drainage of 1900 mL clear yellow fluid. Catheter withdrawn.  Patient tolerated well without immediate complication.        Impression:       Satisfactory CT-guided left thoracentesis.     D:  09/13/2019  E:  09/13/2019                Results for orders placed during the hospital encounter of 08/19/19   Adult Transesophageal Echo (EVA) W/ Cont if Necessary Per Protocol    Narrative · Estimated EF appears to be in the range of less than 20%.  · Left ventricular systolic function is severely decreased.  · Moderate mitral valve regurgitation is present  · Moderate tricuspid valve regurgitation is present.  · Moderately reduced right ventricular systolic function noted.  · A small mobile density is present on the atrial portion of the RV lead   as well as the RA lead.  · The tip of the indewelling catheter in the RA, appears thickened.          I have reviewed the medications:  Scheduled Meds:    apixaban 5 mg Oral Q12H   aspirin 81 mg Oral Daily   bumetanide 1 mg Intravenous Q12H   cefepime 2 g Intravenous Q24H   DAPTOmycin 8 mg/kg (Adjusted) Intravenous Q24H   diphenhydrAMINE 25 mg Oral Nightly   ferrous sulfate 325 mg Oral BID With Meals   gabapentin 100 mg Oral Nightly   insulin detemir 10 Units Subcutaneous Nightly   insulin lispro 0-7 Units Subcutaneous 4x Daily With Meals & Nightly   ipratropium-albuterol 3 mL Nebulization 4x Daily - RT   magnesium (as) gluconate 27 mg Oral BID   metoprolol tartrate 150 mg Oral BID   miconazole  Topical BID   pantoprazole 40 mg Oral Q AM   pharmacy consult - MTM  Does not apply Daily   sertraline 25 mg Oral Nightly   sodium chloride 10 mL Intravenous Q12H   tamsulosin 0.4 mg Oral Daily   temazepam 15 mg Oral Nightly     Continuous Infusions:    hold 1 each    milrinone 0.25 mcg/kg/min Last Rate: 0.25 mcg/kg/min (09/14/19 1454)     PRN Meds:.•  acetaminophen **OR** acetaminophen **OR** acetaminophen  •  dextrose  •  dextrose  •  docusate sodium  •  glucagon (human recombinant)  •  hold  •  ondansetron  •  potassium chloride **OR** potassium chloride **OR** potassium chloride  •  sodium chloride  •   sodium chloride    CT chest 9/9/19 personally reviewed, moderate to large left pleural effusion to my eye.    Assessment/Plan   Assessment / Plan     Active Hospital Problems    Diagnosis  POA   • **Acute on chronic combined systolic and diastolic congestive heart failure (CMS/HCC) [I50.43]  Yes   • Bacterial endocarditis [I33.0]  Yes   • Dyspnea on exertion [R06.09]  Yes   • Rectus sheath hematoma [S30.1XXA]  Yes   • Dyspnea [R06.00]  Yes   • Elevated LFTs [R94.5]  Yes   • Atrial fibrillation and flutter (CMS/HCC) [I48.91, I48.92]  Yes   • IDDM (insulin dependent diabetes mellitus) (CMS/[facility]) [E11.9, Z79.4]  Not Applicable   • Ischemic cardiomyopathy [I25.5]  Yes   • Dyslipidemia [E78.5]  Yes   • Coronary artery disease involving coronary bypass graft of native heart without angina pectoris [I25.810]  Yes   • Essential hypertension [I10]  Yes      Resolved Hospital Problems   No resolved problems to display.        Brief Hospital Course to date:  Rob Miranda is a 64 y.o. male with history of ongoing tobacco abuse, PVD, DMII, COPD, Ischemic Cardiomyopathy with EF less than 20% and BiV ICD, atrial fibrillation on Eliquis, recent RODOLFO requiring temporary dialysis during [facility] admission for Legionella pneumonia with mechanical ventilation. Recent admission 8/19 - 8/26/19 for planned AVN ablation and upgrade to BiVICD revealing vegetations on ICD leads, AVN ablation and BiVICD not performed instead now on 6wks duration of IV abx under [facility] management for endocarditis with plans for procedures after infection clears.  He was discharged home from this facility on 8/26/19 but re-presented to [facility] ED with cough and worsening shortness of breath.  Imaging evaluation found moderate left and minimal right pleural effusions, also labs with markedly elevated BNP.    Dyspnea on exertion  - multifactorial- CHF, COPD, pleural effusion  - s/p thoracentesis 9/13 with 1900 mls removed  - improving  - CXR (today's PA/Lat)  personally reviewed - to my eye less pulmonary edema compared to 9/12 study, better aeration left base.    Acute on chronic systolic CHF/ICM  - continue IV bumex  - I/O last 3 completed shifts:  In: 510 [P.O.:510]  Out: 4025 [Urine:4025]  I/O this shift:  In: -   Out: 625 [Urine:625]    Endocarditis  - vegetations on ICD leads  - dapto and cefepime until 10/1  - weekly cbc, cmp, crp and cpk    Persistent afib/flutter  - eliquis restarted  - metoprolol increased to 150 bid for elevated HR    CKD  - creatinine improving with diuresis, today 0.97    CAD  - h/o CABG    Hypokalemia  - replace prn  - check bmp am    DMII  - glucose for past 24 hrs reviewed, 177-91  - continue levemir 10        DVT Prophylaxis:  eliquis    Disposition: I expect the patient to be discharged TBD.    CODE STATUS:   Code Status and Medical Interventions:   Ordered at: 09/10/19 0201     Level Of Support Discussed With:    Patient     Code Status:    CPR     Medical Interventions (Level of Support Prior to Arrest):    Full         Electronically signed by Andreas Jack MD, 09/14/19, 3:13 PM.

## 2019-09-15 LAB
ANION GAP SERPL CALCULATED.3IONS-SCNC: 10 MMOL/L (ref 5–15)
BUN BLD-MCNC: 25 MG/DL (ref 8–23)
BUN/CREAT SERPL: 23.6 (ref 7–25)
CALCIUM SPEC-SCNC: 8.3 MG/DL (ref 8.6–10.5)
CHLORIDE SERPL-SCNC: 102 MMOL/L (ref 98–107)
CO2 SERPL-SCNC: 29 MMOL/L (ref 22–29)
CREAT BLD-MCNC: 1.06 MG/DL (ref 0.76–1.27)
DEPRECATED RDW RBC AUTO: 81.6 FL (ref 37–54)
ERYTHROCYTE [DISTWIDTH] IN BLOOD BY AUTOMATED COUNT: 26.8 % (ref 12.3–15.4)
GFR SERPL CREATININE-BSD FRML MDRD: 71 ML/MIN/1.73
GLUCOSE BLD-MCNC: 72 MG/DL (ref 65–99)
GLUCOSE BLDC GLUCOMTR-MCNC: 116 MG/DL (ref 70–130)
GLUCOSE BLDC GLUCOMTR-MCNC: 148 MG/DL (ref 70–130)
GLUCOSE BLDC GLUCOMTR-MCNC: 150 MG/DL (ref 70–130)
GLUCOSE BLDC GLUCOMTR-MCNC: 158 MG/DL (ref 70–130)
GLUCOSE BLDC GLUCOMTR-MCNC: 76 MG/DL (ref 70–130)
HCT VFR BLD AUTO: 41.9 % (ref 37.5–51)
HGB BLD-MCNC: 12.3 G/DL (ref 13–17.7)
MAGNESIUM SERPL-MCNC: 1.4 MG/DL (ref 1.6–2.4)
MAGNESIUM SERPL-MCNC: 1.5 MG/DL (ref 1.6–2.4)
MCH RBC QN AUTO: 24.9 PG (ref 26.6–33)
MCHC RBC AUTO-ENTMCNC: 29.4 G/DL (ref 31.5–35.7)
MCV RBC AUTO: 84.8 FL (ref 79–97)
PLATELET # BLD AUTO: 140 10*3/MM3 (ref 140–450)
PMV BLD AUTO: 9.6 FL (ref 6–12)
POTASSIUM BLD-SCNC: 3.6 MMOL/L (ref 3.5–5.2)
POTASSIUM BLD-SCNC: 5.3 MMOL/L (ref 3.5–5.2)
RBC # BLD AUTO: 4.94 10*6/MM3 (ref 4.14–5.8)
SODIUM BLD-SCNC: 141 MMOL/L (ref 136–145)
WBC NRBC COR # BLD: 6.72 10*3/MM3 (ref 3.4–10.8)

## 2019-09-15 PROCEDURE — 85027 COMPLETE CBC AUTOMATED: CPT | Performed by: INTERNAL MEDICINE

## 2019-09-15 PROCEDURE — 97530 THERAPEUTIC ACTIVITIES: CPT

## 2019-09-15 PROCEDURE — 25010000002 DAPTOMYCIN PER 1 MG: Performed by: NURSE PRACTITIONER

## 2019-09-15 PROCEDURE — 94799 UNLISTED PULMONARY SVC/PX: CPT

## 2019-09-15 PROCEDURE — 80048 BASIC METABOLIC PNL TOTAL CA: CPT | Performed by: INTERNAL MEDICINE

## 2019-09-15 PROCEDURE — 84132 ASSAY OF SERUM POTASSIUM: CPT | Performed by: INTERNAL MEDICINE

## 2019-09-15 PROCEDURE — 63710000001 INSULIN DETEMIR PER 5 UNITS: Performed by: NURSE PRACTITIONER

## 2019-09-15 PROCEDURE — 25010000002 CEFEPIME PER 500 MG: Performed by: INTERNAL MEDICINE

## 2019-09-15 PROCEDURE — 97110 THERAPEUTIC EXERCISES: CPT

## 2019-09-15 PROCEDURE — 83735 ASSAY OF MAGNESIUM: CPT | Performed by: INTERNAL MEDICINE

## 2019-09-15 PROCEDURE — 99232 SBSQ HOSP IP/OBS MODERATE 35: CPT | Performed by: INTERNAL MEDICINE

## 2019-09-15 PROCEDURE — 63710000001 DIPHENHYDRAMINE PER 50 MG: Performed by: FAMILY MEDICINE

## 2019-09-15 PROCEDURE — 25010000002 MAGNESIUM SULFATE 2 GM/50ML SOLUTION: Performed by: PHYSICIAN ASSISTANT

## 2019-09-15 PROCEDURE — 82962 GLUCOSE BLOOD TEST: CPT

## 2019-09-15 RX ORDER — MAGNESIUM SULFATE HEPTAHYDRATE 40 MG/ML
2 INJECTION, SOLUTION INTRAVENOUS AS NEEDED
Status: DISCONTINUED | OUTPATIENT
Start: 2019-09-15 | End: 2019-09-17 | Stop reason: HOSPADM

## 2019-09-15 RX ORDER — MAGNESIUM SULFATE HEPTAHYDRATE 40 MG/ML
4 INJECTION, SOLUTION INTRAVENOUS AS NEEDED
Status: DISCONTINUED | OUTPATIENT
Start: 2019-09-15 | End: 2019-09-17 | Stop reason: HOSPADM

## 2019-09-15 RX ADMIN — IPRATROPIUM BROMIDE AND ALBUTEROL SULFATE 3 ML: 2.5; .5 SOLUTION RESPIRATORY (INHALATION) at 08:10

## 2019-09-15 RX ADMIN — GUAIFENESIN 600 MG: 600 TABLET, EXTENDED RELEASE ORAL at 09:27

## 2019-09-15 RX ADMIN — GABAPENTIN 100 MG: 100 CAPSULE ORAL at 21:21

## 2019-09-15 RX ADMIN — DIPHENHYDRAMINE HYDROCHLORIDE 25 MG: 25 CAPSULE ORAL at 21:21

## 2019-09-15 RX ADMIN — SODIUM CHLORIDE, PRESERVATIVE FREE 10 ML: 5 INJECTION INTRAVENOUS at 21:22

## 2019-09-15 RX ADMIN — APIXABAN 5 MG: 5 TABLET, FILM COATED ORAL at 09:27

## 2019-09-15 RX ADMIN — Medication 27 MG: at 21:21

## 2019-09-15 RX ADMIN — INSULIN LISPRO 2 UNITS: 100 INJECTION, SOLUTION INTRAVENOUS; SUBCUTANEOUS at 12:50

## 2019-09-15 RX ADMIN — BUMETANIDE 1 MG: 0.25 INJECTION INTRAMUSCULAR; INTRAVENOUS at 21:22

## 2019-09-15 RX ADMIN — GUAIFENESIN 600 MG: 600 TABLET, EXTENDED RELEASE ORAL at 21:22

## 2019-09-15 RX ADMIN — FLUTICASONE PROPIONATE 2 SPRAY: 50 SPRAY, METERED NASAL at 09:27

## 2019-09-15 RX ADMIN — MILRINONE LACTATE IN DEXTROSE 0.25 MCG/KG/MIN: 200 INJECTION, SOLUTION INTRAVENOUS at 22:14

## 2019-09-15 RX ADMIN — CEFEPIME HYDROCHLORIDE 2 G: 2 INJECTION, POWDER, FOR SOLUTION INTRAVENOUS at 09:28

## 2019-09-15 RX ADMIN — APIXABAN 5 MG: 5 TABLET, FILM COATED ORAL at 21:22

## 2019-09-15 RX ADMIN — FERROUS SULFATE TAB 325 MG (65 MG ELEMENTAL FE) 325 MG: 325 (65 FE) TAB at 18:17

## 2019-09-15 RX ADMIN — SODIUM CHLORIDE, PRESERVATIVE FREE 10 ML: 5 INJECTION INTRAVENOUS at 10:00

## 2019-09-15 RX ADMIN — MILRINONE LACTATE IN DEXTROSE 0.25 MCG/KG/MIN: 200 INJECTION, SOLUTION INTRAVENOUS at 05:35

## 2019-09-15 RX ADMIN — MICONAZOLE NITRATE: 20 POWDER TOPICAL at 09:27

## 2019-09-15 RX ADMIN — TAMSULOSIN HYDROCHLORIDE 0.4 MG: 0.4 CAPSULE ORAL at 09:27

## 2019-09-15 RX ADMIN — ASPIRIN 81 MG: 81 TABLET, COATED ORAL at 09:27

## 2019-09-15 RX ADMIN — IPRATROPIUM BROMIDE AND ALBUTEROL SULFATE 3 ML: 2.5; .5 SOLUTION RESPIRATORY (INHALATION) at 20:02

## 2019-09-15 RX ADMIN — FERROUS SULFATE TAB 325 MG (65 MG ELEMENTAL FE) 325 MG: 325 (65 FE) TAB at 09:29

## 2019-09-15 RX ADMIN — DAPTOMYCIN 700 MG: 500 INJECTION, POWDER, LYOPHILIZED, FOR SOLUTION INTRAVENOUS at 05:30

## 2019-09-15 RX ADMIN — MAGNESIUM SULFATE HEPTAHYDRATE 2 G: 40 INJECTION, SOLUTION INTRAVENOUS at 23:03

## 2019-09-15 RX ADMIN — POTASSIUM CHLORIDE 40 MEQ: 750 CAPSULE, EXTENDED RELEASE ORAL at 09:27

## 2019-09-15 RX ADMIN — Medication 27 MG: at 09:27

## 2019-09-15 RX ADMIN — PANTOPRAZOLE SODIUM 40 MG: 40 TABLET, DELAYED RELEASE ORAL at 05:30

## 2019-09-15 RX ADMIN — POTASSIUM CHLORIDE 40 MEQ: 750 CAPSULE, EXTENDED RELEASE ORAL at 12:50

## 2019-09-15 RX ADMIN — BUMETANIDE 1 MG: 0.25 INJECTION INTRAMUSCULAR; INTRAVENOUS at 09:28

## 2019-09-15 RX ADMIN — IPRATROPIUM BROMIDE AND ALBUTEROL SULFATE 3 ML: 2.5; .5 SOLUTION RESPIRATORY (INHALATION) at 16:53

## 2019-09-15 RX ADMIN — TEMAZEPAM 15 MG: 15 CAPSULE ORAL at 21:21

## 2019-09-15 RX ADMIN — INSULIN DETEMIR 10 UNITS: 100 INJECTION, SOLUTION SUBCUTANEOUS at 21:32

## 2019-09-15 RX ADMIN — IPRATROPIUM BROMIDE AND ALBUTEROL SULFATE 3 ML: 2.5; .5 SOLUTION RESPIRATORY (INHALATION) at 13:16

## 2019-09-15 RX ADMIN — METOPROLOL TARTRATE 150 MG: 100 TABLET ORAL at 21:21

## 2019-09-15 RX ADMIN — METOPROLOL TARTRATE 150 MG: 100 TABLET ORAL at 09:27

## 2019-09-15 RX ADMIN — SERTRALINE HYDROCHLORIDE 25 MG: 50 TABLET ORAL at 21:21

## 2019-09-15 RX ADMIN — MICONAZOLE NITRATE: 20 POWDER TOPICAL at 21:22

## 2019-09-15 NOTE — PLAN OF CARE
Problem: Patient Care Overview  Goal: Plan of Care Review  Outcome: Ongoing (interventions implemented as appropriate)   09/15/19 1501   Coping/Psychosocial   Plan of Care Reviewed With patient   Plan of Care Review   Progress improving   OTHER   Outcome Summary ADLs: modA. Functional Mobility & Transfer: CGA w/RW; walked w/o O2 requiring 1 standing rest break 2/2 O2 84, ~30 sec to recover, verbal cues to do adaptive breathing with mobility. Limiting Factors: endurance, strength, fatigue, medical. Recommended D/C: IRF vs home w/assist and OP pending pt/family preference. Will cont to observe and address ADL/IADL deficits as needed.

## 2019-09-15 NOTE — PROGRESS NOTES
Rob Miranda  [id number]  1955   LOS: 5 days   Patient Care Team:  Kreis, Samuel Duane, MD as PCP - General  Kreis, Samuel Duane, MD as PCP - Family Medicine  Ranjith Hernandez MD as Cardiologist (Cardiology)    Chief Complaint: Follow-up on shortness of breath/CHF/ischemic cardiomyopathy    Subjective    Lying in bed comfortably.  Was able to sit in the chair at bedside yesterday.  Reports that shortness of breath is improving, he has been able to bring up more phlegm with his cough.    Objective     Vital Sign Min/Max for last 24 hours  Temp  Min: 97.4 °F (36.3 °C)  Max: 99.1 °F (37.3 °C)   BP  Min: 88/64  Max: 120/83   Pulse  Min: 90  Max: 121   Resp  Min: 16  Max: 20   SpO2  Min: 89 %  Max: 97 %   No Data Recorded   Weight  Min: 94.8 kg (209 lb)  Max: 95.5 kg (210 lb 8 oz)         09/14/19  1828 09/15/19  0600   Weight: 95.5 kg (210 lb 8 oz) 94.8 kg (209 lb)         Intake/Output Summary (Last 24 hours) at 9/15/2019 1127  Last data filed at 9/15/2019 0927  Gross per 24 hour   Intake 250 ml   Output 1550 ml   Net -1300 ml       Physical Exam:     General Appearance:    Alert, cooperative, in no acute distress   Lungs:    Mild scattered expiratory wheezing, improved from yesterday, decreased breath sounds at left base.,respirations regular, even and                   Unlabored, 2 L O2 NC    Heart:   Regular rate and rhythm, normal S1 and S2, no            murmur, no gallop, no rub, no click   Abdomen:  Extremities:   Soft, non-tender, bowel sounds audible x4  1+ bilateral pretibial edema, left greater than right, normal range of motion   Pulses:   Pulses palpable and equal bilaterally      Results Review:   Results from last 7 days   Lab Units 09/15/19  0448 09/14/19  1736 09/14/19  0501 09/13/19  0330   SODIUM mmol/L 141  --  142 143   POTASSIUM mmol/L 3.6 4.0 3.2* 3.7   CHLORIDE mmol/L 102  --  101 103   CO2 mmol/L 29.0  --  32.0* 29.0   BUN mg/dL 25*  --  26* 32*   CREATININE mg/dL 1.06  --  0.97 1.30*    GLUCOSE mg/dL 72  --  91 85   CALCIUM mg/dL 8.3*  --  8.4* 8.6     Results from last 7 days   Lab Units 09/15/19  0448 09/13/19  0330 09/11/19  0550   WBC 10*3/mm3 6.72 7.72 6.22   HEMOGLOBIN g/dL 12.3* 12.6* 12.5*   HEMATOCRIT % 41.9 45.3 43.5   PLATELETS 10*3/mm3 140 169 174     Results from last 7 days   Lab Units 09/10/19  0316   CHOLESTEROL mg/dL 101   TRIGLYCERIDES mg/dL 106   HDL CHOL mg/dL 20*   LDL CHOL mg/dL 60     Results from last 7 days   Lab Units 09/10/19  0315   HEMOGLOBIN A1C % 6.00*     Results from last 7 days   Lab Units 09/13/19  0330 09/11/19  0550 09/10/19  0316 09/09/19 2057   CK TOTAL U/L 42 40  --   --    TROPONIN T ng/mL  --   --  0.028 0.034*   · Chest x-ray 9/12/2019:  1.  Worsening congestive heart failure.  2.  Persistent opacity of the left base, whether atelectasis or  effusion, unchanged from 09/09/2019.  · No new chest x-ray or ECG to review  · Left CT-guided thoracentesis 9/13/2019:  Satisfactory CT-guided left thoracentesis, 1900 mL clear yellow fluid drained    Medication Review: Reviewed, milrinone 0.25 mcg/kg/min    Assessment/Plan   Acute decompensated congestive heart failure: Responding well to diuresis, continue milrinone infusion.  Status post thoracentesis.      Acute on chronic combined systolic and diastolic congestive heart failure (CMS/HCC)    Coronary artery disease involving coronary bypass graft of native heart without angina pectoris    Essential hypertension    Ischemic cardiomyopathy    Dyslipidemia    IDDM (insulin dependent diabetes mellitus) (CMS/HCC)    Atrial fibrillation and flutter (CMS/HCC)    Elevated LFTs    Dyspnea on exertion    Rectus sheath hematoma    Dyspnea    Bacterial endocarditis      09/15/19  11:27 AM

## 2019-09-15 NOTE — THERAPY TREATMENT NOTE
Acute Care - Occupational Therapy Treatment Note  [facility]     Patient Name: Rob Miranda  : 1955  MRN: [id number]  Today's Date: 9/15/2019  Onset of Illness/Injury or Date of Surgery: 19  Date of Referral to OT: 19  Referring Physician: MD Davin    Admit Date: 2019       ICD-10-CM ICD-9-CM   1. Respiratory distress R06.03 786.09   2. Acute on chronic congestive heart failure, unspecified heart failure type (CMS/HCC) I50.9 428.0   3. Acute pulmonary edema (CMS/HCC) J81.0 518.4   4. Pleural effusion on left J90 511.9   5. Other ascites R18.8 789.59   6. RUQ abdominal pain R10.11 789.01   7. Chronic kidney disease, unspecified CKD stage N18.9 585.9   8. Acute on chronic combined systolic and diastolic congestive heart failure (CMS/HCC) I50.43 428.43     428.0   9. Coronary artery disease involving coronary bypass graft of native heart without angina pectoris I25.810 414.05   10. Essential hypertension I10 401.9   11. IDDM (insulin dependent diabetes mellitus) (CMS/[facility]) E11.9 250.00    Z79.4 V58.67   12. Atrial fibrillation and flutter (CMS/[facility]) I48.91 427.31    I48.92 427.32   13. Dyspnea on exertion R06.09 786.09     Patient Active Problem List   Diagnosis   • Acute on chronic combined systolic and diastolic congestive heart failure (CMS/HCC)   • Coronary artery disease involving coronary bypass graft of native heart without angina pectoris   • Essential hypertension   • History of ASCVD (atherosclerotic cardiovascular disease), status post myocardial infarction, CABG    • Ischemic cardiomyopathy   • Dyslipidemia   • IDDM (insulin dependent diabetes mellitus) (CMS/[facility])   • Atrial fibrillation and flutter (CMS/HCC)   • Sinus bradycardia   • Hyperlipidemia   • Tobacco use   • Hypomagnesemia   • ASCVD (arteriosclerotic cardiovascular disease), status post CABG in .   • Peripheral arterial disease both lower extremities.   • Persistent atrial fibrillation (CMS/HCC)   • Elevated  LFTs   • Dyspnea on exertion   • Rectus sheath hematoma   • Dyspnea   • Bacterial endocarditis     Past Medical History:   Diagnosis Date   • Atherosclerotic cardiovascular disease    • Chronic anticoagulation    • Chronic kidney disease    • Congestive heart failure (CMS/[facility])    • COPD (chronic obstructive pulmonary disease) (CMS/[facility])    • DM (diabetes mellitus) (CMS/[facility])     insulin dependant   • Dyslipidemia    • Hx of atrial flutter     and a- fib   • Hx of myocardial infarction 1992    s/p CABG    • Hyperlipidemia    • Hypertension    • Ischemic cardiomyopathy     advanced   • Legionnaire's disease (CMS/[facility])    • Sinus bradycardia     EPS, 03/19/2007, Dr. Márquez:  Successful radiofrequency ablation of right atrial flutter   • Skin cancer    • Tobacco use      Past Surgical History:   Procedure Laterality Date   • CARDIAC DEFIBRILLATOR PLACEMENT     • CARDIOVERSION      x 2 procedures   • CORONARY ARTERY BYPASS GRAFT  1991    [facility]        Therapy Treatment    Rehabilitation Treatment Summary     Row Name 09/15/19 1501             Treatment Time/Intention    Discipline  occupational therapist  -KA      Document Type  therapy note (daily note)  -KA      Subjective Information  complains of;fatigue  -KA      Mode of Treatment  occupational therapy  -KA      Care Plan Review  evaluation/treatment results reviewed;care plan/treatment goals reviewed;risks/benefits reviewed;current/potential barriers reviewed;patient/other agree to care plan  -KA      Therapy Frequency (OT Eval)  daily  -KA      Patient Effort  excellent  -KA      Existing Precautions/Restrictions  fall;oxygen therapy device and L/min  -KA      Recorded by [KA] Linda Cedeño, KATY 09/15/19 1542      Row Name 09/15/19 1501             Vital Signs    Pre Systolic BP Rehab  98  -KA      Pre Treatment Diastolic BP  56  -KA      Post Systolic BP Rehab  102  -KA      Post Treatment Diastolic BP  77  -KA      Pretreatment Heart Rate  (beats/min)  81  -KA      Posttreatment Heart Rate (beats/min)  94  -KA      Pre SpO2 (%)  94  -KA      O2 Delivery Pre Treatment  nasal cannula  -KA      Intra SpO2 (%)  88  -KA      O2 Delivery Intra Treatment  room air  -KA      Post SpO2 (%)  93  -KA      O2 Delivery Post Treatment  room air  -KA      Pre Patient Position  Sitting  -KA      Intra Patient Position  Standing  -KA      Post Patient Position  Sitting  -KA      Recorded by [KA] Linda Cedeño OT 09/15/19 1542      Row Name 09/15/19 1501             Functional Mobility    Functional Mobility- Ind. Level  standby assist  -KA      Functional Mobility- Device  rolling walker  -KA      Functional Mobility-Distance (Feet)  150  -KA      Functional Mobility- Comment  O2 dropped down to 84 requiring 1 standing rest break ~30 sec to recover with adaptive breathing  -KA      Recorded by [KA] Linda Cedeño OT 09/15/19 1542      Row Name 09/15/19 1501             Transfer Assessment/Treatment    Transfer Assessment/Treatment  sit-stand transfer;stand-sit transfer  -KA      Recorded by [KA] Linda Cedeño OT 09/15/19 1542      Row Name 09/15/19 1501             Sit-Stand Transfer    Sit-Stand [place] (Transfers)  contact guard  -KA      Assistive Device (Sit-Stand Transfers)  walker, front-wheeled  -KA      Recorded by [KA] Linda Cedeño OT 09/15/19 1542      Row Name 09/15/19 1501             Stand-Sit Transfer    Stand-Sit [place] (Transfers)  contact guard  -KA      Assistive Device (Stand-Sit Transfers)  walker, front-wheeled  -KA      Recorded by [KA] Linda Cedeño OT 09/15/19 1542      Row Name 09/15/19 1501             Motor Skills Assessment/Interventions    Additional Documentation  Therapeutic Exercise (Group)  -KA      Recorded by [KA] Linda Cedeño OT 09/15/19 1542      Row Name 09/15/19 1501             Therapeutic Exercise    27249 - OT Therapeutic Exercise Minutes  10  -KA      56531 - OT Therapeutic Activity  Minutes  20  -KA      Recorded by [KA] Linda Cedeño OT 09/15/19 1542      Row Name 09/15/19 1501             Therapeutic Exercise    Upper Extremity (Therapeutic Exercise)  bicep curl, bilateral;tricep extension, bilateral  -KA      Weight/Resistance (Therapeutic Exercise)  blue  -KA      Exercise Type (Therapeutic Exercise)  AROM (active range of motion)  -KA      Position (Therapeutic Exercise)  seated  -KA      Sets/Reps (Therapeutic Exercise)  15/1  -KA      Equipment (Therapeutic Exercise)  other (see comments)  -KA      Recorded by [KA] Linda Cedeño OT 09/15/19 1542      Row Name 09/15/19 1501             Positioning and Restraints    Pre-Treatment Position  sitting in chair/recliner  -KA      Post Treatment Position  chair  -KA      In Chair  reclined;call light within reach;encouraged to call for assist;exit alarm on;legs elevated  -KA      Recorded by [KA] Linda Cedeño OT 09/15/19 1542      Row Name 09/15/19 1501             Pain Assessment    Additional Documentation  Pain Scale: Numbers Pre/Post-Treatment (Group)  -KA      Recorded by [KA] Linda Cedeño OT 09/15/19 1542      Row Name 09/15/19 1501             Pain Scale: Numbers Pre/Post-Treatment    Pain Scale: Numbers, Pretreatment  0/10 - no pain  -KA      Pain Scale: Numbers, Post-Treatment  0/10 - no pain  -KA      Recorded by [KA] Linda Cedeño OT 09/15/19 1542      Row Name                Wound 09/13/19 1400 Left back Puncture    Wound - Properties Group Date first assessed: 09/13/19 [AS] Time first assessed: 1400 [AS] Present on Hospital Admission: N [AS] Side: Left [AS] Location: back [AS] Primary Wound Type: Puncture [AS] Recorded by:  [AS] Phyllis Archer RN 09/13/19 1700    Row Name 09/15/19 1501             Plan of Care Review    Plan of Care Reviewed With  patient  -KA      Recorded by [KA] Linda Cedeño OT 09/15/19 1542      Row Name 09/15/19 1501             Outcome Summary/Treatment Plan (OT)    Daily  Summary of Progress (OT)  progress toward functional goals is good  -FRANKIE      Anticipated Discharge Disposition (OT)  inpatient rehabilitation facility  -      Recorded by [FRANKIE] Linda Cedeño OT 09/15/19 3992        User Key  (r) = Recorded By, (t) = Taken By, (c) = Cosigned By    Initials Name Effective Dates Discipline    AS Phyllis Archer RN 06/16/16 -  Nurse    Linda Katz, KATY 07/17/19 -  OT        Wound 09/13/19 1400 Left back Puncture (Active)   Dressing Appearance dry;intact 9/14/2019 10:02 PM   Base dressing in place, unable to visualize 9/14/2019 10:02 PM   Periwound intact;dry;pink 9/14/2019 10:02 PM   Dressing Care, Wound gauze;transparent film 9/14/2019 10:02 PM       Occupational Therapy Education     Title: PT OT SLP Therapies (In Progress)     Topic: Occupational Therapy (Done)     Point: ADL training (Done)     Description: Instruct learner(s) on proper safety adaptation and remediation techniques during self care or transfers.   Instruct in proper use of assistive devices.    Learning Progress Summary           Patient Acceptance, E, VU by BASILIA at 9/12/2019  8:40 AM    Comment:  benefits of activity, role of OT                   Point: Home exercise program (Done)     Description: Instruct learner(s) on appropriate technique for monitoring, assisting and/or progressing therapeutic exercises/activities.    Learning Progress Summary           Patient Acceptance, E,D, VU,DU,NR by FRANKIE at 9/15/2019  3:01 PM    Comment:  Pt educated on adaptive breathing, functional mobility, safety, fall prevention, and HEP.                   Point: Precautions (Done)     Description: Instruct learner(s) on prescribed precautions during self-care and functional transfers.    Learning Progress Summary           Patient Acceptance, E,D, VU,DU,NR by FRANKIE at 9/15/2019  3:01 PM    Comment:  Pt educated on adaptive breathing, functional mobility, safety, fall prevention, and HEP.                   Point: Body  mechanics (Done)     Description: Instruct learner(s) on proper positioning and spine alignment during self-care, functional mobility activities and/or exercises.    Learning Progress Summary           Patient Acceptance, E,D, VU,DU,NR by FRANKIE at 9/15/2019  3:01 PM    Comment:  Pt educated on adaptive breathing, functional mobility, safety, fall prevention, and HEP.                               User Key     Initials Effective Dates Name Provider Type Discipline     06/23/15 -  Guadalupe Anderson OT Occupational Therapist OT    FRANKIE 07/17/19 -  Linda Cedeño OT Occupational Therapist OT                OT Recommendation and Plan  Outcome Summary/Treatment Plan (OT)  Daily Summary of Progress (OT): progress toward functional goals is good  Anticipated Discharge Disposition (OT): inpatient rehabilitation facility  Therapy Frequency (OT Eval): daily  Daily Summary of Progress (OT): progress toward functional goals is good  Plan of Care Review  Plan of Care Reviewed With: patient  Plan of Care Reviewed With: patient  Outcome Summary: ADLs: modA. Functional Mobility & Transfer: CGA w/RW.  Limiting Factors: endurance, strength, fatigue, medical.  Recommended D/C: IRF vs home w/assist and OP pending pt/family preference.  Will cont to observe and address ADL/IADL deficits as needed.   Outcome Measures     Row Name 09/15/19 1501             How much help from another is currently needed...    Putting on and taking off regular lower body clothing?  3  -KA      Bathing (including washing, rinsing, and drying)  3  -KA      Toileting (which includes using toilet bed pan or urinal)  3  -KA      Putting on and taking off regular upper body clothing  3  -KA      Taking care of personal grooming (such as brushing teeth)  4  -KA      Eating meals  4  -KA      AM-PAC 6 Clicks Score (OT)  20  -KA        User Key  (r) = Recorded By, (t) = Taken By, (c) = Cosigned By    Initials Name Provider Type    Linda Katz OT Occupational  Therapist           Time Calculation:   Time Calculation- OT     Row Name 09/15/19 1501             Time Calculation- OT    OT Start Time  1501  -KA      OT Received On  09/15/19  -FRANKIE      OT Goal Re-Cert Due Date  09/22/19  -KA         Timed Charges    69213 - OT Therapeutic Exercise Minutes  10  -KA      78064 - OT Therapeutic Activity Minutes  20  -KA        User Key  (r) = Recorded By, (t) = Taken By, (c) = Cosigned By    Initials Name Provider Type    Linda Katz OT Occupational Therapist        Therapy Charges for Today     Code Description Service Date Service Provider Modifiers Qty    37177050447 HC OT THER PROC EA 15 MIN 9/15/2019 Linda Cedeño OT GO 1    70334666779 HC OT THERAPEUTIC ACT EA 15 MIN 9/15/2019 Linda Cedeño OT GO 1               Linda Cedeño OT  9/15/2019

## 2019-09-15 NOTE — PROGRESS NOTES
[facility] Medicine Services  PROGRESS NOTE    Patient Name: Rob Miranda  : 1955  MRN: [id number]    Date of Admission: 2019  Primary Care Physician: Kreis, Samuel Duane, MD    Subjective   Subjective     CC:  A/C CHF    HPI:  Dyspnea continue to improve compared to admission. No further coughing. Edema better than at admission as well. Feels tired this morning      Review of Systems  Gen- No fevers, chills  CV- No chest pain, palpitations  Resp- No cough, + dyspnea  GI- No N/V/D, abd pain                Objective   Objective     Vital Signs:   Temp:  [97.4 °F (36.3 °C)-99.1 °F (37.3 °C)] 97.4 °F (36.3 °C)  Heart Rate:  [] 92  Resp:  [16-20] 16  BP: ()/(61-78) 96/77        Physical Exam:  Constitutional -no acute distress, non toxic, in bed  HEENT-NCAT, mucous membranes moist  CV-RRR, no murmur  Resp-diminished with a few scattered expiratory wheezes  Abd-soft, non-tender, non-distended, normo active bowel sounds  Ext-trace edema LE bilaterally  Neuro-somnolent but awakens to voice, speech clear and content appropriate  Psych-slightly flat affect   Skin- venous stasis changes lower legs and feet bilaterally        Results Reviewed:    Results from last 7 days   Lab Units 09/15/19  0448 19  0330 19  0329 19  0544 19  0819 19  0550   WBC 10*3/mm3 6.72 7.72  --   --   --  6.22   HEMOGLOBIN g/dL 12.3* 12.6*  --   --   --  12.5*   HEMATOCRIT % 41.9 45.3  --   --   --  43.5   PLATELETS 10*3/mm3 140 169  --   --   --  174   INR   --   --  1.50* 1.64* 1.77*  --      Results from last 7 days   Lab Units 09/15/19  0448 19  1736 19  0501 19  0330  19  0550 09/10/19  0316 09/09/19  2057   SODIUM mmol/L 141  --  142 143   < > 139 137 137   POTASSIUM mmol/L 3.6 4.0 3.2* 3.7   < > 3.7 4.2 4.1   CHLORIDE mmol/L 102  --  101 103   < > 102 99 102   CO2 mmol/L 29.0  --  32.0* 29.0   < > 24.0 21.0* 23.0   BUN mg/dL 25*  --   26* 32*   < > 40* 38* 36*   CREATININE mg/dL 1.06  --  0.97 1.30*   < > 1.52* 1.60* 1.50*   GLUCOSE mg/dL 72  --  91 85   < > 138* 172* 199*   CALCIUM mg/dL 8.3*  --  8.4* 8.6   < > 8.6 9.0 8.6   ALT (SGPT) U/L  --   --  14 17  --  19 25 22   AST (SGOT) U/L  --   --  19 24  --  26 38 33   TROPONIN T ng/mL  --   --   --   --   --   --  0.028 0.034*   PROBNP pg/mL  --   --   --   --   --   --  7,388.0* 6,224.0*    < > = values in this interval not displayed.     Estimated Creatinine Clearance: 86.4 mL/min (by C-G formula based on SCr of 1.06 mg/dL).    Microbiology Results Abnormal     Procedure Component Value - Date/Time    Body Fluid Culture - Body Fluid, Pleural Cavity [[id number]] Collected:  09/13/19 1150    Lab Status:  Preliminary result Specimen:  Body Fluid from Pleural Cavity Updated:  09/15/19 0940     Body Fluid Culture No growth at 2 days     Gram Stain No WBCs or organisms seen    Blood Culture - Blood, Arm, Right [[id number]] Collected:  09/09/19 2100    Lab Status:  Final result Specimen:  Blood from Arm, Right Updated:  09/14/19 2230     Blood Culture No growth at 5 days    Blood Culture - Blood, Hand, Right [[id number]] Collected:  09/09/19 2115    Lab Status:  Final result Specimen:  Blood from Hand, Right Updated:  09/14/19 2230     Blood Culture No growth at 5 days          Imaging Results (last 24 hours)     Procedure Component Value Units Date/Time    CT Guided Thoracentesis [[id number]] Collected:  09/13/19 1226     Updated:  09/14/19 1911    Narrative:       EXAMINATION: CT GUIDED THORACENTESIS- 09/13/2019     INDICATION: shortness of breath, pleural effusion, infiltrates, CHF.  Patient on Eliquis, held AM of 9/10/19; R06.03-Acute respiratory  distress; I50.9-Heart failure, unspecified; J81.0-Acute pulmonary edema;  I19-Jfhxstp effusion, not elsewhere classified; R18.8-Other ascites;  R10.11-Right upper quadrant pain; N18.9-Chronic kidney disease,  unspecified      TECHNIQUE: Limited helical CT  scan of the chest without intravenous  contrast     The radiation dose reduction device was turned on for each scan per the  ALARA (As Low as Reasonably Achievable) protocol.     COMPARISON: CT 09/09/2019     FINDINGS: Small to moderate left and trace right pleural effusions.     Patient referred for left thoracentesis. Informed consent obtained and  signed. Patient placed in left anterior oblique positioning for  examination and procedure. Patient prepped and draped in typical sterile  fashion. 1% lidocaine used for local anesthetic of the left chest wall  with anesthetic provided to the level of the pleural surface. 8.5 Macedonian  curved catheter placed into the left pleural fluid collection with  confirmation of fluid return upon stylette removal and upon imaging with  subsequent drainage of 1900 mL clear yellow fluid. Catheter withdrawn.  Patient tolerated well without immediate complication.       Impression:       Satisfactory CT-guided left thoracentesis.     D:  09/13/2019  E:  09/13/2019        This report was finalized on 9/14/2019 7:08 PM by Dr. Ravi Hitchcock.       XR Chest PA & Lateral [[id number]] Collected:  09/14/19 1118     Updated:  09/14/19 1701    Narrative:          EXAMINATION: XR CHEST PA AND LATERAL - 09/14/2019      INDICATION: R06.03-Acute respiratory distress; I50.9-Heart failure,  unspecified; J81.0-Acute pulmonary edema; G95-Jstwuxa effusion, not  elsewhere classified; R18.8-Other ascites; R10.11-Right upper quadrant  pain; N18.9-Chronic kidney disease, unspecified; I50.43-Acute on chronic  combined systolic (congestive) and diastolic (congestive) heart failure;  I25.810-Atherosclerosis . . .       COMPARISON: Chest x-ray 09/12/2019.     FINDINGS: Persistent cardiomegaly with improved aeration left lung base  of decreased opacifications and/or effusion left lung base. Background  chronic changes. Right arm PICC remains in place. No pneumothorax.           Impression:       Interval reduction  of left pleural effusion status post  thoracentesis along with decreased opacifications left lung base. No  pneumothorax or recurrent effusion or new parenchymal findings.     DICTATED:   09/14/2019  EDITED/ls :   09/14/2019              Results for orders placed during the hospital encounter of 08/19/19   Adult Transesophageal Echo (EVA) W/ Cont if Necessary Per Protocol    Narrative · Estimated EF appears to be in the range of less than 20%.  · Left ventricular systolic function is severely decreased.  · Moderate mitral valve regurgitation is present  · Moderate tricuspid valve regurgitation is present.  · Moderately reduced right ventricular systolic function noted.  · A small mobile density is present on the atrial portion of the RV lead   as well as the RA lead.  · The tip of the indewelling catheter in the RA, appears thickened.          I have reviewed the medications:  Scheduled Meds:    apixaban 5 mg Oral Q12H   aspirin 81 mg Oral Daily   bumetanide 1 mg Intravenous Q12H   cefepime 2 g Intravenous Q24H   DAPTOmycin 8 mg/kg (Adjusted) Intravenous Q24H   diphenhydrAMINE 25 mg Oral Nightly   ferrous sulfate 325 mg Oral BID With Meals   fluticasone 2 spray Each Nare Daily   gabapentin 100 mg Oral Nightly   guaiFENesin 600 mg Oral Q12H   insulin detemir 10 Units Subcutaneous Nightly   insulin lispro 0-7 Units Subcutaneous 4x Daily With Meals & Nightly   ipratropium-albuterol 3 mL Nebulization 4x Daily - RT   magnesium (as) gluconate 27 mg Oral BID   metoprolol tartrate 150 mg Oral BID   miconazole  Topical BID   pantoprazole 40 mg Oral Q AM   pharmacy consult - MTM  Does not apply Daily   sertraline 25 mg Oral Nightly   sodium chloride 10 mL Intravenous Q12H   tamsulosin 0.4 mg Oral Daily   temazepam 15 mg Oral Nightly     Continuous Infusions:    hold 1 each    milrinone 0.25 mcg/kg/min Last Rate: 0.25 mcg/kg/min (09/15/19 0535)     PRN Meds:.•  acetaminophen **OR** acetaminophen **OR** acetaminophen  •   benzonatate  •  dextrose  •  dextrose  •  docusate sodium  •  glucagon (human recombinant)  •  hold  •  ondansetron  •  potassium chloride **OR** potassium chloride **OR** potassium chloride  •  sodium chloride  •  sodium chloride    CT chest 9/9/19 personally reviewed, moderate to large left pleural effusion to my eye.    Assessment/Plan   Assessment / Plan     Active Hospital Problems    Diagnosis  POA   • **Acute on chronic combined systolic and diastolic congestive heart failure (CMS/HCC) [I50.43]  Yes   • Bacterial endocarditis [I33.0]  Yes   • Dyspnea on exertion [R06.09]  Yes   • Rectus sheath hematoma [S30.1XXA]  Yes   • Dyspnea [R06.00]  Yes   • Elevated LFTs [R94.5]  Yes   • Atrial fibrillation and flutter (CMS/HCC) [I48.91, I48.92]  Yes   • IDDM (insulin dependent diabetes mellitus) (CMS/[facility]) [E11.9, Z79.4]  Not Applicable   • Ischemic cardiomyopathy [I25.5]  Yes   • Dyslipidemia [E78.5]  Yes   • Coronary artery disease involving coronary bypass graft of native heart without angina pectoris [I25.810]  Yes   • Essential hypertension [I10]  Yes      Resolved Hospital Problems   No resolved problems to display.        Brief Hospital Course to date:  Rob Miranda is a 64 y.o. male with history of ongoing tobacco abuse, PVD, DMII, COPD, Ischemic Cardiomyopathy with EF less than 20% and BiV ICD, atrial fibrillation on Eliquis, recent RODOLFO requiring temporary dialysis during [facility] admission for Legionella pneumonia with mechanical ventilation. Recent admission 8/19 - 8/26/19 for planned AVN ablation and upgrade to BiVICD revealing vegetations on ICD leads, AVN ablation and BiVICD not performed instead now on 6wks duration [facility] with plans for procedures after infection clears.  He was discharged home from this facility on 8/26/19 but re-presented to [facility] ED with cough and worsening shortness of breath.  Imaging evaluation found moderate left and minimal right  pleural effusions, also labs with markedly elevated BNP.    Dyspnea on exertion  - improving after treatment for CHF (diuresis), pleural effusion (thoracentesis with 1900 mls removed) and nebs for COPD    Acute on chronic systolic CHF/ICM  - continue IV bumex, monitor renal function (check bmp am) and watch systolic BP as Mr Miranda is running on the low side. Creatinine today 1.06.  - I/O last 3 completed shifts:  In: 510 [P.O.:510]  Out: 3500 [Urine:3500]  I/O this shift:  In: 100 [IV Piggyback:100]  Out: -     Endocarditis  - vegetations on ICD leads -- cultures remain no growth  - dapto and cefepime until 10/1 -- likely home with IV antibiotics early next week  - weekly cbc, cmp, crp and cpk    Persistent afib/flutter  - eliquis  - rate controlled with metoprolol    CKD  - check bmp am    CAD  - h/o CABG    Hypokalemia  - K 3.6 today    DMII  - glucose for past 24 hrs reviewed, 162-76  - continue levemir 10 with SSI        DVT Prophylaxis:  eliquis    Disposition: I expect the patient to be discharged home in 1-2 days    CODE STATUS:   Code Status and Medical Interventions:   Ordered at: 09/10/19 0201     Level Of Support Discussed With:    Patient     Code Status:    CPR     Medical Interventions (Level of Support Prior to Arrest):    Full         Electronically signed by Andreas Jack MD, 09/15/19, 9:49 AM.

## 2019-09-15 NOTE — PLAN OF CARE
Problem: Patient Care Overview  Goal: Plan of Care Review  Outcome: Ongoing (interventions implemented as appropriate)   09/15/19 0333   Coping/Psychosocial   Plan of Care Reviewed With patient   Plan of Care Review   Progress no change   OTHER   Outcome Summary Continues on Milrinone gtt. SBP in 90's. -120 at start of shift, metoprolol given no normal sinus rhythm. no complaints this shift.        Problem: Fall Risk (Adult)  Goal: Absence of Fall  Outcome: Ongoing (interventions implemented as appropriate)      Problem: Breathing Pattern Ineffective (Adult)  Goal: Anxiety/Fear Reduction  Outcome: Ongoing (interventions implemented as appropriate)      Problem: Skin Injury Risk (Adult)  Goal: Skin Health and Integrity  Outcome: Ongoing (interventions implemented as appropriate)

## 2019-09-16 LAB
ALBUMIN SERPL-MCNC: 2.8 G/DL (ref 3.5–5.2)
ALBUMIN/GLOB SERPL: 0.6 G/DL
ALP SERPL-CCNC: 192 U/L (ref 39–117)
ALT SERPL W P-5'-P-CCNC: 16 U/L (ref 1–41)
ANION GAP SERPL CALCULATED.3IONS-SCNC: 8 MMOL/L (ref 5–15)
AST SERPL-CCNC: 31 U/L (ref 1–40)
BACTERIA FLD CULT: NORMAL
BASOPHILS # BLD AUTO: 0.05 10*3/MM3 (ref 0–0.2)
BASOPHILS NFR BLD AUTO: 0.8 % (ref 0–1.5)
BILIRUB SERPL-MCNC: 1.4 MG/DL (ref 0.2–1.2)
BUN BLD-MCNC: 30 MG/DL (ref 8–23)
BUN/CREAT SERPL: 24.8 (ref 7–25)
CALCIUM SPEC-SCNC: 9.1 MG/DL (ref 8.6–10.5)
CHLORIDE SERPL-SCNC: 99 MMOL/L (ref 98–107)
CK SERPL-CCNC: 36 U/L (ref 20–200)
CO2 SERPL-SCNC: 30 MMOL/L (ref 22–29)
CREAT BLD-MCNC: 1.21 MG/DL (ref 0.76–1.27)
CRP SERPL-MCNC: 3.11 MG/DL (ref 0–0.5)
DEPRECATED RDW RBC AUTO: 81.6 FL (ref 37–54)
EOSINOPHIL # BLD AUTO: 0.23 10*3/MM3 (ref 0–0.4)
EOSINOPHIL NFR BLD AUTO: 3.6 % (ref 0.3–6.2)
ERYTHROCYTE [DISTWIDTH] IN BLOOD BY AUTOMATED COUNT: 26.7 % (ref 12.3–15.4)
GFR SERPL CREATININE-BSD FRML MDRD: 61 ML/MIN/1.73
GLOBULIN UR ELPH-MCNC: 4.6 GM/DL
GLUCOSE BLD-MCNC: 97 MG/DL (ref 65–99)
GLUCOSE BLDC GLUCOMTR-MCNC: 136 MG/DL (ref 70–130)
GLUCOSE BLDC GLUCOMTR-MCNC: 199 MG/DL (ref 70–130)
GLUCOSE BLDC GLUCOMTR-MCNC: 258 MG/DL (ref 70–130)
GLUCOSE BLDC GLUCOMTR-MCNC: 88 MG/DL (ref 70–130)
GRAM STN SPEC: NORMAL
HCT VFR BLD AUTO: 45.1 % (ref 37.5–51)
HGB BLD-MCNC: 13 G/DL (ref 13–17.7)
IMM GRANULOCYTES # BLD AUTO: 0.02 10*3/MM3 (ref 0–0.05)
IMM GRANULOCYTES NFR BLD AUTO: 0.3 % (ref 0–0.5)
LAB AP CASE REPORT: NORMAL
LYMPHOCYTES # BLD AUTO: 1.5 10*3/MM3 (ref 0.7–3.1)
LYMPHOCYTES NFR BLD AUTO: 23.4 % (ref 19.6–45.3)
MAGNESIUM SERPL-MCNC: 2.6 MG/DL (ref 1.6–2.4)
MCH RBC QN AUTO: 24.7 PG (ref 26.6–33)
MCHC RBC AUTO-ENTMCNC: 28.8 G/DL (ref 31.5–35.7)
MCV RBC AUTO: 85.6 FL (ref 79–97)
MONOCYTES # BLD AUTO: 0.57 10*3/MM3 (ref 0.1–0.9)
MONOCYTES NFR BLD AUTO: 8.9 % (ref 5–12)
NEUTROPHILS # BLD AUTO: 4.03 10*3/MM3 (ref 1.7–7)
NEUTROPHILS NFR BLD AUTO: 63 % (ref 42.7–76)
NRBC BLD AUTO-RTO: 0 /100 WBC (ref 0–0.2)
PATH REPORT.FINAL DX SPEC: NORMAL
PLATELET # BLD AUTO: 149 10*3/MM3 (ref 140–450)
PMV BLD AUTO: 10 FL (ref 6–12)
POTASSIUM BLD-SCNC: 4.9 MMOL/L (ref 3.5–5.2)
PROT SERPL-MCNC: 7.4 G/DL (ref 6–8.5)
RBC # BLD AUTO: 5.27 10*6/MM3 (ref 4.14–5.8)
SODIUM BLD-SCNC: 137 MMOL/L (ref 136–145)
WBC NRBC COR # BLD: 6.4 10*3/MM3 (ref 3.4–10.8)

## 2019-09-16 PROCEDURE — 97116 GAIT TRAINING THERAPY: CPT

## 2019-09-16 PROCEDURE — 63710000001 DIPHENHYDRAMINE PER 50 MG: Performed by: FAMILY MEDICINE

## 2019-09-16 PROCEDURE — 82962 GLUCOSE BLOOD TEST: CPT

## 2019-09-16 PROCEDURE — 25010000002 CEFEPIME PER 500 MG: Performed by: INTERNAL MEDICINE

## 2019-09-16 PROCEDURE — 25010000002 MAGNESIUM SULFATE 2 GM/50ML SOLUTION: Performed by: PHYSICIAN ASSISTANT

## 2019-09-16 PROCEDURE — 97110 THERAPEUTIC EXERCISES: CPT

## 2019-09-16 PROCEDURE — 99232 SBSQ HOSP IP/OBS MODERATE 35: CPT | Performed by: INTERNAL MEDICINE

## 2019-09-16 PROCEDURE — 94799 UNLISTED PULMONARY SVC/PX: CPT

## 2019-09-16 PROCEDURE — 63710000001 INSULIN DETEMIR PER 5 UNITS: Performed by: NURSE PRACTITIONER

## 2019-09-16 PROCEDURE — 25010000002 DAPTOMYCIN PER 1 MG: Performed by: NURSE PRACTITIONER

## 2019-09-16 PROCEDURE — 80053 COMPREHEN METABOLIC PANEL: CPT | Performed by: INTERNAL MEDICINE

## 2019-09-16 PROCEDURE — 82550 ASSAY OF CK (CPK): CPT | Performed by: INTERNAL MEDICINE

## 2019-09-16 PROCEDURE — 85025 COMPLETE CBC W/AUTO DIFF WBC: CPT | Performed by: INTERNAL MEDICINE

## 2019-09-16 PROCEDURE — 83735 ASSAY OF MAGNESIUM: CPT | Performed by: INTERNAL MEDICINE

## 2019-09-16 PROCEDURE — 86140 C-REACTIVE PROTEIN: CPT | Performed by: INTERNAL MEDICINE

## 2019-09-16 RX ORDER — BUMETANIDE 1 MG/1
1 TABLET ORAL 2 TIMES DAILY
Status: DISCONTINUED | OUTPATIENT
Start: 2019-09-16 | End: 2019-09-17 | Stop reason: HOSPADM

## 2019-09-16 RX ADMIN — Medication 27 MG: at 08:34

## 2019-09-16 RX ADMIN — ASPIRIN 81 MG: 81 TABLET, COATED ORAL at 08:40

## 2019-09-16 RX ADMIN — FLUTICASONE PROPIONATE 2 SPRAY: 50 SPRAY, METERED NASAL at 08:34

## 2019-09-16 RX ADMIN — MICONAZOLE NITRATE: 20 POWDER TOPICAL at 08:34

## 2019-09-16 RX ADMIN — DAPTOMYCIN 700 MG: 500 INJECTION, POWDER, LYOPHILIZED, FOR SOLUTION INTRAVENOUS at 06:09

## 2019-09-16 RX ADMIN — METOPROLOL TARTRATE 150 MG: 100 TABLET ORAL at 20:15

## 2019-09-16 RX ADMIN — APIXABAN 5 MG: 5 TABLET, FILM COATED ORAL at 20:15

## 2019-09-16 RX ADMIN — METOPROLOL TARTRATE 150 MG: 100 TABLET ORAL at 08:42

## 2019-09-16 RX ADMIN — SERTRALINE HYDROCHLORIDE 25 MG: 50 TABLET ORAL at 20:15

## 2019-09-16 RX ADMIN — IPRATROPIUM BROMIDE AND ALBUTEROL SULFATE 3 ML: 2.5; .5 SOLUTION RESPIRATORY (INHALATION) at 17:08

## 2019-09-16 RX ADMIN — APIXABAN 5 MG: 5 TABLET, FILM COATED ORAL at 08:34

## 2019-09-16 RX ADMIN — BUMETANIDE 1 MG: 1 TABLET ORAL at 08:40

## 2019-09-16 RX ADMIN — MICONAZOLE NITRATE: 20 POWDER TOPICAL at 22:04

## 2019-09-16 RX ADMIN — INSULIN LISPRO 4 UNITS: 100 INJECTION, SOLUTION INTRAVENOUS; SUBCUTANEOUS at 22:03

## 2019-09-16 RX ADMIN — SODIUM CHLORIDE, PRESERVATIVE FREE 10 ML: 5 INJECTION INTRAVENOUS at 08:38

## 2019-09-16 RX ADMIN — BUMETANIDE 1 MG: 1 TABLET ORAL at 20:14

## 2019-09-16 RX ADMIN — IPRATROPIUM BROMIDE AND ALBUTEROL SULFATE 3 ML: 2.5; .5 SOLUTION RESPIRATORY (INHALATION) at 09:56

## 2019-09-16 RX ADMIN — MAGNESIUM SULFATE HEPTAHYDRATE 2 G: 40 INJECTION, SOLUTION INTRAVENOUS at 00:53

## 2019-09-16 RX ADMIN — TAMSULOSIN HYDROCHLORIDE 0.4 MG: 0.4 CAPSULE ORAL at 08:34

## 2019-09-16 RX ADMIN — GUAIFENESIN 600 MG: 600 TABLET, EXTENDED RELEASE ORAL at 08:34

## 2019-09-16 RX ADMIN — MAGNESIUM SULFATE HEPTAHYDRATE 2 G: 40 INJECTION, SOLUTION INTRAVENOUS at 04:16

## 2019-09-16 RX ADMIN — TEMAZEPAM 15 MG: 15 CAPSULE ORAL at 20:15

## 2019-09-16 RX ADMIN — MILRINONE LACTATE IN DEXTROSE 0.25 MCG/KG/MIN: 200 INJECTION, SOLUTION INTRAVENOUS at 12:19

## 2019-09-16 RX ADMIN — DIPHENHYDRAMINE HYDROCHLORIDE 25 MG: 25 CAPSULE ORAL at 20:14

## 2019-09-16 RX ADMIN — PANTOPRAZOLE SODIUM 40 MG: 40 TABLET, DELAYED RELEASE ORAL at 06:09

## 2019-09-16 RX ADMIN — CEFEPIME HYDROCHLORIDE 2 G: 2 INJECTION, POWDER, FOR SOLUTION INTRAVENOUS at 08:48

## 2019-09-16 RX ADMIN — INSULIN DETEMIR 10 UNITS: 100 INJECTION, SOLUTION SUBCUTANEOUS at 20:25

## 2019-09-16 RX ADMIN — GABAPENTIN 100 MG: 100 CAPSULE ORAL at 20:15

## 2019-09-16 RX ADMIN — GUAIFENESIN 600 MG: 600 TABLET, EXTENDED RELEASE ORAL at 20:15

## 2019-09-16 RX ADMIN — INSULIN LISPRO 2 UNITS: 100 INJECTION, SOLUTION INTRAVENOUS; SUBCUTANEOUS at 12:16

## 2019-09-16 RX ADMIN — FERROUS SULFATE TAB 325 MG (65 MG ELEMENTAL FE) 325 MG: 325 (65 FE) TAB at 08:34

## 2019-09-16 RX ADMIN — FERROUS SULFATE TAB 325 MG (65 MG ELEMENTAL FE) 325 MG: 325 (65 FE) TAB at 17:31

## 2019-09-16 RX ADMIN — Medication 27 MG: at 20:15

## 2019-09-16 RX ADMIN — IPRATROPIUM BROMIDE AND ALBUTEROL SULFATE 3 ML: 2.5; .5 SOLUTION RESPIRATORY (INHALATION) at 13:25

## 2019-09-16 NOTE — PLAN OF CARE
Problem: Patient Care Overview  Goal: Plan of Care Review  Outcome: Ongoing (interventions implemented as appropriate)   09/16/19 0413   Coping/Psychosocial   Plan of Care Reviewed With patient   Plan of Care Review   Progress no change   OTHER   Outcome Summary Pt remains on milrinone gtt this shift. BPs stable. Mg replaced per protocol. Continue current POC       Problem: Fall Risk (Adult)  Goal: Absence of Fall  Outcome: Ongoing (interventions implemented as appropriate)      Problem: Breathing Pattern Ineffective (Adult)  Goal: Effective Oxygenation/Ventilation  Outcome: Ongoing (interventions implemented as appropriate)    Goal: Anxiety/Fear Reduction  Outcome: Ongoing (interventions implemented as appropriate)      Problem: Skin Injury Risk (Adult)  Goal: Identify Related Risk Factors and Signs and Symptoms  Outcome: Ongoing (interventions implemented as appropriate)    Goal: Skin Health and Integrity  Outcome: Ongoing (interventions implemented as appropriate)

## 2019-09-16 NOTE — PROGRESS NOTES
Continued Stay Note  [facility]     Patient Name: Rob Miranda  MRN: [id number]  Today's Date: 9/16/2019    Admit Date: 9/9/2019    Discharge Plan     Row Name 09/16/19 1457       Plan    Plan  update    Patient/Family in Agreement with Plan  yes    Plan Comments  Spoke with patient at bedside regarding discharge plan.  Patient hopes to go home soon.  CM advised patient that the Milrinone has been called in to [facility] and are waiting for orders for discharge.  No needs verbalized at this time.  Called [facility] and spoke to Junior Wells, who advises that they will need specific order for the Milrinone and it will take 2-3 hours to get meds ready.  CM continues to follow.  Patient plan is to discharge home and resume [facility] HH via car with family to transport when medcially ready and IV antibiotics in place, delivered and teaching completed.      Final Discharge Disposition Code  06 - home with home health care        Discharge Codes    No documentation.       Expected Discharge Date and Time     Expected Discharge Date Expected Discharge Time    Sep 16, 2019             Delores Rodriguez RN

## 2019-09-16 NOTE — THERAPY TREATMENT NOTE
Patient Name: Rob Miranda  : 1955    MRN: [id number]                              Today's Date: 2019       Admit Date: 2019    Visit Dx:     ICD-10-CM ICD-9-CM   1. Respiratory distress R06.03 786.09   2. Acute on chronic congestive heart failure, unspecified heart failure type (CMS/[facility]) I50.9 428.0   3. Acute pulmonary edema (CMS/[facility]) J81.0 518.4   4. Pleural effusion on left J90 511.9   5. Other ascites R18.8 789.59   6. RUQ abdominal pain R10.11 789.01   7. Chronic kidney disease, unspecified CKD stage N18.9 585.9   8. Acute on chronic combined systolic and diastolic congestive heart failure (CMS/[facility]) I50.43 428.43     428.0   9. Coronary artery disease involving coronary bypass graft of native heart without angina pectoris I25.810 414.05   10. Essential hypertension I10 401.9   11. IDDM (insulin dependent diabetes mellitus) (CMS/[facility]) E11.9 250.00    Z79.4 V58.67   12. Atrial fibrillation and flutter (CMS/[facility]) I48.91 427.31    I48.92 427.32   13. Dyspnea on exertion R06.09 786.09     Patient Active Problem List   Diagnosis   • Acute on chronic combined systolic and diastolic congestive heart failure (CMS/[facility])   • Coronary artery disease involving coronary bypass graft of native heart without angina pectoris   • Essential hypertension   • History of ASCVD (atherosclerotic cardiovascular disease), status post myocardial infarction, CABG    • Ischemic cardiomyopathy   • Dyslipidemia   • IDDM (insulin dependent diabetes mellitus) (CMS/[facility])   • Atrial fibrillation and flutter (CMS/[facility])   • Sinus bradycardia   • Hyperlipidemia   • Tobacco use   • Hypomagnesemia   • ASCVD (arteriosclerotic cardiovascular disease), status post CABG in .   • Peripheral arterial disease both lower extremities.   • Persistent atrial fibrillation (CMS/[facility])   • Elevated LFTs   • Dyspnea on exertion   • Rectus sheath hematoma   • Dyspnea   • Bacterial endocarditis     Past Medical History:   Diagnosis Date   •  Atherosclerotic cardiovascular disease    • Chronic anticoagulation    • Chronic kidney disease    • Congestive heart failure (CMS/HCC)    • COPD (chronic obstructive pulmonary disease) (CMS/HCC)    • DM (diabetes mellitus) (CMS/[facility])     insulin dependant   • Dyslipidemia    • Hx of atrial flutter     and a- fib   • Hx of myocardial infarction 1992    s/p CABG    • Hyperlipidemia    • Hypertension    • Ischemic cardiomyopathy     advanced   • Legionnaire's disease (CMS/[facility])    • Sinus bradycardia     EPS, 03/19/2007, Dr. Márquez:  Successful radiofrequency ablation of right atrial flutter   • Skin cancer    • Tobacco use      Past Surgical History:   Procedure Laterality Date   • CARDIAC DEFIBRILLATOR PLACEMENT     • CARDIOVERSION      x 2 procedures   • CORONARY ARTERY BYPASS GRAFT  1991    [facility]      General Information     Row Name 09/16/19 0910          PT Evaluation Time/Intention    Document Type  therapy note (daily note)  -VG     Mode of Treatment  individual therapy;physical therapy  -VG     Row Name 09/16/19 0910          General Information    Existing Precautions/Restrictions  fall;oxygen therapy device and L/min  -VG     Row Name 09/16/19 0910          Cognitive Assessment/Intervention- PT/OT    Orientation Status (Cognition)  oriented x 3  -VG       User Key  (r) = Recorded By, (t) = Taken By, (c) = Cosigned By    Initials Name Provider Type    VG Yusra Warner, PT Physical Therapist        Mobility     Row Name 09/16/19 0911          Bed Mobility Assessment/Treatment    Comment (Bed Mobility)  UIC  -VG     Row Name 09/16/19 0911          Transfer Assessment/Treatment    Comment (Transfers)  Good safety awareness.  -VG     Row Name 09/16/19 0911          Sit-Stand Transfer    Sit-Stand [place] (Transfers)  conditional independence  -VG     Assistive Device (Sit-Stand Transfers)  walker, front-wheeled  -VG     Row Name 09/16/19 0911          Gait/Stairs Assessment/Training     [place] Level (Gait)  supervision;verbal cues  -VG     Assistive Device (Gait)  walker, front-wheeled  -VG     Distance in Feet (Gait)  300  -VG     Deviations/Abnormal Patterns (Gait)  gait speed decreased;festinating/shuffling;stride length decreased  -VG     Comment (Gait/Stairs)  Distance limited by fatigue and SOA; VSS on 2LO2NC. Cues for PLB and pacing. Improved endurance and stability with gait.  -VG       User Key  (r) = Recorded By, (t) = Taken By, (c) = Cosigned By    Initials Name Provider Type    Yusra Valerio, PT Physical Therapist        Obj/Interventions     Row Name 09/16/19 0912          Therapeutic Exercise    Lower Extremity (Therapeutic Exercise)  LAQ (long arc quad), bilateral;marching while seated  -VG     Lower Extremity Range of Motion (Therapeutic Exercise)  hip abduction/adduction, bilateral;ankle dorsiflexion/plantar flexion, bilateral  -VG     Exercise Type (Therapeutic Exercise)  AROM (active range of motion)  -VG     Position (Therapeutic Exercise)  seated  -VG     Sets/Reps (Therapeutic Exercise)  10x  -VG     Comment (Therapeutic Exercise)  Cues for technique.  -VG       User Key  (r) = Recorded By, (t) = Taken By, (c) = Cosigned By    Initials Name Provider Type    Yusra Valerio, PT Physical Therapist        Goals/Plan    No documentation.       Clinical Impression     Row Name 09/16/19 0912          Pain Assessment    Additional Documentation  Pain Scale: Numbers Pre/Post-Treatment (Group)  -VG     Row Name 09/16/19 0912          Pain Scale: Numbers Pre/Post-Treatment    Pain Scale: Numbers, Pretreatment  0/10 - no pain  -VG     Pain Scale: Numbers, Post-Treatment  0/10 - no pain  -VG     Row Name 09/16/19 0912          Plan of Care Review    Plan of Care Reviewed With  patient  -VG     [facility] Name 09/16/19 0912          Vital Signs    Pre Systolic BP Rehab  95  -VG     Pre Treatment Diastolic BP  72  -VG     Post Systolic BP Rehab  110  -VG     Post Treatment  Diastolic BP  73  -VG     Pretreatment Heart Rate (beats/min)  106  -VG     Posttreatment Heart Rate (beats/min)  114  -VG     Pre SpO2 (%)  93  -VG     O2 Delivery Pre Treatment  supplemental O2  -VG     Post SpO2 (%)  97  -VG     O2 Delivery Post Treatment  supplemental O2  -VG     Pre Patient Position  Sitting  -VG     Post Patient Position  Sitting  -VG     Row Name 09/16/19 0912          Positioning and Restraints    Pre-Treatment Position  sitting in chair/recliner  -VG     Post Treatment Position  chair  -VG     In Chair  reclined;call light within reach;encouraged to call for assist;legs elevated;exit alarm on  -VG       User Key  (r) = Recorded By, (t) = Taken By, (c) = Cosigned By    Initials Name Provider Type    VG Yusra Warner, PT Physical Therapist        Outcome Measures     Row Name 09/16/19 0915          How much help from another person do you currently need...    Turning from your back to your side while in flat bed without using bedrails?  4  -VG     Moving from lying on back to sitting on the side of a flat bed without bedrails?  4  -VG     Moving to and from a bed to a chair (including a wheelchair)?  4  -VG     Standing up from a chair using your arms (e.g., wheelchair, bedside chair)?  4  -VG     Climbing 3-5 steps with a railing?  3  -VG     To walk in hospital room?  4  -VG     AM-PAC 6 Clicks Score (PT)  23  -VG     Row Name 09/16/19 0915          Functional Assessment    Outcome Measure Options  AM-PAC 6 Clicks Basic Mobility (PT)  -VG       User Key  (r) = Recorded By, (t) = Taken By, (c) = Cosigned By    Initials Name Provider Type    Yusra Valerio, PT Physical Therapist        Physical Therapy Education     Title: PT OT SLP Therapies (Done)     Topic: Physical Therapy (Done)     Point: Mobility training (Done)     Learning Progress Summary           Patient Acceptance, GABBI VU by VG at 9/16/2019  9:14 AM    AcceptanceGABBI VU by VG at 9/12/2019  9:03 AM                   Point:  Home exercise program (Done)     Learning Progress Summary           Patient Acceptance, E, VU by VG at 9/16/2019  9:14 AM                   Point: Body mechanics (Done)     Learning Progress Summary           Patient Acceptance, E, VU by VG at 9/16/2019  9:14 AM    Acceptance, E, VU by VG at 9/12/2019  9:03 AM                   Point: Precautions (Done)     Learning Progress Summary           Patient Acceptance, E, VU by VG at 9/16/2019  9:14 AM    Acceptance, E, VU by VG at 9/12/2019  9:03 AM                               User Key     Initials Effective Dates Name Provider Type Discipline     05/29/18 -  Yusra Warner, PT Physical Therapist PT              PT Recommendation and Plan  Planned Therapy Interventions (PT Eval): balance training, bed mobility training, gait training, home exercise program, patient/family education, stair training, strengthening, transfer training  Outcome Summary/Treatment Plan (PT)  Anticipated Discharge Disposition (PT): home with home health  Plan of Care Reviewed With: patient  Progress: improving  Outcome Summary: Pt increased ambulation distance to 300 ft with RW and SBA, limited by fatigue and SOA. VSS on 2LO2NC. Improved endurance and stability with gait. Will continue to progress pt as able per POC.     Time Calculation:   PT Charges     Row Name 09/16/19 0845             Time Calculation    Start Time  0845  -      PT Received On  09/16/19  -      PT Goal Re-Cert Due Date  09/22/19  -         Time Calculation- PT    Total Timed Code Minutes- PT  25 minute(s)  -VG         Timed Charges    63857 - PT Therapeutic Exercise Minutes  10  -VG      92714 - Gait Training Minutes   15  -VG        User Key  (r) = Recorded By, (t) = Taken By, (c) = Cosigned By    Initials Name Provider Type    VG Yusra Warner, PT Physical Therapist        Therapy Charges for Today     Code Description Service Date Service Provider Modifiers Qty    63314166185 HC PT THER PROC EA 15 MIN  9/16/2019 Yusra Warner, PT GP 1    43262477766 HC GAIT TRAINING EA 15 MIN 9/16/2019 Yusra Warner, PT GP 1          PT G-Codes  Outcome Measure Options: AM-PAC 6 Clicks Basic Mobility (PT)  AM-PAC 6 Clicks Score (PT): 23  AM-PAC 6 Clicks Score (OT): 20    Sara Warner PT  9/16/2019

## 2019-09-16 NOTE — PROGRESS NOTES
[facility] Medicine Services  PROGRESS NOTE    Patient Name: Rob Miranda  : 1955  MRN: [id number]    Date of Admission: 2019  Primary Care Physician: Kreis, Samuel Duane, MD    Subjective   Subjective     CC:  A/C CHF    HPI:  Dyspnea improved, denies chest pain. Strength better, walking in [place] today.      Review of Systems  Gen- No fevers, chills  CV- No chest pain, palpitations  Resp- No cough, dyspnea  GI- No N/V/D, abd pain                    Objective   Objective     Vital Signs:   Temp:  [97.3 °F (36.3 °C)] 97.3 °F (36.3 °C)  Heart Rate:  [] 97  Resp:  [16-18] 16  BP: ()/(70-84) 103/73        Physical Exam:  Constitutional -no acute distress, non toxic, in bed  HEENT-NCAT, mucous membranes moist  CV-RRR, S1 S2 normal, no m/r/g  Resp-a few scattered wheezes on expiration  Abd-soft, non-tender, non-distended, normo active bowel sounds  Ext-trace edema LE bilaterally  Neuro-alert, speech clear, moves all extremities   Psych-normal affect   Skin- venous stasis changes LE bilaterally        Results Reviewed:    Results from last 7 days   Lab Units 19  0709 09/15/19  0448 09/13/19  0330 09/13/19  0329 09/12/19  0544 19  0819   WBC 10*3/mm3 6.40 6.72 7.72  --   --   --    HEMOGLOBIN g/dL 13.0 12.3* 12.6*  --   --   --    HEMATOCRIT % 45.1 41.9 45.3  --   --   --    PLATELETS 10*3/mm3 149 140 169  --   --   --    INR   --   --   --  1.50* 1.64* 1.77*     Results from last 7 days   Lab Units 19  0709 09/15/19  2053 09/15/19  0448  09/14/19  0501 09/13/19  0330  09/10/19  0316 09/09/19  2057   SODIUM mmol/L 137  --  141  --  142 143   < > 137 137   POTASSIUM mmol/L 4.9 5.3* 3.6   < > 3.2* 3.7   < > 4.2 4.1   CHLORIDE mmol/L 99  --  102  --  101 103   < > 99 102   CO2 mmol/L 30.0*  --  29.0  --  32.0* 29.0   < > 21.0* 23.0   BUN mg/dL 30*  --  25*  --  26* 32*   < > 38* 36*   CREATININE mg/dL 1.21  --  1.06  --  0.97 1.30*   < > 1.60* 1.50*    GLUCOSE mg/dL 97  --  72  --  91 85   < > 172* 199*   CALCIUM mg/dL 9.1  --  8.3*  --  8.4* 8.6   < > 9.0 8.6   ALT (SGPT) U/L 16  --   --   --  14 17   < > 25 22   AST (SGOT) U/L 31  --   --   --  19 24   < > 38 33   TROPONIN T ng/mL  --   --   --   --   --   --   --  0.028 0.034*   PROBNP pg/mL  --   --   --   --   --   --   --  7,388.0* 6,224.0*    < > = values in this interval not displayed.     Estimated Creatinine Clearance: 76.8 mL/min (by C-G formula based on SCr of 1.21 mg/dL).    Microbiology Results Abnormal     Procedure Component Value - Date/Time    Anaerobic Culture - Pleural Fluid, Pleural Cavity [[id number]] Collected:  09/13/19 1150    Lab Status:  Preliminary result Specimen:  Pleural Fluid from Pleural Cavity Updated:  09/16/19 1326     Anaerobic Culture No anaerobes isolated at 3 days    Body Fluid Culture - Body Fluid, Pleural Cavity [[id number]] Collected:  09/13/19 1150    Lab Status:  Final result Specimen:  Body Fluid from Pleural Cavity Updated:  09/16/19 1034     Body Fluid Culture No growth at 3 days     Gram Stain No WBCs or organisms seen    Blood Culture - Blood, Arm, Right [[id number]] Collected:  09/09/19 2100    Lab Status:  Final result Specimen:  Blood from Arm, Right Updated:  09/14/19 2230     Blood Culture No growth at 5 days    Blood Culture - Blood, Hand, Right [[id number]] Collected:  09/09/19 2115    Lab Status:  Final result Specimen:  Blood from Hand, Right Updated:  09/14/19 2230     Blood Culture No growth at 5 days          Imaging Results (last 24 hours)     Procedure Component Value Units Date/Time    XR Chest PA & Lateral [[id number]] Collected:  09/14/19 1118     Updated:  09/16/19 1847    Narrative:          EXAMINATION: XR CHEST PA AND LATERAL - 09/14/2019      INDICATION: R06.03-Acute respiratory distress; I50.9-Heart failure,  unspecified; J81.0-Acute pulmonary edema; I84-Gbuqsrw effusion, not  elsewhere classified; R18.8-Other ascites; R10.11-Right upper  quadrant  pain; N18.9-Chronic kidney disease, unspecified; I50.43-Acute on chronic  combined systolic (congestive) and diastolic (congestive) heart failure;  I25.810-Atherosclerosis . . .       COMPARISON: Chest x-ray 09/12/2019.     FINDINGS: Persistent cardiomegaly with improved aeration left lung base  of decreased opacifications and/or effusion left lung base. Background  chronic changes. Right arm PICC remains in place. No pneumothorax.           Impression:       Interval reduction of left pleural effusion status post  thoracentesis along with decreased opacifications left lung base. No  pneumothorax or recurrent effusion or new parenchymal findings.     DICTATED:   09/14/2019  EDITED/ls :   09/14/2019      This report was finalized on 9/16/2019 6:43 PM by Dr. Ravi Hitchcock.             Results for orders placed during the hospital encounter of 08/19/19   Adult Transesophageal Echo (EVA) W/ Cont if Necessary Per Protocol    Narrative · Estimated EF appears to be in the range of less than 20%.  · Left ventricular systolic function is severely decreased.  · Moderate mitral valve regurgitation is present  · Moderate tricuspid valve regurgitation is present.  · Moderately reduced right ventricular systolic function noted.  · A small mobile density is present on the atrial portion of the RV lead   as well as the RA lead.  · The tip of the indewelling catheter in the RA, appears thickened.          I have reviewed the medications:  Scheduled Meds:    apixaban 5 mg Oral Q12H   aspirin 81 mg Oral Daily   bumetanide 1 mg Oral BID   cefepime 2 g Intravenous Q24H   DAPTOmycin 8 mg/kg (Adjusted) Intravenous Q24H   diphenhydrAMINE 25 mg Oral Nightly   ferrous sulfate 325 mg Oral BID With Meals   fluticasone 2 spray Each Nare Daily   gabapentin 100 mg Oral Nightly   guaiFENesin 600 mg Oral Q12H   insulin detemir 10 Units Subcutaneous Nightly   insulin lispro 0-7 Units Subcutaneous 4x Daily With Meals & Nightly    ipratropium-albuterol 3 mL Nebulization 4x Daily - RT   magnesium (as) gluconate 27 mg Oral BID   metoprolol tartrate 150 mg Oral BID   miconazole  Topical BID   pantoprazole 40 mg Oral Q AM   pharmacy consult - MTM  Does not apply Daily   sertraline 25 mg Oral Nightly   sodium chloride 10 mL Intravenous Q12H   tamsulosin 0.4 mg Oral Daily   temazepam 15 mg Oral Nightly     Continuous Infusions:    milrinone 0.25 mcg/kg/min Last Rate: 0.25 mcg/kg/min (09/16/19 1219)     PRN Meds:.•  acetaminophen **OR** acetaminophen **OR** acetaminophen  •  benzonatate  •  dextrose  •  dextrose  •  docusate sodium  •  glucagon (human recombinant)  •  magnesium sulfate **OR** magnesium sulfate **OR** magnesium sulfate  •  ondansetron  •  potassium chloride **OR** potassium chloride **OR** potassium chloride  •  sodium chloride  •  sodium chloride    CT chest 9/9/19 personally reviewed, moderate to large left pleural effusion to my eye.    Assessment/Plan   Assessment / Plan     Active Hospital Problems    Diagnosis  POA   • **Acute on chronic combined systolic and diastolic congestive heart failure (CMS/HCC) [I50.43]  Yes   • Bacterial endocarditis [I33.0]  Yes   • Dyspnea on exertion [R06.09]  Yes   • Rectus sheath hematoma [S30.1XXA]  Yes   • Dyspnea [R06.00]  Yes   • Elevated LFTs [R94.5]  Yes   • Atrial fibrillation and flutter (CMS/HCC) [I48.91, I48.92]  Yes   • IDDM (insulin dependent diabetes mellitus) (CMS/HCC) [E11.9, Z79.4]  Not Applicable   • Ischemic cardiomyopathy [I25.5]  Yes   • Dyslipidemia [E78.5]  Yes   • Coronary artery disease involving coronary bypass graft of native heart without angina pectoris [I25.810]  Yes   • Essential hypertension [I10]  Yes      Resolved Hospital Problems   No resolved problems to display.        Brief Hospital Course to date:  Rob Miranda is a 64 y.o. male with history of ongoing tobacco abuse, PVD, DMII, COPD, Ischemic Cardiomyopathy with EF less than 20% and BiV ICD, atrial  fibrillation on Eliquis, recent RODOLFO requiring temporary dialysis during [facility] admission for Legionella pneumonia with mechanical ventilation. Recent admission 8/19 - 8/26/19 for planned AVN ablation and upgrade to BiVICD revealing vegetations on ICD leads, AVN ablation and BiVICD not performed instead now on 6wks duration of IV abx under [facility] management for endocarditis with plans for procedures after infection clears.  He was discharged home from this facility on 8/26/19 but re-presented to [facility] ED with cough and worsening shortness of breath.  Imaging evaluation found moderate left and minimal right pleural effusions, also labs with markedly elevated BNP.    Dyspnea on exertion  - improved with diuresis and thoracentesis    Acute on chronic systolic CHF/ICM  - home on po bumex and milrinone. [facility] to supply the latter  - follow up Dr Edwards in three weeks  - I/O last 3 completed shifts:  In: 760 [P.O.:480; I.V.:80; IV Piggyback:200]  Out: 1900 [Urine:1900]  No intake/output data recorded.    Endocarditis  - vegetations on ICD leads -- cultures remain no growth  - dapto and cefepime until 10/1   - weekly cbc, cmp, crp and cpk  - follow up with ID Dr Escobedo in one week.    Persistent afib/flutter  - eliquis  - rate controlled with metoprolol    CKD  - cr 1.2    CAD  - h/o CABG    Hypokalemia  - resolved    DMII  - glucose for past 24 hrs reviewed, 063-39  - continue levemir 10 with SSI        DVT Prophylaxis:  eliquis    Disposition: I expect the patient to be discharged home in am    CODE STATUS:   Code Status and Medical Interventions:   Ordered at: 09/10/19 0201     Level Of Support Discussed With:    Patient     Code Status:    CPR     Medical Interventions (Level of Support Prior to Arrest):    Full         Electronically signed by Andreas Jack MD, 09/16/19, 7:57 PM.

## 2019-09-16 NOTE — PROGRESS NOTES
INFECTIOUS DISEASE Progress Note    Rob Miranda  1955  [id number]    Admit date: 9/9/2019    Requesting Provider: No ref. provider found  Evaluating physician: Wai Escobedo MD  Reason for Consultation: sepsis, endocarditis, related to pacemaker  Chief Complaint: above      Subjective   History of present illness:  Patient is a 64 y.o.  Yr old male with diabetes mellitus type 2, COPD, BPH, depression, hypertension, CAD, ischemic cardiomyopathy with ejection fraction less than 15%, biventricular ICD implant 2007 with generator change 2013, atrial fibrillation, acute kidney injury on dialysis in July at  last dialyzed 7/26/2019 with tunneled catheter in place, with a EVA on 8/19/2019 reported positive for vegetation on the RA and RV leads as well as the tip of the dialysis catheter who was admitted to [facility] on 8/19/2019.  Patient denied any high fevers or chills.  He has been ill previously since June with a diagnosis of left lower lobe pneumonia in June 2019 with a positive urine antigen for Legionella treated with doxycycline and levofloxacin, finishing his therapy at the [facility] after deteriorating with acute hypoxic resp failure admitted to ICU, then discharged approximately 6/19/2019.  Went to Rehab and then discharged around 7/7/19.  Patient at that time was also found to have acute renal failure receiving some hemodialysis via a tunneled temp dialysis right chest catheter.  The patient receives his routine cardiac care by Dr. Edwards.  He continued to have persistent atrial fibrillation and atrial flutter.  A transesophageal echocardiogram done on 8/19/2019 reported possible vegetations on the right atrial/right ventricular lead as well as the dialysis catheter.  Previously the patient received antibiotics in early July 2019.  He did not have subsequent fevers.  He had continued issues with respiratory status and heart failure.  I was consulted on 8/20/2019  for further evaluation and treatment.  The patient has no other localizing signs or symptoms of infection.  The patient had an episode of MRSA left groin infection with a positive culture at [facility] on 1/30/2019 confirmed with the microbiology tech.  Blood cultures obtained at [facility] from January 2019, as well as 6/13/2019, 6/17/2019 were negative.  Urine was positive for Legionella antigen study at [facility] June 2019.  He received a treatment course for this at the [facility].  He has no other localizing signs or symptoms of infection.    The patient was discharged home to receive daptomycin 500 mg every 48 hours, cefepime 1 g every 24 hours for pacer lead related ICD endocarditis with negative cultures to continue until 10/1/2019.  The patient was admitted to the emergency room on 9/9/2019 complaining of increasing shortness of breath, and increased weight gain of 20 pounds with increased abdominal swelling.  He denied any high fevers or chills.  A CT scan of the chest on 9/9 revealed a moderate left-sided pleural effusion, some coarse left upper lobe infiltrate/atelectasis, compressive atelectasis of the left lower lobe, small right pleural effusion.  The CT of the abdomen and pelvis revealed some nephrolithiasis without hydronephrosis, and some ascites but no bowel obstruction.  Chest x-ray revealed cardiomegaly and mild volume overload.  Portal and hepatic venous ultrasound was negative on 9/3.  Laboratories revealed proBNP of 7388, negative troponin, creatinine 1.60, sodium 137, potassium 4.2, bicarb 21, ALT 25, AST 38, albumin 3, alkaline phosphatase 273, total bili 1.4, WBC 8.78, hemoglobin 13.8, platelets 199.  Lactic acid was 1.6, blood cultures obtained on 9/9 were negative.  CPK normal 8/21.  I was consulted on 9/10/2019 for further evaluation and treatment.    9/11/2019 history reviewed.  Breathing improved.  Patient continues on  daptomycin and cefepime until 10/1/2019 for culture negative, ICD vegetation related endocarditis.  No fevers or muscle pain.    9/12/2019 history reviewed.  Tolerating daptomycin and cefepime until 10/1 for culture negative endocarditis.  No high fevers or chills.  Breathing improved.  No high fevers.  9/13/2019 history reviewed.  Continues on daptomycin and cefepime until 10/1 for culture negative ICD related endocarditis.  CHF improved.  Will be requiring IV milrinone at home.  9/14/2019 history reviewed.  On cefepime and doxycycline till 10/1 for culture negative endocarditis related to ICD.  No high fevers or chills.  Breathing improved.  No sputum production.  9/16/19 history reviewed.  Patient continues on daptomycin and cefepime until 10/1 for culture-negative ICD related endocarditis.  No high fevers or chills.  Breathing okay.    Past Medical History:   Diagnosis Date   • Atherosclerotic cardiovascular disease    • Chronic anticoagulation    • Chronic kidney disease    • Congestive heart failure (CMS/[facility])    • COPD (chronic obstructive pulmonary disease) (CMS/[facility])    • DM (diabetes mellitus) (CMS/[facility])     insulin dependant   • Dyslipidemia    • Hx of atrial flutter     and a- fib   • Hx of myocardial infarction 1992    s/p CABG    • Hyperlipidemia    • Hypertension    • Ischemic cardiomyopathy     advanced   • Legionnaire's disease (CMS/[facility])    • Sinus bradycardia     EPS, 03/19/2007, Dr. Márquez:  Successful radiofrequency ablation of right atrial flutter   • Skin cancer    • Tobacco use        Past Surgical History:   Procedure Laterality Date   • CARDIAC DEFIBRILLATOR PLACEMENT     • CARDIOVERSION      x 2 procedures   • CORONARY ARTERY BYPASS GRAFT  1991    [facility]        Pediatric History   Patient Guardian Status   • Not on file     Other Topics Concern   • Not on file   Social History Narrative    Lives at home with his wife, Destiny.     , lives [place] his [place] in [facility],  1.5 packs/day  family history includes Cancer (age of onset: 74) in his mother; Diabetes in his father and mother; Heart attack (age of onset: 72) in his father; Heart disease in his mother.    Allergies   Allergen Reactions   • Shellfish-Derived Products Hives, Shortness Of Breath and Swelling       There is no immunization history for the selected administration types on file for this patient.    Medication:    Current Facility-Administered Medications:   •  acetaminophen (TYLENOL) tablet 650 mg, 650 mg, Oral, Q4H PRN, 650 mg at 09/14/19 2202 **OR** acetaminophen (TYLENOL) 160 MG/5ML solution 650 mg, 650 mg, Oral, Q4H PRN **OR** acetaminophen (TYLENOL) suppository 650 mg, 650 mg, Rectal, Q4H PRN, Hina Ryan APRN  •  apixaban (ELIQUIS) tablet 5 mg, 5 mg, Oral, Q12H, Pooja Be APRN, 5 mg at 09/16/19 0834  •  aspirin EC tablet 81 mg, 81 mg, Oral, Daily, Hina Ryan APRN, 81 mg at 09/16/19 0840  •  benzonatate (TESSALON) capsule 100 mg, 100 mg, Oral, Q4H PRN, Andreas Jack MD, 100 mg at 09/14/19 2202  •  bumetanide (BUMEX) tablet 1 mg, 1 mg, Oral, BID, Linda Noe APRN, 1 mg at 09/16/19 0840  •  cefepime (MAXIPIME) 2 g/100 mL 0.9% NS (mbp), 2 g, Intravenous, Q24H, Wai Escobedo MD, 2 g at 09/16/19 0848  •  DAPTOmycin (CUBICIN) 700 mg in sodium chloride 0.9 % 50 mL IVPB, 8 mg/kg (Adjusted), Intravenous, Q24H, Hina Ryan APRN, Last Rate: 100 mL/hr at 09/16/19 0609, 700 mg at 09/16/19 0609  •  dextrose (D50W) 25 g/ 50mL Intravenous Solution 25 g, 25 g, Intravenous, Q15 Min PRN, Hina Ryan APRN  •  dextrose (GLUTOSE) oral gel 15 g, 15 g, Oral, Q15 Min PRN, Hina Ryan APRN  •  diphenhydrAMINE (BENADRYL) capsule 25 mg, 25 mg, Oral, Nightly, Joanna Jin MD, 25 mg at 09/15/19 2121  •  docusate sodium (COLACE) capsule 100 mg, 100 mg, Oral, BID PRN, Hina Ryan APRN  •  ferrous sulfate tablet 325 mg, 325 mg, Oral, BID With Meals, Hina Ryan APRN,  325 mg at 09/16/19 0834  •  fluticasone (FLONASE) 50 MCG/ACT nasal spray 2 spray, 2 spray, Each Nare, Daily, Andreas Jack MD, 2 spray at 09/16/19 0834  •  gabapentin (NEURONTIN) capsule 100 mg, 100 mg, Oral, Nightly, Hina Ryan APRN, 100 mg at 09/15/19 2121  •  glucagon (human recombinant) (GLUCAGEN DIAGNOSTIC) injection 1 mg, 1 mg, Subcutaneous, Q15 Min PRN, Hina Ryan APRN  •  guaiFENesin (MUCINEX) 12 hr tablet 600 mg, 600 mg, Oral, Q12H, Andreas Jack MD, 600 mg at 09/16/19 0834  •  insulin detemir (LEVEMIR) injection 10 Units, 10 Units, Subcutaneous, Nightly, Hina Ryan APRN, 10 Units at 09/15/19 2132  •  insulin lispro (humaLOG) injection 0-7 Units, 0-7 Units, Subcutaneous, 4x Daily With Meals & Nightly, Hina Ryan APRN, 2 Units at 09/16/19 1216  •  ipratropium-albuterol (DUO-NEB) nebulizer solution 3 mL, 3 mL, Nebulization, 4x Daily - RT, Hina Ryan APRN, 3 mL at 09/16/19 0956  •  magnesium (as) gluconate (MAGONATE) tablet 27 mg, 27 mg, Oral, BID, Hina Ryan APRN, 27 mg at 09/16/19 0834  •  Magnesium Sulfate 2 gram Bolus, followed by 8 gram infusion (total Mg dose 10 grams)- Mg less than or equal to 1mg/dL, 2 g, Intravenous, PRN **OR** Magnesium Sulfate 2 gram / 50mL Infusion (GIVE X 3 BAGS TO EQUAL 6GM TOTAL DOSE) - Mg 1.1 - 1.5 mg/dl, 2 g, Intravenous, PRN, Last Rate: 25 mL/hr at 09/16/19 0416, 2 g at 09/16/19 0416 **OR** Magnesium Sulfate 4 gram infusion- Mg 1.6-1.9 mg/dL, 4 g, Intravenous, PRN, Brneden Castellanos PA-C  •  metoprolol tartrate (LOPRESSOR) tablet 150 mg, 150 mg, Oral, BID, Linda Noe APRN, 150 mg at 09/16/19 0842  •  miconazole (MICOTIN) 2 % powder, , Topical, BID, Hina Ryan APRN  •  milrinone 20 mg/100 mL (0.2 mg/mL) in 5 % dextrose infusion, 0.25 mcg/kg/min, Intravenous, Continuous, Dianne Alba PA, Last Rate: 7.31 mL/hr at 09/16/19 1219, 0.25 mcg/kg/min at 09/16/19 1219  •  ondansetron (ZOFRAN) injection 4 mg, 4  mg, Intravenous, Q6H PRN, Hina Ryan APRN, 4 mg at 09/10/19 0343  •  pantoprazole (PROTONIX) EC tablet 40 mg, 40 mg, Oral, Q AM, Hina Ryan APRN, 40 mg at 09/16/19 0609  •  Pharmacy Consult - [facility], , Does not apply, Daily, Hira Velasco, [facility]  •  potassium chloride (MICRO-K) CR capsule 40 mEq, 40 mEq, Oral, PRN, 40 mEq at 09/15/19 1250 **OR** potassium chloride (KLOR-CON) packet 40 mEq, 40 mEq, Oral, PRN **OR** potassium chloride 10 mEq in 100 mL IVPB, 10 mEq, Intravenous, Q1H PRN, Pooja Be APRN  •  sertraline (ZOLOFT) tablet 25 mg, 25 mg, Oral, Nightly, Hina Ryan APRN, 25 mg at 09/15/19 2121  •  sodium chloride 0.9 % flush 10 mL, 10 mL, Intravenous, PRN, Sachin Palacios, DO  •  sodium chloride 0.9 % flush 10 mL, 10 mL, Intravenous, Q12H, Hina Ryan, APRN, 10 mL at 09/16/19 0838  •  sodium chloride 0.9 % flush 10 mL, 10 mL, Intravenous, PRN, Elly Ryanrie, APRN  •  tamsulosin (FLOMAX) 24 hr capsule 0.4 mg, 0.4 mg, Oral, Daily, Hina Ryan APRN, 0.4 mg at 09/16/19 0834  •  temazepam (RESTORIL) capsule 15 mg, 15 mg, Oral, Nightly, Joanna Jin MD, 15 mg at 09/15/19 2121    Please refer to the medical record for a full medication list    Review of Systems:    Constitutional-- No Fever, chills or sweats.  Appetite good, and no malaise. No fatigue.  Slept well.  HEENT-- No new vision, hearing or throat complaints.  No epistaxis or oral sores.  Denies odynophagia or dysphagia.  No odynophagia or dysphagia. No headache, photophobia or neck stiffness.  CV-- No chest pain, palpitation or syncope  Resp-- some SOB/nonprod cough/no Hemoptysis, no wheezes  GI- No nausea, vomiting, or diarrhea.  No hematochezia, melena, or hematemesis. Denies jaundice or chronic liver disease.  -- No dysuria, hematuria, or flank pain.   Lymph- no swollen lymph nodes in neck/axilla or groin.   Heme- No active bruising or bleeding.  MS-- no swelling or pain in the bones or joints of arms/legs.  No new  "back pain.  Neuro-- No acute focal weakness or numbness in the arms or legs.  No seizures.  Skin--No rashes or lesions, right chest old site dialysis ok    Physical Exam:   Vital Signs   Temp:  [97.3 °F (36.3 °C)-98.6 °F (37 °C)] 97.3 °F (36.3 °C)  Heart Rate:  [] 89  Resp:  [16-18] 16  BP: ()/(67-85) 104/76    Temp  Min: 97.3 °F (36.3 °C)  Max: 98.6 °F (37 °C)  BP  Min: 85/69  Max: 115/83  Pulse  Min: 89  Max: 116  Resp  Min: 16  Max: 18  SpO2  Min: 91 %  Max: 100 %    Blood pressure 104/76, pulse 89, temperature 97.3 °F (36.3 °C), temperature source Oral, resp. rate 16, height 182.9 cm (72\"), weight 98 kg (216 lb 1.6 oz), SpO2 93 %.  GENERAL: Awake and alert, in Minor distress. Appears older than stated age.  More alert.  HEENT:  Normocephalic, atraumatic.  Oropharynx without thrush. Dentition in good repair. No cervical adenopathy. No neck masses.  Ears externally normal, Nose externally normal.  EYES: No conjunctival injection. No icterus. EOM full.  LYMPHATICS: No lymphadenopathy of the neck or axillary or inguinal regions.   HEART: 2/6 syst murmur left sternal border, no gallop, or pericardial friction rub. Reg rate rhythm, No JVD at 45 degrees.  LUNGS: rales at bases, with decreased breath sounds. No respiratory distress, no use of accessory muscles.  Occasional wheezes end expiratory.  ABDOMEN: Soft, nontender, nondistended. No appreciable HSM.  Bowel sounds normal.  SKIN: Warm and dry without cutaneous eruptions.  IV access okay.  No rash.  PSYCHIATRIC: Mental status lucid. No confusion.  EXT:  No cellulitic change.  NEURO: Oriented to name, nonfocal.    Results Review:   I reviewed the patient's new clinical results.  I reviewed the patient's new imaging results and agree with the interpretation.  I reviewed the patient's other test results and agree with the interpretation    Results from last 7 days   Lab Units 09/16/19  0709 09/15/19  0448 09/13/19  0330   WBC 10*3/mm3 6.40 6.72 7.72 "   HEMOGLOBIN g/dL 13.0 12.3* 12.6*   HEMATOCRIT % 45.1 41.9 45.3   PLATELETS 10*3/mm3 149 140 169     Results from last 7 days   Lab Units 09/16/19  0709   SODIUM mmol/L 137   POTASSIUM mmol/L 4.9   CHLORIDE mmol/L 99   CO2 mmol/L 30.0*   BUN mg/dL 30*   CREATININE mg/dL 1.21   GLUCOSE mg/dL 97   CALCIUM mg/dL 9.1     Results from last 7 days   Lab Units 09/16/19  0709   ALK PHOS U/L 192*   BILIRUBIN mg/dL 1.4*   ALT (SGPT) U/L 16   AST (SGOT) U/L 31         Results from last 7 days   Lab Units 09/16/19  0709   CRP mg/dL 3.11*         Results from last 7 days   Lab Units 09/13/19  0330   LACTATE mmol/L 1.8     Estimated Creatinine Clearance: 76.8 mL/min (by C-G formula based on SCr of 1.21 mg/dL).    Microbiology:  Microbiology Results Abnormal     Procedure Component Value - Date/Time    Body Fluid Culture - Body Fluid, Pleural Cavity [[id number]] Collected:  09/13/19 1150    Lab Status:  Final result Specimen:  Body Fluid from Pleural Cavity Updated:  09/16/19 1034     Body Fluid Culture No growth at 3 days     Gram Stain No WBCs or organisms seen    Blood Culture - Blood, Arm, Right [[id number]] Collected:  09/09/19 2100    Lab Status:  Final result Specimen:  Blood from Arm, Right Updated:  09/14/19 2230     Blood Culture No growth at 5 days    Blood Culture - Blood, Hand, Right [[id number]] Collected:  09/09/19 2115    Lab Status:  Final result Specimen:  Blood from Hand, Right Updated:  09/14/19 2230     Blood Culture No growth at 5 days      Microbiology cultures 6/13 at [facility], (joão Trevino), negative, 6/17-, 6/17 BAL negative; blood cultures 8/19/2019-x2 at [facility], 6/16/19 ur antigen positive    Radiology:  Imaging Results (last 72 hours)     ** No results found for the last 72 hours. **          IMPRESSION:     1.  Endocarditis, culture negative, probably antibiotic modified related to ICD leads in right atrium and right ventricle, as well as dialysis catheter by transesophageal  echocardiogram from 8/19/2019.  This could have been partially treated with the numerous antibiotics he has received since June 2019.  Otherwise does not have many symptoms consistent with endocarditis in terms of fever, or embolic phenomenon.  The renal failure potentially could have been immune complex mediated.  Working toward finishing his daptomycin and cefepime on 10/1/2019.  Cryptococcal antigen negative, chlamydia antigen negative, Q fever negative, T spot negative, Brucella negative, Bartonella negative, histoplasma negative on approximately 8/20/2019.  2.  Legionella pneumonia positive urine antigen 6/16/2019.  Eventually transferred to the [facility] with acute hypoxic respiratory failure and was treated and finished therapy with greater than 10 days of levofloxacin and doxycycline.  Clinically resolved.  Very unlikely cause of endocarditis.  3.  MRSA left groin cellulitis with abscess January 2019, unlikely to have a bearing on the current case.  Resolved.  4.  Acute on chronic renal failure.  Required dialysis via a tunneled catheter right chest which was recently removed.  Last dialysis approximately 2 weeks ago.  Nephrology following.  Creatinine 1.30  5.  Chronic ischemic cardiomyopathy with ejection fraction less than 15% status post previous ICD 2007 with generator change 2013.  Probable exacerbation of systolic congestive heart failure.  6.  Anemia, chronic disease and related to renal failure.  7.  Elevated transaminitis possibly related to medications versus hypoperfusion, amiodarone and Lipitor discontinued.  Clinically resolved.  8.  Diabetes mellitus type 2 with minimal increased risk for infection.  A1c 6.0.  9.  COPD.  10.  Thrombocytopenia.  Previously, resolved.  11.  Acute on chronic hypoxic and hypercapnic respiratory failure.   12.  Hypokalemia resolved.  13.  Cholestasis, bili 1.4, worse.  Probably medications versus congestive liver dysfunction from his heart failure.   Elevated alkaline phosphatase 192, worse.    Still has a poor prognosis given his multiorgan failures.    Plan:    1.  Diagnostically, continue to follow patient's physical exam, CBC, CMP, CRP, CPK, blood cultures x2 obtained on 9/9/2019, fungal serologies.  2.  Therapeutically, continue daptomycin 700 mg IV every 24 hours, cefepime 1 g IV daily until 10/1/2019 for 6 weeks for treatment of potential endocarditis.  Watch toxicity in terms of liver, rhabdomyolysis, mental status, and bone marrow.  CBC, CMP, CRP, CPK should be monitored at least weekly while on this regimen.  3.  The risk benefit ratio has been discussed with the family, patient, and Dr. Edwards.  If his cultures are positive for bacteria such as MRSA, may be difficult to clear infection, and further discussion may be needed for removal of device.  If cultures are negative, I would treat with antibiotics for 6 weeks then follow off antibiotics.  Side effects of antibiotics were discussed with the patient and family.  4.  Oxygen support therapy.  5.  Hold statin therapy while on daptomycin to decrease risk of rhabdomyolysis.    I discussed the patients findings and my recommendations with patient, nursing staff, primary care team and consulting provider    Our group would be pleased to follow this patient over the course of their hospitalization and assist with outpatient antimicrobial therapy, as indicated.  Further recommendations depend on the results of the cultures and clinical course.    Case management orders: Please arrange for home antibiotics with daptomycin 500 mg IV every 48 hours, cefepime 1 g IV daily until 10/1/2019 for 6 weeks for endocarditis.  Check CBC, CMP, CRP, CPK weekly while on IV antibiotics. Fax orders to [phone], call [phone] with final arrangements.  Arrange for follow-up with me in 1 week post discharge.    Wai Escobedo MD  9/16/2019

## 2019-09-16 NOTE — PLAN OF CARE
Problem: Patient Care Overview  Goal: Plan of Care Review   09/16/19 0914   Coping/Psychosocial   Plan of Care Reviewed With patient   Plan of Care Review   Progress improving   OTHER   Outcome Summary Pt increased ambulation distance to 300 ft with RW and SBA, limited by fatigue and SOA. VSS on 2LO2NC. Improved endurance and stability with gait. Will continue to progress pt as able per POC.

## 2019-09-16 NOTE — PLAN OF CARE
Problem: Patient Care Overview  Goal: Plan of Care Review  Outcome: Ongoing (interventions implemented as appropriate)   09/16/19 8951   Coping/Psychosocial   Plan of Care Reviewed With patient;spouse   OTHER   Outcome Summary Pt remains on Milrinone gtt, BP remains in low 100's. Maintaining O2 sats on RA today. Ambulated in room today and up to chair. No s/sx distress. Hopeful to go home tomorrow.

## 2019-09-16 NOTE — PROGRESS NOTES
"[facility]  Progress Note       LOS: 6 days   Patient Care Team:  Kreis, Samuel Duane, MD as PCP - General  Kreis, Samuel Duane, MD as PCP - Family Medicine  Ranjith Hernandez MD as Cardiologist (Cardiology)    Chief Complaint:  F/u acute on chronic systolic CHF, AF    Subjective     Interval History: Patient reports \"this is the best I have felt in 2-3 years.\"  Denies any c/o SOB.  LE swelling greatly improved.  Reports he ambulated in the halls yesterday without issue.  Anxious to be able to go home.  No new complaints today.  Energy is improved.  Tolerating IV inotropes well.        Review of Systems:   Pertinent positives in HPI, all others reviewed and negative.      Objective       Current Facility-Administered Medications:   •  acetaminophen (TYLENOL) tablet 650 mg, 650 mg, Oral, Q4H PRN, 650 mg at 09/14/19 2202 **OR** acetaminophen (TYLENOL) 160 MG/5ML solution 650 mg, 650 mg, Oral, Q4H PRN **OR** acetaminophen (TYLENOL) suppository 650 mg, 650 mg, Rectal, Q4H PRN, Hina Ryan APRN  •  apixaban (ELIQUIS) tablet 5 mg, 5 mg, Oral, Q12H, Pooja Be APRN, 5 mg at 09/15/19 2122  •  aspirin EC tablet 81 mg, 81 mg, Oral, Daily, Hina Ryan APRN, 81 mg at 09/15/19 0927  •  benzonatate (TESSALON) capsule 100 mg, 100 mg, Oral, Q4H PRN, Andreas Jack MD, 100 mg at 09/14/19 2202  •  bumetanide (BUMEX) tablet 1 mg, 1 mg, Oral, BID, Linda Noe APRN  •  cefepime (MAXIPIME) 2 g/100 mL 0.9% NS (mbp), 2 g, Intravenous, Q24H, Wai Escobedo MD, 2 g at 09/15/19 0928  •  DAPTOmycin (CUBICIN) 700 mg in sodium chloride 0.9 % 50 mL IVPB, 8 mg/kg (Adjusted), Intravenous, Q24H, Hina Ryan APRN, Last Rate: 100 mL/hr at 09/16/19 0609, 700 mg at 09/16/19 0609  •  dextrose (D50W) 25 g/ 50mL Intravenous Solution 25 g, 25 g, Intravenous, Q15 Min PRN, Hina Ryan APRN  •  dextrose (GLUTOSE) oral gel 15 g, 15 g, Oral, Q15 Min PRN, Hina Ryan, " APRN  •  diphenhydrAMINE (BENADRYL) capsule 25 mg, 25 mg, Oral, Nightly, Joanna Jin MD, 25 mg at 09/15/19 2121  •  docusate sodium (COLACE) capsule 100 mg, 100 mg, Oral, BID PRN, Hina Ryan APRN  •  ferrous sulfate tablet 325 mg, 325 mg, Oral, BID With Meals, Hina Ryan APRN, 325 mg at 09/15/19 1817  •  fluticasone (FLONASE) 50 MCG/ACT nasal spray 2 spray, 2 spray, Each Nare, Daily, Andreas Jack MD, 2 spray at 09/15/19 0927  •  gabapentin (NEURONTIN) capsule 100 mg, 100 mg, Oral, Nightly, Hina Ryan APRN, 100 mg at 09/15/19 2121  •  glucagon (human recombinant) (GLUCAGEN DIAGNOSTIC) injection 1 mg, 1 mg, Subcutaneous, Q15 Min PRN, Hina Ryan APRN  •  guaiFENesin (MUCINEX) 12 hr tablet 600 mg, 600 mg, Oral, Q12H, Andreas Jack MD, 600 mg at 09/15/19 2122  •  Hold medication, 1 each, Does not apply, Continuous PRN, Andreas Jack MD  •  insulin detemir (LEVEMIR) injection 10 Units, 10 Units, Subcutaneous, Nightly, Hina Ryan APRN, 10 Units at 09/15/19 2132  •  insulin lispro (humaLOG) injection 0-7 Units, 0-7 Units, Subcutaneous, 4x Daily With Meals & Nightly, Hina Ryan APRN, 2 Units at 09/15/19 1250  •  ipratropium-albuterol (DUO-NEB) nebulizer solution 3 mL, 3 mL, Nebulization, 4x Daily - RT, Hina Ryan APRN, 3 mL at 09/15/19 2002  •  magnesium (as) gluconate (MAGONATE) tablet 27 mg, 27 mg, Oral, BID, Hina Ryan APRN, 27 mg at 09/15/19 2121  •  Magnesium Sulfate 2 gram Bolus, followed by 8 gram infusion (total Mg dose 10 grams)- Mg less than or equal to 1mg/dL, 2 g, Intravenous, PRN **OR** Magnesium Sulfate 2 gram / 50mL Infusion (GIVE X 3 BAGS TO EQUAL 6GM TOTAL DOSE) - Mg 1.1 - 1.5 mg/dl, 2 g, Intravenous, PRN, Last Rate: 25 mL/hr at 09/16/19 0416, 2 g at 09/16/19 0416 **OR** Magnesium Sulfate 4 gram infusion- Mg 1.6-1.9 mg/dL, 4 g, Intravenous, PRN, Brenden Castellanos PA-C  •  metoprolol tartrate (LOPRESSOR) tablet 150 mg, 150 mg, Oral, BID,  "Linda Noe, APRN, 150 mg at 09/15/19 2121  •  miconazole (MICOTIN) 2 % powder, , Topical, BID, Hina Ryan APRN  •  milrinone 20 mg/100 mL (0.2 mg/mL) in 5 % dextrose infusion, 0.25 mcg/kg/min, Intravenous, Continuous, Dianne Alba PA, Last Rate: 7.31 mL/hr at 09/15/19 2214, 0.25 mcg/kg/min at 09/15/19 2214  •  ondansetron (ZOFRAN) injection 4 mg, 4 mg, Intravenous, Q6H PRN, Hina Ryan APRN, 4 mg at 09/10/19 0343  •  pantoprazole (PROTONIX) EC tablet 40 mg, 40 mg, Oral, Q AM, Hina Ryan APRN, 40 mg at 09/16/19 0609  •  Pharmacy Consult - [facility], , Does not apply, Daily, Hira Velasco, [facility]  •  potassium chloride (MICRO-K) CR capsule 40 mEq, 40 mEq, Oral, PRN, 40 mEq at 09/15/19 1250 **OR** potassium chloride (KLOR-CON) packet 40 mEq, 40 mEq, Oral, PRN **OR** potassium chloride 10 mEq in 100 mL IVPB, 10 mEq, Intravenous, Q1H PRN, Pooja Be APRN  •  sertraline (ZOLOFT) tablet 25 mg, 25 mg, Oral, Nightly, Hina Ryan APRN, 25 mg at 09/15/19 2121  •  sodium chloride 0.9 % flush 10 mL, 10 mL, Intravenous, PRN, Sachin Palacios, DO  •  sodium chloride 0.9 % flush 10 mL, 10 mL, Intravenous, Q12H, Hina Ryan APRN, 10 mL at 09/15/19 2122  •  sodium chloride 0.9 % flush 10 mL, 10 mL, Intravenous, PRN, Hina Ryan APRTIMO  •  tamsulosin (FLOMAX) 24 hr capsule 0.4 mg, 0.4 mg, Oral, Daily, Hina Ryan APRN, 0.4 mg at 09/15/19 0927  •  temazepam (RESTORIL) capsule 15 mg, 15 mg, Oral, Nightly, Joanna Jin MD, 15 mg at 09/15/19 2121    Vital Sign Min/Max for last 24 hours  Temp  Min: 97.3 °F (36.3 °C)  Max: 98.6 °F (37 °C)   BP  Min: 85/69  Max: 120/83   Pulse  Min: 90  Max: 114   Resp  Min: 16  Max: 18   SpO2  Min: 92 %  Max: 100 %   Flow (L/min)  Min: 2  Max: 3   Weight  Min: 98 kg (216 lb 1.6 oz)  Max: 98 kg (216 lb 1.6 oz)     Flowsheet Rows      First Filed Value   Admission Height  182.9 cm (72\") Documented at 09/09/2019 2006   Admission Weight  99.8 kg (220 lb) " Documented at 09/09/2019 2006            Intake/Output Summary (Last 24 hours) at 9/16/2019 0832  Last data filed at 9/16/2019 0700  Gross per 24 hour   Intake 180 ml   Output 1100 ml   Net -920 ml       Physical Exam:     General Appearance:    Alert, cooperative, in no acute distress   Lungs:    Scattered wheezes bilaterally otherwise clear.  Respiratory effort normal.    Heart:   Irregular rate and rhythm rhythm normal S1-S2.  S3 is noted.   Chest Wall:    No abnormalities observed   Abdomen:     Normal bowel sounds, no masses, soft, non-tender, non-distended, no guarding.   Extremities:   Moves all extremities well, 1+ LE edema (greatly improved), no cyanosis, no redness   Pulses:   Pulses palpable and equal bilaterally   Skin:   No bleeding, bruising or rash        Results Review:   Results from last 7 days   Lab Units 09/16/19  0709 09/15/19  0448 09/13/19  0330   WBC 10*3/mm3 6.40 6.72 7.72   HEMOGLOBIN g/dL 13.0 12.3* 12.6*   HEMATOCRIT % 45.1 41.9 45.3   PLATELETS 10*3/mm3 149 140 169     Results from last 7 days   Lab Units 09/16/19  0709 09/15/19  2053 09/15/19  0448  09/14/19  0501   SODIUM mmol/L 137  --  141  --  142   POTASSIUM mmol/L 4.9 5.3* 3.6   < > 3.2*   CHLORIDE mmol/L 99  --  102  --  101   CO2 mmol/L 30.0*  --  29.0  --  32.0*   BUN mg/dL 30*  --  25*  --  26*   CREATININE mg/dL 1.21  --  1.06  --  0.97   GLUCOSE mg/dL 97  --  72  --  91    < > = values in this interval not displayed.      Results from last 7 days   Lab Units 09/10/19  0315   HEMOGLOBIN A1C % 6.00*     Results from last 7 days   Lab Units 09/10/19  0316   CHOLESTEROL mg/dL 101   TRIGLYCERIDES mg/dL 106   HDL CHOL mg/dL 20*         Results from last 7 days   Lab Units 09/10/19  0316   PROBNP pg/mL 7,388.0*     Results from last 7 days   Lab Units 09/13/19  0329 09/12/19  0544 09/11/19  0819   PROTIME Seconds 17.4* 18.6* 19.8*   INR  1.50* 1.64* 1.77*   APTT seconds  --   --  34.4     Results from last 7 days   Lab Units  09/16/19  0709 09/13/19  0330 09/11/19  0550 09/10/19  0316 09/09/19  2057   CK TOTAL U/L 36 42 40  --   --    TROPONIN T ng/mL  --   --   --  0.028 0.034*     Results from last 7 days   Lab Units 09/16/19  0709   MAGNESIUM mg/dL 2.6*       Intake/Output Summary (Last 24 hours) at 9/16/2019 0832  Last data filed at 9/16/2019 0700  Gross per 24 hour   Intake 180 ml   Output 1100 ml   Net -920 ml       I personally viewed and interpreted the patient's EKG/Telemetry data    EKG: None for review today    Telemetry: Atypical AFL, HR  bpm, no VT.    Ejection Fraction  No results found for: EF    Echo EF Estimated  No results found for: ECHOEFEST      Present on Admission:  • Dyspnea on exertion  • Acute on chronic combined systolic and diastolic congestive heart failure (CMS/HCC)  • Coronary artery disease involving coronary bypass graft of native heart without angina pectoris  • Essential hypertension  • Ischemic cardiomyopathy  • Dyslipidemia  • Atrial fibrillation and flutter (CMS/HCC)  • Elevated LFTs  • Rectus sheath hematoma  • Dyspnea  • Bacterial endocarditis    Assessment/Plan   1. Chronic systolic CHF/ICM with acute exacerbation overall significantly improved since hospitalization.  - EF 20%.    - S/p previous ICD implant  - Continue metoprolol.  No ACE/ARB secondary to marginal BP's, recent kidney dysfunction  - Initiated on IV Milrinone  - Continues to diurese well with IV bumex, transition back to PO today  - S/p CT guided left sided thoracentesis 9/13 with 1900 mL's drained.  Repeat CXR 9/14 showing no recurrent effusion.     2. 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 Persistent atrial fibrillation/atrial flutter: Heart rate reasonably controlled at this current dosing of medications.  - Continue metoprolol 150 mg bid for rate control  - Eliquis  resumed post-thoracentesis  - Will reconsider AVN ablation + upgrade to Bi-V ICD in the future once he has completed his course of IV antibiotics      4. CKD:   - Baseline 1.4-1.6. Cr normalized.     5. CAD:  - Remote CABG, on ASA, BB  - Off statin due to elevated liver enzymes last admission, but Pravastatin restarted this admission.   - No recent anginal complaints    Plan: Switch IV bumex to PO.  Continue IV milrinone.  Hopefully d/c home in a.m. tomorrow.  Has appointment in 3 weeks with Dr. Edwards previously scheduled.    Electronically signed by CLARIBEL Dey, 09/16/19, 8:12 AM.      I, Poli Edwards MD, personally performed the services face to face as described and documented by the above named individual. I have made any necessary edits and it is both accurate and complete 9/16/2019  4:10 PM

## 2019-09-17 VITALS
TEMPERATURE: 98.1 F | RESPIRATION RATE: 18 BRPM | BODY MASS INDEX: 28.77 KG/M2 | HEIGHT: 72 IN | WEIGHT: 212.4 LBS | OXYGEN SATURATION: 96 % | DIASTOLIC BLOOD PRESSURE: 66 MMHG | HEART RATE: 91 BPM | SYSTOLIC BLOOD PRESSURE: 98 MMHG

## 2019-09-17 PROBLEM — S30.1XXA RECTUS SHEATH HEMATOMA: Status: RESOLVED | Noted: 2019-09-10 | Resolved: 2019-09-17

## 2019-09-17 LAB
GIE STN SPEC: NORMAL
GLUCOSE BLDC GLUCOMTR-MCNC: 151 MG/DL (ref 70–130)
GLUCOSE BLDC GLUCOMTR-MCNC: 93 MG/DL (ref 70–130)

## 2019-09-17 PROCEDURE — 97116 GAIT TRAINING THERAPY: CPT

## 2019-09-17 PROCEDURE — 82962 GLUCOSE BLOOD TEST: CPT

## 2019-09-17 PROCEDURE — 97110 THERAPEUTIC EXERCISES: CPT

## 2019-09-17 PROCEDURE — 99239 HOSP IP/OBS DSCHRG MGMT >30: CPT | Performed by: INTERNAL MEDICINE

## 2019-09-17 PROCEDURE — 94799 UNLISTED PULMONARY SVC/PX: CPT

## 2019-09-17 PROCEDURE — 25010000002 DAPTOMYCIN PER 1 MG: Performed by: NURSE PRACTITIONER

## 2019-09-17 PROCEDURE — 25010000002 CEFEPIME PER 500 MG: Performed by: INTERNAL MEDICINE

## 2019-09-17 RX ORDER — BUMETANIDE 1 MG/1
1 TABLET ORAL 2 TIMES DAILY
Qty: 60 TABLET | Refills: 1 | Status: SHIPPED | OUTPATIENT
Start: 2019-09-17 | End: 2019-09-18

## 2019-09-17 RX ORDER — METOPROLOL TARTRATE 50 MG/1
150 TABLET, FILM COATED ORAL 2 TIMES DAILY
Qty: 180 TABLET | Refills: 1 | Status: SHIPPED | OUTPATIENT
Start: 2019-09-17 | End: 2019-09-18

## 2019-09-17 RX ADMIN — GUAIFENESIN 600 MG: 600 TABLET, EXTENDED RELEASE ORAL at 08:45

## 2019-09-17 RX ADMIN — MICONAZOLE NITRATE: 20 POWDER TOPICAL at 08:47

## 2019-09-17 RX ADMIN — IPRATROPIUM BROMIDE AND ALBUTEROL SULFATE 3 ML: 2.5; .5 SOLUTION RESPIRATORY (INHALATION) at 09:24

## 2019-09-17 RX ADMIN — INSULIN LISPRO 2 UNITS: 100 INJECTION, SOLUTION INTRAVENOUS; SUBCUTANEOUS at 12:47

## 2019-09-17 RX ADMIN — IPRATROPIUM BROMIDE AND ALBUTEROL SULFATE 3 ML: 2.5; .5 SOLUTION RESPIRATORY (INHALATION) at 11:28

## 2019-09-17 RX ADMIN — Medication 27 MG: at 08:45

## 2019-09-17 RX ADMIN — CEFEPIME HYDROCHLORIDE 2 G: 2 INJECTION, POWDER, FOR SOLUTION INTRAVENOUS at 08:45

## 2019-09-17 RX ADMIN — METOPROLOL TARTRATE 150 MG: 100 TABLET ORAL at 08:45

## 2019-09-17 RX ADMIN — ASPIRIN 81 MG: 81 TABLET, COATED ORAL at 08:45

## 2019-09-17 RX ADMIN — FERROUS SULFATE TAB 325 MG (65 MG ELEMENTAL FE) 325 MG: 325 (65 FE) TAB at 08:45

## 2019-09-17 RX ADMIN — BUMETANIDE 1 MG: 1 TABLET ORAL at 08:44

## 2019-09-17 RX ADMIN — FLUTICASONE PROPIONATE 2 SPRAY: 50 SPRAY, METERED NASAL at 08:47

## 2019-09-17 RX ADMIN — APIXABAN 5 MG: 5 TABLET, FILM COATED ORAL at 08:45

## 2019-09-17 RX ADMIN — TAMSULOSIN HYDROCHLORIDE 0.4 MG: 0.4 CAPSULE ORAL at 08:45

## 2019-09-17 RX ADMIN — MILRINONE LACTATE IN DEXTROSE 0.25 MCG/KG/MIN: 200 INJECTION, SOLUTION INTRAVENOUS at 01:43

## 2019-09-17 RX ADMIN — DAPTOMYCIN 700 MG: 500 INJECTION, POWDER, LYOPHILIZED, FOR SOLUTION INTRAVENOUS at 05:50

## 2019-09-17 RX ADMIN — PANTOPRAZOLE SODIUM 40 MG: 40 TABLET, DELAYED RELEASE ORAL at 05:50

## 2019-09-17 RX ADMIN — MILRINONE LACTATE IN DEXTROSE 0.25 MCG/KG/MIN: 200 INJECTION, SOLUTION INTRAVENOUS at 14:57

## 2019-09-17 NOTE — PLAN OF CARE
Problem: Patient Care Overview  Goal: Plan of Care Review   09/17/19 1351   Coping/Psychosocial   Plan of Care Reviewed With patient   Plan of Care Review   Progress improving   OTHER   Outcome Summary Patient greatly improved, met most of goals and ambulated 350ft without supplemental O2 RW, supervision.

## 2019-09-17 NOTE — PLAN OF CARE
Problem: Patient Care Overview  Goal: Plan of Care Review  Outcome: Outcome(s) achieved Date Met: 09/17/19    Goal: Individualization and Mutuality  Outcome: Outcome(s) achieved Date Met: 09/17/19    Goal: Discharge Needs Assessment  Outcome: Outcome(s) achieved Date Met: 09/17/19      Problem: Fall Risk (Adult)  Goal: Absence of Fall  Outcome: Outcome(s) achieved Date Met: 09/17/19      Problem: Breathing Pattern Ineffective (Adult)  Goal: Effective Oxygenation/Ventilation  Outcome: Outcome(s) achieved Date Met: 09/17/19    Goal: Anxiety/Fear Reduction  Outcome: Outcome(s) achieved Date Met: 09/17/19      Problem: Skin Injury Risk (Adult)  Goal: Identify Related Risk Factors and Signs and Symptoms  Outcome: Outcome(s) achieved Date Met: 09/17/19    Goal: Skin Health and Integrity  Outcome: Outcome(s) achieved Date Met: 09/17/19      Problem: Fluid Volume Excess (Adult)  Goal: Identify Related Risk Factors and Signs and Symptoms  Outcome: Outcome(s) achieved Date Met: 09/17/19    Goal: Optimal Fluid Balance  Outcome: Outcome(s) achieved Date Met: 09/17/19

## 2019-09-17 NOTE — PROGRESS NOTES
Case Management Discharge Note    Final Note: Per MD rounds this a.m., patient ready for discharge.  Called [facility] and spoke to Junior Wells who will pepare medications and they will be delivered to room today along with equipment and teaching to be done at bedside.  Called [facility] and made follow up appointment for 9/25/2019 at 1600, appointment placed in AVS.  Called [facility] in [place] [phone] and spoke to Hellen to advise of patient discharge who requests discharge summary and ROSY orders to be faxed to [phone].  Spoke with patient at bedside regarding discharge plan who is happy to be going home.  CM advises that [facility] and [facility] have been contacted regarding orders, patient in agreement with plan.  No new discharge needs verbalized.  Patient plan is to discharge home and resume [facility] today via car with family to transport.      Destination      No service has been selected for the patient.      Durable Medical Equipment      No service has been selected for the patient.      Dialysis/Infusion      No service has been selected for the patient.      Home Medical Care - Selection Complete      Service Provider Request Status Selected Services Address Phone Number Fax Number    [facility] [address] [facility] [place] [address] [phone]      Therapy      No service has been selected for the patient.      Community Resources      No service has been selected for the patient.             Final Discharge Disposition Code: 06 - home with home health care

## 2019-09-17 NOTE — THERAPY TREATMENT NOTE
Patient Name: Rob Miranda  : 1955    MRN: [id number]                              Today's Date: 2019       Admit Date: 2019    Visit Dx:     ICD-10-CM ICD-9-CM   1. Respiratory distress R06.03 786.09   2. Acute on chronic congestive heart failure, unspecified heart failure type (CMS/Formerly [facility]) I50.9 428.0   3. Acute pulmonary edema (CMS/[facility]) J81.0 518.4   4. Pleural effusion on left J90 511.9   5. Other ascites R18.8 789.59   6. RUQ abdominal pain R10.11 789.01   7. Chronic kidney disease, unspecified CKD stage N18.9 585.9   8. Acute on chronic combined systolic and diastolic congestive heart failure (CMS/[facility]) I50.43 428.43     428.0   9. Coronary artery disease involving coronary bypass graft of native heart without angina pectoris I25.810 414.05   10. Essential hypertension I10 401.9   11. IDDM (insulin dependent diabetes mellitus) (CMS/[facility]) E11.9 250.00    Z79.4 V58.67   12. Atrial fibrillation and flutter (CMS/[facility]) I48.91 427.31    I48.92 427.32   13. Dyspnea on exertion R06.09 786.09     Patient Active Problem List   Diagnosis   • Coronary artery disease involving coronary bypass graft of native heart without angina pectoris   • Essential hypertension   • History of ASCVD (atherosclerotic cardiovascular disease), status post myocardial infarction, CABG    • Ischemic cardiomyopathy   • Dyslipidemia   • IDDM (insulin dependent diabetes mellitus) (CMS/[facility])   • Atrial fibrillation and flutter (CMS/[facility])   • Sinus bradycardia   • Hyperlipidemia   • Tobacco use   • Hypomagnesemia   • ASCVD (arteriosclerotic cardiovascular disease), status post CABG in .   • Peripheral arterial disease both lower extremities.   • Persistent atrial fibrillation (CMS/[facility])   • Elevated LFTs   • Dyspnea on exertion   • Dyspnea   • Bacterial endocarditis     Past Medical History:   Diagnosis Date   • Atherosclerotic cardiovascular disease    • Chronic anticoagulation    • Chronic kidney disease    • Congestive heart  failure (CMS/[facility])    • COPD (chronic obstructive pulmonary disease) (CMS/HCC)    • DM (diabetes mellitus) (CMS/[facility])     insulin dependant   • Dyslipidemia    • Hx of atrial flutter     and a- fib   • Hx of myocardial infarction 1992    s/p CABG    • Hyperlipidemia    • Hypertension    • Ischemic cardiomyopathy     advanced   • Legionnaire's disease (CMS/[facility])    • Sinus bradycardia     EPS, 03/19/2007, Dr. Márquez:  Successful radiofrequency ablation of right atrial flutter   • Skin cancer    • Tobacco use      Past Surgical History:   Procedure Laterality Date   • CARDIAC DEFIBRILLATOR PLACEMENT     • CARDIOVERSION      x 2 procedures   • CORONARY ARTERY BYPASS GRAFT  1991    [facility]      General Information     Row Name 09/17/19 1339          PT Evaluation Time/Intention    Document Type  therapy note (daily note)  -KG     Mode of Treatment  physical therapy  -KG     Row Name 09/17/19 1339          General Information    Patient Profile Reviewed?  yes  -KG     Existing Precautions/Restrictions  fall;oxygen therapy device and L/min  -KG     Row Name 09/17/19 1339          Cognitive Assessment/Intervention- PT/OT    Orientation Status (Cognition)  oriented x 3  -KG     Row Name 09/17/19 1339          Safety Issues, Functional Mobility    Impairments Affecting Function (Mobility)  balance;endurance/activity tolerance;strength  -KG       User Key  (r) = Recorded By, (t) = Taken By, (c) = Cosigned By    Initials Name Provider Type    KG Karen Kumar Physical Therapist        Mobility     Row Name 09/17/19 1340          Bed Mobility Assessment/Treatment    Bed Mobility Assessment/Treatment  supine-sit  -KG     Supine-Sit Camino (Bed Mobility)  independent  -KG     Row Name 09/17/19 1340          Sit-Stand Transfer    Sit-Stand Camino (Transfers)  conditional independence  -KG     Assistive Device (Sit-Stand Transfers)  walker, front-wheeled  -KG     Row Name 09/17/19 1340           Gait/Stairs Assessment/Training    Gait/Stairs Assessment/Training  gait/ambulation independence  -KG     [place] Level (Gait)  supervision;verbal cues  -KG     Assistive Device (Gait)  walker, front-wheeled  -KG     Distance in Feet (Gait)  350 ft  -KG     Deviations/Abnormal Patterns (Gait)  gait speed decreased;festinating/shuffling;stride length decreased  -KG     Comment (Gait/Stairs)  distance limited by fatigue, mild SOB, 0L O2, improved endurance  -KG       User Key  (r) = Recorded By, (t) = Taken By, (c) = Cosigned By    Initials Name Provider Type    Karen Garcia Physical Therapist        Obj/Interventions     Row Name 09/17/19 1342          Therapeutic Exercise    Lower Extremity (Therapeutic Exercise)  LAQ (long arc quad), bilateral  -KG     Lower Extremity Range of Motion (Therapeutic Exercise)  ankle dorsiflexion/plantar flexion, bilateral  -KG     Position (Therapeutic Exercise)  seated  -KG     Sets/Reps (Therapeutic Exercise)  x15  -KG     Row Name 09/17/19 1342          Sensory Assessment/Intervention    Sensory General Assessment  no sensation deficits identified  -KG       User Key  (r) = Recorded By, (t) = Taken By, (c) = Cosigned By    Initials Name Provider Type    Karen Garcia Physical Therapist        Goals/Plan     Row Name 09/17/19 1345          Bed Mobility Goal 1 (PT)    Activity/Assistive Device (Bed Mobility Goal 1, PT)  sit to supine/supine to sit  -KG     [place] Level/Cues Needed (Bed Mobility Goal 1, PT)  independent  -KG     Time Frame (Bed Mobility Goal 1, PT)  long term goal (LTG);2 weeks  -KG     Progress/Outcomes (Bed Mobility Goal 1, PT)  goal met  -KG     Row Name 09/17/19 1345          Transfer Goal 1 (PT)    Activity/Assistive Device (Transfer Goal 1, PT)  sit-to-stand/stand-to-sit;walker, rolling  -KG     [place] Level/Cues Needed (Transfer Goal 1, PT)  conditional independence  -KG     Time Frame (Transfer Goal 1, PT)  long term goal  (LTG);2 weeks  -KG     Progress/Outcome (Transfer Goal 1, PT)  goal met  -KG     Row Name 09/17/19 1345          Gait Training Goal 1 (PT)    Activity/Assistive Device (Gait Training Goal 1, PT)  gait (walking locomotion);walker, rolling  -KG     [place] Level (Gait Training Goal 1, PT)  conditional independence  -KG     Distance (Gait Goal 1, PT)  500  -KG     Time Frame (Gait Training Goal 1, PT)  long term goal (LTG);2 weeks  -KG     Progress/Outcome (Gait Training Goal 1, PT)  goal partially met  -KG     [facility] Name 09/17/19 1345          Patient Education Goal (PT)    Activity (Patient Education Goal, PT)  HEP  -KG     [place]ley/Cues/Accuracy (Memory Goal 2, PT)  independent  -KG     Time Frame (Patient Education Goal, PT)  long term goal (LTG);2 weeks  -KG     Progress/Outcome (Patient Education Goal, PT)  goal partially met  -KG       User Key  (r) = Recorded By, (t) = Taken By, (c) = Cosigned By    Initials Name Provider Type    Karen Garcia Physical Therapist        Clinical Impression     Row Name 09/17/19 1343          Pain Assessment    Additional Documentation  Pain Scale: Numbers Pre/Post-Treatment (Group)  -KG     Row Name 09/17/19 1343          Pain Scale: Numbers Pre/Post-Treatment    Pain Scale: Numbers, Pretreatment  0/10 - no pain  -KG     Pain Scale: Numbers, Post-Treatment  0/10 - no pain  -KG     Row Name 09/17/19 1343          Physical Therapy Clinical Impression    Criteria for Skilled Interventions Met (PT Clinical Impression)  yes;treatment indicated  -KG     Rehab Potential (PT Clinical Summary)  good, to achieve stated therapy goals  -KG     Row Name 09/17/19 1343          Vital Signs    Pre SpO2 (%)  94  -KG     O2 Delivery Pre Treatment  room air  -KG     O2 Delivery Post Treatment  room air  -KG     Row Name 09/17/19 1343          Positioning and Restraints    Pre-Treatment Position  sitting in chair/recliner  -KG     Post Treatment Position  chair  -KG     In Chair   sitting;call light within reach;encouraged to call for assist;with family/caregiver  -KG       User Key  (r) = Recorded By, (t) = Taken By, (c) = Cosigned By    Initials Name Provider Type    Karen Garcia Physical Therapist        Outcome Measures     Row Name 09/17/19 1352          How much help from another person do you currently need...    Turning from your back to your side while in flat bed without using bedrails?  4  -KG     Moving from lying on back to sitting on the side of a flat bed without bedrails?  4  -KG     Moving to and from a bed to a chair (including a wheelchair)?  4  -KG     Standing up from a chair using your arms (e.g., wheelchair, bedside chair)?  4  -KG     Climbing 3-5 steps with a railing?  3  -KG     To walk in hospital room?  4  -KG     AM-PAC 6 Clicks Score (PT)  23  -KG     Row Name 09/17/19 1352          Functional Assessment    Outcome Measure Options  AM-PAC 6 Clicks Basic Mobility (PT)  -KG       User Key  (r) = Recorded By, (t) = Taken By, (c) = Cosigned By    Initials Name Provider Type    Karen Garcia Physical Therapist        Physical Therapy Education     Title: PT OT SLP Therapies (Resolved)     Topic: Physical Therapy (Resolved)     Point: Mobility training (Resolved)     Learning Progress Summary           Patient Acceptance, E, VU by VG at 9/16/2019  9:14 AM    Acceptance, E, VU by VG at 9/12/2019  9:03 AM                   Point: Home exercise program (Resolved)     Learning Progress Summary           Patient Acceptance, E, VU by VG at 9/16/2019  9:14 AM                   Point: Body mechanics (Resolved)     Learning Progress Summary           Patient Acceptance, E, VU by VG at 9/16/2019  9:14 AM    Acceptance, E, VU by VG at 9/12/2019  9:03 AM                   Point: Precautions (Resolved)     Learning Progress Summary           Patient Acceptance, E, VU by VG at 9/16/2019  9:14 AM    Acceptance, E, VU by VG at 9/12/2019  9:03 AM                                User Key     Initials Effective Dates Name Provider Type Discipline    VG 05/29/18 -  Yusra Warner, PT Physical Therapist PT              PT Recommendation and Plan     Plan of Care Reviewed With: patient  Progress: improving  Outcome Summary: Patient greatly improved, met most of goals and ambulated 350ft without supplemental O2 RW, supervision.       Time Calculation:   PT Charges     Row Name 09/17/19 1352             Time Calculation    Start Time  1315  -KG      PT Received On  09/17/19  -KG      PT Goal Re-Cert Due Date  09/22/19  -KG         Time Calculation- PT    Total Timed Code Minutes- PT  25 minute(s)  -KG         Timed Charges    57348 - PT Therapeutic Exercise Minutes  10  -KG      49632 - Gait Training Minutes   15  -KG        User Key  (r) = Recorded By, (t) = Taken By, (c) = Cosigned By    Initials Name Provider Type    KG Karen Kumar Physical Therapist        Therapy Charges for Today     Code Description Service Date Service Provider Modifiers Qty    91695622861 HC PT THER PROC EA 15 MIN 9/17/2019 Karen Kumar GP 1    94693331097 HC GAIT TRAINING EA 15 MIN 9/17/2019 Karen Kumar GP 1          PT G-Codes  Outcome Measure Options: AM-PAC 6 Clicks Basic Mobility (PT)  AM-PAC 6 Clicks Score (PT): 23  AM-PAC 6 Clicks Score (OT): 20    Karen Kumar  9/17/2019

## 2019-09-17 NOTE — PROGRESS NOTES
INFECTIOUS DISEASE Progress Note    Rob Miranda  1955  [id number]    Admit date: 9/9/2019    Requesting Provider: No ref. provider found  Evaluating physician: Wai Escobedo MD  Reason for Consultation: sepsis, endocarditis, related to pacemaker  Chief Complaint: above      Subjective   History of present illness:  Patient is a 64 y.o.  Yr old male with diabetes mellitus type 2, COPD, BPH, depression, hypertension, CAD, ischemic cardiomyopathy with ejection fraction less than 15%, biventricular ICD implant 2007 with generator change 2013, atrial fibrillation, acute kidney injury on dialysis in July at  last dialyzed 7/26/2019 with tunneled catheter in place, with a EVA on 8/19/2019 reported positive for vegetation on the RA and RV leads as well as the tip of the dialysis catheter who was admitted to [facility] on 8/19/2019.  Patient denied any high fevers or chills.  He has been ill previously since June with a diagnosis of left lower lobe pneumonia in June 2019 with a positive urine antigen for Legionella treated with doxycycline and levofloxacin, finishing his therapy at the [facility] after deteriorating with acute hypoxic resp failure admitted to ICU, then discharged approximately 6/19/2019.  Went to Rehab and then discharged around 7/7/19.  Patient at that time was also found to have acute renal failure receiving some hemodialysis via a tunneled temp dialysis right chest catheter.  The patient receives his routine cardiac care by Dr. Edwards.  He continued to have persistent atrial fibrillation and atrial flutter.  A transesophageal echocardiogram done on 8/19/2019 reported possible vegetations on the right atrial/right ventricular lead as well as the dialysis catheter.  Previously the patient received antibiotics in early July 2019.  He did not have subsequent fevers.  He had continued issues with respiratory status and heart failure.  I was consulted on 8/20/2019  for further evaluation and treatment.  The patient has no other localizing signs or symptoms of infection.  The patient had an episode of MRSA left groin infection with a positive culture at [facility] on 1/30/2019 confirmed with the microbiology tech.  Blood cultures obtained at [facility] from January 2019, as well as 6/13/2019, 6/17/2019 were negative.  Urine was positive for Legionella antigen study at [facility] June 2019.  He received a treatment course for this at the [facility].  He has no other localizing signs or symptoms of infection.    The patient was discharged home to receive daptomycin 500 mg every 48 hours, cefepime 1 g every 24 hours for pacer lead related ICD endocarditis with negative cultures to continue until 10/1/2019.  The patient was admitted to the emergency room on 9/9/2019 complaining of increasing shortness of breath, and increased weight gain of 20 pounds with increased abdominal swelling.  He denied any high fevers or chills.  A CT scan of the chest on 9/9 revealed a moderate left-sided pleural effusion, some coarse left upper lobe infiltrate/atelectasis, compressive atelectasis of the left lower lobe, small right pleural effusion.  The CT of the abdomen and pelvis revealed some nephrolithiasis without hydronephrosis, and some ascites but no bowel obstruction.  Chest x-ray revealed cardiomegaly and mild volume overload.  Portal and hepatic venous ultrasound was negative on 9/3.  Laboratories revealed proBNP of 7388, negative troponin, creatinine 1.60, sodium 137, potassium 4.2, bicarb 21, ALT 25, AST 38, albumin 3, alkaline phosphatase 273, total bili 1.4, WBC 8.78, hemoglobin 13.8, platelets 199.  Lactic acid was 1.6, blood cultures obtained on 9/9 were negative.  CPK normal 8/21.  I was consulted on 9/10/2019 for further evaluation and treatment.    9/11/2019 history reviewed.  Breathing improved.  Patient continues on  daptomycin and cefepime until 10/1/2019 for culture negative, ICD vegetation related endocarditis.  No fevers or muscle pain.    9/12/2019 history reviewed.  Tolerating daptomycin and cefepime until 10/1 for culture negative endocarditis.  No high fevers or chills.  Breathing improved.  No high fevers.  9/13/2019 history reviewed.  Continues on daptomycin and cefepime until 10/1 for culture negative ICD related endocarditis.  CHF improved.  Will be requiring IV milrinone at home.  9/14/2019 history reviewed.  On cefepime and doxycycline till 10/1 for culture negative endocarditis related to ICD.  No high fevers or chills.  Breathing improved.  No sputum production.  9/16/19 history reviewed.  Patient continues on daptomycin and cefepime until 10/1 for culture-negative ICD related endocarditis.  No high fevers or chills.  Breathing okay.  9/17/2019 history reviewed.  Patient continues on daptomycin and cefepime until 10/1 for culture negative endocarditis.  No high fevers or chills.    Past Medical History:   Diagnosis Date   • Atherosclerotic cardiovascular disease    • Chronic anticoagulation    • Chronic kidney disease    • Congestive heart failure (CMS/HCC)    • COPD (chronic obstructive pulmonary disease) (CMS/[facility])    • DM (diabetes mellitus) (CMS/[facility])     insulin dependant   • Dyslipidemia    • Hx of atrial flutter     and a- fib   • Hx of myocardial infarction 1992    s/p CABG    • Hyperlipidemia    • Hypertension    • Ischemic cardiomyopathy     advanced   • Legionnaire's disease (CMS/[facility])    • Sinus bradycardia     EPS, 03/19/2007, Dr. Márquez:  Successful radiofrequency ablation of right atrial flutter   • Skin cancer    • Tobacco use        Past Surgical History:   Procedure Laterality Date   • CARDIAC DEFIBRILLATOR PLACEMENT     • CARDIOVERSION      x 2 procedures   • CORONARY ARTERY BYPASS GRAFT  1991    [facility]        Pediatric History   Patient Guardian Status   • Not on file     Other  Topics Concern   • Not on file   Social History Narrative    Lives at home with his wife, Destiny.     , lives with his wife in [facility], 1.5 packs/day  family history includes Cancer (age of onset: 74) in his mother; Diabetes in his father and mother; Heart attack (age of onset: 72) in his father; Heart disease in his mother.    Allergies   Allergen Reactions   • Shellfish-Derived Products Hives, Shortness Of Breath and Swelling       There is no immunization history for the selected administration types on file for this patient.    Medication:    Current Facility-Administered Medications:   •  acetaminophen (TYLENOL) tablet 650 mg, 650 mg, Oral, Q4H PRN, 650 mg at 09/14/19 2202 **OR** acetaminophen (TYLENOL) 160 MG/5ML solution 650 mg, 650 mg, Oral, Q4H PRN **OR** acetaminophen (TYLENOL) suppository 650 mg, 650 mg, Rectal, Q4H PRN, Hina Ryan APRN  •  apixaban (ELIQUIS) tablet 5 mg, 5 mg, Oral, Q12H, Pooja Be APRN, 5 mg at 09/17/19 0845  •  aspirin EC tablet 81 mg, 81 mg, Oral, Daily, Hina Ryan APRN, 81 mg at 09/17/19 0845  •  benzonatate (TESSALON) capsule 100 mg, 100 mg, Oral, Q4H PRN, Andreas Jack MD, 100 mg at 09/14/19 2202  •  bumetanide (BUMEX) tablet 1 mg, 1 mg, Oral, BID, Linda Noe APRN, 1 mg at 09/17/19 0844  •  cefepime (MAXIPIME) 2 g/100 mL 0.9% NS (mbp), 2 g, Intravenous, Q24H, Wai Escobedo MD, 2 g at 09/17/19 0845  •  DAPTOmycin (CUBICIN) 700 mg in sodium chloride 0.9 % 50 mL IVPB, 8 mg/kg (Adjusted), Intravenous, Q24H, Hina Ryan APRN, Last Rate: 100 mL/hr at 09/17/19 0550, 700 mg at 09/17/19 0550  •  dextrose (D50W) 25 g/ 50mL Intravenous Solution 25 g, 25 g, Intravenous, Q15 Min PRN, Hina Ryan APRN  •  dextrose (GLUTOSE) oral gel 15 g, 15 g, Oral, Q15 Min PRN, Hina Ryan APRN  •  diphenhydrAMINE (BENADRYL) capsule 25 mg, 25 mg, Oral, Nightly, Joanna Jin MD, 25 mg at 09/16/19 2014  •  docusate sodium (COLACE)  capsule 100 mg, 100 mg, Oral, BID PRN, Hina Ryan APRN  •  ferrous sulfate tablet 325 mg, 325 mg, Oral, BID With Meals, Hina Ryan APRN, 325 mg at 09/17/19 0845  •  fluticasone (FLONASE) 50 MCG/ACT nasal spray 2 spray, 2 spray, Each Nare, Daily, Andreas Jack MD, 2 spray at 09/17/19 0847  •  gabapentin (NEURONTIN) capsule 100 mg, 100 mg, Oral, Nightly, Hina Ryan APRN, 100 mg at 09/16/19 2015  •  glucagon (human recombinant) (GLUCAGEN DIAGNOSTIC) injection 1 mg, 1 mg, Subcutaneous, Q15 Min PRN, Hina Ryan APRN  •  guaiFENesin (MUCINEX) 12 hr tablet 600 mg, 600 mg, Oral, Q12H, Andreas Jack MD, 600 mg at 09/17/19 0845  •  insulin detemir (LEVEMIR) injection 10 Units, 10 Units, Subcutaneous, Nightly, Hina Ryan APRN, 10 Units at 09/16/19 2025  •  insulin lispro (humaLOG) injection 0-7 Units, 0-7 Units, Subcutaneous, 4x Daily With Meals & Nightly, Hina Ryan APRN, 2 Units at 09/17/19 1247  •  ipratropium-albuterol (DUO-NEB) nebulizer solution 3 mL, 3 mL, Nebulization, 4x Daily - RT, Hina Ryan APRN, 3 mL at 09/17/19 1128  •  magnesium (as) gluconate (MAGONATE) tablet 27 mg, 27 mg, Oral, BID, Hina Ryan APRN, 27 mg at 09/17/19 0845  •  Magnesium Sulfate 2 gram Bolus, followed by 8 gram infusion (total Mg dose 10 grams)- Mg less than or equal to 1mg/dL, 2 g, Intravenous, PRN **OR** Magnesium Sulfate 2 gram / 50mL Infusion (GIVE X 3 BAGS TO EQUAL 6GM TOTAL DOSE) - Mg 1.1 - 1.5 mg/dl, 2 g, Intravenous, PRN, Last Rate: 25 mL/hr at 09/16/19 0416, 2 g at 09/16/19 0416 **OR** Magnesium Sulfate 4 gram infusion- Mg 1.6-1.9 mg/dL, 4 g, Intravenous, PRN, Brenden Castellanos, RAINE  •  metoprolol tartrate (LOPRESSOR) tablet 150 mg, 150 mg, Oral, BID, Linda Noe APRN, 150 mg at 09/17/19 0845  •  miconazole (MICOTIN) 2 % powder, , Topical, BID, Hina Ryan APRN  •  milrinone 20 mg/100 mL (0.2 mg/mL) in 5 % dextrose infusion, 0.25 mcg/kg/min, Intravenous,  Continuous, Dianne Alba PA, Last Rate: 7.31 mL/hr at 09/17/19 1457, 0.25 mcg/kg/min at 09/17/19 1457  •  ondansetron (ZOFRAN) injection 4 mg, 4 mg, Intravenous, Q6H PRN, Hina Ryan, APRN, 4 mg at 09/10/19 0343  •  pantoprazole (PROTONIX) EC tablet 40 mg, 40 mg, Oral, Q AM, Hina Ryan APRN, 40 mg at 09/17/19 0550  •  Pharmacy Consult - [facility], , Does not apply, Daily, Hira Velasco, [facility]  •  potassium chloride (MICRO-K) CR capsule 40 mEq, 40 mEq, Oral, PRN, 40 mEq at 09/15/19 1250 **OR** potassium chloride (KLOR-CON) packet 40 mEq, 40 mEq, Oral, PRN **OR** potassium chloride 10 mEq in 100 mL IVPB, 10 mEq, Intravenous, Q1H PRN, Pooja Be, APRN  •  sertraline (ZOLOFT) tablet 25 mg, 25 mg, Oral, Nightly, Hina Ryan APRN, 25 mg at 09/16/19 2015  •  sodium chloride 0.9 % flush 10 mL, 10 mL, Intravenous, PRN, Sachin Palacios, DO  •  sodium chloride 0.9 % flush 10 mL, 10 mL, Intravenous, Q12H, Hina Ryan APRN, 10 mL at 09/16/19 0838  •  sodium chloride 0.9 % flush 10 mL, 10 mL, Intravenous, PRN, Hina Ryan, APRN  •  tamsulosin (FLOMAX) 24 hr capsule 0.4 mg, 0.4 mg, Oral, Daily, Hina Ryan APRN, 0.4 mg at 09/17/19 0845  •  temazepam (RESTORIL) capsule 15 mg, 15 mg, Oral, Nightly, Joanna Jin MD, 15 mg at 09/16/19 2015    Please refer to the medical record for a full medication list    Review of Systems:    Constitutional-- No Fever, chills or sweats.  Appetite good, and no malaise. No fatigue.  Slept well.  HEENT-- No new vision, hearing or throat complaints.  No epistaxis or oral sores.  Denies odynophagia or dysphagia.  No odynophagia or dysphagia. No headache, photophobia or neck stiffness.  CV-- No chest pain, palpitation or syncope  Resp-- some SOB/nonprod cough/no Hemoptysis, no wheezes  GI- No nausea, vomiting, or diarrhea.  No hematochezia, melena, or hematemesis. Denies jaundice or chronic liver disease.  -- No dysuria, hematuria, or flank pain.   Lymph- no swollen  "lymph nodes in neck/axilla or groin.   Heme- No active bruising or bleeding.  MS-- no swelling or pain in the bones or joints of arms/legs.  No new back pain.  Neuro-- No acute focal weakness or numbness in the arms or legs.  No seizures.  Skin--No rashes or lesions, right chest old site dialysis ok, no nodules    Physical Exam:   Vital Signs   Temp:  [98 °F (36.7 °C)-99 °F (37.2 °C)] 98.1 °F (36.7 °C)  Heart Rate:  [] 91  Resp:  [16-18] 18  BP: ()/(63-84) 98/66    Temp  Min: 98 °F (36.7 °C)  Max: 99 °F (37.2 °C)  BP  Min: 98/66  Max: 129/63  Pulse  Min: 91  Max: 114  Resp  Min: 16  Max: 18  SpO2  Min: 90 %  Max: 97 %    Blood pressure 98/66, pulse 91, temperature 98.1 °F (36.7 °C), temperature source Oral, resp. rate 18, height 182.9 cm (72\"), weight 96.3 kg (212 lb 6.4 oz), SpO2 96 %.  GENERAL: Awake and alert, in normal distress. Appears older than stated age.  More alert.  HEENT:  Normocephalic, atraumatic.  Oropharynx without thrush. Dentition in good repair. No cervical adenopathy. No neck masses.  Ears externally normal, Nose externally normal.  EYES: No conjunctival injection. No icterus. EOM full.  LYMPHATICS: No lymphadenopathy of the neck or axillary or inguinal regions.   HEART: 2/6 syst murmur left sternal border, no gallop, or pericardial friction rub. Reg rate rhythm, No JVD at 45 degrees.  LUNGS: rales at bases, with decreased breath sounds. No respiratory distress, no use of accessory muscles.  Occasional wheezes end expiratory.  ABDOMEN: Soft, nontender, nondistended. No appreciable HSM.  Bowel sounds normal.  No masses.  SKIN: Warm and dry without cutaneous eruptions.  IV access okay.  No rash.  PSYCHIATRIC: Mental status lucid. No confusion.  EXT:  No cellulitic change.  NEURO: Oriented to name, nonfocal.    Results Review:   I reviewed the patient's new clinical results.  I reviewed the patient's new imaging results and agree with the interpretation.  I reviewed the patient's other " test results and agree with the interpretation    Results from last 7 days   Lab Units 09/16/19  0709 09/15/19  0448 09/13/19  0330   WBC 10*3/mm3 6.40 6.72 7.72   HEMOGLOBIN g/dL 13.0 12.3* 12.6*   HEMATOCRIT % 45.1 41.9 45.3   PLATELETS 10*3/mm3 149 140 169     Results from last 7 days   Lab Units 09/16/19  0709   SODIUM mmol/L 137   POTASSIUM mmol/L 4.9   CHLORIDE mmol/L 99   CO2 mmol/L 30.0*   BUN mg/dL 30*   CREATININE mg/dL 1.21   GLUCOSE mg/dL 97   CALCIUM mg/dL 9.1     Results from last 7 days   Lab Units 09/16/19  0709   ALK PHOS U/L 192*   BILIRUBIN mg/dL 1.4*   ALT (SGPT) U/L 16   AST (SGOT) U/L 31         Results from last 7 days   Lab Units 09/16/19  0709   CRP mg/dL 3.11*         Results from last 7 days   Lab Units 09/13/19  0330   LACTATE mmol/L 1.8     Estimated Creatinine Clearance: 76.2 mL/min (by C-G formula based on SCr of 1.21 mg/dL).    Microbiology:  Microbiology Results Abnormal     Procedure Component Value - Date/Time    Fungus Smear - Body Fluid, Pleural Cavity [[id number]] Collected:  09/13/19 1150    Lab Status:  Final result Specimen:  Body Fluid from Pleural Cavity Updated:  09/17/19 1103     Fungal Stain No yeast or hyphal elements seen    Anaerobic Culture - Pleural Fluid, Pleural Cavity [[id number]] Collected:  09/13/19 1150    Lab Status:  Preliminary result Specimen:  Pleural Fluid from Pleural Cavity Updated:  09/16/19 1326     Anaerobic Culture No anaerobes isolated at 3 days    Body Fluid Culture - Body Fluid, Pleural Cavity [[id number]] Collected:  09/13/19 1150    Lab Status:  Final result Specimen:  Body Fluid from Pleural Cavity Updated:  09/16/19 1034     Body Fluid Culture No growth at 3 days     Gram Stain No WBCs or organisms seen    Blood Culture - Blood, Arm, Right [[id number]] Collected:  09/09/19 2100    Lab Status:  Final result Specimen:  Blood from Arm, Right Updated:  09/14/19 2230     Blood Culture No growth at 5 days    Blood Culture - Blood, Hand, Right  [[id number]] Collected:  09/09/19 2115    Lab Status:  Final result Specimen:  Blood from Hand, Right Updated:  09/14/19 2230     Blood Culture No growth at 5 days      Microbiology cultures 6/13 at [facility], (joão Trevino), negative, 6/17-, 6/17 BAL negative; blood cultures 8/19/2019-x2 at [facility], 6/16/19 ur antigen positive    Radiology:  Imaging Results (last 72 hours)     ** No results found for the last 72 hours. **          IMPRESSION:     1.  Endocarditis, culture negative, probably antibiotic modified related to ICD leads in right atrium and right ventricle, as well as dialysis catheter by transesophageal echocardiogram from 8/19/2019.  This could have been partially treated with the numerous antibiotics he has received since June 2019.  Otherwise does not have many symptoms consistent with endocarditis in terms of fever, or embolic phenomenon.  The renal failure potentially could have been immune complex mediated.  Working toward finishing his daptomycin and cefepime on 10/1/2019.  Cryptococcal antigen negative, chlamydia antigen negative, Q fever negative, T spot negative, Brucella negative, Bartonella negative, histoplasma negative on approximately 8/20/2019.  2.  Legionella pneumonia positive urine antigen 6/16/2019.  Eventually transferred to the [facility] with acute hypoxic respiratory failure and was treated and finished therapy with greater than 10 days of levofloxacin and doxycycline.  Clinically resolved.  Very unlikely cause of endocarditis.  3.  MRSA left groin cellulitis with abscess January 2019, unlikely to have a bearing on the current case.  Resolved.  4.  Acute on chronic renal failure.  Required dialysis via a tunneled catheter right chest which was recently removed.  Last dialysis approximately 2 weeks ago.  Nephrology following.  Creatinine 1.30  5.  Chronic ischemic cardiomyopathy with ejection fraction less than 15% status post previous ICD 2007 with  generator change 2013.  Probable exacerbation of systolic congestive heart failure.  6.  Anemia, chronic disease and related to renal failure.  7.  Elevated transaminitis possibly related to medications versus hypoperfusion, amiodarone and Lipitor discontinued.  Clinically resolved.  8.  Diabetes mellitus type 2 with minimal increased risk for infection.  A1c 6.0.  9.  COPD.  10.  Thrombocytopenia.  Previously, resolved.  11.  Acute on chronic hypoxic and hypercapnic respiratory failure.   12.  Hypokalemia resolved.  13.  Cholestasis, bili 1.4, worse.  Probably medications versus congestive liver dysfunction from his heart failure.  Elevated alkaline phosphatase 192, worse.    Still has a poor prognosis given his multiorgan failures.    Plan:    1.  Diagnostically, continue to follow patient's physical exam, CBC, CMP, CRP, CPK, blood cultures x2 obtained on 9/9/2019, fungal serologies.  2.  Therapeutically, continue daptomycin 700 mg IV every 24 hours, cefepime 1 g IV daily until 10/1/2019 for 6 weeks for treatment of potential endocarditis.  Watch toxicity in terms of liver, rhabdomyolysis, mental status, and bone marrow.  CBC, CMP, CRP, CPK should be monitored at least weekly while on this regimen.  3.  The risk benefit ratio has been discussed with the family, patient, and Dr. Edwards.  If his cultures are positive for bacteria such as MRSA, may be difficult to clear infection, and further discussion may be needed for removal of device.  If cultures are negative, I would treat with antibiotics for 6 weeks then follow off antibiotics.  Side effects of antibiotics were discussed with the patient and family.  4.  Oxygen support therapy.  5.  Hold statin therapy while on daptomycin to decrease risk of rhabdomyolysis.    I discussed the patients findings and my recommendations with patient, nursing staff, primary care team and consulting provider    Our group would be pleased to follow this patient over the course  of their hospitalization and assist with outpatient antimicrobial therapy, as indicated.  Further recommendations depend on the results of the cultures and clinical course.    Case management orders: Please arrange for home antibiotics with daptomycin 700 mg IV every 1-4 hours, cefepime 2 g g IV every 12 hours until 10/1/2019 for 6 weeks for endocarditis.  Check CBC, CMP, CRP, CPK weekly while on IV antibiotics. Fax orders to [phone], call [phone] with final arrangements.  Arrange for follow-up with me in 1 week post discharge.    Wai Escobedo MD  9/17/2019

## 2019-09-17 NOTE — DISCHARGE SUMMARY
[facility] Medicine Services  DISCHARGE SUMMARY    Patient Name: Rob Miranda  : 1955  MRN: [id number]    Date of Admission: 2019  Date of Discharge:  19  Primary Care Physician: Kreis, Samuel Duane, MD    Consults     Date and Time Order Name Status Description    9/10/2019 0356 Inpatient Infectious Diseases Consult Completed     9/10/2019 0356 Inpatient Cardiology Consult      2019 09 Inpatient Gastroenterology Consult Completed     2019 1047 Inpatient General Surgery Consult Completed     2019 1601 Inpatient Nephrology Consult Completed     2019 1601 Inpatient Infectious Diseases Consult Completed           Hospital Course     Presenting Problem:   Shortness of breath [R06.02]  Dyspnea [R06.00]    Active Hospital Problems    Diagnosis  POA   • Bacterial endocarditis [I33.0]  Yes   • Dyspnea on exertion [R06.09]  Yes   • Dyspnea [R06.00]  Yes   • Elevated LFTs [R94.5]  Yes   • Atrial fibrillation and flutter (CMS/HCC) [I48.91, I48.92]  Yes   • IDDM (insulin dependent diabetes mellitus) (CMS/HCC) [E11.9, Z79.4]  Not Applicable   • Ischemic cardiomyopathy [I25.5]  Yes   • Dyslipidemia [E78.5]  Yes   • Coronary artery disease involving coronary bypass graft of native heart without angina pectoris [I25.810]  Yes   • Essential hypertension [I10]  Yes      Resolved Hospital Problems    Diagnosis Date Resolved POA   • **Acute on chronic combined systolic and diastolic congestive heart failure (CMS/HCC) [I50.43] 2019 Yes   • Rectus sheath hematoma [S30.1XXA] 2019 Yes          Hospital Course:  Rob Miranda is a 64 y.o. male with history of ongoing tobacco abuse, PVD, DMII, COPD, Ischemic Cardiomyopathy with EF less than 20% and BiV ICD, atrial fibrillation on Eliquis, recent RODOLFO requiring temporary dialysis during  [facility] admission for Legionella pneumonia with mechanical ventilation. Recent admission  - 19 for planned AVN  ablation and upgrade to BiVICD revealing vegetations on ICD leads, AVN ablation and BiVICD not performed instead now on 6wks duration of IV abx under [facility] management for endocarditis with plans for procedures after infection clears.  He was discharged home from this facility on 8/26/19 but re-presented to [facility] ED with cough and worsening shortness of breath.  Imaging evaluation found moderate left and minimal right pleural effusions, also labs with markedly elevated BNP.     Dyspnea on exertion  - improved with IV diuresis and thoracentesis (1900 mls removed)     Acute on chronic systolic CHF/ICM  - home on po bumex and milrinone. [facility] to supply the latter  - follow up Dr Edwards in three weeks  - I/O last 3 completed shifts:  In: 760 [P.O.:480; I.V.:80; IV Piggyback:200]  Out: 1900 [Urine:1900]  No intake/output data recorded.     Endocarditis  - vegetations on ICD leads -- cultures remain no growth  - dapto and cefepime until 10/1   - weekly cbc, cmp, crp and cpk  - follow up with ID Dr Escobedo in one week.     Persistent afib/flutter  - eliquis  - rate controlled with metoprolol (dose increased to 150 mg bid during hospitalization)         Discharge Follow Up Recommendations for labs/diagnostics:   weekly cbc, cmp, crp and cpk while receiving IV antibiotics, fax results to ID Dr Escobedo    Day of Discharge     HPI:   Feels fine, dyspnea has much improved during this hosptialization as has his edema. Denies fever or chills.    Review of Systems  Gen- No fevers, chills  CV- No chest pain, palpitations  Resp- No cough, dyspnea  GI- No N/V/D, abd pain        Otherwise ROS is negative except as mentioned in the HPI.    Vital Signs:   Temp:  [98 °F (36.7 °C)-99 °F (37.2 °C)] 98.1 °F (36.7 °C)  Heart Rate:  [] 94  Resp:  [16-18] 18  BP: (100-129)/(63-84) 100/64     Physical Exam:  Constitutional -no acute distress, non toxic, in bed  HEENT-NCAT, mucous membranes moist  CV-RRR, S1 S2 normal, no  m/r/g  Resp-a few scattered expiratory wheezes  Abd-soft, non-tender, non-distended, normo active bowel sounds  Ext-No lower extremity cyanosis, clubbing but trace LE edema bilaterally  Neuro-alert and oriented, speech clear, moves all extremities   Psych-normal affect   Skin- venous stasis changes lower ext bilaterally      Pertinent  and/or Most Recent Results     Results from last 7 days   Lab Units 09/16/19  0709 09/15/19  2053 09/15/19  0448 09/14/19  1736 09/14/19  0501 09/13/19  0330 09/12/19  0544 09/11/19  0550   WBC 10*3/mm3 6.40  --  6.72  --   --  7.72  --  6.22   HEMOGLOBIN g/dL 13.0  --  12.3*  --   --  12.6*  --  12.5*   HEMATOCRIT % 45.1  --  41.9  --   --  45.3  --  43.5   PLATELETS 10*3/mm3 149  --  140  --   --  169  --  174   SODIUM mmol/L 137  --  141  --  142 143 142 139   POTASSIUM mmol/L 4.9 5.3* 3.6 4.0 3.2* 3.7 3.4* 3.7   CHLORIDE mmol/L 99  --  102  --  101 103 103 102   CO2 mmol/L 30.0*  --  29.0  --  32.0* 29.0 27.0 24.0   BUN mg/dL 30*  --  25*  --  26* 32* 35* 40*   CREATININE mg/dL 1.21  --  1.06  --  0.97 1.30* 1.40* 1.52*   GLUCOSE mg/dL 97  --  72  --  91 85 103* 138*   CALCIUM mg/dL 9.1  --  8.3*  --  8.4* 8.6 8.9 8.6     Results from last 7 days   Lab Units 09/16/19  0709 09/14/19  0501 09/13/19 0330 09/13/19  0329 09/12/19  0544 09/11/19  0819 09/11/19  0550   BILIRUBIN mg/dL 1.4* 1.3* 1.4*  --   --   --  1.3*   ALK PHOS U/L 192* 177* 202*  --   --   --  210*   ALT (SGPT) U/L 16 14 17  --   --   --  19   AST (SGOT) U/L 31 19 24  --   --   --  26   PROTIME Seconds  --   --   --  17.4* 18.6* 19.8*  --    INR   --   --   --  1.50* 1.64* 1.77*  --    APTT seconds  --   --   --   --   --  34.4  --            Invalid input(s): TG, LDLCALC, LDLREALC  Results from last 7 days   Lab Units 09/13/19  0330 09/11/19  0550   LACTATE mmol/L 1.8 1.2       Brief Urine Lab Results  (Last result in the past 365 days)      Color   Clarity   Blood   Leuk Est   Nitrite   Protein   CREAT   Urine HCG         08/20/19 1316             102.4             Microbiology Results Abnormal     Procedure Component Value - Date/Time    Fungus Smear - Body Fluid, Pleural Cavity [[id number]] Collected:  09/13/19 1150    Lab Status:  Final result Specimen:  Body Fluid from Pleural Cavity Updated:  09/17/19 1103     Fungal Stain No yeast or hyphal elements seen    Anaerobic Culture - Pleural Fluid, Pleural Cavity [[id number]] Collected:  09/13/19 1150    Lab Status:  Preliminary result Specimen:  Pleural Fluid from Pleural Cavity Updated:  09/16/19 1326     Anaerobic Culture No anaerobes isolated at 3 days    Body Fluid Culture - Body Fluid, Pleural Cavity [[id number]] Collected:  09/13/19 1150    Lab Status:  Final result Specimen:  Body Fluid from Pleural Cavity Updated:  09/16/19 1034     Body Fluid Culture No growth at 3 days     Gram Stain No WBCs or organisms seen    Blood Culture - Blood, Arm, Right [[id number]] Collected:  09/09/19 2100    Lab Status:  Final result Specimen:  Blood from Arm, Right Updated:  09/14/19 2230     Blood Culture No growth at 5 days    Blood Culture - Blood, Hand, Right [[id number]] Collected:  09/09/19 2115    Lab Status:  Final result Specimen:  Blood from Hand, Right Updated:  09/14/19 2230     Blood Culture No growth at 5 days          Imaging Results (all)     Procedure Component Value Units Date/Time    XR Chest PA & Lateral [[id number]] Collected:  09/14/19 1118     Updated:  09/16/19 1847    Narrative:          EXAMINATION: XR CHEST PA AND LATERAL - 09/14/2019      INDICATION: R06.03-Acute respiratory distress; I50.9-Heart failure,  unspecified; J81.0-Acute pulmonary edema; F11-Njlqevs effusion, not  elsewhere classified; R18.8-Other ascites; R10.11-Right upper quadrant  pain; N18.9-Chronic kidney disease, unspecified; I50.43-Acute on chronic  combined systolic (congestive) and diastolic (congestive) heart failure;  I25.810-Atherosclerosis . . .       COMPARISON: Chest x-ray 09/12/2019.      FINDINGS: Persistent cardiomegaly with improved aeration left lung base  of decreased opacifications and/or effusion left lung base. Background  chronic changes. Right arm PICC remains in place. No pneumothorax.           Impression:       Interval reduction of left pleural effusion status post  thoracentesis along with decreased opacifications left lung base. No  pneumothorax or recurrent effusion or new parenchymal findings.     DICTATED:   09/14/2019  EDITED/ls :   09/14/2019      This report was finalized on 9/16/2019 6:43 PM by Dr. Ravi Hitchcock.       CT Guided Thoracentesis [[id number]] Collected:  09/13/19 1226     Updated:  09/14/19 1911    Narrative:       EXAMINATION: CT GUIDED THORACENTESIS- 09/13/2019     INDICATION: shortness of breath, pleural effusion, infiltrates, CHF.  Patient on Eliquis, held AM of 9/10/19; R06.03-Acute respiratory  distress; I50.9-Heart failure, unspecified; J81.0-Acute pulmonary edema;  Z48-Qtcpgbk effusion, not elsewhere classified; R18.8-Other ascites;  R10.11-Right upper quadrant pain; N18.9-Chronic kidney disease,  unspecified      TECHNIQUE: Limited helical CT scan of the chest without intravenous  contrast     The radiation dose reduction device was turned on for each scan per the  ALARA (As Low as Reasonably Achievable) protocol.     COMPARISON: CT 09/09/2019     FINDINGS: Small to moderate left and trace right pleural effusions.     Patient referred for left thoracentesis. Informed consent obtained and  signed. Patient placed in left anterior oblique positioning for  examination and procedure. Patient prepped and draped in typical sterile  fashion. 1% lidocaine used for local anesthetic of the left chest wall  with anesthetic provided to the level of the pleural surface. 8.5 [place]  curved catheter placed into the left pleural fluid collection with  confirmation of fluid return upon stylette removal and upon imaging with  subsequent drainage of 1900 mL clear yellow  fluid. Catheter withdrawn.  Patient tolerated well without immediate complication.       Impression:       Satisfactory CT-guided left thoracentesis.     D:  09/13/2019  E:  09/13/2019        This report was finalized on 9/14/2019 7:08 PM by Dr. Ravi Hitchcock.       XR Chest 1 View [[id number]] Collected:  09/12/19 0949     Updated:  09/12/19 1506    Narrative:       EXAMINATION: XR CHEST 1 VW-09/12/2019:      INDICATION: Re-evaluate effusion to determine if thora still needed  after aggressive diuresis; R06.03-Acute respiratory distress;  I50.9-Heart failure, unspecified; J81.0-Acute pulmonary edema;  E95-Wifomoz effusion, not elsewhere classified; R18.8-Other ascites;  R10.11-Right upper quadrant pain; N18.9-Chronic kidney disease,  unspecified.      COMPARISON: 09/09/2019.     FINDINGS: Right upper extremity PICC line is seen with its tip partly  obscured by the pacing wires, but at least in the proximal SVC. The  heart is enlarged. There is increasing pulmonary vascular congestion and  moderate interstitial edema. There is persistent opacity of the left  lung base, similar to prior exam. No pneumothorax is seen.       Impression:       1.  Worsening congestive heart failure.  2.  Persistent opacity of the left base, whether atelectasis or  effusion, unchanged from 09/09/2019.     D:  09/12/2019  E:  09/12/2019     This report was finalized on 9/12/2019 3:03 PM by DR. Vincent Puri MD.       CT Chest Without Contrast [[id number]] Collected:  09/09/19 2349     Updated:  09/09/19 2351    Narrative:       CT Chest WO    INDICATION:   Abdominal pain and short of air    TECHNIQUE:   CT of the thorax without IV contrast. Coronal and sagittal reconstructions were obtained.  Radiation dose reduction techniques included automated exposure control or exposure modulation based on body size. Count of known CT and cardiac nuc med studies  performed in previous 12 months: 1.     COMPARISON:   None    FINDINGS:  There is a moderate  left pleural effusion, and there is coarse left upper lobe infiltrate or atelectasis. There is compressive atelectasis of the left lower lobe. There is a small right effusion, and there is coarse right middle lobe infiltrate, and some  right basilar atelectasis or infiltrate.    There is no mediastinal soft tissue mass. There is no acute bony abnormality.      Impression:       Left greater than right pleural effusion and coarse left upper lobe and right middle lobe infiltrates.    Signer Name: Loco Martínez MD   Signed: 9/9/2019 11:49 PM   Workstation Name: [id number]    [facility]    CT Abdomen Pelvis Without Contrast [[id number]] Collected:  09/09/19 2341     Updated:  09/09/19 2343    Narrative:       CT Abdomen Pelvis WO    INDICATION:   Abdominal pain, short of air    TECHNIQUE:   CT of the abdomen and pelvis without contrast. Coronal and sagittal reconstructions were obtained.  Radiation dose reduction techniques included automated exposure control or exposure modulation based on body size. Radiation audit for number of CT and  nuclear cardiology exams performed in the last year: 1.      COMPARISON:   Abdominal pelvic CT dated March 2018    FINDINGS:    There is a large left pleural effusion and left lower lobe atelectasis. There is a small right pleural effusion.    Abdomen: Unenhanced images of the liver are normal. There is cholelithiasis but no CT evidence to suggest cholecystitis or biliary obstruction. The spleen and pancreas are unremarkable, and the there is bilateral nephrolithiasis without hydronephrosis or  ureterolithiasis.    The aorta is normal in caliber. There is no bowel obstruction. There is mild to moderate ascites.    Pelvis:  There is no hernia or bowel obstruction. The appendix is normal.    There is no acute bony abnormality.      Impression:       There is nephrolithiasis without hydronephrosis or ureterolithiasis. There is ascites but no bowel obstruction.  There is cholelithiasis without CT evidence of cholecystitis or biliary obstruction.      Signer Name: Loco Martínez MD   Signed: 9/9/2019 11:41 PM   Workstation Name: [id number]    [facility]    XR Chest 1 View [[id number]] Collected:  09/09/19 2154     Updated:  09/09/19 2156    Narrative:       CR Chest 1 Vw    INDICATION:   64-year-old male with difficulty breathing in the ED tonight. Lower leg swelling for a couple of days.     COMPARISON:    3/8/2015    FINDINGS:  Single portable AP view(s) of the chest.  Heart is enlarged but stable status post median sternotomy. Right-sided PICC line tip can be followed to the mid to distal SVC level but no further given technical factors. Interstitial prominence in both lungs  suspicious for mild vascular congestion. There is a new left-sided pleural effusion with compressive atelectasis or pneumonia. No pneumothorax.      Impression:         1. Cardiomegaly and probable mild volume overload.  2. Left-sided effusion and compressive atelectasis or pneumonia. No pneumothorax.     Signer Name: Yordy Downey MD   Signed: 9/9/2019 9:54 PM   Workstation Name: [id number]    [facility]          Results for orders placed during the hospital encounter of 08/19/19   Duplex Portal Hepatic Complete CAR    Narrative EXAMINATION:  DUPLEX PORTAL HEPATIC ULTRASOUND-08/24/2019     HISTORY: Elevated LFTs, hyperbilirubinemia.     COMPARISON: NONE.     FINDINGS: There is normal, spontaneous phasic hepatopedal flow in the  main portal vein, right and left portal veins. Main portal vein is  approximately 14 mm in diameter. There is expected phasic hepatofugal  flow in the right middle and left hepatic veins. IVC is patent. Superior  mesenteric vein is patent. Splenic vein is patent.       Impression Negative duplex portal hepatic venous ultrasound.     D:  08/27/2019  E:  08/27/2019     This report was finalized on 8/28/2019 10:41 PM by   Vincent Puri MD.          Results for orders placed during the hospital encounter of 08/19/19   Duplex Portal Hepatic Complete CAR    Narrative EXAMINATION:  DUPLEX PORTAL HEPATIC ULTRASOUND-08/24/2019     HISTORY: Elevated LFTs, hyperbilirubinemia.     COMPARISON: NONE.     FINDINGS: There is normal, spontaneous phasic hepatopedal flow in the  main portal vein, right and left portal veins. Main portal vein is  approximately 14 mm in diameter. There is expected phasic hepatofugal  flow in the right middle and left hepatic veins. IVC is patent. Superior  mesenteric vein is patent. Splenic vein is patent.       Impression Negative duplex portal hepatic venous ultrasound.     D:  08/27/2019  E:  08/27/2019     This report was finalized on 8/28/2019 10:41 PM by DR. Vincent Puri MD.          Results for orders placed during the hospital encounter of 08/19/19   Adult Transesophageal Echo (EVA) W/ Cont if Necessary Per Protocol    Narrative · Estimated EF appears to be in the range of less than 20%.  · Left ventricular systolic function is severely decreased.  · Moderate mitral valve regurgitation is present  · Moderate tricuspid valve regurgitation is present.  · Moderately reduced right ventricular systolic function noted.  · A small mobile density is present on the atrial portion of the RV lead   as well as the RA lead.  · The tip of the indewelling catheter in the RA, appears thickened.           Order Current Status    Fungus Culture - Body Fluid, Pleural Cavity In process    Anaerobic Culture - Pleural Fluid, Pleural Cavity Preliminary result        Discharge Details        Discharge Medications      New Medications      Instructions Start Date   milrinone lactate 50 mg in sodium chloride 0.9 % 200 mL   0.25 mcg/kg/min, Intravenous, Continuous      pharmacy consult - MTM   Does not apply, Daily         Changes to Medications      Instructions Start Date   bumetanide 1 MG tablet  Commonly known as:  BUMEX  What changed:     · medication strength  · when to take this   1 mg, Oral, 2 Times Daily      cefepime  Commonly known as:  MAXIPIME  What changed:    · how much to take  · when to take this   2 g, Intravenous, Every 12 Hours      daptomycin IVPB  Commonly known as:  CUBICIN  What changed:  when to take this   500 mg, Intravenous, Every 24 Hours      metoprolol tartrate 50 MG tablet  Commonly known as:  LOPRESSOR  What changed:    · medication strength  · how much to take   150 mg, Oral, 2 Times Daily         Continue These Medications      Instructions Start Date   ALPRAZolam 1 MG tablet  Commonly known as:  XANAX   1 mg, Oral, 2 Times Daily PRN      apixaban 5 MG tablet tablet  Commonly known as:  ELIQUIS   5 mg, Oral, Every 12 Hours Scheduled      aspirin 81 MG EC tablet   81 mg, Oral, Daily      cetirizine 10 MG tablet  Commonly known as:  zyrTEC   10 mg, Oral, Daily      esomeprazole 40 MG capsule  Commonly known as:  nexIUM   40 mg, Oral, Every Morning Before Breakfast      ferrous sulfate 325 (65 FE) MG tablet   325 mg, Oral, 2 Times Daily      fluticasone 50 MCG/ACT nasal spray  Commonly known as:  FLONASE   2 sprays, Nasal, Daily      gabapentin 100 MG capsule  Commonly known as:  NEURONTIN   100 mg, Oral, Every Night at Bedtime      INCRUSE ELLIPTA 62.5 MCG/INH aerosol powder   Generic drug:  Umeclidinium Bromide   Inhalation, Daily      insulin detemir 100 UNIT/ML injection  Commonly known as:  LEVEMIR   10 Units, Subcutaneous, Nightly, flexpen      magnesium gluconate 500 MG tablet  Commonly known as:  MAGONATE   TAKE 1 TABLET TWICE DAILY      miconazole 2 % powder  Commonly known as:  MICOTIN   Topical, 2 Times Daily      sertraline 25 MG tablet  Commonly known as:  ZOLOFT   25 mg, Oral, Every Night at Bedtime      tamsulosin 0.4 MG capsule 24 hr capsule  Commonly known as:  FLOMAX   0.4 mg, Oral, Daily             Allergies   Allergen Reactions   • Shellfish-Derived Products Hives, Shortness Of Breath and Swelling          Discharge Disposition:  Home or Self Care    Diet:  Hospital:  Diet Order   Procedures   • Diet Regular; Cardiac, Consistent Carbohydrate     Discharge:     Discharge Activity:         CODE STATUS:    Code Status and Medical Interventions:   Ordered at: 09/10/19 0201     Level Of Support Discussed With:    Patient     Code Status:    CPR     Medical Interventions (Level of Support Prior to Arrest):    Full         Future Appointments   Date Time Provider Department Center   11/1/2019 12:45 PM Poli Edwards MD [facility] None   11/27/2019 11:45 AM Avery Colbert PA-C [facility] None       Additional Instructions for the Follow-ups that You Need to Schedule     Ambulatory Referral to Home Health   As directed      Face to Face Visit Date:  9/13/2019    Follow-up provider for Plan of Care?:  I treated the patient in an acute care facility and will not continue treatment after discharge.    Follow-up provider:  KREIS, SAMUEL DUANE [[id number]]    Reason/Clinical Findings:  acute on chronic combined systolic and dialstolic congestive heart failure    Describe mobility limitations that make leaving home difficult:  weakness, impaired physical mobility, impaired functional mobility    Nursing/Therapeutic Services Requested:  Skilled Nursing Physical Therapy Occupational Therapy    Skilled nursing orders:  Other (resume previous HH orders)    PT orders:  Other (resume previous HH orders)    Occupational orders:  Other (resume previous HH orders)         Discharge Follow-up with Specialty: follow up ID Dr Escobedo in one week   As directed      Specialty:  follow up ID Dr Escobedo in one week         Discharge Follow-up with Specialty: follow up with Cardiology Dr Edwards in three weeks   As directed      Specialty:  follow up with Cardiology Dr Edwards in three weeks               Time Spent on Discharge:  50 minutes    Electronically signed by Andreas Jack MD, 09/17/19, 11:50 AM.

## 2019-09-17 NOTE — DISCHARGE INSTR - APPOINTMENTS
You have an appointment with Kreis, Samuel Duane, MD  on September 23, 2019 @ 12:15.   Call them if you have any questions. Phone: [phone]  [address] [place] [address] [address] [place] [address]    You have an appointment with Wai Escobedo MD  on September 25, 2019 @ 4:00 PM.   Call them if you have any questions. Phone: [phone] [address] [place] [address]  [facility] [id number]    You will be called an appointment time and date to be seen by Dr. Edwards in 3 weeks.  [address] [place] [id number] [phone]

## 2019-09-17 NOTE — SIGNIFICANT NOTE
D/c teaching and paperwork provided to pt and spouse. Verbalized understanding. Home meds delivered to bedside by retail pharmacy & home infusion and teaching complete. Pt belongings taken with him from bedside. No s/sx distress. Transported out to car by wheelchair & RN.

## 2019-09-17 NOTE — PLAN OF CARE
Problem: Patient Care Overview  Goal: Plan of Care Review  Outcome: Ongoing (interventions implemented as appropriate)   09/17/19 6588   Coping/Psychosocial   Plan of Care Reviewed With patient   Plan of Care Review   Progress improving   OTHER   Outcome Summary Pt did not rest much this shift. VSS. 1L NC. No c/o pain or discomfort. Pt to be d/c'd with HH today. Will continue to monitor.

## 2019-09-17 NOTE — DISCHARGE PLACEMENT REQUEST
"Rob Miranda (64 y.o. Male)     Date of Birth Social Security Number Address Home Phone MRN    1955  [address] [facility] [place] [id number] [phone] [id number]    Islam Marital Status          None        Admission Date Admission Type Admitting Provider Attending Provider Department, Room/Bed    19 Emergency Andreas Jack MD Sloan, Walker E, MD [facility] 6B, N642/1    Discharge Date Discharge Disposition Discharge Destination         Home or Self Care              Attending Provider:  Andreas Jack MD    Allergies:  Shellfish-derived Products    Isolation:  None   Infection:  None   Code Status:  CPR    Ht:  182.9 cm (72\")   Wt:  96.3 kg (212 lb 6.4 oz)    Admission Cmt:  None   Principal Problem:  Acute on chronic combined systolic and diastolic congestive heart failure (CMS/HCC) [I50.43] More...                 Active Insurance as of 2019     Primary Coverage     Payor Plan Insurance Group Employer/Plan Group    HUMANA MEDICARE REPLACEMENT HUMANA MEDICARE REPL [id number]     Payor Plan Address Payor Plan Phone Number Payor Plan Fax Number Effective Dates    [address] [address] [phone]  3/1/2019 - None Entered    [facility] [address]       Subscriber Name Subscriber Birth Date Member ID       ROB MIRANDA 1955 [id number]                 Emergency Contacts      (Rel.) Home Phone Work Phone Mobile Phone    Manasa Miranda (Spouse) [phone] -- [phone]               Discharge Summary      Adnreas Jack MD at 19 [facility]              [facility] Medicine Services  DISCHARGE SUMMARY    Patient Name: Rob Miranda  : 1955  MRN: [id number]    Date of Admission: 2019  Date of Discharge:  19  Primary Care Physician: Kreis, Samuel Duane, MD    Consults     Date and Time Order Name Status Description    9/10/2019 0356 Inpatient Infectious Diseases Consult Completed     9/10/2019 0356 " Inpatient Cardiology Consult      8/23/2019 0927 Inpatient Gastroenterology Consult Completed     8/22/2019 1047 Inpatient General Surgery Consult Completed     8/19/2019 1601 Inpatient Nephrology Consult Completed     8/19/2019 1601 Inpatient Infectious Diseases Consult Completed           Hospital Course     Presenting Problem:   Shortness of breath [R06.02]  Dyspnea [R06.00]    Active Hospital Problems    Diagnosis  POA   • Bacterial endocarditis [I33.0]  Yes   • Dyspnea on exertion [R06.09]  Yes   • Dyspnea [R06.00]  Yes   • Elevated LFTs [R94.5]  Yes   • Atrial fibrillation and flutter (CMS/HCC) [I48.91, I48.92]  Yes   • IDDM (insulin dependent diabetes mellitus) (CMS/HCC) [E11.9, Z79.4]  Not Applicable   • Ischemic cardiomyopathy [I25.5]  Yes   • Dyslipidemia [E78.5]  Yes   • Coronary artery disease involving coronary bypass graft of native heart without angina pectoris [I25.810]  Yes   • Essential hypertension [I10]  Yes      Resolved Hospital Problems    Diagnosis Date Resolved POA   • **Acute on chronic combined systolic and diastolic congestive heart failure (CMS/HCC) [I50.43] 09/17/2019 Yes   • Rectus sheath hematoma [S30.1XXA] 09/17/2019 Yes          Hospital Course:  Rob Miranda is a 64 y.o. male with history of ongoing tobacco abuse, PVD, DMII, COPD, Ischemic Cardiomyopathy with EF less than 20% and BiV ICD, atrial fibrillation on Eliquis, recent RODOLFO requiring temporary dialysis during [facility] admission for Legionella pneumonia with mechanical ventilation. Recent admission 8/19 - 8/26/19 for planned AVN ablation and upgrade to BiVICD revealing vegetations on ICD leads, AVN ablation and BiVICD not performed instead now on 6wks duration of IV abx under [facility] management for endocarditis with plans for procedures after infection clears.  He was discharged home from this facility on 8/26/19 but re-presented to [facility] ED with cough and worsening shortness of breath.  Imaging evaluation found moderate  left and minimal right pleural effusions, also labs with markedly elevated BNP.     Dyspnea on exertion  - improved with IV diuresis and thoracentesis (1900 mls removed)     Acute on chronic systolic CHF/ICM  - home on po bumex and milrinone. [facility] to supply the latter  - follow up Dr Edwards in three weeks  - I/O last 3 completed shifts:  In: 760 [P.O.:480; I.V.:80; IV Piggyback:200]  Out: 1900 [Urine:1900]  No intake/output data recorded.     Endocarditis  - vegetations on ICD leads -- cultures remain no growth  - dapto and cefepime until 10/1   - weekly cbc, cmp, crp and cpk  - follow up with ID Dr Escobedo in one week.     Persistent afib/flutter  - eliquis  - rate controlled with metoprolol (dose increased to 150 mg bid during hospitalization)         Discharge Follow Up Recommendations for labs/diagnostics:   weekly cbc, cmp, crp and cpk while receiving IV antibiotics, fax results to ID Dr Escobedo    Day of Discharge     HPI:   Feels fine, dyspnea has much improved during this hosptialization as has his edema. Denies fever or chills.    Review of Systems  Gen- No fevers, chills  CV- No chest pain, palpitations  Resp- No cough, dyspnea  GI- No N/V/D, abd pain        Otherwise ROS is negative except as mentioned in the HPI.    Vital Signs:   Temp:  [98 °F (36.7 °C)-99 °F (37.2 °C)] 98.1 °F (36.7 °C)  Heart Rate:  [] 94  Resp:  [16-18] 18  BP: (100-129)/(63-84) 100/64     Physical Exam:  Constitutional -no acute distress, non toxic, in bed  HEENT-NCAT, mucous membranes moist  CV-RRR, S1 S2 normal, no m/r/g  Resp-a few scattered expiratory wheezes  Abd-soft, non-tender, non-distended, normo active bowel sounds  Ext-No lower extremity cyanosis, clubbing but trace LE edema bilaterally  Neuro-alert and oriented, speech clear, moves all extremities   Psych-normal affect   Skin- venous stasis changes lower ext bilaterally      Pertinent  and/or Most Recent Results     Results from last 7 days   Lab Units  09/16/19  0709 09/15/19  2053 09/15/19  0448 09/14/19  1736 09/14/19  0501 09/13/19  0330 09/12/19  0544 09/11/19  0550   WBC 10*3/mm3 6.40  --  6.72  --   --  7.72  --  6.22   HEMOGLOBIN g/dL 13.0  --  12.3*  --   --  12.6*  --  12.5*   HEMATOCRIT % 45.1  --  41.9  --   --  45.3  --  43.5   PLATELETS 10*3/mm3 149  --  140  --   --  169  --  174   SODIUM mmol/L 137  --  141  --  142 143 142 139   POTASSIUM mmol/L 4.9 5.3* 3.6 4.0 3.2* 3.7 3.4* 3.7   CHLORIDE mmol/L 99  --  102  --  101 103 103 102   CO2 mmol/L 30.0*  --  29.0  --  32.0* 29.0 27.0 24.0   BUN mg/dL 30*  --  25*  --  26* 32* 35* 40*   CREATININE mg/dL 1.21  --  1.06  --  0.97 1.30* 1.40* 1.52*   GLUCOSE mg/dL 97  --  72  --  91 85 103* 138*   CALCIUM mg/dL 9.1  --  8.3*  --  8.4* 8.6 8.9 8.6     Results from last 7 days   Lab Units 09/16/19  0709 09/14/19  0501 09/13/19 0330 09/13/19 0329 09/12/19  0544 09/11/19  0819 09/11/19  0550   BILIRUBIN mg/dL 1.4* 1.3* 1.4*  --   --   --  1.3*   ALK PHOS U/L 192* 177* 202*  --   --   --  210*   ALT (SGPT) U/L 16 14 17  --   --   --  19   AST (SGOT) U/L 31 19 24  --   --   --  26   PROTIME Seconds  --   --   --  17.4* 18.6* 19.8*  --    INR   --   --   --  1.50* 1.64* 1.77*  --    APTT seconds  --   --   --   --   --  34.4  --            Invalid input(s): TG, LDLCALC, LDLREALC  Results from last 7 days   Lab Units 09/13/19  0330 09/11/19  0550   LACTATE mmol/L 1.8 1.2       Brief Urine Lab Results  (Last result in the past 365 days)      Color   Clarity   Blood   Leuk Est   Nitrite   Protein   CREAT   Urine HCG        08/20/19 1316             102.4             Microbiology Results Abnormal     Procedure Component Value - Date/Time    Fungus Smear - Body Fluid, Pleural Cavity [[id number]] Collected:  09/13/19 1150    Lab Status:  Final result Specimen:  Body Fluid from Pleural Cavity Updated:  09/17/19 1103     Fungal Stain No yeast or hyphal elements seen    Anaerobic Culture - Pleural Fluid, Pleural Cavity  [[id number]] Collected:  09/13/19 1150    Lab Status:  Preliminary result Specimen:  Pleural Fluid from Pleural Cavity Updated:  09/16/19 1326     Anaerobic Culture No anaerobes isolated at 3 days    Body Fluid Culture - Body Fluid, Pleural Cavity [[id number]] Collected:  09/13/19 1150    Lab Status:  Final result Specimen:  Body Fluid from Pleural Cavity Updated:  09/16/19 1034     Body Fluid Culture No growth at 3 days     Gram Stain No WBCs or organisms seen    Blood Culture - Blood, Arm, Right [[id number]] Collected:  09/09/19 2100    Lab Status:  Final result Specimen:  Blood from Arm, Right Updated:  09/14/19 2230     Blood Culture No growth at 5 days    Blood Culture - Blood, Hand, Right [[id number]] Collected:  09/09/19 2115    Lab Status:  Final result Specimen:  Blood from Hand, Right Updated:  09/14/19 2230     Blood Culture No growth at 5 days          Imaging Results (all)     Procedure Component Value Units Date/Time    XR Chest PA & Lateral [[id number]] Collected:  09/14/19 1118     Updated:  09/16/19 1847    Narrative:          EXAMINATION: XR CHEST PA AND LATERAL - 09/14/2019      INDICATION: R06.03-Acute respiratory distress; I50.9-Heart failure,  unspecified; J81.0-Acute pulmonary edema; X15-Zyawjyv effusion, not  elsewhere classified; R18.8-Other ascites; R10.11-Right upper quadrant  pain; N18.9-Chronic kidney disease, unspecified; I50.43-Acute on chronic  combined systolic (congestive) and diastolic (congestive) heart failure;  I25.810-Atherosclerosis . . .       COMPARISON: Chest x-ray 09/12/2019.     FINDINGS: Persistent cardiomegaly with improved aeration left lung base  of decreased opacifications and/or effusion left lung base. Background  chronic changes. Right arm PICC remains in place. No pneumothorax.           Impression:       Interval reduction of left pleural effusion status post  thoracentesis along with decreased opacifications left lung base. No  pneumothorax or recurrent effusion or  new parenchymal findings.     DICTATED:   09/14/2019  EDITED/ls :   09/14/2019      This report was finalized on 9/16/2019 6:43 PM by Dr. Ravi Hitchcock.       CT Guided Thoracentesis [[id number]] Collected:  09/13/19 1226     Updated:  09/14/19 1911    Narrative:       EXAMINATION: CT GUIDED THORACENTESIS- 09/13/2019     INDICATION: shortness of breath, pleural effusion, infiltrates, CHF.  Patient on Eliquis, held AM of 9/10/19; R06.03-Acute respiratory  distress; I50.9-Heart failure, unspecified; J81.0-Acute pulmonary edema;  O67-Pplrway effusion, not elsewhere classified; R18.8-Other ascites;  R10.11-Right upper quadrant pain; N18.9-Chronic kidney disease,  unspecified      TECHNIQUE: Limited helical CT scan of the chest without intravenous  contrast     The radiation dose reduction device was turned on for each scan per the  ALARA (As Low as Reasonably Achievable) protocol.     COMPARISON: CT 09/09/2019     FINDINGS: Small to moderate left and trace right pleural effusions.     Patient referred for left thoracentesis. Informed consent obtained and  signed. Patient placed in left anterior oblique positioning for  examination and procedure. Patient prepped and draped in typical sterile  fashion. 1% lidocaine used for local anesthetic of the left chest wall  with anesthetic provided to the level of the pleural surface. 8.5 Croatian  curved catheter placed into the left pleural fluid collection with  confirmation of fluid return upon stylette removal and upon imaging with  subsequent drainage of 1900 mL clear yellow fluid. Catheter withdrawn.  Patient tolerated well without immediate complication.       Impression:       Satisfactory CT-guided left thoracentesis.     D:  09/13/2019  E:  09/13/2019        This report was finalized on 9/14/2019 7:08 PM by Dr. Ravi Hitchcock.       XR Chest 1 View [[id number]] Collected:  09/12/19 0949     Updated:  09/12/19 1506    Narrative:       EXAMINATION: XR CHEST 1 VW-09/12/2019:       INDICATION: Re-evaluate effusion to determine if thora still needed  after aggressive diuresis; R06.03-Acute respiratory distress;  I50.9-Heart failure, unspecified; J81.0-Acute pulmonary edema;  V73-Lndhhdb effusion, not elsewhere classified; R18.8-Other ascites;  R10.11-Right upper quadrant pain; N18.9-Chronic kidney disease,  unspecified.      COMPARISON: 09/09/2019.     FINDINGS: Right upper extremity PICC line is seen with its tip partly  obscured by the pacing wires, but at least in the proximal SVC. The  heart is enlarged. There is increasing pulmonary vascular congestion and  moderate interstitial edema. There is persistent opacity of the left  lung base, similar to prior exam. No pneumothorax is seen.       Impression:       1.  Worsening congestive heart failure.  2.  Persistent opacity of the left base, whether atelectasis or  effusion, unchanged from 09/09/2019.     D:  09/12/2019  E:  09/12/2019     This report was finalized on 9/12/2019 3:03 PM by DR. Vincent Puri MD.       CT Chest Without Contrast [[id number]] Collected:  09/09/19 2349     Updated:  09/09/19 2351    Narrative:       CT Chest WO    INDICATION:   Abdominal pain and short of air    TECHNIQUE:   CT of the thorax without IV contrast. Coronal and sagittal reconstructions were obtained.  Radiation dose reduction techniques included automated exposure control or exposure modulation based on body size. Count of known CT and cardiac nuc med studies  performed in previous 12 months: 1.     COMPARISON:   None    FINDINGS:  There is a moderate left pleural effusion, and there is coarse left upper lobe infiltrate or atelectasis. There is compressive atelectasis of the left lower lobe. There is a small right effusion, and there is coarse right middle lobe infiltrate, and some  right basilar atelectasis or infiltrate.    There is no mediastinal soft tissue mass. There is no acute bony abnormality.      Impression:       Left greater than right  pleural effusion and coarse left upper lobe and right middle lobe infiltrates.    Signer Name: Loco Martínez MD   Signed: 9/9/2019 11:49 PM   Workstation Name: [facility]    [facility]    CT Abdomen Pelvis Without Contrast [[id number]] Collected:  09/09/19 2341     Updated:  09/09/19 2343    Narrative:       CT Abdomen Pelvis WO    INDICATION:   Abdominal pain, short of air    TECHNIQUE:   CT of the abdomen and pelvis without contrast. Coronal and sagittal reconstructions were obtained.  Radiation dose reduction techniques included automated exposure control or exposure modulation based on body size. Radiation audit for number of CT and  nuclear cardiology exams performed in the last year: 1.      COMPARISON:   Abdominal pelvic CT dated March 2018    FINDINGS:    There is a large left pleural effusion and left lower lobe atelectasis. There is a small right pleural effusion.    Abdomen: Unenhanced images of the liver are normal. There is cholelithiasis but no CT evidence to suggest cholecystitis or biliary obstruction. The spleen and pancreas are unremarkable, and the there is bilateral nephrolithiasis without hydronephrosis or  ureterolithiasis.    The aorta is normal in caliber. There is no bowel obstruction. There is mild to moderate ascites.    Pelvis:  There is no hernia or bowel obstruction. The appendix is normal.    There is no acute bony abnormality.      Impression:       There is nephrolithiasis without hydronephrosis or ureterolithiasis. There is ascites but no bowel obstruction. There is cholelithiasis without CT evidence of cholecystitis or biliary obstruction.      Signer Name: Loco Martínez MD   Signed: 9/9/2019 11:41 PM   Workstation Name: [facility]    [facility]    XR Chest 1 View [[id number]] Collected:  09/09/19 2154     Updated:  09/09/19 2156    Narrative:       CR Chest 1 Vw    INDICATION:   64-year-old male with difficulty breathing in  the ED tonight. Lower leg swelling for a couple of days.     COMPARISON:    3/8/2015    FINDINGS:  Single portable AP view(s) of the chest.  Heart is enlarged but stable status post median sternotomy. Right-sided PICC line tip can be followed to the mid to distal SVC level but no further given technical factors. Interstitial prominence in both lungs  suspicious for mild vascular congestion. There is a new left-sided pleural effusion with compressive atelectasis or pneumonia. No pneumothorax.      Impression:         1. Cardiomegaly and probable mild volume overload.  2. Left-sided effusion and compressive atelectasis or pneumonia. No pneumothorax.     Signer Name: Yordy Downey MD   Signed: 9/9/2019 9:54 PM   Workstation Name: [facility]    [facility]          Results for orders placed during the hospital encounter of 08/19/19   Duplex Portal Hepatic Complete CAR    Narrative EXAMINATION:  DUPLEX PORTAL HEPATIC ULTRASOUND-08/24/2019     HISTORY: Elevated LFTs, hyperbilirubinemia.     COMPARISON: NONE.     FINDINGS: There is normal, spontaneous phasic hepatopedal flow in the  main portal vein, right and left portal veins. Main portal vein is  approximately 14 mm in diameter. There is expected phasic hepatofugal  flow in the right middle and left hepatic veins. IVC is patent. Superior  mesenteric vein is patent. Splenic vein is patent.       Impression Negative duplex portal hepatic venous ultrasound.     D:  08/27/2019  E:  08/27/2019     This report was finalized on 8/28/2019 10:41 PM by DR. Vincent Puri MD.          Results for orders placed during the hospital encounter of 08/19/19   Duplex Portal Hepatic Complete CAR    Narrative EXAMINATION:  DUPLEX PORTAL HEPATIC ULTRASOUND-08/24/2019     HISTORY: Elevated LFTs, hyperbilirubinemia.     COMPARISON: NONE.     FINDINGS: There is normal, spontaneous phasic hepatopedal flow in the  main portal vein, right and left portal veins. Main portal  vein is  approximately 14 mm in diameter. There is expected phasic hepatofugal  flow in the right middle and left hepatic veins. IVC is patent. Superior  mesenteric vein is patent. Splenic vein is patent.       Impression Negative duplex portal hepatic venous ultrasound.     D:  08/27/2019  E:  08/27/2019     This report was finalized on 8/28/2019 10:41 PM by DR. Vincent Puri MD.          Results for orders placed during the hospital encounter of 08/19/19   Adult Transesophageal Echo (EVA) W/ Cont if Necessary Per Protocol    Narrative · Estimated EF appears to be in the range of less than 20%.  · Left ventricular systolic function is severely decreased.  · Moderate mitral valve regurgitation is present  · Moderate tricuspid valve regurgitation is present.  · Moderately reduced right ventricular systolic function noted.  · A small mobile density is present on the atrial portion of the RV lead   as well as the RA lead.  · The tip of the indewelling catheter in the RA, appears thickened.           Order Current Status    Fungus Culture - Body Fluid, Pleural Cavity In process    Anaerobic Culture - Pleural Fluid, Pleural Cavity Preliminary result        Discharge Details        Discharge Medications      New Medications      Instructions Start Date   milrinone lactate 50 mg in sodium chloride 0.9 % 200 mL   0.25 mcg/kg/min, Intravenous, Continuous      pharmacy consult - MTM   Does not apply, Daily         Changes to Medications      Instructions Start Date   bumetanide 1 MG tablet  Commonly known as:  BUMEX  What changed:    · medication strength  · when to take this   1 mg, Oral, 2 Times Daily      cefepime  Commonly known as:  MAXIPIME  What changed:    · how much to take  · when to take this   2 g, Intravenous, Every 12 Hours      daptomycin IVPB  Commonly known as:  CUBICIN  What changed:  when to take this   500 mg, Intravenous, Every 24 Hours      metoprolol tartrate 50 MG tablet  Commonly known as:   LOPRESSOR  What changed:    · medication strength  · how much to take   150 mg, Oral, 2 Times Daily         Continue These Medications      Instructions Start Date   ALPRAZolam 1 MG tablet  Commonly known as:  XANAX   1 mg, Oral, 2 Times Daily PRN      apixaban 5 MG tablet tablet  Commonly known as:  ELIQUIS   5 mg, Oral, Every 12 Hours Scheduled      aspirin 81 MG EC tablet   81 mg, Oral, Daily      cetirizine 10 MG tablet  Commonly known as:  zyrTEC   10 mg, Oral, Daily      esomeprazole 40 MG capsule  Commonly known as:  nexIUM   40 mg, Oral, Every Morning Before Breakfast      ferrous sulfate 325 (65 FE) MG tablet   325 mg, Oral, 2 Times Daily      fluticasone 50 MCG/ACT nasal spray  Commonly known as:  FLONASE   2 sprays, Nasal, Daily      gabapentin 100 MG capsule  Commonly known as:  NEURONTIN   100 mg, Oral, Every Night at Bedtime      INCRUSE ELLIPTA 62.5 MCG/INH aerosol powder   Generic drug:  Umeclidinium Bromide   Inhalation, Daily      insulin detemir 100 UNIT/ML injection  Commonly known as:  LEVEMIR   10 Units, Subcutaneous, Nightly, flexpen      magnesium gluconate 500 MG tablet  Commonly known as:  MAGONATE   TAKE 1 TABLET TWICE DAILY      miconazole 2 % powder  Commonly known as:  MICOTIN   Topical, 2 Times Daily      sertraline 25 MG tablet  Commonly known as:  ZOLOFT   25 mg, Oral, Every Night at Bedtime      tamsulosin 0.4 MG capsule 24 hr capsule  Commonly known as:  FLOMAX   0.4 mg, Oral, Daily             Allergies   Allergen Reactions   • Shellfish-Derived Products Hives, Shortness Of Breath and Swelling         Discharge Disposition:  Home or Self Care    Diet:  Hospital:  Diet Order   Procedures   • Diet Regular; Cardiac, Consistent Carbohydrate     Discharge:     Discharge Activity:         CODE STATUS:    Code Status and Medical Interventions:   Ordered at: 09/10/19 0201     Level Of Support Discussed With:    Patient     Code Status:    CPR     Medical Interventions (Level of Support  Prior to Arrest):    Full         Future Appointments   Date Time Provider Department Center   2019 12:45 PM Poli Edwards MD [facility] None   2019 11:45 AM Avery Colbert PA-C [facility] None       Additional Instructions for the Follow-ups that You Need to Schedule     Ambulatory Referral to Home Health   As directed      Face to Face Visit Date:  2019    Follow-up provider for Plan of Care?:  I treated the patient in an acute care facility and will not continue treatment after discharge.    Follow-up provider:  KREIS, SAMUEL DUANE [[id number]]    Reason/Clinical Findings:  acute on chronic combined systolic and dialstolic congestive heart failure    Describe mobility limitations that make leaving home difficult:  weakness, impaired physical mobility, impaired functional mobility    Nursing/Therapeutic Services Requested:  Skilled Nursing Physical Therapy Occupational Therapy    Skilled nursing orders:  Other (resume previous HH orders)    PT orders:  Other (resume previous HH orders)    Occupational orders:  Other (resume previous HH orders)         Discharge Follow-up with Specialty: follow up ID Dr Escobedo in one week   As directed      Specialty:  follow up ID Dr Escobedo in one week         Discharge Follow-up with Specialty: follow up with Cardiology Dr Edwards in three weeks   As directed      Specialty:  follow up with Cardiology Dr Edwards in three weeks               Time Spent on Discharge:  50 minutes    Electronically signed by Andreas Jack MD, 19, 11:50 AM.        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 [employer] # SUBSCRIBER ID   Primary:    [employer] REPLAC[employer] [id number]      Referring Provider Information:  TATE FRANK Phone: [phone] Fax:       Referral Information:   # Visits:  1 Referral Type: Home Health [42]   Urgency:  Routine Referral Reason: Specialty Services Required   Start Date: Sep 13, 2019 End Date:  To be determined by Insurer   Diagnosis: Respiratory distress (R06.03 [ICD-10-CM] 786.09 [ICD-9-CM])  Acute on chronic congestive heart failure, unspecified heart failure type (CMS/HCC) (I50.9 [ICD-10-CM] 428.0 [ICD-9-CM])  Chronic kidney disease, unspecified CKD stage (N18.9 [ICD-10-CM] 585.9 [ICD-9-CM])  Acute on chronic combined systolic and diastolic congestive heart failure (CMS/HCC) (I50.43 [ICD-10-CM] 428.43,428.0 [ICD-9-CM])  Coronary artery disease involving coronary bypass graft of native heart without angina pectoris (I25.810 [ICD-10-CM] 414.05 [ICD-9-CM])  Essential hypertension (I10 [ICD-10-CM] 401.9 [ICD-9-CM])  IDDM (insulin dependent diabetes mellitus) (CMS/[facility]) (E11.9,Z79.4 [ICD-10-CM] 250.00,V58.67 [ICD-9-CM])  Atrial fibrillation and flutter (CMS/[facility]) (I48.91,I48.92 [ICD-10-CM] 427.31,427.32 [ICD-9-CM])  Dyspnea on exertion (R06.09 [ICD-10-CM] 786.09 [ICD-9-CM])      Refer to Dept:   Refer to Provider:   Refer to Facility:       Face to Face Visit Date: 9/13/2019  Follow-up provider for Plan of Care? I treated the patient in an acute care facility and will not continue treatment after discharge.  Follow-up provider: KREIS, SAMUEL DUANE [[id number]]  Reason/Clinical Findings: acute on chronic combined systolic and dialstolic congestive heart failure  Describe mobility limitations that make leaving home difficult: weakness, impaired physical mobility, impaired functional mobility  Nursing/Therapeutic Services Requested: Skilled Nursing  Nursing/Therapeutic Services Requested: Physical Therapy  Nursing/Therapeutic Services Requested: Occupational  Therapy    Skilled nursing orders: Other (resume previous HH orders)  Check CBC, CMP, CRP, CPK weekly while on IV antibiotics    PT orders: Other (resume previous HH orders)    Occupational orders: Other (resume previous HH orders)     This document serves as a request of services and does not constitute Insurance authorization or approval of services.  To determine eligibility, please contact the members Insurance carrier to verify and review coverage.     If you have medical questions regarding this request for services. Please contact [address] at [phone] between the hours of 8:00am - 5:00pm (Mon-Fri).       Verbal Order Mode: Telephone with readback   Authorizing Provider: Andreas Jack MD  Authorizing Provider's NPI: [id number]     Order Entered By: Delores Rodriguez RN 9/13/2019  3:19 PM     Electronically signed by: Andreas Jack MD 9/14/2019  9:38 AM

## 2019-09-18 ENCOUNTER — READMISSION MANAGEMENT (OUTPATIENT)
Dept: CALL CENTER | Facility: HOSPITAL | Age: 64
End: 2019-09-18

## 2019-09-18 ENCOUNTER — DOCUMENTATION (OUTPATIENT)
Dept: CARDIAC REHAB | Facility: HOSPITAL | Age: 64
End: 2019-09-18

## 2019-09-18 LAB — BACTERIA SPEC ANAEROBE CULT: NORMAL

## 2019-09-18 RX ORDER — METOPROLOL TARTRATE 50 MG/1
150 TABLET, FILM COATED ORAL 2 TIMES DAILY
Qty: 180 TABLET | Refills: 1 | Status: SHIPPED | OUTPATIENT
Start: 2019-09-18 | End: 2019-10-09 | Stop reason: SDUPTHER

## 2019-09-18 RX ORDER — BUMETANIDE 1 MG/1
1 TABLET ORAL 2 TIMES DAILY
Qty: 60 TABLET | Refills: 1 | Status: SHIPPED | OUTPATIENT
Start: 2019-09-18 | End: 2019-10-09 | Stop reason: SDUPTHER

## 2019-09-18 NOTE — OUTREACH NOTE
Prep Survey      Responses   Facility patient discharged from?  [place]   Is patient eligible?  Yes   Discharge diagnosis  Acute on chronic systolic CHF   Does the patient have one of the following disease processes/diagnoses(primary or secondary)?  CHF   Does the patient have Home health ordered?  Yes   What is the Home health agency?   [facility]   Is there a DME ordered?  No   Prep survey completed?  Yes          Darline Terry RN

## 2019-09-19 ENCOUNTER — DOCUMENTATION (OUTPATIENT)
Dept: CARDIAC REHAB | Facility: HOSPITAL | Age: 64
End: 2019-09-19

## 2019-09-19 ENCOUNTER — READMISSION MANAGEMENT (OUTPATIENT)
Dept: CALL CENTER | Facility: HOSPITAL | Age: 64
End: 2019-09-19

## 2019-09-19 NOTE — PROGRESS NOTES
Pt. Referred for Phase II Cardiac Rehab. Staff discussed benefits of exercise, program protocol, and educational material provided. Teach back verified.  Permission granted from patient for staff to fax referral information to outlying program at this time.  Staff to fax referral info to [facility].

## 2019-09-19 NOTE — OUTREACH NOTE
CHF Week 1 Survey      Responses   Facility patient discharged from?  [place]   Does the patient have one of the following disease processes/diagnoses(primary or secondary)?  CHF   Is there a successful TCM telephone encounter documented?  No   CHF Week 1 attempt successful?  No   Unsuccessful attempts  Attempt 1          Linda Kowalski RN

## 2019-09-20 ENCOUNTER — READMISSION MANAGEMENT (OUTPATIENT)
Dept: CALL CENTER | Facility: HOSPITAL | Age: 64
End: 2019-09-20

## 2019-09-20 NOTE — OUTREACH NOTE
CHF Week 1 Survey      Responses   Facility patient discharged from?  [place]   Does the patient have one of the following disease processes/diagnoses(primary or secondary)?  CHF   Is there a successful TCM telephone encounter documented?  No   CHF Week 1 attempt successful?  Yes   Call start time  1402   Call end time  1406   Discharge diagnosis  Acute on chronic systolic CHF   Is patient permission given to speak with other caregiver?  Yes   List who call center can speak with  wife, Manasa   Person spoke with today (if not patient) and relationship  wife   Meds reviewed with patient/caregiver?  Yes   Is the patient having any side effects they believe may be caused by any medication additions or changes?  No   Does the patient have all medications ordered at discharge?  Yes   Is the patient taking all medications as directed (includes completed medication regime)?  Yes   Does the patient have a primary care provider?   Yes   Does the patient have an appointment with their PCP within 7 days of discharge?  Yes   Comments regarding PCP   Samuel Duane Kreis, MD, Monday, 9/23/19   Has the patient kept scheduled appointments due by today?  N/A   Comments  Follow up with Wai Escobedo MD, Sep 25, 2019 4:00 p.m.    What is the Home health agency?   [facility]   Has home health visited the patient within 72 hours of discharge?  Yes   Home health comments  PICC care.   Psychosocial issues?  No   Did the patient receive a copy of their discharge instructions?  Yes   Nursing interventions  Reviewed instructions with patient   What is the patient's perception of their health status since discharge?  Improving   Nursing interventions  Nurse provided patient education   Is the patient weighing daily?  Yes   Does the patient have scales?  Yes   Daily weight interventions  Education provided on importance of daily weight   Is the patient able to teach back Heart Failure diet management?  Yes   Is the patient  able to teach back signs and symptoms of worsening condition? (i.e. weight gain, shortness of air, etc.)  Yes   Is the patient/caregiver able to teach back the hierarchy of who to call/visit for symptoms/problems? PCP, Specialist, Home health nurse, Urgent Care, ED, 911  Yes    CHF Week 1 call completed?  Yes          Linda Kowalski RN

## 2019-09-26 ENCOUNTER — TELEPHONE (OUTPATIENT)
Dept: CARDIOLOGY | Facility: CLINIC | Age: 64
End: 2019-09-26

## 2019-09-27 ENCOUNTER — READMISSION MANAGEMENT (OUTPATIENT)
Dept: CALL CENTER | Facility: HOSPITAL | Age: 64
End: 2019-09-27

## 2019-09-27 NOTE — OUTREACH NOTE
CHF Week 2 Survey      Responses   Facility patient discharged from?  [place]   Does the patient have one of the following disease processes/diagnoses(primary or secondary)?  CHF   Week 2 attempt successful?  Yes   Call start time  1626   Call end time  1632   Discharge diagnosis  Acute on chronic systolic CHF   Person spoke with today (if not patient) and relationship  Manasa   Medication alerts for this patient  PICC- wife is doing IV infusion   Meds reviewed with patient/caregiver?  Yes   Is the patient taking all medications as directed (includes completed medication regime)?  Yes   Medication comments  Antibiotics through 10/01 doing Milrinone continious   Has the patient kept scheduled appointments due by today?  Yes   Comments  Saw Dr Coffman on Wednesday 09/25   What is the Home health agency?   [facility]   Comments  Leg edema has decreased. Weight stable.   What is the patient's perception of their health status since discharge?  Improving   Is the patient weighing daily?  Yes   Is the patient able to teach back Heart Failure Zones?  Yes [Green zone]   CHF Week 2 call completed?  Yes          Alexandra Morin RN

## 2019-10-04 ENCOUNTER — READMISSION MANAGEMENT (OUTPATIENT)
Dept: CALL CENTER | Facility: HOSPITAL | Age: 64
End: 2019-10-04

## 2019-10-04 NOTE — OUTREACH NOTE
CHF Week 3 Survey      Responses   Facility patient discharged from?  [place]   Does the patient have one of the following disease processes/diagnoses(primary or secondary)?  CHF   Week 3 attempt successful?  Yes   Call start time  1517   Call end time  1528   Discharge diagnosis  Acute on chronic systolic CHF   Person spoke with today (if not patient) and relationship  Manasa Cabrera reviewed with patient/caregiver?  Yes   Is the patient having any side effects they believe may be caused by any medication additions or changes?  No   Does the patient have all medications ordered at discharge?  Yes   Is the patient taking all medications as directed (includes completed medication regime)?  Yes   Does the patient have a primary care provider?   Yes   Does the patient have an appointment with their PCP within 7 days of discharge?  Yes   Has the patient kept scheduled appointments due by today?  Yes   What is the Home health agency?   [facility]   Has home health visited the patient within 72 hours of discharge?  Yes   Did the patient receive a copy of their discharge instructions?  Yes   Nursing interventions  Reviewed instructions with patient   What is the patient's perception of their health status since discharge?  Same   Nursing interventions  Nurse provided patient education   Is the patient weighing daily?  Yes   Does the patient have scales?  Yes   Daily weight interventions  Education provided on importance of daily weight   Is the patient able to teach back Heart Failure diet management?  Yes   Is the patient able to teach back Heart Failure Zones?  Yes   Is the patient able to teach back signs and symptoms of worsening condition? (i.e. weight gain, shortness of air, etc.)  Yes   Is the patient/caregiver able to teach back the hierarchy of who to call/visit for symptoms/problems? PCP, Specialist, Home health nurse, Urgent Care, ED, 911  Yes   CHF Week 3 call completed?  Yes   Wrap up additional  comments  wife is very worried about pt. His abdomen iks extended, but breathing seems to be ok. Will  check lastcouple days weights and call doctor if elevated.          Michelle Wiley RN

## 2019-10-08 ENCOUNTER — TELEPHONE (OUTPATIENT)
Dept: CARDIOLOGY | Facility: CLINIC | Age: 64
End: 2019-10-08

## 2019-10-08 NOTE — TELEPHONE ENCOUNTER
[facility] device [facility] called to have [facility] readings transferred to our clinic.  Pt is no longer being seen at .  Enrolled in [facility] done.

## 2019-10-09 ENCOUNTER — OFFICE VISIT (OUTPATIENT)
Dept: CARDIOLOGY | Facility: CLINIC | Age: 64
End: 2019-10-09

## 2019-10-09 VITALS
HEIGHT: 72 IN | DIASTOLIC BLOOD PRESSURE: 68 MMHG | BODY MASS INDEX: 29.8 KG/M2 | HEART RATE: 105 BPM | SYSTOLIC BLOOD PRESSURE: 100 MMHG | WEIGHT: 220 LBS

## 2019-10-09 DIAGNOSIS — I50.22 CHRONIC SYSTOLIC CONGESTIVE HEART FAILURE (HCC): ICD-10-CM

## 2019-10-09 DIAGNOSIS — I25.5 ISCHEMIC CARDIOMYOPATHY: ICD-10-CM

## 2019-10-09 DIAGNOSIS — I48.91 ATRIAL FIBRILLATION AND FLUTTER (HCC): Primary | ICD-10-CM

## 2019-10-09 DIAGNOSIS — I48.92 ATRIAL FIBRILLATION AND FLUTTER (HCC): Primary | ICD-10-CM

## 2019-10-09 DIAGNOSIS — I33.0 SUBACUTE BACTERIAL ENDOCARDITIS: ICD-10-CM

## 2019-10-09 DIAGNOSIS — I10 ESSENTIAL HYPERTENSION: ICD-10-CM

## 2019-10-09 DIAGNOSIS — I25.810 CORONARY ARTERY DISEASE INVOLVING CORONARY BYPASS GRAFT OF NATIVE HEART WITHOUT ANGINA PECTORIS: ICD-10-CM

## 2019-10-09 DIAGNOSIS — I48.92 ATRIAL FIBRILLATION AND FLUTTER (HCC): ICD-10-CM

## 2019-10-09 DIAGNOSIS — I48.91 ATRIAL FIBRILLATION AND FLUTTER (HCC): ICD-10-CM

## 2019-10-09 PROCEDURE — 93283 PRGRMG EVAL IMPLANTABLE DFB: CPT | Performed by: INTERNAL MEDICINE

## 2019-10-09 PROCEDURE — 99214 OFFICE O/P EST MOD 30 MIN: CPT | Performed by: INTERNAL MEDICINE

## 2019-10-09 RX ORDER — METOPROLOL TARTRATE 50 MG/1
150 TABLET, FILM COATED ORAL 2 TIMES DAILY
Qty: 270 TABLET | Refills: 3 | Status: SHIPPED | OUTPATIENT
Start: 2019-10-09

## 2019-10-09 RX ORDER — BUMETANIDE 1 MG/1
1 TABLET ORAL 2 TIMES DAILY
Qty: 180 TABLET | Refills: 3 | Status: SHIPPED | OUTPATIENT
Start: 2019-10-09

## 2019-10-09 RX ORDER — FERROUS SULFATE 325(65) MG
325 TABLET ORAL
Qty: 30 TABLET | Refills: 5 | Status: SHIPPED | OUTPATIENT
Start: 2019-10-09 | End: 2020-01-01

## 2019-10-09 NOTE — PROGRESS NOTES
Rob Miranda  1955  [phone]    10/09/2019    [facility] CARDIOLOGY     Kreis, Samuel Duane, MD  [address] [place] [address] [address] [id number]    Chief Complaint   Patient presents with   • Atrial Fibrillation       Problem List:   1. PAF/flutter            A)Tikosyn 2013            B)ICD Shocks secondary to Afib with RVR            C)PVA 2014, inability to isoate Right inferior pulmonary vent secondary to near proximity to esophagus            D)Repeat PVA 3/16/2015 wit re-isolaton of all four pulmonary veins.  Roof lines and mitral annulas left atrial rotor  ablation            E)Recurrent Afib with ECV 4/17 and 10/17            F)Chadsvasc=4, Eliquis BID            G)Aflutter(recurrent) with ECV to NSR 11/15/18            H)Recurrent persistent Flutter since June 19            I) Plan for EVA/ECV + AVN ablation + upgrade to Bi-V ICD 08/2019 - aborted secondary to RODOLFO, transaminitis and  vegetations on ICD lead necessitating [facility] hospital admission 8/19/19-8/26/19  2. DCM            A)Remote MI/CABG 1991            B)Patent grafts by [facility] 2004, persistent ICM EF<30%            C)Echo 2006 EF 35%            D)St. Jr ICD Dr. Márquez 2006            E)Gen change 2013.              F)Recent Echo [facility] 6/19 EF 20%.             G) Echo 8/19/19: EF <20%, moderate MR, moderate TR, small mobile density present on RA as well as RV leads  3. Chronic SHF Class III  4. DM (Insulin dependant)  5. HTN  6. HLD  7. Insomnia   8. PAD: Severe bilateral lower extremity disease by [facility] 3/18, followed by Dr. Vasquez   9. Tobacco Abuse(Ongoing)  10. ARF, HD.  Last HD 7/26/19 Followed by JOHNNY Cardenas  11. Recent Legionnaires PNA Admit to [facility], transfer to  6/18/19 with two weeks on Ventilator, discharged to  Rehab in [place] 7/31/19.       Allergies  Allergies   Allergen Reactions   • Shellfish-Derived Products Hives, Shortness Of Breath and Swelling       Current  Medications    Current Outpatient Medications:   •  ALPRAZolam (XANAX) 1 MG tablet, Take 1 mg by mouth 2 (Two) Times a Day As Needed for Anxiety., Disp: , Rfl:   •  apixaban (ELIQUIS) 5 MG tablet tablet, Take 1 tablet by mouth Every 12 (Twelve) Hours., Disp: 30 tablet, Rfl: 11  •  aspirin 81 MG EC tablet, Take 81 mg by mouth Daily., Disp: , Rfl:   •  bumetanide (BUMEX) 1 MG tablet, Take 1 tablet by mouth 2 (Two) Times a Day., Disp: 60 tablet, Rfl: 1  •  cefepime (MAXIPIME) 1 g/100 mL 0.9% NS IVPB (mbp), Infuse 200 mL into a venous catheter Every 12 (Twelve) Hours for 40 days., Disp: 3000 mL, Rfl: 1  •  esomeprazole (nexIUM) 40 MG capsule, Take 40 mg by mouth Every Morning Before Breakfast., Disp: , Rfl:   •  ferrous sulfate 325 (65 FE) MG tablet, Take 325 mg by mouth 2 (Two) Times a Day., Disp: , Rfl:   •  fluticasone (FLONASE) 50 MCG/ACT nasal spray, 2 sprays into the nostril(s) as directed by provider Daily., Disp: , Rfl:   •  gabapentin (NEURONTIN) 100 MG capsule, Take 100 mg by mouth every night at bedtime., Disp: , Rfl: 5  •  insulin detemir (LEVEMIR) 100 UNIT/ML injection, Inject 10 Units under the skin into the appropriate area as directed Every Night. flexpen, Disp: , Rfl: 12  •  ipratropium-albuterol (COMBIVENT RESPIMAT)  MCG/ACT inhaler, Inhale 1 puff 4 (Four) Times a Day As Needed for Wheezing., Disp: , Rfl:   •  magnesium gluconate (MAGONATE) 500 MG tablet, TAKE 1 TABLET TWICE DAILY, Disp: 60 tablet, Rfl: 3  •  metoprolol tartrate (LOPRESSOR) 50 MG tablet, Take 3 tablets by mouth 2 (Two) Times a Day., Disp: 180 tablet, Rfl: 1  •  miconazole (MICOTIN) 2 % powder, Apply  topically to the appropriate area as directed 2 (Two) Times a Day., Disp: 1 each, Rfl: 0  •  sertraline (ZOLOFT) 25 MG tablet, Take 1 tablet by mouth every night at bedtime., Disp: 30 tablet, Rfl: 6  •  tamsulosin (FLOMAX) 0.4 MG capsule 24 hr capsule, Take 1 capsule by mouth Daily., Disp: 30 capsule, Rfl: 0  •  Umeclidinium Bromide  "(INCRUSE ELLIPTA) 62.5 MCG/INH aerosol powder , Inhale Daily., Disp: , Rfl:     History of Present Illness     Pt presents for follow up of AF/AFL/CHF/. Since we last saw the pt, he was admitted for possible AV node ablation and upgrade to a biventricular pacemaker ICD.  Unfortunate at that time, he was found to have severe renal insufficiency and elevated liver function test.  In addition there is a question of whether or not he has infected vegetations on his leads.  He separately was treated with both IV as well as oral antibiotics and received IV milrinone due to his severe class III-IV heart failure symptoms.  Since his discharge she has done reasonably well.  He does remain short of breath with exertional activity but overall energy level has improved.  He has had no hospitalizations or ER visits.  He has had no ICD discharges.  He brings in a blood pressure reading with him which were running running 110/60 with heart rates anywhere from 80 to 100 bpm.  He has not seen heart failure specialist at  as of yet.  He does not have a scheduled appointment either.    ROS:  General:  + fatigue, No weight gain or loss  Cardiovascular:  Denies CP, PND, syncope, near syncope, edema or palpitations.  Pulmonary:  + NGUYEN, no cough, or wheezing      Vitals:    10/09/19 1304   BP: 100/68   BP Location: Left arm   Patient Position: Sitting   Pulse: 105   Weight: 99.8 kg (220 lb)   Height: 182.9 cm (72\")     Body mass index is 29.84 kg/m².  PE:  General: NAD  Neck: no JVD, no carotid bruits, no TM  Heart tachycardic regular rate rhythm normal S1-S2 there is an S3.  MR murmur noted as well.  Lungs: Rales at both bases.  Abd: soft, non-tender, NL BS  Ext: No musculoskeletal deformities, no edema, cyanosis, or clubbing  Psych: normal mood and affect    Diagnostic Data:    Interrogation of his ICD demonstrates normal DDDR pacemaker ICD function.  No ventricular tachycardias.  Approximately 4 months left on his " battery.    Procedures    1. Atrial fibrillation and flutter (CMS/HCC)    2. Chronic systolic congestive heart failure (CMS/HCC)    3. Subacute bacterial endocarditis    4. Ischemic cardiomyopathy    5. Coronary artery disease involving coronary bypass graft of native heart without angina pectoris    6. Essential hypertension          Plan:    1.  Persistent atrial for ablation/atrial flutter.  Heart rates for the most part running approximately 80 to 100 bpm at home.  Complicated situation.  Patient may have bacterial endocarditis.  Pursuing AV node ablation with upgrade to a biventricular pacemaker ICD may worsen the pre-existing condition.  In addition, he has only 4 months on battery function left to reach [facility].  Needs to see heart failure specialist as soon as possible at  to determine whether or not he is a candidate for possible heart transplantation or LVAD insertion.  2.  Class III-IV congestive heart failure on home IV milrinone.  Overall improved since discharge from a standpoint of heart failure symptoms although remains extremely symptomatic.  Continue current medical therapy.  See #1.  3.  Bacterial endocarditis.  Patient now on oral antibiotics.  He is to follow-up with his infectious disease doctor in 2 weeks.  We will discuss the case with him at that time.  4.  Hypertension: Stable.    F/up in 3 months

## 2019-10-14 ENCOUNTER — READMISSION MANAGEMENT (OUTPATIENT)
Dept: CALL CENTER | Facility: HOSPITAL | Age: 64
End: 2019-10-14

## 2019-10-14 NOTE — OUTREACH NOTE
CHF Week 4 Survey      Responses   Facility patient discharged from?  [place]   Does the patient have one of the following disease processes/diagnoses(primary or secondary)?  CHF   Week 4 attempt successful?  No          Michelle Fong RN

## 2019-10-16 ENCOUNTER — TELEPHONE (OUTPATIENT)
Dept: CARDIOLOGY | Facility: CLINIC | Age: 64
End: 2019-10-16

## 2019-10-16 NOTE — TELEPHONE ENCOUNTER
PT wife called to see if we had made the referral to  for heart transplant team and LVAD team. I called [facility] and spoke with Malika [phone]. Faxed records and confirmed. Malika will call the pt to coordinate appt time and date.

## 2019-10-17 NOTE — TELEPHONE ENCOUNTER
Eva contacted Lupe regarding the patient being referred for LVAD vs. Heart transplant. She requested that he see an MD per Dr. Edwards's request.

## 2019-10-25 LAB — FUNGUS WND CULT: NORMAL

## 2019-11-15 NOTE — TELEPHONE ENCOUNTER
Patient's wife left a message on [facility] nurse line. I tried to call the patient back at [phone]. No answer no VM set up.     Message left stated; patient saw [facility] in October and [facility] stated patient has about 4 months left on his battery and sent him to [facility] to be evaluated for a heart transplant.  Patient's wife states they saw [facility] and Mr. Miranda is not a candidate to be on the transplant list at this time.  Wife wanted to make sure that we are following his device remotely.  I spoke with Michael in the Device Clinic.  She made sure patient was scheduled to have remotes monthly until [place].  If patient calls back, please make aware.

## 2019-12-06 NOTE — PROGRESS NOTES
Kreis, Samuel Duane, MD  Rob Miranda  1955 12/06/2019    Patient Active Problem List   Diagnosis   • Coronary artery disease involving coronary bypass graft of native heart without angina pectoris   • Essential hypertension   • History of ASCVD (atherosclerotic cardiovascular disease), status post myocardial infarction, CABG 1992   • Ischemic cardiomyopathy   • Dyslipidemia   • IDDM (insulin dependent diabetes mellitus) (CMS/[facility])   • Atrial fibrillation and flutter (CMS/[facility])   • Sinus bradycardia   • Hyperlipidemia   • Tobacco use   • Hypomagnesemia   • ASCVD (arteriosclerotic cardiovascular disease), status post CABG in 1992.   • Peripheral arterial disease both lower extremities.   • Persistent atrial fibrillation   • Elevated LFTs   • Dyspnea on exertion   • Dyspnea   • Bacterial endocarditis       Dear Kreis, Samuel Duane, MD:    Subjective     History of Present Illness:    Chief Complaint   Patient presents with   • Atrial Fibrillation   • Congestive Heart Failure       Rob Miranda is a pleasant 64 y.o. male with a past medical history significant for ASCVD, status post previous myocardial infarctions and CABG in 1992 with advanced ischemic cardiomyopathy with most recent LV ejection fraction of 20%, status post ICD implantation and previous history of atrial flutter, status post RF ablation for which he follows Dr. Edwards for.  He comes in today for routine cardiology follow-up.    Mr. Miranda has had an eventful last few months while undergoing transesophageal echocardiogram he was found to have bacterial endocarditis and has been treated for this since and is still on antibiotics.  He also unfortunately is at end-of-life for his pacemaker and needs a generator change and Dr. Edwards also wishes to switch the patient to biventricular pacemaker as well in order to try to help assist in left ventricular ejection fraction.  He was also recently hospitalized in September for respiratory  distress was found to have significant pleural effusion he did have subsequent thoracentesis where he had 1900 mL drained off.  Reportedly Dr. Edwards is trying to treat endocarditis and hold off on replacing pacemaker generator and placing biventricular until the endocarditis has resolved.  He also has recently referred the patient to [facility] heart specialist to discuss possible options of heart transplant versus LVAD.    Speaking to the patient and his wife they do report they have seen [facility] however they did not have all the records and so they are unsure if he is a candidate for heart transplant at this time. They are scheduled to follow-up with them again in January.  Thankfully, patient reports since the thoracentesis he has been doing significantly better. Although his ability to do physical exertion is limited due significantly decreased LVEF he reports it is better than it has been in several months and still able to perform his typical activities of daily living around his house.  He also reports he had been sleeping in his recliner in order to breathe better but recently has also been able to switch to his bed with 2-3 pillows and he denies paroxysmal nocturnal dyspnea and he reports improvement in his pedal edema.  He currently denies any chest pains or syncopal episodes.  He also reports to me that he was told by his nephrologist that he is okay to try Entresto however [facility].  Of note since he was seen in Dr. Edwards's office on October 6 by our scales he has lost 52 pounds.    Allergies   Allergen Reactions   • Shellfish-Derived Products Hives, Shortness Of Breath and Swelling   :      Current Outpatient Medications:   •  ALPRAZolam (XANAX) 1 MG tablet, Take 1 mg by mouth 2 (Two) Times a Day As Needed for Anxiety., Disp: , Rfl:   •  apixaban (ELIQUIS) 5 MG tablet tablet, Take 1 tablet by mouth Every 12 (Twelve) Hours., Disp: 180 tablet, Rfl:  3  •  aspirin 81 MG EC tablet, Take 81 mg by mouth Daily., Disp: , Rfl:   •  bumetanide (BUMEX) 1 MG tablet, Take 1 tablet by mouth 2 (Two) Times a Day., Disp: 180 tablet, Rfl: 3  •  doxycycline (MONODOX) 100 MG capsule, Take 100 mg by mouth 2 (Two) Times a Day., Disp: , Rfl:   •  esomeprazole (nexIUM) 40 MG capsule, Take 40 mg by mouth Every Morning Before Breakfast., Disp: , Rfl:   •  ferrous sulfate 325 (65 FE) MG tablet, Take 1 tablet by mouth Daily With Breakfast., Disp: 30 tablet, Rfl: 5  •  gabapentin (NEURONTIN) 100 MG capsule, Take 100 mg by mouth every night at bedtime., Disp: , Rfl: 5  •  ipratropium-albuterol (COMBIVENT RESPIMAT)  MCG/ACT inhaler, Inhale 1 puff 4 (Four) Times a Day As Needed for Wheezing., Disp: , Rfl:   •  magnesium gluconate (MAGONATE) 500 MG tablet, TAKE 1 TABLET TWICE DAILY, Disp: 60 tablet, Rfl: 3  •  metoprolol tartrate (LOPRESSOR) 50 MG tablet, Take 3 tablets by mouth 2 (Two) Times a Day., Disp: 270 tablet, Rfl: 3  •  miconazole (MICOTIN) 2 % powder, Apply  topically to the appropriate area as directed 2 (Two) Times a Day., Disp: 1 each, Rfl: 0  •  Probiotic Product (PROBIOTIC-10) capsule, Take 100 mg by mouth., Disp: , Rfl:   •  sertraline (ZOLOFT) 25 MG tablet, Take 1 tablet by mouth every night at bedtime., Disp: 30 tablet, Rfl: 6  •  tamsulosin (FLOMAX) 0.4 MG capsule 24 hr capsule, Take 1 capsule by mouth Daily., Disp: 30 capsule, Rfl: 0  •  fluticasone (FLONASE) 50 MCG/ACT nasal spray, 2 sprays into the nostril(s) as directed by provider Daily., Disp: , Rfl:   •  insulin detemir (LEVEMIR) 100 UNIT/ML injection, Inject 10 Units under the skin into the appropriate area as directed Every Night. flexpen, Disp: , Rfl: 12  •  Umeclidinium Bromide (INCRUSE ELLIPTA) 62.5 MCG/INH aerosol powder , Inhale Daily., Disp: , Rfl:     The following portions of the patient's history were reviewed and updated as appropriate: allergies, current medications, past family history, past  "medical history, past social history, past surgical history and problem list.    Social History     Tobacco Use   • Smoking status: Current Every Day Smoker     Packs/day: 0.50     Years: 48.00     Pack years: 24.00     Types: Cigarettes   • Smokeless tobacco: Never Used   • Tobacco comment: 5-6 CAD   Substance Use Topics   • Alcohol use: No     Frequency: Never   • Drug use: No       Review of Systems   Constitution: Negative for weakness and malaise/fatigue.   Cardiovascular: Positive for dyspnea on exertion. Negative for chest pain and irregular heartbeat.   Respiratory: Positive for shortness of breath. Negative for cough.    Hematologic/Lymphatic: Negative for bleeding problem. Does not bruise/bleed easily.   Gastrointestinal: Negative for nausea and vomiting.       Objective   Vitals:    12/06/19 1003   BP: 93/62   Pulse: 99   SpO2: 98%   Weight: 76.2 kg (168 lb)   Height: 182.9 cm (72\")     Body mass index is 22.78 kg/m².    Physical Exam   Constitutional: He is oriented to person, place, and time. He appears well-developed and well-nourished. No distress.   Patient had an IV or pic line placed on right arm but was wrapped and not visible   HENT:   Head: Normocephalic and atraumatic.   Cardiovascular: Normal rate, regular rhythm and normal heart sounds.   Pulmonary/Chest: Effort normal. No respiratory distress. He has wheezes.   Musculoskeletal: He exhibits edema ( 1+ pedal edema ).   Neurological: He is alert and oriented to person, place, and time.   Skin: He is not diaphoretic.       Lab Results   Component Value Date     09/16/2019    K 4.9 09/16/2019    CL 99 09/16/2019    CO2 30.0 (H) 09/16/2019    BUN 30 (H) 09/16/2019    CREATININE 1.21 09/16/2019    GLUCOSE 97 09/16/2019    CALCIUM 9.1 09/16/2019    AST 31 09/16/2019    ALT 16 09/16/2019    ALKPHOS 192 (H) 09/16/2019    LABIL2 1.6 09/13/2018     Lab Results   Component Value Date    CKTOTAL 36 09/16/2019     Lab Results   Component Value Date "    WBC 6.40 09/16/2019    HGB 13.0 09/16/2019    HCT 45.1 09/16/2019     09/16/2019     Lab Results   Component Value Date    INR 1.50 (H) 09/13/2019    INR 1.64 (H) 09/12/2019    INR 1.77 (H) 09/11/2019     Lab Results   Component Value Date    MG 2.6 (H) 09/16/2019     Lab Results   Component Value Date    TRIG 106 09/10/2019    HDL 20 (L) 09/10/2019    LDL 60 09/10/2019      Lab Results   Component Value Date    BNP 1,160 (H) 02/08/2014       During this visit the following were done:  Labs Reviewed [x]    Labs Ordered []    Radiology Reports Reviewed [x]    Radiology Ordered []    PCP Records Reviewed []    Referring Provider Records Reviewed []    ER Records Reviewed []    Hospital Records Reviewed []    History Obtained From Family []    Radiology Images Reviewed []    Other Reviewed []    Records Requested []       Procedures    Assessment/Plan    Diagnosis Plan   1. Coronary artery disease involving coronary bypass graft of native heart without angina pectoris     2. Essential hypertension     3. ASCVD (arteriosclerotic cardiovascular disease), status post CABG in 1992.  Basic Metabolic Panel   4. History of ASCVD (atherosclerotic cardiovascular disease), status post myocardial infarction, CABG 1992     5. Atrial fibrillation and flutter (CMS/HCC)     6. Ischemic cardiomyopathy              Recommendations:  1. Unfortunately given patient's comorbid conditions overall his prognosis is still guarded, however, thankfully he has been improving his breathing has significantly improved and he is lost roughly 52 pounds in the last 2 months which she believes is mostly fluid.  I do encouraged him to follow-up with Dr. Edwards's office to be sure that pacemaker generator is changed when appropriate and upgrade to biventricular which they report they will be.  2. I also encouraged him to be sure to follow-up with  regarding possible options of heart transplant or LVAD.  3. In regards to initiating Entresto  given patient's baseline hypotension I am very doubtful that he will be out of tolerate this medication given his blood pressure in the office today was 93/62.  4. Since patient's symptoms have significantly improved I will continue current regimen such as metoprolol tartrate, Bumex, aspirin, metolazone, and Eliquis.  5. I will also be checking a BMP to monitor patient's renal function and potassium levels.  If patient's blood pressure is improved may consider adding Entresto if renal function is stable.    Patient's Body mass index is 22.78 kg/m². BMI is within normal parameters. No follow-up required..     Return in about 3 months (around 3/6/2020).    As always, I appreciate very much the opportunity to participate in the cardiovascular care of your patients.      With Best Regards,    Avery Colbert PA-C
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
## 2019-12-13 PROBLEM — I50.22 CHRONIC SYSTOLIC (CONGESTIVE) HEART FAILURE (HCC): Status: ACTIVE | Noted: 2019-01-01

## 2019-12-13 NOTE — PLAN OF CARE
Problem: Patient Care Overview  Goal: Plan of Care Review  Outcome: Ongoing (interventions implemented as appropriate)  Flowsheets (Taken 12/13/2019 0215)  Progress: no change  Plan of Care Reviewed With: patient  Outcome Summary: pt here for AV node ablation due to afib/flutter with RVR episodes and an upgrade from DDD ICD to BiV ICD. pt states he is already feeling better. milrinone gtt infusing to right arm PICC (since Oct.). 2LNC tonight mainly while sleeping due to desatting to upper 80s. left chest dsg CDI. left arm in sling. right groin site CDI. pt denies complaints/pain. hoping to dc in the am. will cont. to monitor.

## 2019-12-13 NOTE — PROGRESS NOTES
[facility]  Discharge Progress Note     LOS: 0 days   Patient Care Team:  Kreis, Samuel Duane, MD as PCP - General  Kreis, Samuel Duane, MD as PCP - Family Medicine  Ranjith Hernandez MD as Cardiologist (Cardiology)    Chief Complaint:  Atrial fibrillation, dilated cardiomyopathy    Subjective     Interval History: Patient is s/p AV node ablation + upgrade to Bi-V ICD yesterday and has done well overnight.  Denies any specific complaints this morning.  Has ambulated without issue.  Minimal incisional pain.  Feels ready to go home.          Review of Systems:   Pertinent positives in HPI, all others reviewed and negative.      Current Facility-Administered Medications:   •  aspirin EC tablet 81 mg, 81 mg, Oral, Daily, Linda Noe APRN  •  bumetanide (BUMEX) tablet 1 mg, 1 mg, Oral, BID, Linda Noe APRN  •  doxycycline (MONODOX) capsule 100 mg, 100 mg, Oral, Q12H, Linda Noe APRN, 100 mg at 12/12/19 2202  •  ferrous sulfate tablet 325 mg, 325 mg, Oral, Daily With Breakfast, Linda Noe APRN  •  fluticasone (FLONASE) 50 MCG/ACT nasal spray 2 spray, 2 spray, Nasal, Daily, Linda Noe APRN  •  HYDROcodone-acetaminophen (NORCO) 5-325 MG per tablet 1 tablet, 1 tablet, Oral, Q4H PRN, Poli Edwards MD  •  HYDROcodone-acetaminophen (NORCO) 5-325 MG per tablet 1 tablet, 1 tablet, Oral, Q4H PRN, Poli Edwards MD  •  insulin detemir (LEVEMIR) injection 10 Units, 10 Units, Subcutaneous, Nightly, Linda Noe APRN, 10 Units at 12/12/19 2202  •  ipratropium-albuterol (DUO-NEB) nebulizer solution 3 mL, 3 mL, Nebulization, Q6H PRN, Linda Noe APRN  •  levothyroxine (SYNTHROID, LEVOTHROID) tablet 25 mcg, 25 mcg, Oral, Q AM, Linda Noe APRN, 25 mcg at 12/13/19 0553  •  magnesium (as) gluconate (MAGONATE) tablet 27 mg, 27 mg, Oral, BID, Linda Noe APRN,  "27 mg at 12/12/19 2202  •  metoprolol tartrate (LOPRESSOR) tablet 150 mg, 150 mg, Oral, BID, Linda Noe APRN, 150 mg at 12/12/19 2209  •  milrinone 20 mg/100 mL (0.2 mg/mL) in 5 % dextrose infusion  (PATIENT SUPPLIED MED), 0.293 mcg/kg/min, Intravenous, Continuous, Linda Noe APRN, Last Rate: 7.5 mL/hr at 12/13/19 0545, 0.293 mcg/kg/min at 12/13/19 0545  •  ondansetron (ZOFRAN) injection 4 mg, 4 mg, Intravenous, Q6H PRN, Poli Edwards MD  •  ondansetron (ZOFRAN) injection 4 mg, 4 mg, Intravenous, Q6H PRN, Poli Edwards MD  •  pantoprazole (PROTONIX) EC tablet 40 mg, 40 mg, Oral, QAM, Linda Noe APRN, 40 mg at 12/13/19 0553  •  sertraline (ZOLOFT) tablet 25 mg, 25 mg, Oral, Nightly, Linda Noe APRN, 25 mg at 12/12/19 2201  •  sodium chloride 0.9 % flush 10 mL, 10 mL, Intravenous, PRN, Poli Edwards MD  •  sodium chloride 0.9 % flush 3 mL, 3 mL, Intravenous, Q12H, Poli Edwards MD, 3 mL at 12/12/19 2204  •  tamsulosin (FLOMAX) 24 hr capsule 0.4 mg, 0.4 mg, Oral, Nightly, Linda Noe APRN, 0.4 mg at 12/12/19 2201  •  vancomycin 1250 mg/250 mL 0.9% NS IVPB ([facility]), 15 mg/kg, Intravenous, Q12H, Poli Edwards MD, Stopped at 12/13/19 0215      Objective     Vital Sign Min/Max for last 24 hours  Temp  Min: 98.1 °F (36.7 °C)  Max: 98.1 °F (36.7 °C)   BP  Min: 92/83  Max: 125/72   Pulse  Min: 85  Max: 159   Resp  Min: 8  Max: 18   SpO2  Min: 89 %  Max: 96 %   No data recorded   No data recorded     Flowsheet Rows      First Filed Value   Admission Height  182.9 cm (72\") Documented at 12/12/2019 0804   Admission Weight  85.3 kg (188 lb) Documented at 12/12/2019 0804          Physical Exam:     General Appearance:    Alert, cooperative, in no acute distress   Lungs:     Trace crackles in bases, respirations regular, even and            unlabored    Heart:    Regular rhythm and normal rate, normal S1 and S2, no            " murmur, no gallop, no rub, no click   Chest Wall:    No abnormalities observed   Abdomen:     Normal bowel sounds, no masses, soft, non-tender, non-distended    Extremities:   No gross deformities, no edema, no cyanosis,    Pulses:   Pulses palpable and equal bilaterally   Skin:   Implant site clean dry intact without signs of bleeding, hematoma or infection.  Right groin site stable.        Results Review:   Results from last 7 days   Lab Units 12/12/19  0851   WBC 10*3/mm3 7.38   HEMOGLOBIN g/dL 15.8   HEMATOCRIT % 49.8   PLATELETS 10*3/mm3 155     Results from last 7 days   Lab Units 12/12/19  0851   SODIUM mmol/L 137   POTASSIUM mmol/L 4.4   CHLORIDE mmol/L 94*   CO2 mmol/L 30.0*   BUN mg/dL 40*   CREATININE mg/dL 1.19   GLUCOSE mg/dL 106*      Results from last 7 days   Lab Units 12/12/19  0851   HEMOGLOBIN A1C % 8.80*                 Results from last 7 days   Lab Units 12/12/19  0851   PROTIME Seconds 15.5*   INR  1.29*           Intake/Output Summary (Last 24 hours) at 12/13/2019 0828  Last data filed at 12/13/2019 0215  Gross per 24 hour   Intake 250 ml   Output 275 ml   Net -25 ml       I personally viewed and interpreted the patient's EKG/Telemetry data    Chest X-ray: no penumothorax; acceptable RA/RV and new LV lead position.     Telemetry: Bi-V paced, HR  bpm      Present on Admission:  • Atrial fibrillation and flutter (CMS/HCC)  • Ischemic cardiomyopathy  • Chronic systolic (congestive) heart failure (CMS/HCC)    Assessment/Plan    1. Persistent atrial fibrillation/atypical atrial flutter:  - Patient with a history of persistent atrial fibrillation with difficult to control ventricular rates despite AV khai agents, chronic systolic CHF, ICM with an EF of 20% s/p DDD ICD implant in the past.  He has had worsening systolic heart failure in the setting of atrial fibrillation/flutter with RVR.  - S/p AVN ablation + upgrade to Bi-V ICD implant.  Pt has done well overnight. The chest Xray and chest  incision site are unremarkable. Wound care and post device care have been reviewed with the patient in detail.      2. DCM:  - Ischemic in nature.  S/p ICD implant in the past.  Upgrade to Bi-V ICD as per #1.  - Repeat echocardiogram as an outpatient in the future for reassessment of LVEF     3. Chronic systolic CHF, NYHA class III:  - Well compensated at this time.  Currently on home milrinone infusion with improved HF symptoms.  - Evaluated at  - not a candidate for heart transplant or LVAD at this time  - Continue daily bumex and milrinone infusion - hopefully can discontinue milrinone in the next few months.     4. Bacterial endocarditis:  - On chronic suppression with doxycycline.  - Clinically asymptomatic, no recent fevers/chills.      Plan: The patient is stable and will be discharged to home today with plan for wound check in 7-10 days and follow up with Dr. Edwards in 3 months with [facility] device check in [place].    Electronically signed by Linda Noe, CLARIBEL, 12/13/19, 7:30 AM.

## 2019-12-20 NOTE — PROGRESS NOTES
WOUND CHECK    2019    Rob Miranda, : 1955    WOUND CHECK    B/P: (Sitting)  120/80 LEFT ARM   (Standing)     Pulse: 80    Patient has fever: [] Temperature if indicated: 97.7    Wound Location: LEFT INFRACLAVICULAR     Dressing Removed [x]        Old Dressing Appearance:  Clean, dry [x]                 Old, bloody drainage []                            Moist, serous drainage []                Moist, thick yellow/green drainage []       Wound Appearance: Redness []                  Drainage []                  Culture obtained []        Color: N/A     Consistency: NA     Amount: none         Gloves used, wound cleansed with sterile 4x4 and peroxide [x]       MD notified [] MD orders:   Antibiotic started []  If checked, type   Other:     Appointment for follow-up scheduled for 3 months post procedure [x]    Future Appointments   Date Time Provider Department Center   3/6/2020 10:30 AM Avery Colbert PA-C [facility] None   3/13/2020 11:15 AM [facility]           Dawn Grullon MA, 19      MD Signature:______________________________ Completed By/Date:

## 2020-01-01 ENCOUNTER — TELEPHONE (OUTPATIENT)
Dept: CARDIOLOGY | Facility: CLINIC | Age: 65
End: 2020-01-01

## 2020-01-01 ENCOUNTER — OFFICE VISIT (OUTPATIENT)
Dept: CARDIOLOGY | Facility: CLINIC | Age: 65
End: 2020-01-01

## 2020-01-01 VITALS
OXYGEN SATURATION: 94 % | HEART RATE: 80 BPM | SYSTOLIC BLOOD PRESSURE: 100 MMHG | HEIGHT: 72 IN | DIASTOLIC BLOOD PRESSURE: 60 MMHG | WEIGHT: 176 LBS | BODY MASS INDEX: 23.84 KG/M2

## 2020-01-01 VITALS
HEIGHT: 72 IN | DIASTOLIC BLOOD PRESSURE: 51 MMHG | SYSTOLIC BLOOD PRESSURE: 85 MMHG | BODY MASS INDEX: 24.92 KG/M2 | WEIGHT: 184 LBS | HEART RATE: 80 BPM

## 2020-01-01 VITALS
HEART RATE: 70 BPM | WEIGHT: 178 LBS | BODY MASS INDEX: 24.11 KG/M2 | DIASTOLIC BLOOD PRESSURE: 62 MMHG | SYSTOLIC BLOOD PRESSURE: 118 MMHG | TEMPERATURE: 97.3 F | HEIGHT: 72 IN

## 2020-01-01 DIAGNOSIS — I25.810 CORONARY ARTERY DISEASE INVOLVING CORONARY BYPASS GRAFT OF NATIVE HEART WITHOUT ANGINA PECTORIS: ICD-10-CM

## 2020-01-01 DIAGNOSIS — I50.22 CHRONIC SYSTOLIC (CONGESTIVE) HEART FAILURE (HCC): ICD-10-CM

## 2020-01-01 DIAGNOSIS — I25.810 CORONARY ARTERY DISEASE INVOLVING CORONARY BYPASS GRAFT OF NATIVE HEART WITHOUT ANGINA PECTORIS: Primary | ICD-10-CM

## 2020-01-01 DIAGNOSIS — I48.92 ATRIAL FIBRILLATION AND FLUTTER (HCC): Primary | ICD-10-CM

## 2020-01-01 DIAGNOSIS — I33.0 CHRONIC BACTERIAL ENDOCARDITIS: ICD-10-CM

## 2020-01-01 DIAGNOSIS — I25.5 ISCHEMIC CARDIOMYOPATHY: ICD-10-CM

## 2020-01-01 DIAGNOSIS — I48.91 ATRIAL FIBRILLATION AND FLUTTER (HCC): Primary | ICD-10-CM

## 2020-01-01 DIAGNOSIS — I10 ESSENTIAL HYPERTENSION: ICD-10-CM

## 2020-01-01 PROCEDURE — 99213 OFFICE O/P EST LOW 20 MIN: CPT | Performed by: INTERNAL MEDICINE

## 2020-01-01 PROCEDURE — 99213 OFFICE O/P EST LOW 20 MIN: CPT | Performed by: PHYSICIAN ASSISTANT

## 2020-01-01 PROCEDURE — 93000 ELECTROCARDIOGRAM COMPLETE: CPT | Performed by: PHYSICIAN ASSISTANT

## 2020-01-01 PROCEDURE — 93284 PRGRMG EVAL IMPLANTABLE DFB: CPT | Performed by: INTERNAL MEDICINE

## 2020-01-01 RX ORDER — MAGNESIUM GLUCONATE 27 MG(500)
TABLET ORAL
Qty: 60 TABLET | Refills: 3 | OUTPATIENT
Start: 2020-01-01

## 2020-01-01 RX ORDER — ROSUVASTATIN CALCIUM 20 MG/1
20 TABLET, COATED ORAL DAILY
COMMUNITY

## 2020-01-01 RX ORDER — DIGOXIN 125 MCG
1 TABLET ORAL DAILY
COMMUNITY

## 2020-01-01 RX ORDER — PNV NO.95/FERROUS FUM/FOLIC AC 28MG-0.8MG
TABLET ORAL
Qty: 30 TABLET | Refills: 5 | Status: SHIPPED | OUTPATIENT
Start: 2020-01-01

## 2020-01-01 RX ORDER — AMOXICILLIN AND CLAVULANATE POTASSIUM 875; 125 MG/1; MG/1
1 TABLET, FILM COATED ORAL 2 TIMES DAILY
COMMUNITY

## 2020-01-01 RX ORDER — ALBUTEROL SULFATE 90 UG/1
1 AEROSOL, METERED RESPIRATORY (INHALATION) AS NEEDED
COMMUNITY

## 2020-01-01 RX ORDER — TAMSULOSIN HYDROCHLORIDE 0.4 MG/1
0.4 CAPSULE ORAL DAILY
Qty: 30 CAPSULE | Refills: 0 | OUTPATIENT
Start: 2020-01-01

## 2020-01-01 RX ORDER — DOXYCYCLINE HYCLATE 100 MG/1
100 CAPSULE ORAL DAILY
COMMUNITY

## 2020-01-03 NOTE — TELEPHONE ENCOUNTER
Wife called with pt having intermittent stim on left rib cage area.  He is going to go to [facility] today for reprogramming.  Dr Winter is in clinic there today and RODRÍGUEZ is there for check.

## 2020-02-10 NOTE — TELEPHONE ENCOUNTER
Patient is currently on a continuous IV Milrinone drip. Dr. Escobedo has transferred PICC line care to you since the patient is not receiving IV antibiotics any more. The Home Health nurse in [place] needs some information from you.   Does he need regular labs drawn?    She has been doing dressing changes on Tuesdays. Is this ok with  you?   How long does he need to remain on the IV drip?          Nirmala  (P)[phone]  (P)[phone]

## 2020-02-14 NOTE — TELEPHONE ENCOUNTER
Perri, Pt sees [facility] team. Ask them to manage. Ok with me if they want to stop it now since he now has BIV device and may not need it.

## 2020-02-14 NOTE — TELEPHONE ENCOUNTER
Someone with 's CHF team called to let me know that the main physician is not in the office. The patient follows up with them in March and they will discuss the IV Milrinone drip then. She was not sure if he would manage the drip since he did not start the drip initially.

## 2020-02-14 NOTE — TELEPHONE ENCOUNTER
I called and left Nirmala and [facility] team a message letting them know that [facility]'s CHF team will be managing the care of the Milrinone drip.

## 2020-02-25 NOTE — TELEPHONE ENCOUNTER
Nirmala, the patient's Home Health nurse,changed the patient's PICC line dressing today. The insertion site had a fluid filled pocket around it and the patient was c/o pain in that area. She sent him to the ER to be evaluated because the area has not been this way previously. She needs to know who will be following the IV Milrinone drip? I called [facility]s CHF clinic and spoke with Meri today. She said that they have not seen the patient since October 2019. At that time the patient stated that he had so many doctors appointments that he just wanted to f/u with you. She did see that his wife scheduled an appointment with them on March 24 at 10 am. She said that they would not be able to manage the drip now since they did not start it and also because the patient has not been seen since October. She said that she would discuss this with Dr. Hill at the next f/u, but until then you would have to manage the PICC line and IV drip.

## 2020-02-26 NOTE — TELEPHONE ENCOUNTER
I called and spoke with Nirmala, an RN with [facility]. She has been taking care of the Milrinone drip and doing his PICC line care. She spoke with the patient's wife this morning and she said that the patient did not go to the ER yesterday to have his PICC line assessed. Per Dr. Edwards, I instructed her to stop the IV Milrinone drip and discontinue PICC line. She is also going to pull the PICC line as well  because of his recent history of infection. He is not receiving any other medications via PICC line. I also let her know that he will be evaluated by Dr. Hill at 's HF clinic on March 24 at 10 am. She verbalized understanding.

## 2020-03-06 NOTE — PROGRESS NOTES
Kreis, Samuel Duane, MD  Rob Miranda  1955 03/06/2020    Patient Active Problem List   Diagnosis   • Coronary artery disease involving coronary bypass graft of native heart without angina pectoris   • Essential hypertension   • History of ASCVD (atherosclerotic cardiovascular disease), status post myocardial infarction, CABG 1992   • Ischemic cardiomyopathy   • Dyslipidemia   • IDDM (insulin dependent diabetes mellitus) (CMS/HCC)   • Atrial fibrillation and flutter (CMS/HCC)   • Sinus bradycardia   • Hyperlipidemia   • Tobacco use   • Hypomagnesemia   • ASCVD (arteriosclerotic cardiovascular disease), status post CABG in 1992.   • Peripheral arterial disease both lower extremities.   • Persistent atrial fibrillation   • Elevated LFTs   • Dyspnea on exertion   • Dyspnea   • Bacterial endocarditis   • Chronic systolic (congestive) heart failure (CMS/HCC)       Dear Kreis, Samuel Duane, MD:    Subjective     History of Present Illness:    Chief Complaint   Patient presents with   • Atrial Fibrillation and Flutter   • Dizziness       Rob Miranda is a pleasant 65 y.o. male with a past medical history significant for    Allergies   Allergen Reactions   • Shellfish-Derived Products Hives, Shortness Of Breath and Swelling   :      Current Outpatient Medications:   •  apixaban (ELIQUIS) 5 MG tablet tablet, Take 1 tablet by mouth Every 12 (Twelve) Hours., Disp: 180 tablet, Rfl: 3  •  aspirin 81 MG EC tablet, Take 81 mg by mouth Daily., Disp: , Rfl:   •  bumetanide (BUMEX) 1 MG tablet, Take 1 tablet by mouth 2 (Two) Times a Day., Disp: 180 tablet, Rfl: 3  •  doxycycline (MONODOX) 100 MG capsule, Take 100 mg by mouth 2 (Two) Times a Day., Disp: , Rfl:   •  Empagliflozin (JARDIANCE) 10 MG tablet, Take  by mouth 1 (One) Time., Disp: , Rfl:   •  esomeprazole (nexIUM) 40 MG capsule, Take 40 mg by mouth Every Morning Before Breakfast., Disp: , Rfl:   •  ferrous sulfate 325 (65 FE) MG tablet, Take 1 tablet by mouth  Daily With Breakfast., Disp: 30 tablet, Rfl: 5  •  fluticasone (FLONASE) 50 MCG/ACT nasal spray, 2 sprays into the nostril(s) as directed by provider Daily., Disp: , Rfl:   •  gabapentin (NEURONTIN) 100 MG capsule, Take 100 mg by mouth every night at bedtime., Disp: , Rfl: 5  •  insulin detemir (LEVEMIR) 100 UNIT/ML injection, Inject 10 Units under the skin into the appropriate area as directed Every Night. flexpen (Patient taking differently: Inject 40 Units under the skin into the appropriate area as directed Daily. flexpen), Disp: , Rfl: 12  •  ipratropium-albuterol (COMBIVENT RESPIMAT)  MCG/ACT inhaler, Inhale 1 puff 4 (Four) Times a Day As Needed for Wheezing., Disp: , Rfl:   •  levothyroxine (SYNTHROID, LEVOTHROID) 25 MCG tablet, Take 25 mcg by mouth Daily., Disp: , Rfl:   •  magnesium gluconate (MAGONATE) 500 MG tablet, TAKE 1 TABLET TWICE DAILY, Disp: 60 tablet, Rfl: 3  •  metoprolol tartrate (LOPRESSOR) 50 MG tablet, Take 3 tablets by mouth 2 (Two) Times a Day., Disp: 270 tablet, Rfl: 3  •  Milrinone Lactate in Dextrose (MILRINONE IN DEXTROSE IV), Infuse 7.5 mL/hr into a venous catheter Continuous., Disp: , Rfl:   •  Probiotic Product (PROBIOTIC-10) capsule, Take 100 mg by mouth., Disp: , Rfl:   •  sertraline (ZOLOFT) 25 MG tablet, Take 1 tablet by mouth every night at bedtime., Disp: 30 tablet, Rfl: 6  •  tamsulosin (FLOMAX) 0.4 MG capsule 24 hr capsule, Take 1 capsule by mouth Daily., Disp: 30 capsule, Rfl: 0    The following portions of the patient's history were reviewed and updated as appropriate: allergies, current medications, past family history, past medical history, past social history, past surgical history and problem list.    Social History     Tobacco Use   • Smoking status: Current Every Day Smoker     Packs/day: 0.50     Years: 48.00     Pack years: 24.00     Types: Cigarettes   • Smokeless tobacco: Never Used   • Tobacco comment: 5-6 CAD   Substance Use Topics   • Alcohol use: No      "Frequency: Never   • Drug use: No       ROS    Objective   Vitals:    03/06/20 0854   BP: (!) 85/51   BP Location: Right arm   Patient Position: Sitting   Cuff Size: Small Adult   Pulse: 80   Weight: 83.5 kg (184 lb)   Height: 182.9 cm (72.01\")     Body mass index is 24.95 kg/m².    Physical Exam    Lab Results   Component Value Date     12/12/2019    K 4.4 12/12/2019    CL 94 (L) 12/12/2019    CO2 30.0 (H) 12/12/2019    BUN 40 (H) 12/12/2019    CREATININE 1.19 12/12/2019    GLUCOSE 106 (H) 12/12/2019    CALCIUM 10.2 12/12/2019    AST 31 09/16/2019    ALT 16 09/16/2019    ALKPHOS 192 (H) 09/16/2019    LABIL2 1.6 09/13/2018     Lab Results   Component Value Date    CKTOTAL 36 09/16/2019     Lab Results   Component Value Date    WBC 7.38 12/12/2019    HGB 15.8 12/12/2019    HCT 49.8 12/12/2019     12/12/2019     Lab Results   Component Value Date    INR 1.29 (H) 12/12/2019    INR 1.50 (H) 09/13/2019    INR 1.64 (H) 09/12/2019     Lab Results   Component Value Date    MG 2.6 (H) 09/16/2019     Lab Results   Component Value Date    TRIG 106 09/10/2019    HDL 20 (L) 09/10/2019    LDL 60 09/10/2019      Lab Results   Component Value Date    BNP 1,160 (H) 02/08/2014       During this visit the following were done:  Labs Reviewed [x]    Labs Ordered []    Radiology Reports Reviewed [x]    Radiology Ordered []    PCP Records Reviewed []    Referring Provider Records Reviewed []    ER Records Reviewed []    Hospital Records Reviewed []    History Obtained From Family []    Radiology Images Reviewed []    Other Reviewed []    Records Requested []         ECG 12 Lead  Date/Time: 3/6/2020 9:02 AM  Performed by: Avery Colbert PA-C  Authorized by: Avery Colbert PA-C               Assessment/Plan   No diagnosis found.         Recommendations:  1.     Patient's Body mass index is 24.95 kg/m². BMI is {BMI range:63065}.       No follow-ups on file.    As always, I appreciate very much the opportunity to " participate in the cardiovascular care of your patients.      With Best Regards,    Avery Colbert PA-C

## 2020-03-06 NOTE — PROGRESS NOTES
Kreis, Samuel Duane, MD  Rob Miranda  1955 03/06/2020    Patient Active Problem List   Diagnosis   • Coronary artery disease involving coronary bypass graft of native heart without angina pectoris   • Essential hypertension   • History of ASCVD (atherosclerotic cardiovascular disease), status post myocardial infarction, CABG 1992   • Ischemic cardiomyopathy   • Dyslipidemia   • IDDM (insulin dependent diabetes mellitus) (CMS/[facility])   • Atrial fibrillation and flutter (CMS/[facility])   • Sinus bradycardia   • Hyperlipidemia   • Tobacco use   • Hypomagnesemia   • ASCVD (arteriosclerotic cardiovascular disease), status post CABG in 1992.   • Peripheral arterial disease both lower extremities.   • Persistent atrial fibrillation   • Elevated LFTs   • Dyspnea on exertion   • Dyspnea   • Bacterial endocarditis   • Chronic systolic (congestive) heart failure (CMS/[facility])       Dear Kreis, Samuel Duane, MD:    Subjective     History of Present Illness:    Chief Complaint   Patient presents with   • Atrial Fibrillation and Flutter   • Dizziness       Rob Miranda is a pleasant 65 y.o. male with a past medical history significant for ASCVD, status post previous myocardial infarctions and CABG in 1992 with advanced ischemic cardiomyopathy with most recent LV ejection fraction of 20%, status post ICD implantation that was recently upgraded to biventricular and atrial flutter, status post RF ablation and AV ablation recently, for which he follows Dr. Edwards for.  He comes in today for routine cardiology follow-up.    Since patient was last seen he did have an upgrade to his pacemaker to biventricular and also had an AV khai ablation.  Since this time patient reports he has been doing well.  He is still hypotensive today and does report that he has dizziness if he stands too quickly however he has adjusted his lifestyle to adapting for this such as slowly standing up with support and denies any recent falls or syncope.   He does report he is breathing well and only uses 2 pillows to sleep on which she is done for most of his life and does not prop his bed up.  He also denies any PND or worsening pedal edema.  He also reports his atrial flutter seems to be controlled as he denies any palpitations or racing heart rates. He does deny any bleeding issues with eliquis.       Allergies   Allergen Reactions   • Shellfish-Derived Products Hives, Shortness Of Breath and Swelling   :      Current Outpatient Medications:   •  apixaban (ELIQUIS) 5 MG tablet tablet, Take 1 tablet by mouth Every 12 (Twelve) Hours., Disp: 180 tablet, Rfl: 3  •  aspirin 81 MG EC tablet, Take 81 mg by mouth Daily., Disp: , Rfl:   •  bumetanide (BUMEX) 1 MG tablet, Take 1 tablet by mouth 2 (Two) Times a Day., Disp: 180 tablet, Rfl: 3  •  doxycycline (MONODOX) 100 MG capsule, Take 100 mg by mouth 2 (Two) Times a Day., Disp: , Rfl:   •  Empagliflozin (JARDIANCE) 10 MG tablet, Take  by mouth 1 (One) Time., Disp: , Rfl:   •  esomeprazole (nexIUM) 40 MG capsule, Take 40 mg by mouth Every Morning Before Breakfast., Disp: , Rfl:   •  ferrous sulfate 325 (65 FE) MG tablet, Take 1 tablet by mouth Daily With Breakfast., Disp: 30 tablet, Rfl: 5  •  fluticasone (FLONASE) 50 MCG/ACT nasal spray, 2 sprays into the nostril(s) as directed by provider Daily., Disp: , Rfl:   •  gabapentin (NEURONTIN) 100 MG capsule, Take 100 mg by mouth every night at bedtime., Disp: , Rfl: 5  •  insulin detemir (LEVEMIR) 100 UNIT/ML injection, Inject 10 Units under the skin into the appropriate area as directed Every Night. flexpen (Patient taking differently: Inject 40 Units under the skin into the appropriate area as directed Daily. flexpen), Disp: , Rfl: 12  •  ipratropium-albuterol (COMBIVENT RESPIMAT)  MCG/ACT inhaler, Inhale 1 puff 4 (Four) Times a Day As Needed for Wheezing., Disp: , Rfl:   •  levothyroxine (SYNTHROID, LEVOTHROID) 25 MCG tablet, Take 25 mcg by mouth Daily., Disp: , Rfl:  "  •  magnesium gluconate (MAGONATE) 500 MG tablet, TAKE 1 TABLET TWICE DAILY, Disp: 60 tablet, Rfl: 3  •  metoprolol tartrate (LOPRESSOR) 50 MG tablet, Take 3 tablets by mouth 2 (Two) Times a Day., Disp: 270 tablet, Rfl: 3  •  Milrinone Lactate in Dextrose (MILRINONE IN DEXTROSE IV), Infuse 7.5 mL/hr into a venous catheter Continuous., Disp: , Rfl:   •  Probiotic Product (PROBIOTIC-10) capsule, Take 100 mg by mouth., Disp: , Rfl:   •  sertraline (ZOLOFT) 25 MG tablet, Take 1 tablet by mouth every night at bedtime., Disp: 30 tablet, Rfl: 6  •  tamsulosin (FLOMAX) 0.4 MG capsule 24 hr capsule, Take 1 capsule by mouth Daily., Disp: 30 capsule, Rfl: 0    The following portions of the patient's history were reviewed and updated as appropriate: allergies, current medications, past family history, past medical history, past social history, past surgical history and problem list.    Social History     Tobacco Use   • Smoking status: Current Every Day Smoker     Packs/day: 0.50     Years: 48.00     Pack years: 24.00     Types: Cigarettes   • Smokeless tobacco: Never Used   • Tobacco comment: 5-6 CAD   Substance Use Topics   • Alcohol use: No     Frequency: Never   • Drug use: No       Review of Systems   Constitution: Negative for malaise/fatigue.   Cardiovascular: Negative for chest pain, dyspnea on exertion and irregular heartbeat.   Respiratory: Negative for cough and shortness of breath.    Hematologic/Lymphatic: Negative for bleeding problem. Does not bruise/bleed easily.   Gastrointestinal: Negative for nausea and vomiting.   Neurological: Negative for weakness.       Objective   Vitals:    03/06/20 0854   BP: (!) 85/51   BP Location: Right arm   Patient Position: Sitting   Cuff Size: Small Adult   Pulse: 80   Weight: 83.5 kg (184 lb)   Height: 182.9 cm (72.01\")     Body mass index is 24.95 kg/m².    Physical Exam   Constitutional: He is oriented to person, place, and time. He appears well-developed and well-nourished. " No distress.   HENT:   Head: Normocephalic and atraumatic.   Cardiovascular: Normal rate, regular rhythm and normal heart sounds.   Pulmonary/Chest: Effort normal. No respiratory distress. He has wheezes.   Musculoskeletal: He exhibits no edema.   Neurological: He is alert and oriented to person, place, and time.   Skin: He is not diaphoretic.       Lab Results   Component Value Date     12/12/2019    K 4.4 12/12/2019    CL 94 (L) 12/12/2019    CO2 30.0 (H) 12/12/2019    BUN 40 (H) 12/12/2019    CREATININE 1.19 12/12/2019    GLUCOSE 106 (H) 12/12/2019    CALCIUM 10.2 12/12/2019    AST 31 09/16/2019    ALT 16 09/16/2019    ALKPHOS 192 (H) 09/16/2019    LABIL2 1.6 09/13/2018     Lab Results   Component Value Date    CKTOTAL 36 09/16/2019     Lab Results   Component Value Date    WBC 7.38 12/12/2019    HGB 15.8 12/12/2019    HCT 49.8 12/12/2019     12/12/2019     Lab Results   Component Value Date    INR 1.29 (H) 12/12/2019    INR 1.50 (H) 09/13/2019    INR 1.64 (H) 09/12/2019     Lab Results   Component Value Date    MG 2.6 (H) 09/16/2019     Lab Results   Component Value Date    TRIG 106 09/10/2019    HDL 20 (L) 09/10/2019    LDL 60 09/10/2019      Lab Results   Component Value Date    BNP 1,160 (H) 02/08/2014       During this visit the following were done:  Labs Reviewed [x]    Labs Ordered []    Radiology Reports Reviewed [x]    Radiology Ordered []    PCP Records Reviewed []    Referring Provider Records Reviewed []    ER Records Reviewed []    Hospital Records Reviewed []    History Obtained From Family []    Radiology Images Reviewed []    Other Reviewed []    Records Requested []         ECG 12 Lead  Date/Time: 3/6/2020 9:12 AM  Performed by: Avery Colbert PA-C  Authorized by: Avery Colbert PA-C   Comparison: compared with previous ECG   Comparison to previous ECG: Ventricular paced rhythm  Rhythm: sinus rhythm  Pacing: dual chamber pacing  Clinical impression: non-specific  ECG            Assessment/Plan    Diagnosis Plan   1. Coronary artery disease involving coronary bypass graft of native heart without angina pectoris     2. Essential hypertension              Recommendations:  1. Overall patient does seem relatively stable from cardiac standpoint.  He is hypotensive today and I would like to decrease metoprolol however due to patient's difficulty in controlling his atrial fibrillation I will hold off on adjusting metoprolol since he is seen Dr. Edwards in 1 week.  I advised him to be careful when standing and try to drink a Gatorade or other sports drink a day to help improve blood pressure.    Patient's Body mass index is 24.95 kg/m². BMI is within normal parameters. No follow-up required..       Return in about 6 months (around 9/6/2020).    As always, I appreciate very much the opportunity to participate in the cardiovascular care of your patients.      With Best Regards,    Avery Colbert PA-C

## 2020-03-13 NOTE — PROGRESS NOTES
Rob Miranda  1955  [phone]      03/13/2020    [facility] CARDIOLOGY     Kreis, Samuel Duane, MD  [address] [place] [address] DR MAIRA TURNER [id number]    Chief Complaint   Patient presents with   • Atrial Fibrillation   • Cardiomyopathy   • Congestive Heart Failure   • VT       Problem List:  1. Persistent atrial fibrillation  a. Tikosyn 2013  b. ICD shocks secondary to A. fib with RVR  c. PVA 2014, inability to isolate right inferior pulmonary vein secondary to near proximity to esophagus  d. Repeat PVA 3/16/2015 with reisolation of all 4 pulmonary veins, roofline's and mitral valve annulus, left atrial rotor ablation  e. Recurrent A. fib with ECV, 4/17 and 10/17  f. CHADSVASc 4, on Eliquis  g. Atrial flutter with ECV to NSR 11/15/2018  h. Recurrent, persistent atrial flutter since June 2019  i. Plan for EVA/ECV plus AV node ablation plus upgrade to bi-V ICD 08/2019-aborted secondary to RODOLFO, transaminitis and vegetations on ICD lead necessitating [facility] hospital admission 8/19/2019-8/26/2019  j. RFA of AVN 12/12/19  2. DCM  a. Remote MI/CABG, 1991  b. Patent grafts by [facility] 2004, persistent ICM, EF less than 30%  c. Echo 2006 EF 35%  d. Saint Jr ICD, Dr. Márquez, 2006  e. ICD generator change, 2013  f. Echocardiogram [facility], 6/19, EF 20%  g. Echocardiogram 8/19/2019: EF less than 20%, moderate MR, moderate TR, small mobile density present on RA as well as RV leads  h. Upgrade too BiV ICD 12/12/19  3. Chronic systolic CHF, NYHA class III  4. Diabetes mellitus type 2  5. Hypertension  6. Hyperlipidemia  7. Insomnia  8. PAD  a. Severe bilateral lower extremity disease by CTA 3/18, followed by Dr. Vasquez  9. Tobacco abuse  10. ARF, HD, last HD 7/26/2019, followed by Dr. Aguiar, [place], [place]  11. Recent legionnaires PNA, admit to [facility], transfer to  6/18/2019 with 2 weeks on ventilator, discharged to rehab in [place] 7/31/2019        Allergies  Allergies    Allergen Reactions   • Shellfish-Derived Products Hives, Shortness Of Breath and Swelling       Current Medications    Current Outpatient Medications:   •  apixaban (ELIQUIS) 5 MG tablet tablet, Take 1 tablet by mouth Every 12 (Twelve) Hours., Disp: 180 tablet, Rfl: 3  •  aspirin 81 MG EC tablet, Take 81 mg by mouth Daily., Disp: , Rfl:   •  bumetanide (BUMEX) 1 MG tablet, Take 1 tablet by mouth 2 (Two) Times a Day., Disp: 180 tablet, Rfl: 3  •  doxycycline (MONODOX) 100 MG capsule, Take 100 mg by mouth Daily., Disp: , Rfl:   •  Empagliflozin (JARDIANCE) 10 MG tablet, Take  by mouth 1 (One) Time., Disp: , Rfl:   •  esomeprazole (nexIUM) 40 MG capsule, Take 40 mg by mouth Every Morning Before Breakfast., Disp: , Rfl:   •  ferrous sulfate 325 (65 FE) MG tablet, Take 1 tablet by mouth Daily With Breakfast., Disp: 30 tablet, Rfl: 5  •  fluticasone (FLONASE) 50 MCG/ACT nasal spray, 2 sprays into the nostril(s) as directed by provider Daily., Disp: , Rfl:   •  gabapentin (NEURONTIN) 100 MG capsule, Take 100 mg by mouth every night at bedtime., Disp: , Rfl: 5  •  insulin detemir (LEVEMIR) 100 UNIT/ML injection, Inject 10 Units under the skin into the appropriate area as directed Every Night. flexpen (Patient taking differently: Inject 40 Units under the skin into the appropriate area as directed Daily. flexpen), Disp: , Rfl: 12  •  ipratropium-albuterol (COMBIVENT RESPIMAT)  MCG/ACT inhaler, Inhale 1 puff 4 (Four) Times a Day As Needed for Wheezing., Disp: , Rfl:   •  levothyroxine (SYNTHROID, LEVOTHROID) 25 MCG tablet, Take 25 mcg by mouth Daily., Disp: , Rfl:   •  magnesium gluconate (MAGONATE) 500 MG tablet, TAKE 1 TABLET TWICE DAILY, Disp: 60 tablet, Rfl: 3  •  metoprolol tartrate (LOPRESSOR) 50 MG tablet, Take 3 tablets by mouth 2 (Two) Times a Day. (Patient taking differently: Take 100 mg by mouth 2 (Two) Times a Day.), Disp: 270 tablet, Rfl: 3  •  Probiotic Product (PROBIOTIC-10) capsule, Take 100 mg by  "mouth., Disp: , Rfl:   •  rosuvastatin (CRESTOR) 20 MG tablet, Take 20 mg by mouth Daily., Disp: , Rfl:   •  sertraline (ZOLOFT) 25 MG tablet, Take 1 tablet by mouth every night at bedtime., Disp: 30 tablet, Rfl: 6  •  tamsulosin (FLOMAX) 0.4 MG capsule 24 hr capsule, Take 1 capsule by mouth Daily., Disp: 30 capsule, Rfl: 0    History of Present Illness     Pt presents for follow up of VT/AF/HTN/AVB . Since the pt has seen us, pt feels much improved after AVN/upgrade to BiV ICD. Less SOB. Denies any palpitations, CP, LH, and dizziness. Denies any hospitalizations, ER visits, ICD shocks, or TIA/CVA symptoms. Overall feels well. No side effects to medications. BP at 100/60 at home. Off milirinone for 4 weeks. Lowered BBL due to low BP    ROS:  General:  + fatigue, No weight gain or loss  Cardiovascular:  Denies CP, PND, syncope, near syncope, edema or palpitations.  Pulmonary:  + NGUYEN, No cough, or wheezing    Vitals:    03/13/20 1137   BP: 100/60   BP Location: Left arm   Patient Position: Sitting   Pulse: 80   SpO2: 94%   Weight: 79.8 kg (176 lb)   Height: 182.9 cm (72\")       PE:  General: NAD  Neck: no JVD, no carotid bruits, no TM  Heart RRR, NL S1, S2, S3 present, no rubs, murmurs  Lungs: CTA, no wheezes, rhonchi, or rales  Abd: soft, non-tender, NL BS  Ext: No musculoskeletal deformities, no edema, cyanosis, or clubbing  Psych: normal mood and affect    Diagnostic Data:  Procedures.    1. Atrial fibrillation and flutter (CMS/HCC)    2. Ischemic cardiomyopathy    3. Coronary artery disease involving coronary bypass graft of native heart without angina pectoris    4. Chronic bacterial endocarditis        ICD interrogation: NL fxn, NL battery fxn, CAF, No VT, 95% BiV paced.    Plan:  1. Persistent atrial fibrillation/atypical atrial flutter s/p RFA of AVN: overall doing vey well      2. DCM/CHF:  - Ischemic in nature.  S/p ICD implant in the past.  s/p Upgrade to Bi-V ICD: now off IV milirinone and Class II; doing " well    stay on BBL and bumex     3. Bacterial endocarditis:  - On chronic suppression with doxycycline.  - Clinically asymptomatic, no recent fevers/chills.      F/up in 6 months

## 2020-10-09 PROBLEM — I47.29 NSVT (NONSUSTAINED VENTRICULAR TACHYCARDIA) (HCC): Status: ACTIVE | Noted: 2020-01-01

## 2020-10-09 NOTE — PROGRESS NOTES
Rob Miranda  1955  [phone]    10/09/2020    [facility] CARDIOLOGY     Kreis, Samuel Duane, MD  [address] [place] [address] DR MAIRA TURNER [id number]    Chief Complaint   Patient presents with   • Atrial Fibrillation       Problem List:  1. Persistent atrial fibrillation  a. Tikosyn 2013  b. ICD shocks secondary to A. fib with RVR  c. PVA 2014, inability to isolate right inferior pulmonary vein secondary to near proximity to esophagus  d. Repeat PVA 3/16/2015 with reisolation of all 4 pulmonary veins, roofline's and mitral valve annulus, left atrial rotor ablation  e. Recurrent A. fib with ECV, 4/17 and 10/17  f. CHADSVASc 4, on Eliquis  g. Atrial flutter with ECV to NSR 11/15/2018  h. Recurrent, persistent atrial flutter since June 2019  i. Plan for EVA/ECV plus AV node ablation plus upgrade to bi-V ICD 08/2019-aborted secondary to RODOLFO, transaminitis and vegetations on ICD lead necessitating [facility] hospital admission 8/19/2019-8/26/2019  j. RFA of AVN 12/12/19  2. DCM  a. Remote MI/CABG, 1991  b. Patent grafts by [facility] 2004, persistent ICM, EF less than 30%  c. Echo 2006 EF 35%  d. Saint Jr ICD, Dr. Márquez, 2006  e. ICD generator change, 2013  f. Echocardiogram [facility], 6/19, EF 20%  g. Echocardiogram 8/19/2019: EF less than 20%, moderate MR, moderate TR, small mobile density present on RA as well as RV leads  h. Upgrade too BiV ICD 12/12/19  3. Chronic systolic CHF, NYHA class III  4. Diabetes mellitus type 2  5. Hypertension  6. Hyperlipidemia  7. Insomnia  8. PAD  a. Severe bilateral lower extremity disease by CTA 3/18, followed by Dr. Vasquez  b. Stent in left lower extremity 8/2020  c. Lower extremities Bypass x 1 left x 2 on right, complicated by gangrene infection 10/2020  9. Tobacco abuse  10. ARF, HD, last HD 7/26/2019, followed by [facility] [place], [place]  11. Recent legionnaires PNA, admit to [facility], transfer to  6/18/2019 with 2 weeks on  ventilator, discharged to rehab in [place] 7/31/2019  12. Hip fracture repair and shoulder fracture repair     Allergies  Allergies   Allergen Reactions   • Shellfish-Derived Products Hives, Shortness Of Breath and Swelling       Current Medications    Current Outpatient Medications:   •  albuterol sulfate HFA (Ventolin HFA) 108 (90 Base) MCG/ACT inhaler, Inhale 1 puff As Needed., Disp: , Rfl:   •  amoxicillin-clavulanate (AUGMENTIN) 875-125 MG per tablet, Take 1 tablet by mouth 2 (two) times a day., Disp: , Rfl:   •  apixaban (ELIQUIS) 5 MG tablet tablet, Take 1 tablet by mouth Every 12 (Twelve) Hours., Disp: 180 tablet, Rfl: 3  •  aspirin 81 MG EC tablet, Take 81 mg by mouth Daily., Disp: , Rfl:   •  bumetanide (BUMEX) 1 MG tablet, Take 1 tablet by mouth 2 (Two) Times a Day., Disp: 180 tablet, Rfl: 3  •  collagenase 250 UNIT/GM ointment, Apply 1 application topically to the appropriate area as directed Daily., Disp: , Rfl:   •  digoxin (LANOXIN) 125 MCG tablet, Take 1 tablet by mouth Daily., Disp: , Rfl:   •  doxycycline (VIBRAMYCIN) 100 MG capsule, Take 100 mg by mouth Daily., Disp: , Rfl:   •  Empagliflozin (JARDIANCE) 10 MG tablet, Take  by mouth 1 (One) Time., Disp: , Rfl:   •  esomeprazole (nexIUM) 40 MG capsule, Take 40 mg by mouth Every Morning Before Breakfast., Disp: , Rfl:   •  ferrous sulfate 325 (65 Fe) MG tablet, TAKE 1 TABLET BY MOUTH DAILY WITH BREAKFAST., Disp: 30 tablet, Rfl: 5  •  fluticasone (FLONASE) 50 MCG/ACT nasal spray, 2 sprays into the nostril(s) as directed by provider Daily., Disp: , Rfl:   •  gabapentin (NEURONTIN) 100 MG capsule, Take 100 mg by mouth every night at bedtime., Disp: , Rfl: 5  •  insulin detemir (LEVEMIR) 100 UNIT/ML injection, Inject 10 Units under the skin into the appropriate area as directed Every Night. flexpen (Patient taking differently: Inject 40 Units under the skin into the appropriate area as directed Daily. flexpen), Disp: , Rfl: 12  •  ipratropium-albuterol  (COMBIVENT RESPIMAT)  MCG/ACT inhaler, Inhale 1 puff 4 (Four) Times a Day As Needed for Wheezing., Disp: , Rfl:   •  levothyroxine (SYNTHROID, LEVOTHROID) 25 MCG tablet, Take 25 mcg by mouth Daily., Disp: , Rfl:   •  magnesium gluconate (MAGONATE) 500 MG tablet, TAKE 1 TABLET TWICE DAILY, Disp: 60 tablet, Rfl: 3  •  metoprolol tartrate (LOPRESSOR) 50 MG tablet, Take 3 tablets by mouth 2 (Two) Times a Day. (Patient taking differently: Take 100 mg by mouth 2 (Two) Times a Day.), Disp: 270 tablet, Rfl: 3  •  Probiotic Product (PROBIOTIC-10) capsule, Take 100 mg by mouth., Disp: , Rfl:   •  sertraline (ZOLOFT) 25 MG tablet, Take 1 tablet by mouth every night at bedtime., Disp: 30 tablet, Rfl: 6  •  tamsulosin (FLOMAX) 0.4 MG capsule 24 hr capsule, Take 1 capsule by mouth Daily., Disp: 30 capsule, Rfl: 0  •  Umeclidinium Bromide (Incruse Ellipta) 62.5 MCG/INH aerosol powder , Inhale 62.5 mcg Daily As Needed., Disp: , Rfl:   •  rosuvastatin (CRESTOR) 20 MG tablet, Take 20 mg by mouth Daily., Disp: , Rfl:     History of Present Illness     Pt presents for follow up of atrial fibrillation, DCM, BiV ICD check, and recent hospitalization. Since we last saw the pt, he was hospitalized in [place] in March after a fall and fractured his shoulder and hip with subsequent repair of both. Recently, about one week ago, he was discharged from [facility] in [place] after having lower extremity bypass surgery on both legs for ischemic limb pain complicated by gangrene infection. He is still on antibiotics. Apparently, while there pt had tachycardia which was thought to be atrial fibrillation with RVR and he was placed on Diltiazem gtt and also is now on Digoxin. According to his device interrogation today, he had some NSVT and not atrial fibrillation. He is dependent as he has had an AV node ablation. Since his hospitalization, he has been slowly regaining his strength. He just got home yesterday. He denies any SOB, CP,  "or syncope, or ICD shocks. No bleeding issues on Eliquis. No CVA like symptoms. Wound care taking care of his wounds. Will need heal skin graft in future    ROS:  General:  + fatigue,-  weight gain or loss  Cardiovascular:  Denies CP, PND, syncope, near syncope, edema - palpitations.  Pulmonary:  + NGUYEN, No cough, or wheezing      Vitals:    10/09/20 1130   BP: 118/62   BP Location: Left arm   Patient Position: Sitting   Pulse: 70   Temp: 97.3 °F (36.3 °C)   Weight: 80.7 kg (178 lb)   Height: 182.9 cm (72\")     Body mass index is 24.14 kg/m².  PE:  General: NAD. A & O x 3. Sitting in wheelchair.   Neck: no JVD, no carotid bruits, no TM  Heart RRR, NL S1, S2, S3 present, no rubs, + MR murmurs  Lungs: CTA, no wheezes, rhonchi, or rales  Abd: soft, non-tender, NL BS  Ext: No musculoskeletal deformities, no edema, cyanosis, or clubbing  Psych: normal mood and affect    Diagnostic Data:    BiV ICD Manual Interrogation: normal function. BiV paced 96%. NSVT x 4, most recent 8/31/2020. 2.8 years on battery. + dependent, 92% AFL    Procedures    1. Atrial fibrillation and flutter (CMS/HCC)    2. Chronic systolic (congestive) heart failure (CMS/HCC)    3. Ischemic cardiomyopathy          Plan:  1. Persistent atrial fibrillation/atypical atrial flutter s/p RFA of AVN:  - stable, overall doing well. Stop digoxin  - on Eliquis for anticoagulation.       2. DCM/CHF:  - Ischemic in nature.  S/p ICD implant in the past.  s/p Upgrade to Bi-V ICD: now off IV milirinone and Class II; doing well   -EF recently 20% at [facility]    stay on BBL and bumex     3. Bacterial endocarditis:  - On chronic suppression with doxycycline.  - Clinically asymptomatic, no recent fevers/chills.      4. NSVT/VT  - monitor for now.     F/up in 6 months    Scribed for Poli Edwards MD by Leda Gong PA-C. 10/9/2020  11:45 EDT     I, Poli Edwards MD, personally performed the services described in this documentation as scribed by the above named " individual in my presence, and it is both accurate and complete.  10/9/2020  11:55 EDT

## 2021-01-13 NOTE — TELEPHONE ENCOUNTER
Called and spoke with patient and given message per Eliz Apodaca PA-C to stay off of the Entresto for now.  Fwd copy of labs to nephrology.  He has a f/u with them this summer Fernanda?     Terbinafine Counseling: Patient counseling regarding adverse effects of terbinafine including but not limited to headache, diarrhea, rash, upset stomach, liver function test abnormalities, itching, taste/smell disturbance, nausea, abdominal pain, and flatulence.  There is a rare possibility of liver failure that can occur when taking terbinafine.  The patient understands that a baseline LFT and kidney function test may be required. The patient verbalized understanding of the proper use and possible adverse effects of terbinafine.  All of the patient's questions and concerns were addressed.

## (undated) DEVICE — CANN NASL CO2 DIVIDED A/

## (undated) DEVICE — DRSNG SURESITE123 4X4.8IN

## (undated) DEVICE — RADIFOCUS GLIDEWIRE: Brand: GLIDEWIRE

## (undated) DEVICE — CAUTERY TIP POLISHER: Brand: DEVON

## (undated) DEVICE — MEDI-VAC YANKAUER SUCTION HANDLE W/BULBOUS TIP: Brand: CARDINAL HEALTH

## (undated) DEVICE — HI-TORQUE WHISPER MS GUIDE WIRE .014 STRAIGHT TIP 3.0 CM X 190 CM: Brand: HI-TORQUE WHISPER

## (undated) DEVICE — ST EXT IV SMARTSITE 2VLV SP M LL 5ML IV1

## (undated) DEVICE — SYS LD DEL ACUITY BREAKAWAY W/CATH W/GW

## (undated) DEVICE — LIMB HOLDER, WRIST/ANKLE: Brand: DEROYAL

## (undated) DEVICE — IRRIGATOR BULB ASEPTO 60CC STRL

## (undated) DEVICE — SET PRIMARY GRVTY 10DP MALE LL 104IN

## (undated) DEVICE — LEX ELECTRO PHYSIOLOGY: Brand: MEDLINE INDUSTRIES, INC.

## (undated) DEVICE — INTRO SHEATH ENGAGE W/50 GW .038 7F12

## (undated) DEVICE — Device

## (undated) DEVICE — SOL NACL 0.9PCT 1000ML

## (undated) DEVICE — ADULT, W/LG. BACK PAD, RADIOTRANSPARENT ELEMENT AND LEAD WIRE: Brand: DEFIBRILLATION ELECTRODES

## (undated) DEVICE — 3M™ STERI-STRIP™ REINFORCED ADHESIVE SKIN CLOSURES, R1547, 1/2 IN X 4 IN (12 MM X 100 MM), 6 STRIPS/ENVELOPE: Brand: 3M™ STERI-STRIP™

## (undated) DEVICE — ST INF PRI SMRTSTE 20DRP 2VLV 24ML 117

## (undated) DEVICE — CATH COURNARD DECCA CSL 6F120CM

## (undated) DEVICE — BALN PRESS WEDGE 6F 110CM

## (undated) DEVICE — TUBING, SUCTION, 1/4" X 10', STRAIGHT: Brand: MEDLINE

## (undated) DEVICE — PENCL E/S HNDSWCH ROCKRBTN HOLSTR 10FT

## (undated) DEVICE — Device: Brand: EZ STEER

## (undated) DEVICE — DECANT BG O JET

## (undated) DEVICE — INTRO TEAR AWAY/LVD W/SD PRT 10F 13CM